# Patient Record
Sex: FEMALE | Race: WHITE | NOT HISPANIC OR LATINO | Employment: OTHER | RURAL
[De-identification: names, ages, dates, MRNs, and addresses within clinical notes are randomized per-mention and may not be internally consistent; named-entity substitution may affect disease eponyms.]

---

## 2020-11-29 ENCOUNTER — HISTORICAL (OUTPATIENT)
Dept: ADMINISTRATIVE | Facility: HOSPITAL | Age: 70
End: 2020-11-29

## 2020-11-29 LAB
ALBUMIN SERPL BCP-MCNC: 2.5 G/DL (ref 3.5–5)
ALBUMIN/GLOB SERPL: 0.7 {RATIO}
ALP SERPL-CCNC: 82 U/L (ref 55–142)
ALT SERPL W P-5'-P-CCNC: 15 U/L (ref 13–56)
ANION GAP SERPL CALCULATED.3IONS-SCNC: 16 MMOL/L
APTT PPP: 47.9 SECONDS (ref 25.2–37.3)
AST SERPL W P-5'-P-CCNC: 30 U/L (ref 15–37)
BACTERIA #/AREA URNS HPF: ABNORMAL /HPF
BASOPHILS # BLD AUTO: 0.08 X10E3/UL (ref 0–0.2)
BASOPHILS NFR BLD AUTO: 0.3 % (ref 0–1)
BILIRUB SERPL-MCNC: 0.6 MG/DL (ref 0–1.2)
BILIRUB UR QL STRIP: ABNORMAL MG/DL
BUN SERPL-MCNC: 42 MG/DL (ref 7–18)
BUN/CREAT SERPL: 22
CALCIUM SERPL-MCNC: 8 MG/DL (ref 8.5–10.1)
CHLORIDE SERPL-SCNC: 99 MMOL/L (ref 98–107)
CK MB SERPL-MCNC: 2.4 NG/ML (ref 1–3.6)
CK SERPL-CCNC: 470 U/L (ref 26–192)
CLARITY UR: ABNORMAL
CLARITY UR: ABNORMAL
CO2 SERPL-SCNC: 23 MMOL/L (ref 21–32)
COLOR UR: ABNORMAL
COLOR UR: ABNORMAL
CREAT SERPL-MCNC: 1.91 MG/DL (ref 0.55–1.02)
EOSINOPHIL # BLD AUTO: 0 X10E3/UL (ref 0–0.5)
EOSINOPHIL NFR BLD AUTO: 0 % (ref 1–4)
ERYTHROCYTE [DISTWIDTH] IN BLOOD BY AUTOMATED COUNT: 13.1 % (ref 11.5–14.5)
GLOBULIN SER-MCNC: 3.5 G/DL (ref 2–4)
GLUCOSE SERPL-MCNC: 131 MG/DL (ref 74–106)
GLUCOSE UR STRIP-MCNC: NEGATIVE MG/DL
HCT VFR BLD AUTO: 30.4 % (ref 38–47)
HGB BLD-MCNC: 9.8 G/DL (ref 12–16)
IMM GRANULOCYTES # BLD AUTO: 0.49 X10E3/UL (ref 0–0.04)
IMM GRANULOCYTES NFR BLD: 1.9 % (ref 0–0.4)
INR BLD: 2.38 (ref 0–3.3)
KETONES UR STRIP-SCNC: NEGATIVE MG/DL
LEUKOCYTE ESTERASE UR QL STRIP: ABNORMAL LEU/UL
LYMPHOCYTES # BLD AUTO: 0.36 X10E3/UL (ref 1–4.8)
LYMPHOCYTES NFR BLD AUTO: 1.4 % (ref 27–41)
LYMPHOCYTES NFR BLD MANUAL: 2 % (ref 27–41)
MAGNESIUM SERPL-MCNC: 1.4 MG/DL (ref 1.7–2.3)
MCH RBC QN AUTO: 31.1 PG (ref 27–31)
MCHC RBC AUTO-ENTMCNC: 32.2 G/DL (ref 32–36)
MCV RBC AUTO: 96.5 FL (ref 80–96)
MONOCYTES # BLD AUTO: 0.72 X10E3/UL (ref 0–0.8)
MONOCYTES NFR BLD AUTO: 2.8 % (ref 2–6)
MONOCYTES NFR BLD MANUAL: 2 % (ref 2–6)
MPC BLD CALC-MCNC: 11.2 FL (ref 9.4–12.4)
MYOGLOBIN SERPL-MCNC: 937 NG/ML (ref 13–71)
NEUTROPHILS # BLD AUTO: 24.19 X10E3/UL (ref 1.8–7.7)
NEUTROPHILS NFR BLD AUTO: 93.6 % (ref 53–65)
NEUTS BAND NFR BLD MANUAL: 24 % (ref 1–5)
NEUTS SEG NFR BLD MANUAL: 72 % (ref 50–62)
NITRITE UR QL STRIP: POSITIVE
NRBC # BLD AUTO: 0 X10E3/UL (ref 0–0)
NRBC, AUTO (.00): 0 /100 (ref 0–0)
PH UR STRIP: 5 PH UNITS (ref 5–8)
PLATELET # BLD AUTO: 234 X10E3/UL (ref 150–400)
PLATELET MORPHOLOGY: ABNORMAL
POTASSIUM SERPL-SCNC: 3.6 MMOL/L (ref 3.5–5.1)
PROT SERPL-MCNC: 6 G/DL (ref 6.4–8.2)
PROT UR QL STRIP: NEGATIVE MG/DL
PROTHROMBIN TIME: 24.6 SECONDS (ref 11.7–14.7)
RBC # BLD AUTO: 3.15 X10E6/UL (ref 4.2–5.4)
RBC # UR STRIP: NEGATIVE ERY/UL
RBC #/AREA URNS HPF: ABNORMAL /HPF (ref 0–3)
RBC MORPH BLD: NORMAL
SARS-COV-2 RNA AMPLIFICATION, QUAL: NEGATIVE
SODIUM SERPL-SCNC: 134 MMOL/L (ref 136–145)
SP GR UR STRIP: 1.02 (ref 1–1.03)
SQUAMOUS #/AREA URNS LPF: ABNORMAL /LPF
TRANS CELLS #/AREA URNS LPF: ABNORMAL /LPF
TROPONIN I SERPL-MCNC: <0.017 NG/ML (ref 0–0.06)
UROBILINOGEN UR STRIP-ACNC: 1 EU/DL
WBC # BLD AUTO: 25.84 X10E3/UL (ref 4.5–11)
WBC #/AREA URNS HPF: ABNORMAL /HPF (ref 0–5)

## 2020-11-30 ENCOUNTER — HISTORICAL (OUTPATIENT)
Dept: ADMINISTRATIVE | Facility: HOSPITAL | Age: 70
End: 2020-11-30

## 2020-11-30 LAB
ANION GAP SERPL CALCULATED.3IONS-SCNC: 15 MMOL/L
BASOPHILS # BLD AUTO: 0.04 X10E3/UL (ref 0–0.2)
BASOPHILS NFR BLD AUTO: 0.2 % (ref 0–1)
BUN SERPL-MCNC: 34 MG/DL (ref 7–18)
CALCIUM SERPL-MCNC: 7.9 MG/DL (ref 8.5–10.1)
CHLORIDE SERPL-SCNC: 102 MMOL/L (ref 101–111)
CO2 SERPL-SCNC: 21 MMOL/L (ref 21–32)
CREAT SERPL-MCNC: 1.7 MG/DL (ref 0.6–1.3)
EOSINOPHIL # BLD AUTO: 0.01 X10E3/UL (ref 0–0.5)
EOSINOPHIL NFR BLD AUTO: 0 % (ref 1–4)
ERYTHROCYTE [DISTWIDTH] IN BLOOD BY AUTOMATED COUNT: 13.1 % (ref 11.5–14.5)
GLUCOSE SERPL-MCNC: 114 MG/DL (ref 74–106)
HCT VFR BLD AUTO: 28.1 % (ref 38–47)
HGB BLD-MCNC: 9.1 G/DL (ref 12–16)
LYMPHOCYTES # BLD AUTO: 0.8 X10E3/UL (ref 1–4.8)
LYMPHOCYTES NFR BLD AUTO: 3.9 % (ref 27–41)
MAGNESIUM SERPL-MCNC: 1.6 MG/DL (ref 1.8–2.4)
MCH RBC QN AUTO: 30.4 PG (ref 27–31)
MCHC RBC AUTO-ENTMCNC: 32.4 G/DL (ref 32–36)
MCV RBC AUTO: 94 FL (ref 80–96)
MONOCYTES # BLD AUTO: 0.71 X10E3/UL (ref 0–0.8)
MONOCYTES NFR BLD AUTO: 3.4 % (ref 2–6)
MPC BLD CALC-MCNC: 10.9 FL (ref 9.4–12.4)
NEUTROPHILS # BLD AUTO: 19.18 X10E3/UL (ref 1.8–7.7)
NEUTROPHILS NFR BLD AUTO: 92.5 % (ref 53–65)
NT-PROBNP SERPL-MCNC: 8434 PG/ML (ref 5–125)
PLATELET # BLD AUTO: 225 X10E3/UL (ref 150–400)
POTASSIUM SERPL-SCNC: 3.4 MMOL/L (ref 3.6–5)
RBC # BLD AUTO: 2.99 X10E6/UL (ref 4.2–5.4)
SODIUM SERPL-SCNC: 135 MMOL/L (ref 135–145)
WBC # BLD AUTO: 20.74 X10E3/UL (ref 4.5–11)

## 2020-12-01 ENCOUNTER — HISTORICAL (OUTPATIENT)
Dept: ADMINISTRATIVE | Facility: HOSPITAL | Age: 70
End: 2020-12-01

## 2020-12-01 LAB
ANION GAP SERPL CALCULATED.3IONS-SCNC: 15 MMOL/L
BASOPHILS # BLD AUTO: 0.04 X10E3/UL (ref 0–0.2)
BASOPHILS NFR BLD AUTO: 0.2 % (ref 0–1)
BUN SERPL-MCNC: 27 MG/DL (ref 7–18)
CALCIUM SERPL-MCNC: 7.8 MG/DL (ref 8.5–10.1)
CHLORIDE SERPL-SCNC: 104 MMOL/L (ref 101–111)
CO2 SERPL-SCNC: 22 MMOL/L (ref 21–32)
CREAT SERPL-MCNC: 1.6 MG/DL (ref 0.6–1.3)
EOSINOPHIL # BLD AUTO: 0.4 X10E3/UL (ref 0–0.5)
EOSINOPHIL NFR BLD AUTO: 2.2 % (ref 1–4)
EOSINOPHIL NFR BLD MANUAL: 1 % (ref 1–4)
ERYTHROCYTE [DISTWIDTH] IN BLOOD BY AUTOMATED COUNT: 13.2 % (ref 11.5–14.5)
GLUCOSE SERPL-MCNC: 106 MG/DL (ref 74–106)
HCT VFR BLD AUTO: 27.4 % (ref 38–47)
HGB BLD-MCNC: 8.8 G/DL (ref 12–16)
LYMPHOCYTES # BLD AUTO: 1.07 X10E3/UL (ref 1–4.8)
LYMPHOCYTES NFR BLD AUTO: 5.9 % (ref 27–41)
LYMPHOCYTES NFR BLD MANUAL: 6 % (ref 27–41)
MAGNESIUM SERPL-MCNC: 2 MG/DL (ref 1.8–2.4)
MCH RBC QN AUTO: 30.7 PG (ref 27–31)
MCHC RBC AUTO-ENTMCNC: 32.1 G/DL (ref 32–36)
MCV RBC AUTO: 96 FL (ref 80–96)
MONOCYTES # BLD AUTO: 1.28 X10E3/UL (ref 0–0.8)
MONOCYTES NFR BLD AUTO: 7 % (ref 2–6)
MPC BLD CALC-MCNC: 11.6 FL (ref 9.4–12.4)
NEUTROPHILS # BLD AUTO: 15.44 X10E3/UL (ref 1.8–7.7)
NEUTROPHILS NFR BLD AUTO: 84.7 % (ref 53–65)
NEUTS BAND NFR BLD MANUAL: 7 % (ref 1–5)
NEUTS SEG NFR BLD MANUAL: 86 % (ref 50–62)
PLATELET # BLD AUTO: 223 X10E3/UL (ref 150–400)
POTASSIUM SERPL-SCNC: 4.2 MMOL/L (ref 3.6–5)
RBC # BLD AUTO: 2.87 X10E6/UL (ref 4.2–5.4)
REPORT: 38
REPORT: NORMAL
SODIUM SERPL-SCNC: 137 MMOL/L (ref 135–145)
WBC # BLD AUTO: 18.23 X10E3/UL (ref 4.5–11)

## 2020-12-02 ENCOUNTER — HISTORICAL (OUTPATIENT)
Dept: ADMINISTRATIVE | Facility: HOSPITAL | Age: 70
End: 2020-12-02

## 2020-12-02 LAB
ANION GAP SERPL CALCULATED.3IONS-SCNC: 17 MMOL/L
BASOPHILS # BLD AUTO: 0.05 X10E3/UL (ref 0–0.2)
BASOPHILS NFR BLD AUTO: 0.3 % (ref 0–1)
BILIRUB UR QL STRIP: NEGATIVE MG/DL
BUN SERPL-MCNC: 19 MG/DL (ref 7–18)
CALCIUM SERPL-MCNC: 8 MG/DL (ref 8.5–10.1)
CHLORIDE SERPL-SCNC: 100 MMOL/L (ref 101–111)
CLARITY UR: CLEAR
CO2 SERPL-SCNC: 22 MMOL/L (ref 21–32)
COLOR UR: YELLOW
CREAT SERPL-MCNC: 1.4 MG/DL (ref 0.6–1.3)
EOSINOPHIL # BLD AUTO: 0.43 X10E3/UL (ref 0–0.5)
EOSINOPHIL NFR BLD AUTO: 2.2 % (ref 1–4)
EOSINOPHIL NFR BLD MANUAL: 2 % (ref 1–4)
ERYTHROCYTE [DISTWIDTH] IN BLOOD BY AUTOMATED COUNT: 13.5 % (ref 11.5–14.5)
GLUCOSE SERPL-MCNC: 124 MG/DL (ref 74–106)
GLUCOSE UR STRIP-MCNC: NORMAL MG/DL
HCT VFR BLD AUTO: 30.6 % (ref 38–47)
HGB BLD-MCNC: 9.7 G/DL (ref 12–16)
KETONES UR STRIP-SCNC: NEGATIVE MG/DL
LEUKOCYTE ESTERASE UR QL STRIP: NEGATIVE LEU/UL
LYMPHOCYTES # BLD AUTO: 1.66 X10E3/UL (ref 1–4.8)
LYMPHOCYTES NFR BLD AUTO: 8.5 % (ref 27–41)
LYMPHOCYTES NFR BLD MANUAL: 8 % (ref 27–41)
MCH RBC QN AUTO: 30.5 PG (ref 27–31)
MCHC RBC AUTO-ENTMCNC: 31.7 G/DL (ref 32–36)
MCV RBC AUTO: 96 FL (ref 80–96)
MONOCYTES # BLD AUTO: 1.71 X10E3/UL (ref 0–0.8)
MONOCYTES NFR BLD AUTO: 8.8 % (ref 2–6)
MONOCYTES NFR BLD MANUAL: 10 % (ref 2–6)
MPC BLD CALC-MCNC: 11 FL (ref 9.4–12.4)
NEUTROPHILS # BLD AUTO: 15.66 X10E3/UL (ref 1.8–7.7)
NEUTROPHILS NFR BLD AUTO: 80.2 % (ref 53–65)
NEUTS BAND NFR BLD MANUAL: 6 % (ref 1–5)
NEUTS SEG NFR BLD MANUAL: 74 % (ref 50–62)
NITRITE UR QL STRIP: NEGATIVE
PH UR STRIP: 5.5 PH UNITS (ref 5–8)
PLATELET # BLD AUTO: 251 X10E3/UL (ref 150–400)
POTASSIUM SERPL-SCNC: 4 MMOL/L (ref 3.6–5)
PROT UR QL STRIP: NEGATIVE MG/DL
RBC # BLD AUTO: 3.18 X10E6/UL (ref 4.2–5.4)
RBC # UR STRIP: NEGATIVE ERY/UL
SODIUM SERPL-SCNC: 135 MMOL/L (ref 135–145)
SP GR UR STRIP: 1.02 (ref 1–1.03)
UROBILINOGEN UR STRIP-ACNC: 4 MG/DL
WBC # BLD AUTO: 19.51 X10E3/UL (ref 4.5–11)

## 2020-12-04 ENCOUNTER — HISTORICAL (OUTPATIENT)
Dept: ADMINISTRATIVE | Facility: HOSPITAL | Age: 70
End: 2020-12-04

## 2020-12-04 LAB — VANCOMYCIN TROUGH SERPL-MCNC: 8.1 UG/DL (ref 10–20)

## 2020-12-05 LAB
REPORT: NORMAL

## 2020-12-06 LAB
REPORT: NORMAL

## 2020-12-09 ENCOUNTER — HISTORICAL (OUTPATIENT)
Dept: ADMINISTRATIVE | Facility: HOSPITAL | Age: 70
End: 2020-12-09

## 2020-12-09 LAB
ANION GAP SERPL CALCULATED.3IONS-SCNC: 9 MMOL/L
BASOPHILS # BLD AUTO: 0.03 X10E3/UL (ref 0–0.2)
BASOPHILS NFR BLD AUTO: 0.3 % (ref 0–1)
BUN SERPL-MCNC: 21 MG/DL (ref 7–18)
CALCIUM SERPL-MCNC: 8.2 MG/DL (ref 8.5–10.1)
CHLORIDE SERPL-SCNC: 101 MMOL/L (ref 101–111)
CO2 SERPL-SCNC: 30 MMOL/L (ref 21–32)
CREAT SERPL-MCNC: 1.5 MG/DL (ref 0.6–1.3)
EOSINOPHIL # BLD AUTO: 0.11 X10E3/UL (ref 0–0.5)
EOSINOPHIL NFR BLD AUTO: 1.1 % (ref 1–4)
ERYTHROCYTE [DISTWIDTH] IN BLOOD BY AUTOMATED COUNT: 13.6 % (ref 11.5–14.5)
GLUCOSE SERPL-MCNC: 90 MG/DL (ref 74–106)
HCT VFR BLD AUTO: 27.5 % (ref 38–47)
HGB BLD-MCNC: 8.6 G/DL (ref 12–16)
LYMPHOCYTES # BLD AUTO: 1.45 X10E3/UL (ref 1–4.8)
LYMPHOCYTES NFR BLD AUTO: 14.5 % (ref 27–41)
MCH RBC QN AUTO: 30.4 PG (ref 27–31)
MCHC RBC AUTO-ENTMCNC: 31.3 G/DL (ref 32–36)
MCV RBC AUTO: 97 FL (ref 80–96)
MONOCYTES # BLD AUTO: 1.27 X10E3/UL (ref 0–0.8)
MONOCYTES NFR BLD AUTO: 12.7 % (ref 2–6)
MPC BLD CALC-MCNC: 9.5 FL (ref 9.4–12.4)
NEUTROPHILS # BLD AUTO: 7.14 X10E3/UL (ref 1.8–7.7)
NEUTROPHILS NFR BLD AUTO: 71.4 % (ref 53–65)
PLATELET # BLD AUTO: 378 X10E3/UL (ref 150–400)
POTASSIUM SERPL-SCNC: 4.2 MMOL/L (ref 3.6–5)
RBC # BLD AUTO: 2.83 X10E6/UL (ref 4.2–5.4)
SARS-COV+SARS-COV-2 AG RESP QL IA.RAPID: POSITIVE
SODIUM SERPL-SCNC: 136 MMOL/L (ref 135–145)
WBC # BLD AUTO: 10 X10E3/UL (ref 4.5–11)

## 2020-12-16 ENCOUNTER — HISTORICAL (OUTPATIENT)
Dept: ADMINISTRATIVE | Facility: HOSPITAL | Age: 70
End: 2020-12-16

## 2020-12-16 LAB
ANION GAP SERPL CALCULATED.3IONS-SCNC: 13 MMOL/L
BASOPHILS # BLD AUTO: 0.02 X10E3/UL (ref 0–0.2)
BASOPHILS NFR BLD AUTO: 0.3 % (ref 0–1)
BUN SERPL-MCNC: 27 MG/DL (ref 7–18)
CALCIUM SERPL-MCNC: 7.9 MG/DL (ref 8.5–10.1)
CHLORIDE SERPL-SCNC: 100 MMOL/L (ref 98–107)
CO2 SERPL-SCNC: 27 MMOL/L (ref 21–32)
CREAT SERPL-MCNC: 1.19 MG/DL (ref 0.55–1.02)
EOSINOPHIL # BLD AUTO: 0 X10E3/UL (ref 0–0.5)
EOSINOPHIL NFR BLD AUTO: 0 % (ref 1–4)
ERYTHROCYTE [DISTWIDTH] IN BLOOD BY AUTOMATED COUNT: 14.1 % (ref 11.5–14.5)
GLUCOSE SERPL-MCNC: 99 MG/DL (ref 74–106)
HCT VFR BLD AUTO: 28.4 % (ref 38–47)
HGB BLD-MCNC: 9 G/DL (ref 12–16)
LYMPHOCYTES # BLD AUTO: 0.91 X10E3/UL (ref 1–4.8)
LYMPHOCYTES NFR BLD AUTO: 13.7 % (ref 27–41)
MCH RBC QN AUTO: 31 PG (ref 27–31)
MCHC RBC AUTO-ENTMCNC: 31.7 G/DL (ref 32–36)
MCV RBC AUTO: 98 FL (ref 80–96)
MONOCYTES # BLD AUTO: 0.39 X10E3/UL (ref 0–0.8)
MONOCYTES NFR BLD AUTO: 5.9 % (ref 2–6)
MPC BLD CALC-MCNC: 9.9 FL (ref 9.4–12.4)
NEUTROPHILS # BLD AUTO: 5.3 X10E3/UL (ref 1.8–7.7)
NEUTROPHILS NFR BLD AUTO: 80.1 % (ref 53–65)
PLATELET # BLD AUTO: 259 X10E3/UL (ref 150–400)
POTASSIUM SERPL-SCNC: 3.8 MMOL/L (ref 3.5–5.1)
RBC # BLD AUTO: 2.9 X10E6/UL (ref 4.2–5.4)
SODIUM SERPL-SCNC: 136 MMOL/L (ref 136–145)
WBC # BLD AUTO: 6.62 X10E3/UL (ref 4.5–11)

## 2020-12-18 ENCOUNTER — HISTORICAL (OUTPATIENT)
Dept: ADMINISTRATIVE | Facility: HOSPITAL | Age: 70
End: 2020-12-18

## 2020-12-18 LAB
ANION GAP SERPL CALCULATED.3IONS-SCNC: 14 MMOL/L
BASOPHILS # BLD AUTO: 0.02 X10E3/UL (ref 0–0.2)
BASOPHILS NFR BLD AUTO: 0.2 % (ref 0–1)
BILIRUB UR QL STRIP: NEGATIVE MG/DL
BUN SERPL-MCNC: 22 MG/DL (ref 7–18)
CALCIUM SERPL-MCNC: 8.6 MG/DL (ref 8.5–10.1)
CHLORIDE SERPL-SCNC: 99 MMOL/L (ref 98–107)
CLARITY UR: CLEAR
CO2 SERPL-SCNC: 29 MMOL/L (ref 21–32)
COLOR UR: YELLOW
CREAT SERPL-MCNC: 1.21 MG/DL (ref 0.55–1.02)
EOSINOPHIL # BLD AUTO: 0 X10E3/UL (ref 0–0.5)
EOSINOPHIL NFR BLD AUTO: 0 % (ref 1–4)
ERYTHROCYTE [DISTWIDTH] IN BLOOD BY AUTOMATED COUNT: 14.2 % (ref 11.5–14.5)
GLUCOSE SERPL-MCNC: 98 MG/DL (ref 74–106)
GLUCOSE UR STRIP-MCNC: NEGATIVE MG/DL
HCT VFR BLD AUTO: 30.6 % (ref 38–47)
HGB BLD-MCNC: 9.5 G/DL (ref 12–16)
KETONES UR STRIP-SCNC: NEGATIVE MG/DL
LEUKOCYTE ESTERASE UR QL STRIP: NEGATIVE LEU/UL
LYMPHOCYTES # BLD AUTO: 0.87 X10E3/UL (ref 1–4.8)
LYMPHOCYTES NFR BLD AUTO: 10.6 % (ref 27–41)
MCH RBC QN AUTO: 30.4 PG (ref 27–31)
MCHC RBC AUTO-ENTMCNC: 31 G/DL (ref 32–36)
MCV RBC AUTO: 98 FL (ref 80–96)
MONOCYTES # BLD AUTO: 0.35 X10E3/UL (ref 0–0.8)
MONOCYTES NFR BLD AUTO: 4.3 % (ref 2–6)
MPC BLD CALC-MCNC: 10.4 FL (ref 9.4–12.4)
NEUTROPHILS # BLD AUTO: 6.96 X10E3/UL (ref 1.8–7.7)
NEUTROPHILS NFR BLD AUTO: 84.9 % (ref 53–65)
NITRITE UR QL STRIP: NEGATIVE
PH UR STRIP: 5 PH UNITS (ref 5–8)
PLATELET # BLD AUTO: 265 X10E3/UL (ref 150–400)
POTASSIUM SERPL-SCNC: 3.7 MMOL/L (ref 3.5–5.1)
PROT UR QL STRIP: NEGATIVE MG/DL
RBC # BLD AUTO: 3.12 X10E6/UL (ref 4.2–5.4)
RBC # UR STRIP: NEGATIVE ERY/UL
SODIUM SERPL-SCNC: 138 MMOL/L (ref 136–145)
SP GR UR STRIP: 1.02 (ref 1–1.03)
UROBILINOGEN UR STRIP-ACNC: 0.2 MG/DL
WBC # BLD AUTO: 8.2 X10E3/UL (ref 4.5–11)

## 2020-12-19 LAB — GLUCOSE SERPL-MCNC: 91 MG/DL (ref 70–105)

## 2020-12-20 ENCOUNTER — HISTORICAL (OUTPATIENT)
Dept: ADMINISTRATIVE | Facility: HOSPITAL | Age: 70
End: 2020-12-20

## 2020-12-20 LAB
ALBUMIN SERPL BCP-MCNC: 2.7 G/DL (ref 3.5–5)
ALBUMIN/GLOB SERPL: 0.6 {RATIO}
ALP SERPL-CCNC: 94 U/L (ref 55–142)
ALT SERPL W P-5'-P-CCNC: 23 U/L (ref 13–56)
ANION GAP SERPL CALCULATED.3IONS-SCNC: 14 MMOL/L
AST SERPL W P-5'-P-CCNC: 22 U/L (ref 15–37)
BASOPHILS # BLD AUTO: 0.02 X10E3/UL (ref 0–0.2)
BASOPHILS NFR BLD AUTO: 0.2 % (ref 0–1)
BILIRUB SERPL-MCNC: 0.4 MG/DL (ref 0–1.2)
BUN SERPL-MCNC: 20 MG/DL (ref 7–18)
BUN/CREAT SERPL: 16.1
CALCIUM SERPL-MCNC: 8.7 MG/DL (ref 8.5–10.1)
CHLORIDE SERPL-SCNC: 103 MMOL/L (ref 98–107)
CO2 SERPL-SCNC: 30 MMOL/L (ref 21–32)
CO2 SERPL-SCNC: 32 MMOL/L (ref 22–28)
CREAT SERPL-MCNC: 1.24 MG/DL (ref 0.55–1.02)
D DIMER PPP FEU-MCNC: 0.59 UG/ML (ref 0–0.47)
EOSINOPHIL # BLD AUTO: 0 X10E3/UL (ref 0–0.5)
EOSINOPHIL NFR BLD AUTO: 0 % (ref 1–4)
ERYTHROCYTE [DISTWIDTH] IN BLOOD BY AUTOMATED COUNT: 14 % (ref 11.5–14.5)
GLOBULIN SER-MCNC: 4.6 G/DL (ref 2–4)
GLUCOSE SERPL-MCNC: 104 MG/DL (ref 60–95)
GLUCOSE SERPL-MCNC: 136 MG/DL (ref 74–106)
HCO3 UR-SCNC: 30 MMOL/L (ref 21–28)
HCO3 UR-SCNC: 31 MMOL/L (ref 21–28)
HCT VFR BLD AUTO: 33 % (ref 35–51)
HCT VFR BLD AUTO: 33.1 % (ref 38–47)
HGB BLD-MCNC: 10.2 G/DL (ref 12–16)
IMM GRANULOCYTES # BLD AUTO: 0.22 X10E3/UL (ref 0–0.04)
IMM GRANULOCYTES NFR BLD: 2 % (ref 0–0.4)
LACTATE SERPL-SCNC: 2.4 MMOL/L (ref 0.4–2)
LACTATE SERPL-SCNC: 2.7 MMOL/L (ref 0.4–2)
LDH SERPL L TO P-CCNC: 1.8 MMOL/L (ref 0.3–1.2)
LYMPHOCYTES # BLD AUTO: 0.68 X10E3/UL (ref 1–4.8)
LYMPHOCYTES NFR BLD AUTO: 6.1 % (ref 27–41)
LYMPHOCYTES NFR BLD MANUAL: 7 % (ref 27–41)
MAGNESIUM SERPL-MCNC: 1.6 MG/DL (ref 1.7–2.3)
MCH RBC QN AUTO: 30.4 PG (ref 27–31)
MCHC RBC AUTO-ENTMCNC: 30.8 G/DL (ref 32–36)
MCV RBC AUTO: 98.5 FL (ref 80–96)
MONOCYTES # BLD AUTO: 0.99 X10E3/UL (ref 0–0.8)
MONOCYTES NFR BLD AUTO: 8.9 % (ref 2–6)
MONOCYTES NFR BLD MANUAL: 8 % (ref 2–6)
MPC BLD CALC-MCNC: 10.5 FL (ref 9.4–12.4)
MYELOCYTES NFR BLD MANUAL: 1 %
NEUTROPHILS # BLD AUTO: 9.17 X10E3/UL (ref 1.8–7.7)
NEUTROPHILS NFR BLD AUTO: 82.8 % (ref 53–65)
NEUTS BAND NFR BLD MANUAL: 1 % (ref 1–5)
NEUTS SEG NFR BLD MANUAL: 83 % (ref 50–62)
NRBC # BLD AUTO: 0.1 X10E3/UL (ref 0–0)
NRBC, AUTO (.00): 0.8 /100 (ref 0–0)
NT-PROBNP SERPL-MCNC: ABNORMAL PG/ML (ref 1–125)
PCO2 BLDA: 42 MM HG (ref 35–48)
PCO2 BLDA: 52 MM HG (ref 35–48)
PEEP: 10
PH SMN: 7.37 PH UNITS (ref 7.35–7.45)
PH SMN: 7.47 PH UNITS (ref 7.35–7.45)
PHOSPHATE SERPL-MCNC: 3.1 MG/DL (ref 2.5–4.5)
PLATELET # BLD AUTO: 277 X10E3/UL (ref 150–400)
PLATELET MORPHOLOGY: ABNORMAL
PO2 BLDA: 62 MM HG (ref 83–108)
PO2 BLDA: 74 MM HG (ref 83–108)
POC A-ADO2: 586
POC BASE EXCESS ARTERIAL: 3.7 MMOL/L (ref -2–3)
POC BASE EXCESS ARTERIAL: 6.3 MMOL/L (ref -2–3)
POC IONIZED CALCIUM: 1.12 MMOL/L (ref 1.15–1.35)
POC MECH RATE: 18
POC O2 STATUS: 100
POC SATURATED O2: 91 % (ref 95–98)
POC SATURATED O2: 96 % (ref 95–98)
POC TIDAL VOLUME: 400
POTASSIUM SERPL-SCNC: 3.7 MMOL/L (ref 3.5–5.1)
POTASSIUM SERPL-SCNC: 3.8 MMOL/L (ref 3.4–4.5)
PROT SERPL-MCNC: 7.3 G/DL (ref 6.4–8.2)
RBC # BLD AUTO: 3.36 X10E6/UL (ref 4.2–5.4)
RBC MORPH BLD: NORMAL
SODIUM SERPL-SCNC: 135 MMOL/L (ref 136–145)
SODIUM SERPL-SCNC: 143 MMOL/L (ref 136–145)
TROPONIN I SERPL-MCNC: 0.03 NG/ML (ref 0–0.06)
WBC # BLD AUTO: 11.08 X10E3/UL (ref 4.5–11)

## 2020-12-21 ENCOUNTER — HISTORICAL (OUTPATIENT)
Dept: ADMINISTRATIVE | Facility: HOSPITAL | Age: 70
End: 2020-12-21

## 2020-12-21 LAB
ALBUMIN SERPL BCP-MCNC: 2.4 G/DL (ref 3.5–5)
ALBUMIN/GLOB SERPL: 0.6 {RATIO}
ALP SERPL-CCNC: 90 U/L (ref 55–142)
ALT SERPL W P-5'-P-CCNC: 177 U/L (ref 13–56)
ANION GAP SERPL CALCULATED.3IONS-SCNC: 14 MMOL/L
AST SERPL W P-5'-P-CCNC: 336 U/L (ref 15–37)
BASOPHILS # BLD AUTO: 0.03 X10E3/UL (ref 0–0.2)
BASOPHILS NFR BLD AUTO: 0.2 % (ref 0–1)
BILIRUB SERPL-MCNC: 0.5 MG/DL (ref 0–1.2)
BUN SERPL-MCNC: 25 MG/DL (ref 7–18)
BUN/CREAT SERPL: 19.4
CALCIUM SERPL-MCNC: 8.2 MG/DL (ref 8.5–10.1)
CHLORIDE SERPL-SCNC: 103 MMOL/L (ref 98–107)
CO2 SERPL-SCNC: 29 MMOL/L (ref 21–32)
CREAT SERPL-MCNC: 1.29 MG/DL (ref 0.55–1.02)
EOSINOPHIL # BLD AUTO: 0 X10E3/UL (ref 0–0.5)
EOSINOPHIL NFR BLD AUTO: 0 % (ref 1–4)
ERYTHROCYTE [DISTWIDTH] IN BLOOD BY AUTOMATED COUNT: 14.3 % (ref 11.5–14.5)
GLOBULIN SER-MCNC: 4.3 G/DL (ref 2–4)
GLUCOSE SERPL-MCNC: 150 MG/DL (ref 74–106)
HCO3 UR-SCNC: 33 MMOL/L (ref 21–28)
HCT VFR BLD AUTO: 30 % (ref 38–47)
HGB BLD-MCNC: 9.5 G/DL (ref 12–16)
IMM GRANULOCYTES # BLD AUTO: 0.65 X10E3/UL (ref 0–0.04)
IMM GRANULOCYTES NFR BLD: 3.5 % (ref 0–0.4)
LYMPHOCYTES # BLD AUTO: 0.75 X10E3/UL (ref 1–4.8)
LYMPHOCYTES NFR BLD AUTO: 4 % (ref 27–41)
LYMPHOCYTES NFR BLD MANUAL: 4 % (ref 27–41)
MCH RBC QN AUTO: 30.1 PG (ref 27–31)
MCHC RBC AUTO-ENTMCNC: 31.7 G/DL (ref 32–36)
MCV RBC AUTO: 94.9 FL (ref 80–96)
METAMYELOCYTES NFR BLD MANUAL: 1 %
MONOCYTES # BLD AUTO: 1.53 X10E3/UL (ref 0–0.8)
MONOCYTES NFR BLD AUTO: 8.1 % (ref 2–6)
MONOCYTES NFR BLD MANUAL: 7 % (ref 2–6)
MPC BLD CALC-MCNC: 10.9 FL (ref 9.4–12.4)
MYELOCYTES NFR BLD MANUAL: 2 %
NEUTROPHILS # BLD AUTO: 15.86 X10E3/UL (ref 1.8–7.7)
NEUTROPHILS NFR BLD AUTO: 84.2 % (ref 53–65)
NEUTS BAND NFR BLD MANUAL: 2 % (ref 1–5)
NEUTS SEG NFR BLD MANUAL: 84 % (ref 50–62)
NRBC # BLD AUTO: 0.2 X10E3/UL (ref 0–0)
NRBC BLD MANUAL-RTO: 1 /100 (ref 0–0)
NRBC, AUTO (.00): 1 /100 (ref 0–0)
PCO2 BLDA: 54 MM HG (ref 35–48)
PH SMN: 7.4 PH UNITS (ref 7.35–7.45)
PLATELET # BLD AUTO: 245 X10E3/UL (ref 150–400)
PLATELET MORPHOLOGY: ABNORMAL
PO2 BLDA: 113 MM HG (ref 83–108)
POC A-ADO2: ABNORMAL
POC BASE EXCESS ARTERIAL: 7.1 MMOL/L (ref -2–3)
POC SATURATED O2: 98 % (ref 95–98)
POTASSIUM SERPL-SCNC: 4.1 MMOL/L (ref 3.5–5.1)
PROT SERPL-MCNC: 6.7 G/DL (ref 6.4–8.2)
RBC # BLD AUTO: 3.16 X10E6/UL (ref 4.2–5.4)
RBC MORPH BLD: NORMAL
SODIUM SERPL-SCNC: 142 MMOL/L (ref 136–145)
TRIGL SERPL-MCNC: 159 MG/DL
WBC # BLD AUTO: 18.82 X10E3/UL (ref 4.5–11)

## 2020-12-22 ENCOUNTER — HISTORICAL (OUTPATIENT)
Dept: ADMINISTRATIVE | Facility: HOSPITAL | Age: 70
End: 2020-12-22

## 2020-12-22 LAB
ALBUMIN SERPL BCP-MCNC: 2.2 G/DL (ref 3.5–5)
ALBUMIN/GLOB SERPL: 0.6 {RATIO}
ALP SERPL-CCNC: 86 U/L (ref 55–142)
ALT SERPL W P-5'-P-CCNC: 117 U/L (ref 13–56)
ANION GAP SERPL CALCULATED.3IONS-SCNC: 5 MMOL/L
ANISOCYTOSIS BLD QL SMEAR: ABNORMAL
AST SERPL W P-5'-P-CCNC: 161 U/L (ref 15–37)
BASOPHILS # BLD AUTO: 0.01 X10E3/UL (ref 0–0.2)
BASOPHILS NFR BLD AUTO: 0.1 % (ref 0–1)
BILIRUB SERPL-MCNC: 0.4 MG/DL (ref 0–1.2)
BUN SERPL-MCNC: 28 MG/DL (ref 7–18)
BUN/CREAT SERPL: 23
CALCIUM SERPL-MCNC: 8.3 MG/DL (ref 8.5–10.1)
CHLORIDE SERPL-SCNC: 103 MMOL/L (ref 98–107)
CO2 SERPL-SCNC: 37 MMOL/L (ref 21–32)
CREAT SERPL-MCNC: 1.22 MG/DL (ref 0.55–1.02)
D DIMER PPP FEU-MCNC: 0.67 UG/ML (ref 0–0.47)
EOSINOPHIL # BLD AUTO: 0 X10E3/UL (ref 0–0.5)
EOSINOPHIL NFR BLD AUTO: 0 % (ref 1–4)
ERYTHROCYTE [DISTWIDTH] IN BLOOD BY AUTOMATED COUNT: 14 % (ref 11.5–14.5)
GLOBULIN SER-MCNC: 3.6 G/DL (ref 2–4)
GLUCOSE SERPL-MCNC: 136 MG/DL (ref 74–106)
HCT VFR BLD AUTO: 27.5 % (ref 38–47)
HGB BLD-MCNC: 8.8 G/DL (ref 12–16)
HYPOCHROMIA BLD QL SMEAR: SLIGHT
IMM GRANULOCYTES # BLD AUTO: 0.79 X10E3/UL (ref 0–0.04)
IMM GRANULOCYTES NFR BLD: 5.4 % (ref 0–0.4)
LYMPHOCYTES # BLD AUTO: 0.95 X10E3/UL (ref 1–4.8)
LYMPHOCYTES NFR BLD AUTO: 6.5 % (ref 27–41)
LYMPHOCYTES NFR BLD MANUAL: 9 % (ref 27–41)
MAGNESIUM SERPL-MCNC: 1.7 MG/DL (ref 1.7–2.3)
MCH RBC QN AUTO: 30.6 PG (ref 27–31)
MCHC RBC AUTO-ENTMCNC: 32 G/DL (ref 32–36)
MCV RBC AUTO: 95.5 FL (ref 80–96)
MONOCYTES # BLD AUTO: 0.98 X10E3/UL (ref 0–0.8)
MONOCYTES NFR BLD AUTO: 6.7 % (ref 2–6)
MONOCYTES NFR BLD MANUAL: 4 % (ref 2–6)
MPC BLD CALC-MCNC: 11.4 FL (ref 9.4–12.4)
MYELOCYTES NFR BLD MANUAL: 1 %
NEUTROPHILS # BLD AUTO: 11.89 X10E3/UL (ref 1.8–7.7)
NEUTROPHILS NFR BLD AUTO: 81.3 % (ref 53–65)
NEUTS SEG NFR BLD MANUAL: 86 % (ref 50–62)
NRBC # BLD AUTO: 0.3 X10E3/UL (ref 0–0)
NRBC BLD MANUAL-RTO: 5 /100 (ref 0–0)
NRBC, AUTO (.00): 2.3 /100 (ref 0–0)
OVALOCYTES BLD QL SMEAR: ABNORMAL
PHOSPHATE SERPL-MCNC: 2.5 MG/DL (ref 2.5–4.5)
PLATELET # BLD AUTO: 244 X10E3/UL (ref 150–400)
PLATELET MORPHOLOGY: ABNORMAL
POLYCHROMASIA BLD QL SMEAR: ABNORMAL
POTASSIUM SERPL-SCNC: 3 MMOL/L (ref 3.5–5.1)
PROT SERPL-MCNC: 5.8 G/DL (ref 6.4–8.2)
RBC # BLD AUTO: 2.88 X10E6/UL (ref 4.2–5.4)
SODIUM SERPL-SCNC: 142 MMOL/L (ref 136–145)
TRIGL SERPL-MCNC: 118 MG/DL
WBC # BLD AUTO: 14.62 X10E3/UL (ref 4.5–11)

## 2020-12-23 ENCOUNTER — HISTORICAL (OUTPATIENT)
Dept: ADMINISTRATIVE | Facility: HOSPITAL | Age: 70
End: 2020-12-23

## 2020-12-23 LAB
ALBUMIN SERPL BCP-MCNC: 1.9 G/DL (ref 3.5–5)
ALBUMIN/GLOB SERPL: 0.6 {RATIO}
ALP SERPL-CCNC: 79 U/L (ref 55–142)
ALT SERPL W P-5'-P-CCNC: 68 U/L (ref 13–56)
ANION GAP SERPL CALCULATED.3IONS-SCNC: 9 MMOL/L
AST SERPL W P-5'-P-CCNC: 39 U/L (ref 15–37)
BILIRUB SERPL-MCNC: 0.4 MG/DL (ref 0–1.2)
BUN SERPL-MCNC: 38 MG/DL (ref 7–18)
BUN/CREAT SERPL: 38
CALCIUM SERPL-MCNC: 7.6 MG/DL (ref 8.5–10.1)
CHLORIDE SERPL-SCNC: 105 MMOL/L (ref 98–107)
CO2 SERPL-SCNC: 34 MMOL/L (ref 21–32)
CREAT SERPL-MCNC: 1 MG/DL (ref 0.55–1.02)
GLOBULIN SER-MCNC: 3.4 G/DL (ref 2–4)
GLUCOSE SERPL-MCNC: 149 MG/DL (ref 74–106)
POTASSIUM SERPL-SCNC: 3 MMOL/L (ref 3.5–5.1)
PROT SERPL-MCNC: 5.3 G/DL (ref 6.4–8.2)
SODIUM SERPL-SCNC: 145 MMOL/L (ref 136–145)
TRIGL SERPL-MCNC: 137 MG/DL

## 2020-12-25 ENCOUNTER — HISTORICAL (OUTPATIENT)
Dept: ADMINISTRATIVE | Facility: HOSPITAL | Age: 70
End: 2020-12-25

## 2020-12-25 LAB
ALBUMIN SERPL BCP-MCNC: 2 G/DL (ref 3.5–5)
ALBUMIN/GLOB SERPL: 0.5 {RATIO}
ALP SERPL-CCNC: 80 U/L (ref 55–142)
ALT SERPL W P-5'-P-CCNC: 47 U/L (ref 13–56)
ANION GAP SERPL CALCULATED.3IONS-SCNC: 11 MMOL/L
ANISOCYTOSIS BLD QL SMEAR: ABNORMAL
AST SERPL W P-5'-P-CCNC: 28 U/L (ref 15–37)
BASOPHILS # BLD AUTO: 0.05 X10E3/UL (ref 0–0.2)
BASOPHILS NFR BLD AUTO: 0.3 % (ref 0–1)
BILIRUB SERPL-MCNC: 0.4 MG/DL (ref 0–1.2)
BUN SERPL-MCNC: 39 MG/DL (ref 7–18)
BUN/CREAT SERPL: 45.9
CALCIUM SERPL-MCNC: 7.7 MG/DL (ref 8.5–10.1)
CHLORIDE SERPL-SCNC: 105 MMOL/L (ref 98–107)
CO2 SERPL-SCNC: 34 MMOL/L (ref 21–32)
CREAT SERPL-MCNC: 0.85 MG/DL (ref 0.55–1.02)
D DIMER PPP FEU-MCNC: 0.58 UG/ML (ref 0–0.47)
EOSINOPHIL # BLD AUTO: 0 X10E3/UL (ref 0–0.5)
EOSINOPHIL NFR BLD AUTO: 0 % (ref 1–4)
ERYTHROCYTE [DISTWIDTH] IN BLOOD BY AUTOMATED COUNT: 14.9 % (ref 11.5–14.5)
GLOBULIN SER-MCNC: 3.7 G/DL (ref 2–4)
GLUCOSE SERPL-MCNC: 108 MG/DL (ref 74–106)
HCT VFR BLD AUTO: 30.9 % (ref 38–47)
HGB BLD-MCNC: 9.6 G/DL (ref 12–16)
HYPOCHROMIA BLD QL SMEAR: SLIGHT
IMM GRANULOCYTES # BLD AUTO: 1 X10E3/UL (ref 0–0.04)
IMM GRANULOCYTES NFR BLD: 6.4 % (ref 0–0.4)
LYMPHOCYTES # BLD AUTO: 1.33 X10E3/UL (ref 1–4.8)
LYMPHOCYTES NFR BLD AUTO: 8.6 % (ref 27–41)
LYMPHOCYTES NFR BLD MANUAL: 6 % (ref 27–41)
MCH RBC QN AUTO: 29.4 PG (ref 27–31)
MCHC RBC AUTO-ENTMCNC: 31.1 G/DL (ref 32–36)
MCV RBC AUTO: 94.8 FL (ref 80–96)
METAMYELOCYTES NFR BLD MANUAL: 2 %
MONOCYTES # BLD AUTO: 1.36 X10E3/UL (ref 0–0.8)
MONOCYTES NFR BLD AUTO: 8.7 % (ref 2–6)
MONOCYTES NFR BLD MANUAL: 9 % (ref 2–6)
MPC BLD CALC-MCNC: 12.8 FL (ref 9.4–12.4)
NEUTROPHILS # BLD AUTO: 11.81 X10E3/UL (ref 1.8–7.7)
NEUTROPHILS NFR BLD AUTO: 76 % (ref 53–65)
NEUTS BAND NFR BLD MANUAL: 2 % (ref 1–5)
NEUTS SEG NFR BLD MANUAL: 81 % (ref 50–62)
NRBC # BLD AUTO: 0.1 X10E3/UL (ref 0–0)
NRBC, AUTO (.00): 0.6 /100 (ref 0–0)
OVALOCYTES BLD QL SMEAR: ABNORMAL
PLATELET # BLD AUTO: 318 X10E3/UL (ref 150–400)
PLATELET MORPHOLOGY: ABNORMAL
POLYCHROMASIA BLD QL SMEAR: ABNORMAL
POTASSIUM SERPL-SCNC: 3.6 MMOL/L (ref 3.5–5.1)
PROT SERPL-MCNC: 5.7 G/DL (ref 6.4–8.2)
RBC # BLD AUTO: 3.26 X10E6/UL (ref 4.2–5.4)
SODIUM SERPL-SCNC: 146 MMOL/L (ref 136–145)
TRIGL SERPL-MCNC: 146 MG/DL
WBC # BLD AUTO: 15.55 X10E3/UL (ref 4.5–11)

## 2020-12-26 ENCOUNTER — HISTORICAL (OUTPATIENT)
Dept: ADMINISTRATIVE | Facility: HOSPITAL | Age: 70
End: 2020-12-26

## 2020-12-26 LAB — TRIGL SERPL-MCNC: 148 MG/DL

## 2020-12-27 ENCOUNTER — HISTORICAL (OUTPATIENT)
Dept: ADMINISTRATIVE | Facility: HOSPITAL | Age: 70
End: 2020-12-27

## 2020-12-27 LAB
ANION GAP SERPL CALCULATED.3IONS-SCNC: 12 MMOL/L
ANISOCYTOSIS BLD QL SMEAR: ABNORMAL
BASOPHILS # BLD AUTO: 0.05 X10E3/UL (ref 0–0.2)
BASOPHILS NFR BLD AUTO: 0.3 % (ref 0–1)
BUN SERPL-MCNC: 34 MG/DL (ref 7–18)
CALCIUM SERPL-MCNC: 8 MG/DL (ref 8.5–10.1)
CHLORIDE SERPL-SCNC: 104 MMOL/L (ref 98–107)
CO2 SERPL-SCNC: 32 MMOL/L (ref 21–32)
CREAT SERPL-MCNC: 0.84 MG/DL (ref 0.55–1.02)
EOSINOPHIL # BLD AUTO: 0.13 X10E3/UL (ref 0–0.5)
EOSINOPHIL NFR BLD AUTO: 0.7 % (ref 1–4)
ERYTHROCYTE [DISTWIDTH] IN BLOOD BY AUTOMATED COUNT: 14.6 % (ref 11.5–14.5)
GLUCOSE SERPL-MCNC: 94 MG/DL (ref 74–106)
HCT VFR BLD AUTO: 30.2 % (ref 38–47)
HGB BLD-MCNC: 9.6 G/DL (ref 12–16)
IMM GRANULOCYTES # BLD AUTO: 1.28 X10E3/UL (ref 0–0.04)
IMM GRANULOCYTES NFR BLD: 6.6 % (ref 0–0.4)
LYMPHOCYTES # BLD AUTO: 2.41 X10E3/UL (ref 1–4.8)
LYMPHOCYTES NFR BLD AUTO: 12.4 % (ref 27–41)
LYMPHOCYTES NFR BLD MANUAL: 10 % (ref 27–41)
MCH RBC QN AUTO: 30.5 PG (ref 27–31)
MCHC RBC AUTO-ENTMCNC: 31.8 G/DL (ref 32–36)
MCV RBC AUTO: 95.9 FL (ref 80–96)
MONOCYTES # BLD AUTO: 1.7 X10E3/UL (ref 0–0.8)
MONOCYTES NFR BLD AUTO: 8.7 % (ref 2–6)
MONOCYTES NFR BLD MANUAL: 7 % (ref 2–6)
MPC BLD CALC-MCNC: 12.4 FL (ref 9.4–12.4)
MYELOCYTES NFR BLD MANUAL: 1 %
NEUTROPHILS # BLD AUTO: 13.86 X10E3/UL (ref 1.8–7.7)
NEUTROPHILS NFR BLD AUTO: 71.3 % (ref 53–65)
NEUTS SEG NFR BLD MANUAL: 82 % (ref 50–62)
NRBC # BLD AUTO: 0 X10E3/UL (ref 0–0)
NRBC, AUTO (.00): 0.2 /100 (ref 0–0)
OVALOCYTES BLD QL SMEAR: ABNORMAL
PLATELET # BLD AUTO: 343 X10E3/UL (ref 150–400)
PLATELET MORPHOLOGY: ABNORMAL
POLYCHROMASIA BLD QL SMEAR: ABNORMAL
POTASSIUM SERPL-SCNC: 3.5 MMOL/L (ref 3.5–5.1)
RBC # BLD AUTO: 3.15 X10E6/UL (ref 4.2–5.4)
SODIUM SERPL-SCNC: 144 MMOL/L (ref 136–145)
TARGETS BLD QL SMEAR: ABNORMAL
TRIGL SERPL-MCNC: 121 MG/DL
WBC # BLD AUTO: 19.43 X10E3/UL (ref 4.5–11)

## 2020-12-29 ENCOUNTER — HISTORICAL (OUTPATIENT)
Dept: ADMINISTRATIVE | Facility: HOSPITAL | Age: 70
End: 2020-12-29

## 2020-12-29 LAB
ANION GAP SERPL CALCULATED.3IONS-SCNC: 12 MMOL/L
BUN SERPL-MCNC: 25 MG/DL (ref 7–18)
CALCIUM SERPL-MCNC: 8.2 MG/DL (ref 8.5–10.1)
CHLORIDE SERPL-SCNC: 104 MMOL/L (ref 98–107)
CO2 SERPL-SCNC: 29 MMOL/L (ref 21–32)
CREAT SERPL-MCNC: 0.8 MG/DL (ref 0.55–1.02)
GLUCOSE SERPL-MCNC: 85 MG/DL (ref 74–106)
POTASSIUM SERPL-SCNC: 3.7 MMOL/L (ref 3.5–5.1)
SODIUM SERPL-SCNC: 141 MMOL/L (ref 136–145)

## 2020-12-30 ENCOUNTER — HISTORICAL (OUTPATIENT)
Dept: ADMINISTRATIVE | Facility: HOSPITAL | Age: 70
End: 2020-12-30

## 2020-12-30 LAB
ANION GAP SERPL CALCULATED.3IONS-SCNC: 14 MMOL/L
BASOPHILS # BLD AUTO: 0.08 X10E3/UL (ref 0–0.2)
BASOPHILS NFR BLD AUTO: 0.5 % (ref 0–1)
BUN SERPL-MCNC: 23 MG/DL (ref 7–18)
CALCIUM SERPL-MCNC: 8.4 MG/DL (ref 8.5–10.1)
CHLORIDE SERPL-SCNC: 102 MMOL/L (ref 98–107)
CHOLEST SERPL-MCNC: 145 MG/DL
CHOLEST/HDLC SERPL: 3.7 {RATIO}
CO2 SERPL-SCNC: 28 MMOL/L (ref 21–32)
CREAT SERPL-MCNC: 0.92 MG/DL (ref 0.55–1.02)
CRP SERPL-MCNC: 5.4 UG/ML (ref 0–0.8)
EOSINOPHIL # BLD AUTO: 0.11 X10E3/UL (ref 0–0.5)
EOSINOPHIL NFR BLD AUTO: 0.6 % (ref 1–4)
ERYTHROCYTE [DISTWIDTH] IN BLOOD BY AUTOMATED COUNT: 15.1 % (ref 11.5–14.5)
GLUCOSE SERPL-MCNC: 71 MG/DL (ref 74–106)
HCT VFR BLD AUTO: 34.6 % (ref 38–47)
HDLC SERPL-MCNC: 39 MG/DL
HGB BLD-MCNC: 10.7 G/DL (ref 12–16)
IMM GRANULOCYTES # BLD AUTO: 0.63 X10E3/UL (ref 0–0.04)
IMM GRANULOCYTES NFR BLD: 3.6 % (ref 0–0.4)
LDLC SERPL CALC-MCNC: 81 MG/DL
LYMPHOCYTES # BLD AUTO: 1.45 X10E3/UL (ref 1–4.8)
LYMPHOCYTES NFR BLD AUTO: 8.2 % (ref 27–41)
MCH RBC QN AUTO: 30 PG (ref 27–31)
MCHC RBC AUTO-ENTMCNC: 30.9 G/DL (ref 32–36)
MCV RBC AUTO: 96.9 FL (ref 80–96)
MONOCYTES # BLD AUTO: 1.93 X10E3/UL (ref 0–0.8)
MONOCYTES NFR BLD AUTO: 10.9 % (ref 2–6)
MPC BLD CALC-MCNC: 11.8 FL (ref 9.4–12.4)
NEUTROPHILS # BLD AUTO: 13.49 X10E3/UL (ref 1.8–7.7)
NEUTROPHILS NFR BLD AUTO: 76.2 % (ref 53–65)
NRBC # BLD AUTO: 0 X10E3/UL (ref 0–0)
NRBC, AUTO (.00): 0 /100 (ref 0–0)
PLATELET # BLD AUTO: 464 X10E3/UL (ref 150–400)
POTASSIUM SERPL-SCNC: 3.7 MMOL/L (ref 3.5–5.1)
RBC # BLD AUTO: 3.57 X10E6/UL (ref 4.2–5.4)
SODIUM SERPL-SCNC: 140 MMOL/L (ref 136–145)
T4 SERPL-MCNC: 12.9 UG/DL (ref 4.8–13.9)
TRIGL SERPL-MCNC: 124 MG/DL
TSH SERPL DL<=0.005 MIU/L-ACNC: 1.24 UIU/ML (ref 0.36–3.74)
WBC # BLD AUTO: 17.69 X10E3/UL (ref 4.5–11)

## 2020-12-31 ENCOUNTER — HISTORICAL (OUTPATIENT)
Dept: ADMINISTRATIVE | Facility: HOSPITAL | Age: 70
End: 2020-12-31

## 2020-12-31 LAB
ANION GAP SERPL CALCULATED.3IONS-SCNC: 9 MMOL/L
ANISOCYTOSIS BLD QL SMEAR: ABNORMAL
BASOPHILS # BLD AUTO: 0.06 X10E3/UL (ref 0–0.2)
BASOPHILS NFR BLD AUTO: 0.4 % (ref 0–1)
BUN SERPL-MCNC: 24 MG/DL (ref 7–17)
CALCIUM SERPL-MCNC: 8.9 MG/DL (ref 8.5–10.1)
CHLORIDE SERPL-SCNC: 105 MMOL/L (ref 98–107)
CO2 SERPL-SCNC: 28 MMOL/L (ref 21–32)
CREAT SERPL-MCNC: 0.9 MG/DL (ref 0.55–1.3)
EOSINOPHIL # BLD AUTO: 0.03 X10E3/UL (ref 0–0.5)
EOSINOPHIL NFR BLD AUTO: 0.2 % (ref 1–4)
ERYTHROCYTE [DISTWIDTH] IN BLOOD BY AUTOMATED COUNT: 15.4 % (ref 11.5–14.5)
GLUCOSE SERPL-MCNC: 97 MG/DL (ref 74–106)
HCT VFR BLD AUTO: 31.7 % (ref 38–47)
HGB BLD-MCNC: 9.7 G/DL (ref 12–16)
HYPOCHROMIA BLD QL SMEAR: SLIGHT
LYMPHOCYTES # BLD AUTO: 1.62 X10E3/UL (ref 1–4.8)
LYMPHOCYTES NFR BLD AUTO: 9.8 % (ref 27–41)
LYMPHOCYTES NFR BLD MANUAL: 11 % (ref 27–41)
MCH RBC QN AUTO: 29.8 PG (ref 27–31)
MCHC RBC AUTO-ENTMCNC: 30.6 G/DL (ref 32–36)
MCV RBC AUTO: 97 FL (ref 80–96)
MONOCYTES # BLD AUTO: 1.83 X10E3/UL (ref 0–0.8)
MONOCYTES NFR BLD AUTO: 11 % (ref 2–6)
MONOCYTES NFR BLD MANUAL: 8 % (ref 2–6)
MPC BLD CALC-MCNC: 10.9 FL (ref 9.4–12.4)
NEUTROPHILS # BLD AUTO: 13.04 X10E3/UL (ref 1.8–7.7)
NEUTROPHILS NFR BLD AUTO: 78.6 % (ref 53–65)
NEUTS BAND NFR BLD MANUAL: 2 % (ref 1–5)
NEUTS SEG NFR BLD MANUAL: 79 % (ref 50–62)
PLATELET # BLD AUTO: 392 X10E3/UL (ref 150–400)
PLATELET MORPHOLOGY: NORMAL
POTASSIUM SERPL-SCNC: 3.8 MMOL/L (ref 3.5–5.1)
RBC # BLD AUTO: 3.26 X10E6/UL (ref 4.2–5.4)
SODIUM SERPL-SCNC: 138 MMOL/L (ref 136–145)
WBC # BLD AUTO: 16.58 X10E3/UL (ref 4.5–11)

## 2021-01-01 ENCOUNTER — HISTORICAL (OUTPATIENT)
Dept: ADMINISTRATIVE | Facility: HOSPITAL | Age: 71
End: 2021-01-01

## 2021-01-01 LAB
ALBUMIN SERPL BCP-MCNC: 2.2 G/DL (ref 3.4–5)
ALBUMIN/GLOB SERPL: 0.6 {RATIO}
ALP SERPL-CCNC: 71 U/L (ref 46–116)
ALT SERPL W P-5'-P-CCNC: 29 U/L (ref 14–63)
ANION GAP SERPL CALCULATED.3IONS-SCNC: 9 MMOL/L
ANISOCYTOSIS BLD QL SMEAR: ABNORMAL
AST SERPL W P-5'-P-CCNC: 13 U/L (ref 15–37)
BASOPHILS # BLD AUTO: 0.05 X10E3/UL (ref 0–0.2)
BASOPHILS NFR BLD AUTO: 0.4 % (ref 0–1)
BASOPHILS NFR BLD MANUAL: 1 % (ref 0–1)
BILIRUB SERPL-MCNC: 0.5 MG/DL (ref 0.2–1)
BILIRUB UR QL STRIP: NEGATIVE MG/DL
BUN SERPL-MCNC: 22 MG/DL (ref 7–17)
BUN/CREAT SERPL: 22.9
CALCIUM SERPL-MCNC: 8.8 MG/DL (ref 8.5–10.1)
CHLORIDE SERPL-SCNC: 103 MMOL/L (ref 98–107)
CLARITY UR: ABNORMAL
CO2 SERPL-SCNC: 30 MMOL/L (ref 21–32)
COLOR UR: YELLOW
CREAT SERPL-MCNC: 0.96 MG/DL (ref 0.55–1.3)
EOSINOPHIL # BLD AUTO: 0.03 X10E3/UL (ref 0–0.5)
EOSINOPHIL NFR BLD AUTO: 0.2 % (ref 1–4)
ERYTHROCYTE [DISTWIDTH] IN BLOOD BY AUTOMATED COUNT: 15.5 % (ref 11.5–14.5)
GLOBULIN SER-MCNC: 3.7 G/DL
GLUCOSE SERPL-MCNC: 101 MG/DL (ref 74–106)
GLUCOSE UR STRIP-MCNC: NORMAL MG/DL
HCT VFR BLD AUTO: 30.9 % (ref 38–47)
HGB BLD-MCNC: 9.4 G/DL (ref 12–16)
HYPOCHROMIA BLD QL SMEAR: ABNORMAL
KETONES UR STRIP-SCNC: NEGATIVE MG/DL
LEUKOCYTE ESTERASE UR QL STRIP: NEGATIVE LEU/UL
LYMPHOCYTES # BLD AUTO: 1.29 X10E3/UL (ref 1–4.8)
LYMPHOCYTES NFR BLD AUTO: 9.2 % (ref 27–41)
LYMPHOCYTES NFR BLD MANUAL: 9 % (ref 27–41)
MCH RBC QN AUTO: 30.2 PG (ref 27–31)
MCHC RBC AUTO-ENTMCNC: 30.4 G/DL (ref 32–36)
MCV RBC AUTO: 99 FL (ref 80–96)
MONOCYTES # BLD AUTO: 1.66 X10E3/UL (ref 0–0.8)
MONOCYTES NFR BLD AUTO: 11.8 % (ref 2–6)
MONOCYTES NFR BLD MANUAL: 5 % (ref 2–6)
MPC BLD CALC-MCNC: 10.7 FL (ref 9.4–12.4)
NEUTROPHILS # BLD AUTO: 11.03 X10E3/UL (ref 1.8–7.7)
NEUTROPHILS NFR BLD AUTO: 78.4 % (ref 53–65)
NEUTS SEG NFR BLD MANUAL: 85 % (ref 50–62)
NITRITE UR QL STRIP: NEGATIVE MG/DL
PH UR STRIP: 5.5 PH UNITS (ref 5–8)
PLATELET # BLD AUTO: 352 X10E3/UL (ref 150–400)
PLATELET MORPHOLOGY: NORMAL
POTASSIUM SERPL-SCNC: 3.9 MMOL/L (ref 3.5–5.1)
PROT SERPL-MCNC: 5.9 G/DL (ref 6.4–8.2)
PROT UR QL STRIP: NEGATIVE MG/DL
RBC # BLD AUTO: 3.11 X10E6/UL (ref 4.2–5.4)
RBC # UR STRIP: NEGATIVE ERY/UL
SODIUM SERPL-SCNC: 138 MMOL/L (ref 136–145)
SP GR UR STRIP: 1.02 (ref 1–1.03)
UROBILINOGEN UR STRIP-ACNC: 0.2 MG/DL
WBC # BLD AUTO: 14.06 X10E3/UL (ref 4.5–11)

## 2021-01-02 ENCOUNTER — HISTORICAL (OUTPATIENT)
Dept: ADMINISTRATIVE | Facility: HOSPITAL | Age: 71
End: 2021-01-02

## 2021-01-04 LAB — C DIFF TOX A+B STL IA-ACNC: NEGATIVE

## 2021-01-05 ENCOUNTER — HISTORICAL (OUTPATIENT)
Dept: ADMINISTRATIVE | Facility: HOSPITAL | Age: 71
End: 2021-01-05

## 2021-01-05 LAB
ALBUMIN SERPL BCP-MCNC: 2.6 G/DL (ref 3.4–5)
ALBUMIN/GLOB SERPL: 0.6 {RATIO}
ALP SERPL-CCNC: 87 U/L (ref 46–116)
ALT SERPL W P-5'-P-CCNC: 37 U/L (ref 14–63)
ANION GAP SERPL CALCULATED.3IONS-SCNC: 11 MMOL/L
ANISOCYTOSIS BLD QL SMEAR: ABNORMAL
AST SERPL W P-5'-P-CCNC: 16 U/L (ref 15–37)
BACTERIA #/AREA URNS HPF: ABNORMAL /HPF
BASOPHILS # BLD AUTO: 0.08 X10E3/UL (ref 0–0.2)
BASOPHILS NFR BLD AUTO: 0.6 % (ref 0–1)
BILIRUB SERPL-MCNC: 0.4 MG/DL (ref 0.2–1)
BILIRUB UR QL STRIP: NEGATIVE MG/DL
BUN SERPL-MCNC: 21 MG/DL (ref 7–17)
BUN/CREAT SERPL: 22.8
CALCIUM SERPL-MCNC: 9.4 MG/DL (ref 8.5–10.1)
CHLORIDE SERPL-SCNC: 104 MMOL/L (ref 98–107)
CLARITY UR: ABNORMAL
CLARITY UR: ABNORMAL
CO2 SERPL-SCNC: 28 MMOL/L (ref 21–32)
COLOR UR: YELLOW
COLOR UR: YELLOW
CREAT SERPL-MCNC: 0.92 MG/DL (ref 0.55–1.3)
EOSINOPHIL # BLD AUTO: 0.06 X10E3/UL (ref 0–0.5)
EOSINOPHIL NFR BLD AUTO: 0.4 % (ref 1–4)
EOSINOPHIL NFR BLD MANUAL: 1 % (ref 1–4)
ERYTHROCYTE [DISTWIDTH] IN BLOOD BY AUTOMATED COUNT: 15.4 % (ref 11.5–14.5)
GLOBULIN SER-MCNC: 4.1 G/DL
GLUCOSE SERPL-MCNC: 98 MG/DL (ref 74–106)
GLUCOSE UR STRIP-MCNC: NORMAL MG/DL
HCT VFR BLD AUTO: 34 % (ref 38–47)
HGB BLD-MCNC: 10.4 G/DL (ref 12–16)
HYPOCHROMIA BLD QL SMEAR: SLIGHT
KETONES UR STRIP-SCNC: NEGATIVE MG/DL
LEUKOCYTE ESTERASE UR QL STRIP: NEGATIVE LEU/UL
LYMPHOCYTES # BLD AUTO: 1.88 X10E3/UL (ref 1–4.8)
LYMPHOCYTES NFR BLD AUTO: 13.9 % (ref 27–41)
LYMPHOCYTES NFR BLD MANUAL: 13 % (ref 27–41)
MCH RBC QN AUTO: 30.2 PG (ref 27–31)
MCHC RBC AUTO-ENTMCNC: 30.6 G/DL (ref 32–36)
MCV RBC AUTO: 99 FL (ref 80–96)
MONOCYTES # BLD AUTO: 0.98 X10E3/UL (ref 0–0.8)
MONOCYTES NFR BLD AUTO: 7.3 % (ref 2–6)
MONOCYTES NFR BLD MANUAL: 6 % (ref 2–6)
MPC BLD CALC-MCNC: 11.3 FL (ref 9.4–12.4)
NEUTROPHILS # BLD AUTO: 10.51 X10E3/UL (ref 1.8–7.7)
NEUTROPHILS NFR BLD AUTO: 77.8 % (ref 53–65)
NEUTS SEG NFR BLD MANUAL: 80 % (ref 50–62)
NITRITE UR QL STRIP: POSITIVE MG/DL
PH UR STRIP: 5.5 PH UNITS (ref 5–8)
PLATELET # BLD AUTO: 325 X10E3/UL (ref 150–400)
PLATELET MORPHOLOGY: NORMAL
POTASSIUM SERPL-SCNC: 3.1 MMOL/L (ref 3.5–5.1)
PROT SERPL-MCNC: 6.7 G/DL (ref 6.4–8.2)
PROT UR QL STRIP: NEGATIVE MG/DL
RBC # BLD AUTO: 3.44 X10E6/UL (ref 4.2–5.4)
RBC # UR STRIP: ABNORMAL ERY/UL
RBC #/AREA URNS HPF: ABNORMAL /HPF (ref 0–3)
SODIUM SERPL-SCNC: 140 MMOL/L (ref 136–145)
SP GR UR STRIP: 1.02 (ref 1–1.03)
SQUAMOUS #/AREA URNS LPF: ABNORMAL /LPF
TRANS CELLS #/AREA URNS LPF: ABNORMAL /LPF
UROBILINOGEN UR STRIP-ACNC: 0.2 MG/DL
WBC # BLD AUTO: 13.51 X10E3/UL (ref 4.5–11)
WBC #/AREA URNS HPF: ABNORMAL /HPF (ref 0–5)

## 2021-01-07 ENCOUNTER — HISTORICAL (OUTPATIENT)
Dept: ADMINISTRATIVE | Facility: HOSPITAL | Age: 71
End: 2021-01-07

## 2021-01-07 LAB
ANION GAP SERPL CALCULATED.3IONS-SCNC: 7 MMOL/L
BACTERIA #/AREA URNS HPF: ABNORMAL /HPF
BASOPHILS # BLD AUTO: 0.05 X10E3/UL (ref 0–0.2)
BASOPHILS NFR BLD AUTO: 0.3 % (ref 0–1)
BILIRUB UR QL STRIP: NEGATIVE MG/DL
BUN SERPL-MCNC: 29 MG/DL (ref 7–17)
CALCIUM SERPL-MCNC: 8.7 MG/DL (ref 8.5–10.1)
CHLORIDE SERPL-SCNC: 101 MMOL/L (ref 98–107)
CLARITY UR: CLEAR
CLARITY UR: CLEAR
CO2 SERPL-SCNC: 29 MMOL/L (ref 21–32)
COLOR UR: YELLOW
COLOR UR: YELLOW
CREAT SERPL-MCNC: 1.23 MG/DL (ref 0.55–1.3)
EOSINOPHIL # BLD AUTO: 0.04 X10E3/UL (ref 0–0.5)
EOSINOPHIL NFR BLD AUTO: 0.3 % (ref 1–4)
ERYTHROCYTE [DISTWIDTH] IN BLOOD BY AUTOMATED COUNT: 15.9 % (ref 11.5–14.5)
GLUCOSE SERPL-MCNC: 108 MG/DL (ref 74–106)
GLUCOSE UR STRIP-MCNC: NEGATIVE MG/DL
HCT VFR BLD AUTO: 29.2 % (ref 38–47)
HGB BLD-MCNC: 8.9 G/DL (ref 12–16)
KETONES UR STRIP-SCNC: NEGATIVE MG/DL
LEUKOCYTE ESTERASE UR QL STRIP: ABNORMAL LEU/UL
LYMPHOCYTES # BLD AUTO: 1.96 X10E3/UL (ref 1–4.8)
LYMPHOCYTES NFR BLD AUTO: 13.5 % (ref 27–41)
LYMPHOCYTES NFR BLD MANUAL: 10 % (ref 27–41)
MCH RBC QN AUTO: 30 PG (ref 27–31)
MCHC RBC AUTO-ENTMCNC: 30.5 G/DL (ref 32–36)
MCV RBC AUTO: 98 FL (ref 80–96)
MONOCYTES # BLD AUTO: 1.39 X10E3/UL (ref 0–0.8)
MONOCYTES NFR BLD AUTO: 9.5 % (ref 2–6)
MONOCYTES NFR BLD MANUAL: 10 % (ref 2–6)
MPC BLD CALC-MCNC: 10.6 FL (ref 9.4–12.4)
NEUTROPHILS # BLD AUTO: 11.13 X10E3/UL (ref 1.8–7.7)
NEUTROPHILS NFR BLD AUTO: 76.4 % (ref 53–65)
NEUTS SEG NFR BLD MANUAL: 80 % (ref 50–62)
NITRITE UR QL STRIP: NEGATIVE MG/DL
PH UR STRIP: 6 PH UNITS (ref 5–8)
PLATELET # BLD AUTO: 245 X10E3/UL (ref 150–400)
PLATELET MORPHOLOGY: NORMAL
POTASSIUM SERPL-SCNC: 3.5 MMOL/L (ref 3.5–5.1)
PROT UR QL STRIP: NEGATIVE MG/DL
RBC # BLD AUTO: 2.97 X10E6/UL (ref 4.2–5.4)
RBC # UR STRIP: ABNORMAL ERY/UL
RBC #/AREA URNS HPF: ABNORMAL /HPF (ref 0–3)
RBC MORPH BLD: NORMAL
SODIUM SERPL-SCNC: 133 MMOL/L (ref 136–145)
SP GR UR STRIP: 1.01 (ref 1–1.03)
SQUAMOUS #/AREA URNS LPF: ABNORMAL /LPF
UROBILINOGEN UR STRIP-ACNC: 0.2 MG/DL
WBC # BLD AUTO: 14.57 X10E3/UL (ref 4.5–11)
WBC #/AREA URNS HPF: ABNORMAL /HPF (ref 0–5)
YEAST #/AREA URNS HPF: ABNORMAL /HPF

## 2021-01-10 LAB
REPORT: NORMAL

## 2021-01-11 ENCOUNTER — HISTORICAL (OUTPATIENT)
Dept: ADMINISTRATIVE | Facility: HOSPITAL | Age: 71
End: 2021-01-11

## 2021-01-12 ENCOUNTER — HISTORICAL (OUTPATIENT)
Dept: ADMINISTRATIVE | Facility: HOSPITAL | Age: 71
End: 2021-01-12

## 2021-01-12 LAB
ALBUMIN SERPL BCP-MCNC: 2.3 G/DL (ref 3.4–5)
ALBUMIN/GLOB SERPL: 0.7 {RATIO}
ALP SERPL-CCNC: 93 U/L (ref 46–116)
ALT SERPL W P-5'-P-CCNC: 42 U/L (ref 14–63)
ANION GAP SERPL CALCULATED.3IONS-SCNC: 14 MMOL/L
AST SERPL W P-5'-P-CCNC: 14 U/L (ref 15–37)
BASOPHILS # BLD AUTO: 0.05 X10E3/UL (ref 0–0.2)
BASOPHILS NFR BLD AUTO: 0.4 % (ref 0–1)
BASOPHILS NFR BLD MANUAL: 1 % (ref 0–1)
BILIRUB SERPL-MCNC: 0.4 MG/DL (ref 0.2–1)
BILIRUB UR QL STRIP: NEGATIVE MG/DL
BUN SERPL-MCNC: 22 MG/DL (ref 7–17)
BUN/CREAT SERPL: 21.2
CALCIUM SERPL-MCNC: 8.7 MG/DL (ref 8.5–10.1)
CHLORIDE SERPL-SCNC: 107 MMOL/L (ref 98–107)
CLARITY UR: CLEAR
CO2 SERPL-SCNC: 25 MMOL/L (ref 21–32)
COLOR UR: YELLOW
CREAT SERPL-MCNC: 1.04 MG/DL (ref 0.55–1.3)
D DIMER PPP FEU-MCNC: 0.45 UG/ML (ref 0–0.47)
EOSINOPHIL # BLD AUTO: 0.29 X10E3/UL (ref 0–0.5)
EOSINOPHIL NFR BLD AUTO: 2.6 % (ref 1–4)
EOSINOPHIL NFR BLD MANUAL: 6 % (ref 1–4)
ERYTHROCYTE [DISTWIDTH] IN BLOOD BY AUTOMATED COUNT: 16.5 % (ref 11.5–14.5)
GLOBULIN SER-MCNC: 3.2 G/DL
GLUCOSE SERPL-MCNC: 99 MG/DL (ref 74–106)
GLUCOSE UR STRIP-MCNC: NORMAL MG/DL
HCT VFR BLD AUTO: 29.8 % (ref 38–47)
HGB BLD-MCNC: 9 G/DL (ref 12–16)
KETONES UR STRIP-SCNC: NEGATIVE MG/DL
LEUKOCYTE ESTERASE UR QL STRIP: NEGATIVE LEU/UL
LYMPHOCYTES # BLD AUTO: 2.13 X10E3/UL (ref 1–4.8)
LYMPHOCYTES NFR BLD AUTO: 18.7 % (ref 27–41)
LYMPHOCYTES NFR BLD MANUAL: 18 % (ref 27–41)
MCH RBC QN AUTO: 30 PG (ref 27–31)
MCHC RBC AUTO-ENTMCNC: 30.2 G/DL (ref 32–36)
MCV RBC AUTO: 99 FL (ref 80–96)
MONOCYTES # BLD AUTO: 1.29 X10E3/UL (ref 0–0.8)
MONOCYTES NFR BLD AUTO: 11.3 % (ref 2–6)
MONOCYTES NFR BLD MANUAL: 5 % (ref 2–6)
MPC BLD CALC-MCNC: 10.2 FL (ref 9.4–12.4)
NEUTROPHILS # BLD AUTO: 7.61 X10E3/UL (ref 1.8–7.7)
NEUTROPHILS NFR BLD AUTO: 67 % (ref 53–65)
NEUTS SEG NFR BLD MANUAL: 70 % (ref 50–62)
NITRITE UR QL STRIP: NEGATIVE MG/DL
PH UR STRIP: 5.5 PH UNITS (ref 5–8)
PLATELET # BLD AUTO: 212 X10E3/UL (ref 150–400)
PLATELET MORPHOLOGY: NORMAL
POTASSIUM SERPL-SCNC: 3.7 MMOL/L (ref 3.5–5.1)
PROT SERPL-MCNC: 5.5 G/DL (ref 6.4–8.2)
PROT UR QL STRIP: NEGATIVE MG/DL
RBC # BLD AUTO: 3 X10E6/UL (ref 4.2–5.4)
RBC # UR STRIP: NEGATIVE ERY/UL
RBC MORPH BLD: NORMAL
SODIUM SERPL-SCNC: 142 MMOL/L (ref 136–145)
SP GR UR STRIP: 1.02 (ref 1–1.03)
UROBILINOGEN UR STRIP-ACNC: 0.2 MG/DL
WBC # BLD AUTO: 11.37 X10E3/UL (ref 4.5–11)

## 2021-01-13 LAB
REPORT: 38
REPORT: NORMAL

## 2021-01-26 ENCOUNTER — HISTORICAL (OUTPATIENT)
Dept: ADMINISTRATIVE | Facility: HOSPITAL | Age: 71
End: 2021-01-26

## 2021-01-26 LAB
BACTERIA #/AREA URNS HPF: ABNORMAL /HPF
BILIRUB UR QL STRIP: NEGATIVE MG/DL
CLARITY UR: ABNORMAL
COLOR UR: YELLOW
GLUCOSE UR STRIP-MCNC: NEGATIVE MG/DL
KETONES UR STRIP-SCNC: NEGATIVE MG/DL
LEUKOCYTE ESTERASE UR QL STRIP: ABNORMAL LEU/UL
NITRITE UR QL STRIP: POSITIVE
PH UR STRIP: 5.5 PH UNITS (ref 5–8)
PROT UR QL STRIP: NEGATIVE MG/DL
RBC # UR STRIP: NEGATIVE ERY/UL
RBC #/AREA URNS HPF: ABNORMAL /HPF (ref 0–3)
SP GR UR STRIP: 1.02 (ref 1–1.03)
SQUAMOUS #/AREA URNS LPF: ABNORMAL /LPF
UROBILINOGEN UR STRIP-ACNC: 0.2 EU/DL
WBC #/AREA URNS HPF: ABNORMAL /HPF (ref 0–5)

## 2021-02-01 LAB
REPORT: 38
REPORT: NORMAL

## 2021-02-11 ENCOUNTER — HISTORICAL (OUTPATIENT)
Dept: ADMINISTRATIVE | Facility: HOSPITAL | Age: 71
End: 2021-02-11

## 2021-02-11 LAB
BACTERIA #/AREA URNS HPF: ABNORMAL /HPF
BILIRUB UR QL STRIP: NEGATIVE MG/DL
CLARITY UR: CLEAR
COLOR UR: YELLOW
GLUCOSE UR STRIP-MCNC: NEGATIVE MG/DL
KETONES UR STRIP-SCNC: NEGATIVE MG/DL
LEUKOCYTE ESTERASE UR QL STRIP: NEGATIVE LEU/UL
NITRITE UR QL STRIP: NEGATIVE
PH UR STRIP: 5.5 PH UNITS (ref 5–8)
PROT UR QL STRIP: NEGATIVE MG/DL
RBC # UR STRIP: NEGATIVE ERY/UL
RBC #/AREA URNS HPF: ABNORMAL /HPF (ref 0–3)
SP GR UR STRIP: 1.02 (ref 1–1.03)
SQUAMOUS #/AREA URNS LPF: ABNORMAL /LPF
UROBILINOGEN UR STRIP-ACNC: 0.2 EU/DL
WBC #/AREA URNS HPF: ABNORMAL /HPF (ref 0–5)

## 2021-02-13 LAB
REPORT: NORMAL

## 2021-07-23 ENCOUNTER — OFFICE VISIT (OUTPATIENT)
Dept: PAIN MEDICINE | Facility: CLINIC | Age: 71
End: 2021-07-23
Payer: COMMERCIAL

## 2021-07-23 VITALS
WEIGHT: 206 LBS | HEIGHT: 71 IN | DIASTOLIC BLOOD PRESSURE: 76 MMHG | HEART RATE: 88 BPM | SYSTOLIC BLOOD PRESSURE: 112 MMHG | BODY MASS INDEX: 28.84 KG/M2

## 2021-07-23 DIAGNOSIS — M54.9 DORSALGIA, UNSPECIFIED: Chronic | ICD-10-CM

## 2021-07-23 DIAGNOSIS — M47.816 LUMBAR SPONDYLOSIS: Chronic | ICD-10-CM

## 2021-07-23 DIAGNOSIS — M54.17 LUMBOSACRAL RADICULOPATHY: Chronic | ICD-10-CM

## 2021-07-23 DIAGNOSIS — G89.29 CHRONIC BILATERAL LOW BACK PAIN WITH BILATERAL SCIATICA: Primary | Chronic | ICD-10-CM

## 2021-07-23 DIAGNOSIS — M54.41 CHRONIC BILATERAL LOW BACK PAIN WITH BILATERAL SCIATICA: Primary | Chronic | ICD-10-CM

## 2021-07-23 DIAGNOSIS — M54.42 CHRONIC BILATERAL LOW BACK PAIN WITH BILATERAL SCIATICA: Primary | Chronic | ICD-10-CM

## 2021-07-23 PROCEDURE — 3288F PR FALLS RISK ASSESSMENT DOCUMENTED: ICD-10-PCS | Mod: CPTII,,, | Performed by: PAIN MEDICINE

## 2021-07-23 PROCEDURE — 1159F PR MEDICATION LIST DOCUMENTED IN MEDICAL RECORD: ICD-10-PCS | Mod: CPTII,,, | Performed by: PAIN MEDICINE

## 2021-07-23 PROCEDURE — 1100F PTFALLS ASSESS-DOCD GE2>/YR: CPT | Mod: CPTII,,, | Performed by: PAIN MEDICINE

## 2021-07-23 PROCEDURE — 1159F MED LIST DOCD IN RCRD: CPT | Mod: CPTII,,, | Performed by: PAIN MEDICINE

## 2021-07-23 PROCEDURE — 3008F BODY MASS INDEX DOCD: CPT | Mod: CPTII,,, | Performed by: PAIN MEDICINE

## 2021-07-23 PROCEDURE — 1126F AMNT PAIN NOTED NONE PRSNT: CPT | Mod: CPTII,,, | Performed by: PAIN MEDICINE

## 2021-07-23 PROCEDURE — 1126F PR PAIN SEVERITY QUANTIFIED, NO PAIN PRESENT: ICD-10-PCS | Mod: CPTII,,, | Performed by: PAIN MEDICINE

## 2021-07-23 PROCEDURE — 3008F PR BODY MASS INDEX (BMI) DOCUMENTED: ICD-10-PCS | Mod: CPTII,,, | Performed by: PAIN MEDICINE

## 2021-07-23 PROCEDURE — 99202 OFFICE O/P NEW SF 15 MIN: CPT | Mod: S$PBB,,, | Performed by: PAIN MEDICINE

## 2021-07-23 PROCEDURE — 1100F PR PT FALLS ASSESS DOC 2+ FALLS/FALL W/INJURY/YR: ICD-10-PCS | Mod: CPTII,,, | Performed by: PAIN MEDICINE

## 2021-07-23 PROCEDURE — 99202 PR OFFICE/OUTPT VISIT, NEW, LEVL II, 15-29 MIN: ICD-10-PCS | Mod: S$PBB,,, | Performed by: PAIN MEDICINE

## 2021-07-23 PROCEDURE — 3288F FALL RISK ASSESSMENT DOCD: CPT | Mod: CPTII,,, | Performed by: PAIN MEDICINE

## 2021-07-23 PROCEDURE — 99214 OFFICE O/P EST MOD 30 MIN: CPT | Mod: PBBFAC | Performed by: PAIN MEDICINE

## 2021-07-23 RX ORDER — APIXABAN 5 MG/1
5 TABLET, FILM COATED ORAL 2 TIMES DAILY
COMMUNITY
Start: 2021-07-06

## 2021-07-23 RX ORDER — FOLIC ACID 0.8 MG
800 TABLET ORAL DAILY
COMMUNITY

## 2021-07-23 RX ORDER — GABAPENTIN 600 MG/1
1 TABLET ORAL 2 TIMES DAILY
COMMUNITY
Start: 2021-04-01

## 2021-07-23 RX ORDER — SOLIFENACIN SUCCINATE 5 MG/1
1 TABLET, FILM COATED ORAL DAILY
COMMUNITY
Start: 2021-05-07

## 2021-07-23 RX ORDER — ESTRADIOL 2 MG/1
1 TABLET ORAL DAILY
COMMUNITY
Start: 2021-05-30

## 2021-07-23 RX ORDER — UBIDECARENONE 75 MG
500 CAPSULE ORAL DAILY
COMMUNITY

## 2021-07-23 RX ORDER — DULOXETIN HYDROCHLORIDE 60 MG/1
1 CAPSULE, DELAYED RELEASE ORAL DAILY
COMMUNITY
Start: 2021-04-01

## 2021-07-23 RX ORDER — METOPROLOL TARTRATE 25 MG/1
1 TABLET, FILM COATED ORAL DAILY
COMMUNITY
Start: 2021-05-30

## 2021-07-23 RX ORDER — LISINOPRIL AND HYDROCHLOROTHIAZIDE 12.5; 2 MG/1; MG/1
1 TABLET ORAL DAILY
COMMUNITY
Start: 2021-06-21

## 2021-08-10 ENCOUNTER — OFFICE VISIT (OUTPATIENT)
Dept: PAIN MEDICINE | Facility: CLINIC | Age: 71
End: 2021-08-10
Payer: COMMERCIAL

## 2021-08-10 VITALS
WEIGHT: 206 LBS | DIASTOLIC BLOOD PRESSURE: 44 MMHG | HEART RATE: 94 BPM | SYSTOLIC BLOOD PRESSURE: 100 MMHG | BODY MASS INDEX: 28.84 KG/M2 | HEIGHT: 71 IN

## 2021-08-10 DIAGNOSIS — M47.816 LUMBAR SPONDYLOSIS: Primary | Chronic | ICD-10-CM

## 2021-08-10 DIAGNOSIS — G89.29 CHRONIC BILATERAL LOW BACK PAIN WITH BILATERAL SCIATICA: Chronic | ICD-10-CM

## 2021-08-10 DIAGNOSIS — M54.42 CHRONIC BILATERAL LOW BACK PAIN WITH BILATERAL SCIATICA: Chronic | ICD-10-CM

## 2021-08-10 DIAGNOSIS — M54.41 CHRONIC BILATERAL LOW BACK PAIN WITH BILATERAL SCIATICA: Chronic | ICD-10-CM

## 2021-08-10 PROCEDURE — 3008F BODY MASS INDEX DOCD: CPT | Mod: CPTII,,, | Performed by: PAIN MEDICINE

## 2021-08-10 PROCEDURE — 1125F PR PAIN SEVERITY QUANTIFIED, PAIN PRESENT: ICD-10-PCS | Mod: CPTII,,, | Performed by: PAIN MEDICINE

## 2021-08-10 PROCEDURE — 3074F PR MOST RECENT SYSTOLIC BLOOD PRESSURE < 130 MM HG: ICD-10-PCS | Mod: CPTII,,, | Performed by: PAIN MEDICINE

## 2021-08-10 PROCEDURE — 3288F FALL RISK ASSESSMENT DOCD: CPT | Mod: CPTII,,, | Performed by: PAIN MEDICINE

## 2021-08-10 PROCEDURE — 1159F PR MEDICATION LIST DOCUMENTED IN MEDICAL RECORD: ICD-10-PCS | Mod: CPTII,,, | Performed by: PAIN MEDICINE

## 2021-08-10 PROCEDURE — 99215 OFFICE O/P EST HI 40 MIN: CPT | Mod: PBBFAC | Performed by: PAIN MEDICINE

## 2021-08-10 PROCEDURE — 99212 OFFICE O/P EST SF 10 MIN: CPT | Mod: S$PBB,,, | Performed by: PAIN MEDICINE

## 2021-08-10 PROCEDURE — 3074F SYST BP LT 130 MM HG: CPT | Mod: CPTII,,, | Performed by: PAIN MEDICINE

## 2021-08-10 PROCEDURE — 3288F PR FALLS RISK ASSESSMENT DOCUMENTED: ICD-10-PCS | Mod: CPTII,,, | Performed by: PAIN MEDICINE

## 2021-08-10 PROCEDURE — 1125F AMNT PAIN NOTED PAIN PRSNT: CPT | Mod: CPTII,,, | Performed by: PAIN MEDICINE

## 2021-08-10 PROCEDURE — 3078F DIAST BP <80 MM HG: CPT | Mod: CPTII,,, | Performed by: PAIN MEDICINE

## 2021-08-10 PROCEDURE — 1159F MED LIST DOCD IN RCRD: CPT | Mod: CPTII,,, | Performed by: PAIN MEDICINE

## 2021-08-10 PROCEDURE — 99212 PR OFFICE/OUTPT VISIT, EST, LEVL II, 10-19 MIN: ICD-10-PCS | Mod: S$PBB,,, | Performed by: PAIN MEDICINE

## 2021-08-10 PROCEDURE — 1101F PR PT FALLS ASSESS DOC 0-1 FALLS W/OUT INJ PAST YR: ICD-10-PCS | Mod: CPTII,,, | Performed by: PAIN MEDICINE

## 2021-08-10 PROCEDURE — 3008F PR BODY MASS INDEX (BMI) DOCUMENTED: ICD-10-PCS | Mod: CPTII,,, | Performed by: PAIN MEDICINE

## 2021-08-10 PROCEDURE — 1101F PT FALLS ASSESS-DOCD LE1/YR: CPT | Mod: CPTII,,, | Performed by: PAIN MEDICINE

## 2021-08-10 PROCEDURE — 3078F PR MOST RECENT DIASTOLIC BLOOD PRESSURE < 80 MM HG: ICD-10-PCS | Mod: CPTII,,, | Performed by: PAIN MEDICINE

## 2021-08-12 ENCOUNTER — DOCUMENTATION ONLY (OUTPATIENT)
Dept: PAIN MEDICINE | Facility: CLINIC | Age: 71
End: 2021-08-12

## 2021-08-13 ENCOUNTER — TELEPHONE (OUTPATIENT)
Dept: PAIN MEDICINE | Facility: CLINIC | Age: 71
End: 2021-08-13

## 2021-08-26 ENCOUNTER — HOSPITAL ENCOUNTER (OUTPATIENT)
Facility: HOSPITAL | Age: 71
Discharge: HOME | End: 2021-08-26
Attending: PAIN MEDICINE | Admitting: PAIN MEDICINE
Payer: COMMERCIAL

## 2021-08-26 ENCOUNTER — ANESTHESIA (OUTPATIENT)
Dept: PAIN MEDICINE | Facility: HOSPITAL | Age: 71
End: 2021-08-26
Payer: COMMERCIAL

## 2021-08-26 ENCOUNTER — ANESTHESIA EVENT (OUTPATIENT)
Dept: PAIN MEDICINE | Facility: HOSPITAL | Age: 71
End: 2021-08-26
Payer: COMMERCIAL

## 2021-08-26 VITALS
RESPIRATION RATE: 19 BRPM | SYSTOLIC BLOOD PRESSURE: 132 MMHG | OXYGEN SATURATION: 96 % | WEIGHT: 205 LBS | TEMPERATURE: 99 F | DIASTOLIC BLOOD PRESSURE: 76 MMHG | BODY MASS INDEX: 28.7 KG/M2 | HEIGHT: 71 IN | HEART RATE: 76 BPM

## 2021-08-26 DIAGNOSIS — M47.817 SPONDYLOSIS OF LUMBOSACRAL REGION WITHOUT MYELOPATHY OR RADICULOPATHY: Primary | ICD-10-CM

## 2021-08-26 PROCEDURE — 64494 INJ PARAVERT F JNT L/S 2 LEV: CPT | Mod: 50,,, | Performed by: PAIN MEDICINE

## 2021-08-26 PROCEDURE — 64494 INJ PARAVERT F JNT L/S 2 LEV: CPT | Mod: 50 | Performed by: PAIN MEDICINE

## 2021-08-26 PROCEDURE — 64493 PR INJ DX/THER AGNT PARAVERT FACET JOINT,IMG GUIDE,LUMBAR/SAC,1ST LVL: ICD-10-PCS | Mod: 50,,, | Performed by: PAIN MEDICINE

## 2021-08-26 PROCEDURE — 25000003 PHARM REV CODE 250: Performed by: PAIN MEDICINE

## 2021-08-26 PROCEDURE — 64495 INJ PARAVERT F JNT L/S 3 LEV: CPT | Mod: 50 | Performed by: PAIN MEDICINE

## 2021-08-26 PROCEDURE — 64495 INJ PARAVERT F JNT L/S 3 LEV: CPT | Mod: 50,,, | Performed by: PAIN MEDICINE

## 2021-08-26 PROCEDURE — 37000008 HC ANESTHESIA 1ST 15 MINUTES: Performed by: PAIN MEDICINE

## 2021-08-26 PROCEDURE — 64493 INJ PARAVERT F JNT L/S 1 LEV: CPT | Mod: 50 | Performed by: PAIN MEDICINE

## 2021-08-26 PROCEDURE — 27201423 OPTIME MED/SURG SUP & DEVICES STERILE SUPPLY: Performed by: PAIN MEDICINE

## 2021-08-26 PROCEDURE — 64494 PR INJ DX/THER AGNT PARAVERT FACET JOINT,IMG GUIDE,LUMBAR/SAC, 2ND LEVEL: ICD-10-PCS | Mod: 50,,, | Performed by: PAIN MEDICINE

## 2021-08-26 PROCEDURE — 64495 PR INJ DX/THER AGNT PARAVERT FACET JOINT,IMG GUIDE,LUMBAR/SAC, ADD LEVEL: ICD-10-PCS | Mod: 50,,, | Performed by: PAIN MEDICINE

## 2021-08-26 PROCEDURE — 63600175 PHARM REV CODE 636 W HCPCS: Performed by: PAIN MEDICINE

## 2021-08-26 PROCEDURE — 27000284 HC CANNULA NASAL: Performed by: NURSE ANESTHETIST, CERTIFIED REGISTERED

## 2021-08-26 PROCEDURE — 25000003 PHARM REV CODE 250: Performed by: NURSE ANESTHETIST, CERTIFIED REGISTERED

## 2021-08-26 PROCEDURE — D9220A PRA ANESTHESIA: ICD-10-PCS | Mod: ,,, | Performed by: NURSE ANESTHETIST, CERTIFIED REGISTERED

## 2021-08-26 PROCEDURE — 64493 INJ PARAVERT F JNT L/S 1 LEV: CPT | Mod: 50,,, | Performed by: PAIN MEDICINE

## 2021-08-26 PROCEDURE — 63600175 PHARM REV CODE 636 W HCPCS: Performed by: NURSE ANESTHETIST, CERTIFIED REGISTERED

## 2021-08-26 PROCEDURE — D9220A PRA ANESTHESIA: Mod: ,,, | Performed by: NURSE ANESTHETIST, CERTIFIED REGISTERED

## 2021-08-26 RX ORDER — LIDOCAINE HYDROCHLORIDE 20 MG/ML
INJECTION, SOLUTION EPIDURAL; INFILTRATION; INTRACAUDAL; PERINEURAL
Status: DISCONTINUED | OUTPATIENT
Start: 2021-08-26 | End: 2021-08-26

## 2021-08-26 RX ORDER — BUPIVACAINE HYDROCHLORIDE 2.5 MG/ML
INJECTION, SOLUTION INFILTRATION; PERINEURAL
Status: DISCONTINUED | OUTPATIENT
Start: 2021-08-26 | End: 2021-08-26 | Stop reason: HOSPADM

## 2021-08-26 RX ORDER — PROPOFOL 10 MG/ML
VIAL (ML) INTRAVENOUS
Status: DISCONTINUED | OUTPATIENT
Start: 2021-08-26 | End: 2021-08-26

## 2021-08-26 RX ORDER — TRIAMCINOLONE ACETONIDE 40 MG/ML
INJECTION, SUSPENSION INTRA-ARTICULAR; INTRAMUSCULAR
Status: DISCONTINUED | OUTPATIENT
Start: 2021-08-26 | End: 2021-08-26 | Stop reason: HOSPADM

## 2021-08-26 RX ORDER — SODIUM CHLORIDE 9 MG/ML
INJECTION, SOLUTION INTRAVENOUS CONTINUOUS
Status: DISCONTINUED | OUTPATIENT
Start: 2021-08-26 | End: 2021-08-26 | Stop reason: HOSPADM

## 2021-08-26 RX ADMIN — LIDOCAINE HYDROCHLORIDE 100 MG: 20 INJECTION, SOLUTION EPIDURAL; INFILTRATION; INTRACAUDAL; PERINEURAL at 11:08

## 2021-08-26 RX ADMIN — PROPOFOL 200 MG: 10 INJECTION, EMULSION INTRAVENOUS at 11:08

## 2021-09-16 ENCOUNTER — OFFICE VISIT (OUTPATIENT)
Dept: PAIN MEDICINE | Facility: CLINIC | Age: 71
End: 2021-09-16
Payer: COMMERCIAL

## 2021-09-16 VITALS
BODY MASS INDEX: 28.84 KG/M2 | WEIGHT: 206 LBS | HEIGHT: 71 IN | SYSTOLIC BLOOD PRESSURE: 99 MMHG | HEART RATE: 85 BPM | DIASTOLIC BLOOD PRESSURE: 64 MMHG

## 2021-09-16 DIAGNOSIS — M54.42 CHRONIC BILATERAL LOW BACK PAIN WITH BILATERAL SCIATICA: ICD-10-CM

## 2021-09-16 DIAGNOSIS — M54.41 CHRONIC BILATERAL LOW BACK PAIN WITH BILATERAL SCIATICA: ICD-10-CM

## 2021-09-16 DIAGNOSIS — G89.29 CHRONIC BILATERAL LOW BACK PAIN WITH BILATERAL SCIATICA: ICD-10-CM

## 2021-09-16 DIAGNOSIS — M47.817 SPONDYLOSIS OF LUMBOSACRAL REGION WITHOUT MYELOPATHY OR RADICULOPATHY: Primary | ICD-10-CM

## 2021-09-16 DIAGNOSIS — Z11.59 SCREENING FOR VIRAL DISEASE: ICD-10-CM

## 2021-09-16 PROCEDURE — 1125F PR PAIN SEVERITY QUANTIFIED, PAIN PRESENT: ICD-10-PCS | Mod: CPTII,,, | Performed by: PAIN MEDICINE

## 2021-09-16 PROCEDURE — 1159F PR MEDICATION LIST DOCUMENTED IN MEDICAL RECORD: ICD-10-PCS | Mod: CPTII,,, | Performed by: PAIN MEDICINE

## 2021-09-16 PROCEDURE — 1100F PTFALLS ASSESS-DOCD GE2>/YR: CPT | Mod: CPTII,,, | Performed by: PAIN MEDICINE

## 2021-09-16 PROCEDURE — 3288F PR FALLS RISK ASSESSMENT DOCUMENTED: ICD-10-PCS | Mod: CPTII,,, | Performed by: PAIN MEDICINE

## 2021-09-16 PROCEDURE — 99215 OFFICE O/P EST HI 40 MIN: CPT | Mod: PBBFAC | Performed by: PAIN MEDICINE

## 2021-09-16 PROCEDURE — 3074F PR MOST RECENT SYSTOLIC BLOOD PRESSURE < 130 MM HG: ICD-10-PCS | Mod: CPTII,,, | Performed by: PAIN MEDICINE

## 2021-09-16 PROCEDURE — 3008F BODY MASS INDEX DOCD: CPT | Mod: CPTII,,, | Performed by: PAIN MEDICINE

## 2021-09-16 PROCEDURE — 3074F SYST BP LT 130 MM HG: CPT | Mod: CPTII,,, | Performed by: PAIN MEDICINE

## 2021-09-16 PROCEDURE — 3008F PR BODY MASS INDEX (BMI) DOCUMENTED: ICD-10-PCS | Mod: CPTII,,, | Performed by: PAIN MEDICINE

## 2021-09-16 PROCEDURE — 1100F PR PT FALLS ASSESS DOC 2+ FALLS/FALL W/INJURY/YR: ICD-10-PCS | Mod: CPTII,,, | Performed by: PAIN MEDICINE

## 2021-09-16 PROCEDURE — 3078F PR MOST RECENT DIASTOLIC BLOOD PRESSURE < 80 MM HG: ICD-10-PCS | Mod: CPTII,,, | Performed by: PAIN MEDICINE

## 2021-09-16 PROCEDURE — 4010F ACE/ARB THERAPY RXD/TAKEN: CPT | Mod: CPTII,,, | Performed by: PAIN MEDICINE

## 2021-09-16 PROCEDURE — 4010F PR ACE/ARB THEARPY RXD/TAKEN: ICD-10-PCS | Mod: CPTII,,, | Performed by: PAIN MEDICINE

## 2021-09-16 PROCEDURE — 3288F FALL RISK ASSESSMENT DOCD: CPT | Mod: CPTII,,, | Performed by: PAIN MEDICINE

## 2021-09-16 PROCEDURE — 3078F DIAST BP <80 MM HG: CPT | Mod: CPTII,,, | Performed by: PAIN MEDICINE

## 2021-09-16 PROCEDURE — 1159F MED LIST DOCD IN RCRD: CPT | Mod: CPTII,,, | Performed by: PAIN MEDICINE

## 2021-09-16 PROCEDURE — 99212 OFFICE O/P EST SF 10 MIN: CPT | Mod: S$PBB,,, | Performed by: PAIN MEDICINE

## 2021-09-16 PROCEDURE — 1125F AMNT PAIN NOTED PAIN PRSNT: CPT | Mod: CPTII,,, | Performed by: PAIN MEDICINE

## 2021-09-16 PROCEDURE — 99212 PR OFFICE/OUTPT VISIT, EST, LEVL II, 10-19 MIN: ICD-10-PCS | Mod: S$PBB,,, | Performed by: PAIN MEDICINE

## 2021-10-05 ENCOUNTER — HOSPITAL ENCOUNTER (OUTPATIENT)
Facility: HOSPITAL | Age: 71
Discharge: HOME | End: 2021-10-05
Attending: PAIN MEDICINE | Admitting: PAIN MEDICINE
Payer: MEDICARE

## 2021-10-05 ENCOUNTER — ANESTHESIA (OUTPATIENT)
Dept: PAIN MEDICINE | Facility: HOSPITAL | Age: 71
End: 2021-10-05
Payer: MEDICARE

## 2021-10-05 ENCOUNTER — ANESTHESIA EVENT (OUTPATIENT)
Dept: PAIN MEDICINE | Facility: HOSPITAL | Age: 71
End: 2021-10-05
Payer: MEDICARE

## 2021-10-05 VITALS
SYSTOLIC BLOOD PRESSURE: 113 MMHG | HEART RATE: 86 BPM | OXYGEN SATURATION: 98 % | TEMPERATURE: 98 F | RESPIRATION RATE: 20 BRPM | BODY MASS INDEX: 28.84 KG/M2 | DIASTOLIC BLOOD PRESSURE: 60 MMHG | WEIGHT: 206 LBS | HEIGHT: 71 IN

## 2021-10-05 DIAGNOSIS — M47.817 SPONDYLOSIS OF LUMBOSACRAL REGION WITHOUT MYELOPATHY OR RADICULOPATHY: ICD-10-CM

## 2021-10-05 PROCEDURE — 63600175 PHARM REV CODE 636 W HCPCS: Performed by: NURSE ANESTHETIST, CERTIFIED REGISTERED

## 2021-10-05 PROCEDURE — 64493 INJ PARAVERT F JNT L/S 1 LEV: CPT | Mod: 50,,, | Performed by: PAIN MEDICINE

## 2021-10-05 PROCEDURE — 64493 INJ PARAVERT F JNT L/S 1 LEV: CPT | Mod: 50 | Performed by: PAIN MEDICINE

## 2021-10-05 PROCEDURE — 64494 PR INJ DX/THER AGNT PARAVERT FACET JOINT,IMG GUIDE,LUMBAR/SAC, 2ND LEVEL: ICD-10-PCS | Mod: 50,,, | Performed by: PAIN MEDICINE

## 2021-10-05 PROCEDURE — 27201423 OPTIME MED/SURG SUP & DEVICES STERILE SUPPLY: Performed by: PAIN MEDICINE

## 2021-10-05 PROCEDURE — D9220A PRA ANESTHESIA: ICD-10-PCS | Mod: ,,, | Performed by: NURSE ANESTHETIST, CERTIFIED REGISTERED

## 2021-10-05 PROCEDURE — 63600175 PHARM REV CODE 636 W HCPCS: Performed by: PAIN MEDICINE

## 2021-10-05 PROCEDURE — 64494 INJ PARAVERT F JNT L/S 2 LEV: CPT | Mod: 50,,, | Performed by: PAIN MEDICINE

## 2021-10-05 PROCEDURE — 25000003 PHARM REV CODE 250: Performed by: PAIN MEDICINE

## 2021-10-05 PROCEDURE — 64493 PR INJ DX/THER AGNT PARAVERT FACET JOINT,IMG GUIDE,LUMBAR/SAC,1ST LVL: ICD-10-PCS | Mod: 50,,, | Performed by: PAIN MEDICINE

## 2021-10-05 PROCEDURE — 64494 INJ PARAVERT F JNT L/S 2 LEV: CPT | Mod: 50 | Performed by: PAIN MEDICINE

## 2021-10-05 PROCEDURE — 37000008 HC ANESTHESIA 1ST 15 MINUTES: Performed by: PAIN MEDICINE

## 2021-10-05 PROCEDURE — D9220A PRA ANESTHESIA: Mod: ,,, | Performed by: NURSE ANESTHETIST, CERTIFIED REGISTERED

## 2021-10-05 PROCEDURE — 25000003 PHARM REV CODE 250: Performed by: NURSE ANESTHETIST, CERTIFIED REGISTERED

## 2021-10-05 PROCEDURE — 77003 FLUOROGUIDE FOR SPINE INJECT: CPT | Performed by: PAIN MEDICINE

## 2021-10-05 RX ORDER — TRIAMCINOLONE ACETONIDE 40 MG/ML
INJECTION, SUSPENSION INTRA-ARTICULAR; INTRAMUSCULAR
Status: DISCONTINUED | OUTPATIENT
Start: 2021-10-05 | End: 2021-10-05 | Stop reason: HOSPADM

## 2021-10-05 RX ORDER — PROPOFOL 10 MG/ML
VIAL (ML) INTRAVENOUS
Status: DISCONTINUED | OUTPATIENT
Start: 2021-10-05 | End: 2021-10-05

## 2021-10-05 RX ORDER — BUPIVACAINE HYDROCHLORIDE 2.5 MG/ML
INJECTION, SOLUTION INFILTRATION; PERINEURAL
Status: DISCONTINUED | OUTPATIENT
Start: 2021-10-05 | End: 2021-10-05 | Stop reason: HOSPADM

## 2021-10-05 RX ORDER — LIDOCAINE HYDROCHLORIDE 20 MG/ML
INJECTION, SOLUTION EPIDURAL; INFILTRATION; INTRACAUDAL; PERINEURAL
Status: DISCONTINUED | OUTPATIENT
Start: 2021-10-05 | End: 2021-10-05

## 2021-10-05 RX ORDER — SODIUM CHLORIDE 9 MG/ML
INJECTION, SOLUTION INTRAVENOUS CONTINUOUS
Status: DISCONTINUED | OUTPATIENT
Start: 2021-10-05 | End: 2021-10-05 | Stop reason: HOSPADM

## 2021-10-05 RX ADMIN — SODIUM CHLORIDE: 9 INJECTION, SOLUTION INTRAVENOUS at 12:10

## 2021-10-05 RX ADMIN — PROPOFOL 75 MG: 10 INJECTION, EMULSION INTRAVENOUS at 12:10

## 2021-10-05 RX ADMIN — LIDOCAINE HYDROCHLORIDE 100 MG: 20 INJECTION, SOLUTION INTRAVENOUS at 12:10

## 2021-10-19 ENCOUNTER — OFFICE VISIT (OUTPATIENT)
Dept: PAIN MEDICINE | Facility: CLINIC | Age: 71
End: 2021-10-19
Payer: MEDICARE

## 2021-10-19 VITALS
WEIGHT: 206 LBS | RESPIRATION RATE: 17 BRPM | BODY MASS INDEX: 28.84 KG/M2 | HEART RATE: 91 BPM | DIASTOLIC BLOOD PRESSURE: 64 MMHG | SYSTOLIC BLOOD PRESSURE: 118 MMHG | HEIGHT: 71 IN

## 2021-10-19 DIAGNOSIS — M47.817 SPONDYLOSIS OF LUMBOSACRAL REGION WITHOUT MYELOPATHY OR RADICULOPATHY: Primary | Chronic | ICD-10-CM

## 2021-10-19 DIAGNOSIS — Z11.59 SPECIAL SCREENING EXAMINATION FOR VIRAL DISEASE: ICD-10-CM

## 2021-10-19 DIAGNOSIS — G62.9 NEUROPATHY: Chronic | ICD-10-CM

## 2021-10-19 PROCEDURE — 1159F MED LIST DOCD IN RCRD: CPT | Mod: CPTII,,, | Performed by: PHYSICIAN ASSISTANT

## 2021-10-19 PROCEDURE — 1101F PT FALLS ASSESS-DOCD LE1/YR: CPT | Mod: CPTII,,, | Performed by: PHYSICIAN ASSISTANT

## 2021-10-19 PROCEDURE — 99215 OFFICE O/P EST HI 40 MIN: CPT | Mod: PBBFAC,CS | Performed by: PHYSICIAN ASSISTANT

## 2021-10-19 PROCEDURE — 1125F PR PAIN SEVERITY QUANTIFIED, PAIN PRESENT: ICD-10-PCS | Mod: CPTII,,, | Performed by: PHYSICIAN ASSISTANT

## 2021-10-19 PROCEDURE — 3008F BODY MASS INDEX DOCD: CPT | Mod: CPTII,,, | Performed by: PHYSICIAN ASSISTANT

## 2021-10-19 PROCEDURE — 1101F PR PT FALLS ASSESS DOC 0-1 FALLS W/OUT INJ PAST YR: ICD-10-PCS | Mod: CPTII,,, | Performed by: PHYSICIAN ASSISTANT

## 2021-10-19 PROCEDURE — 3288F PR FALLS RISK ASSESSMENT DOCUMENTED: ICD-10-PCS | Mod: CPTII,,, | Performed by: PHYSICIAN ASSISTANT

## 2021-10-19 PROCEDURE — 1125F AMNT PAIN NOTED PAIN PRSNT: CPT | Mod: CPTII,,, | Performed by: PHYSICIAN ASSISTANT

## 2021-10-19 PROCEDURE — 3078F DIAST BP <80 MM HG: CPT | Mod: CPTII,,, | Performed by: PHYSICIAN ASSISTANT

## 2021-10-19 PROCEDURE — 99214 PR OFFICE/OUTPT VISIT, EST, LEVL IV, 30-39 MIN: ICD-10-PCS | Mod: S$PBB,,, | Performed by: PHYSICIAN ASSISTANT

## 2021-10-19 PROCEDURE — 3008F PR BODY MASS INDEX (BMI) DOCUMENTED: ICD-10-PCS | Mod: CPTII,,, | Performed by: PHYSICIAN ASSISTANT

## 2021-10-19 PROCEDURE — 3074F PR MOST RECENT SYSTOLIC BLOOD PRESSURE < 130 MM HG: ICD-10-PCS | Mod: CPTII,,, | Performed by: PHYSICIAN ASSISTANT

## 2021-10-19 PROCEDURE — 4010F PR ACE/ARB THEARPY RXD/TAKEN: ICD-10-PCS | Mod: CPTII,,, | Performed by: PHYSICIAN ASSISTANT

## 2021-10-19 PROCEDURE — 3078F PR MOST RECENT DIASTOLIC BLOOD PRESSURE < 80 MM HG: ICD-10-PCS | Mod: CPTII,,, | Performed by: PHYSICIAN ASSISTANT

## 2021-10-19 PROCEDURE — 4010F ACE/ARB THERAPY RXD/TAKEN: CPT | Mod: CPTII,,, | Performed by: PHYSICIAN ASSISTANT

## 2021-10-19 PROCEDURE — 3288F FALL RISK ASSESSMENT DOCD: CPT | Mod: CPTII,,, | Performed by: PHYSICIAN ASSISTANT

## 2021-10-19 PROCEDURE — 99214 OFFICE O/P EST MOD 30 MIN: CPT | Mod: S$PBB,,, | Performed by: PHYSICIAN ASSISTANT

## 2021-10-19 PROCEDURE — 1159F PR MEDICATION LIST DOCUMENTED IN MEDICAL RECORD: ICD-10-PCS | Mod: CPTII,,, | Performed by: PHYSICIAN ASSISTANT

## 2021-10-19 PROCEDURE — 3074F SYST BP LT 130 MM HG: CPT | Mod: CPTII,,, | Performed by: PHYSICIAN ASSISTANT

## 2021-11-09 ENCOUNTER — ANESTHESIA EVENT (OUTPATIENT)
Dept: PAIN MEDICINE | Facility: HOSPITAL | Age: 71
End: 2021-11-09
Payer: MEDICARE

## 2021-11-09 ENCOUNTER — ANESTHESIA (OUTPATIENT)
Dept: PAIN MEDICINE | Facility: HOSPITAL | Age: 71
End: 2021-11-09
Payer: COMMERCIAL

## 2021-11-09 ENCOUNTER — HOSPITAL ENCOUNTER (OUTPATIENT)
Facility: HOSPITAL | Age: 71
Discharge: HOME | End: 2021-11-09
Attending: PAIN MEDICINE | Admitting: PAIN MEDICINE
Payer: MEDICARE

## 2021-11-09 VITALS
HEIGHT: 71 IN | HEART RATE: 89 BPM | OXYGEN SATURATION: 100 % | WEIGHT: 206 LBS | SYSTOLIC BLOOD PRESSURE: 107 MMHG | DIASTOLIC BLOOD PRESSURE: 62 MMHG | BODY MASS INDEX: 28.84 KG/M2 | TEMPERATURE: 99 F | RESPIRATION RATE: 15 BRPM

## 2021-11-09 DIAGNOSIS — M47.817 SPONDYLOSIS OF LUMBOSACRAL REGION WITHOUT MYELOPATHY OR RADICULOPATHY: ICD-10-CM

## 2021-11-09 PROCEDURE — 64636 DESTROY L/S FACET JNT ADDL: CPT | Mod: 50 | Performed by: PAIN MEDICINE

## 2021-11-09 PROCEDURE — 64635 PR DESTROY LUMB/SAC FACET JNT: ICD-10-PCS | Mod: 50,,, | Performed by: PAIN MEDICINE

## 2021-11-09 PROCEDURE — 25000003 PHARM REV CODE 250: Performed by: PAIN MEDICINE

## 2021-11-09 PROCEDURE — 25000003 PHARM REV CODE 250: Performed by: NURSE ANESTHETIST, CERTIFIED REGISTERED

## 2021-11-09 PROCEDURE — 27201423 OPTIME MED/SURG SUP & DEVICES STERILE SUPPLY: Performed by: PAIN MEDICINE

## 2021-11-09 PROCEDURE — 37000009 HC ANESTHESIA EA ADD 15 MINS: Performed by: PAIN MEDICINE

## 2021-11-09 PROCEDURE — 64635 DESTROY LUMB/SAC FACET JNT: CPT | Mod: 50,,, | Performed by: PAIN MEDICINE

## 2021-11-09 PROCEDURE — 63600175 PHARM REV CODE 636 W HCPCS: Performed by: PAIN MEDICINE

## 2021-11-09 PROCEDURE — 64636 PR DESTROY L/S FACET JNT ADDL: ICD-10-PCS | Mod: 50,,, | Performed by: PAIN MEDICINE

## 2021-11-09 PROCEDURE — 63600175 PHARM REV CODE 636 W HCPCS: Performed by: NURSE ANESTHETIST, CERTIFIED REGISTERED

## 2021-11-09 PROCEDURE — D9220A PRA ANESTHESIA: ICD-10-PCS | Mod: ,,, | Performed by: NURSE ANESTHETIST, CERTIFIED REGISTERED

## 2021-11-09 PROCEDURE — 64636 DESTROY L/S FACET JNT ADDL: CPT | Mod: 50,,, | Performed by: PAIN MEDICINE

## 2021-11-09 PROCEDURE — 27000284 HC CANNULA NASAL: Performed by: NURSE ANESTHETIST, CERTIFIED REGISTERED

## 2021-11-09 PROCEDURE — 37000008 HC ANESTHESIA 1ST 15 MINUTES: Performed by: PAIN MEDICINE

## 2021-11-09 PROCEDURE — 64635 DESTROY LUMB/SAC FACET JNT: CPT | Mod: 50 | Performed by: PAIN MEDICINE

## 2021-11-09 PROCEDURE — D9220A PRA ANESTHESIA: Mod: ,,, | Performed by: NURSE ANESTHETIST, CERTIFIED REGISTERED

## 2021-11-09 RX ORDER — PROPOFOL 10 MG/ML
VIAL (ML) INTRAVENOUS
Status: DISCONTINUED | OUTPATIENT
Start: 2021-11-09 | End: 2021-11-09

## 2021-11-09 RX ORDER — TRIAMCINOLONE ACETONIDE 40 MG/ML
INJECTION, SUSPENSION INTRA-ARTICULAR; INTRAMUSCULAR
Status: DISCONTINUED | OUTPATIENT
Start: 2021-11-09 | End: 2021-11-09 | Stop reason: HOSPADM

## 2021-11-09 RX ORDER — LIDOCAINE HYDROCHLORIDE 20 MG/ML
INJECTION, SOLUTION EPIDURAL; INFILTRATION; INTRACAUDAL; PERINEURAL
Status: DISCONTINUED | OUTPATIENT
Start: 2021-11-09 | End: 2021-11-09

## 2021-11-09 RX ORDER — BUPIVACAINE HYDROCHLORIDE 2.5 MG/ML
INJECTION, SOLUTION INFILTRATION; PERINEURAL
Status: DISCONTINUED | OUTPATIENT
Start: 2021-11-09 | End: 2021-11-09 | Stop reason: HOSPADM

## 2021-11-09 RX ORDER — ORPHENADRINE CITRATE 30 MG/ML
INJECTION INTRAMUSCULAR; INTRAVENOUS
Status: DISCONTINUED | OUTPATIENT
Start: 2021-11-09 | End: 2021-11-09

## 2021-11-09 RX ORDER — SODIUM CHLORIDE 9 MG/ML
INJECTION, SOLUTION INTRAVENOUS CONTINUOUS
Status: DISCONTINUED | OUTPATIENT
Start: 2021-11-09 | End: 2021-11-09 | Stop reason: HOSPADM

## 2021-11-09 RX ADMIN — LIDOCAINE HYDROCHLORIDE 100 MG: 20 INJECTION, SOLUTION EPIDURAL; INFILTRATION; INTRACAUDAL; PERINEURAL at 09:11

## 2021-11-09 RX ADMIN — PROPOFOL 200 MG: 10 INJECTION, EMULSION INTRAVENOUS at 09:11

## 2021-11-09 RX ADMIN — ORPHENADRINE CITRATE 60 MG: 30 INJECTION INTRAMUSCULAR; INTRAVENOUS at 09:11

## 2021-11-09 RX ADMIN — SODIUM CHLORIDE: 9 INJECTION, SOLUTION INTRAVENOUS at 09:11

## 2021-12-01 ENCOUNTER — OFFICE VISIT (OUTPATIENT)
Dept: PAIN MEDICINE | Facility: CLINIC | Age: 71
End: 2021-12-01
Payer: MEDICARE

## 2021-12-01 VITALS
DIASTOLIC BLOOD PRESSURE: 70 MMHG | WEIGHT: 213 LBS | HEIGHT: 71 IN | HEART RATE: 106 BPM | RESPIRATION RATE: 20 BRPM | BODY MASS INDEX: 29.82 KG/M2 | SYSTOLIC BLOOD PRESSURE: 103 MMHG

## 2021-12-01 DIAGNOSIS — G62.9 NEUROPATHY: Chronic | ICD-10-CM

## 2021-12-01 DIAGNOSIS — M47.817 SPONDYLOSIS OF LUMBOSACRAL REGION WITHOUT MYELOPATHY OR RADICULOPATHY: Primary | Chronic | ICD-10-CM

## 2021-12-01 PROCEDURE — 4010F ACE/ARB THERAPY RXD/TAKEN: CPT | Mod: CPTII,,, | Performed by: PHYSICIAN ASSISTANT

## 2021-12-01 PROCEDURE — 99214 OFFICE O/P EST MOD 30 MIN: CPT | Mod: PBBFAC | Performed by: PHYSICIAN ASSISTANT

## 2021-12-01 PROCEDURE — 99214 PR OFFICE/OUTPT VISIT, EST, LEVL IV, 30-39 MIN: ICD-10-PCS | Mod: S$PBB,,, | Performed by: PHYSICIAN ASSISTANT

## 2021-12-01 PROCEDURE — 99214 OFFICE O/P EST MOD 30 MIN: CPT | Mod: S$PBB,,, | Performed by: PHYSICIAN ASSISTANT

## 2021-12-01 PROCEDURE — 4010F PR ACE/ARB THEARPY RXD/TAKEN: ICD-10-PCS | Mod: CPTII,,, | Performed by: PHYSICIAN ASSISTANT

## 2021-12-01 RX ORDER — CYCLOBENZAPRINE HCL 10 MG
10 TABLET ORAL 3 TIMES DAILY PRN
Qty: 90 TABLET | Refills: 2 | Status: SHIPPED | OUTPATIENT
Start: 2021-12-01 | End: 2022-02-23 | Stop reason: SDUPTHER

## 2022-02-23 NOTE — PROGRESS NOTES
Disclaimer:  This note has been generated using voice recognition software.  There may be type of graft focal areas that have been missed during a proof reading      Subjective:      Patient ID: Bobbi Haywood is a 71 y.o. female.    Chief Complaint: Low-back Pain      Back Pain  This is a chronic problem. The current episode started more than 1 year ago. The problem occurs daily. The problem is unchanged. Associated symptoms include leg pain. Pertinent negatives include no bladder incontinence, chest pain, dysuria or fever.   Medication Refill  Associated symptoms include arthralgias, myalgias and neck pain. Pertinent negatives include no change in bowel habit, chest pain, chills, coughing, diaphoresis, fever, rash, sore throat, vertigo or vomiting.     Review of Systems   Constitutional: Negative for appetite change, chills, diaphoresis, fever and unexpected weight change.   HENT: Negative for drooling, ear discharge, ear pain, facial swelling, nosebleeds, sore throat, trouble swallowing, voice change and goiter.    Eyes: Negative for photophobia, pain, discharge, redness and visual disturbance.   Respiratory: Negative for apnea, cough, choking, chest tightness, shortness of breath, wheezing and stridor.    Cardiovascular: Negative for chest pain, palpitations and leg swelling.   Gastrointestinal: Negative for abdominal distention, change in bowel habit, diarrhea, rectal pain, vomiting, fecal incontinence and change in bowel habit.   Endocrine: Negative for cold intolerance, heat intolerance, polydipsia, polyphagia and polyuria.   Genitourinary: Negative for bladder incontinence, dysuria, flank pain, frequency and hot flashes.   Musculoskeletal: Positive for arthralgias, back pain, gait problem, leg pain, myalgias, neck pain, neck stiffness and joint deformity.   Integumentary:  Negative for color change, pallor and rash.   Allergic/Immunologic: Negative for immunocompromised state.   Neurological:  "Negative for dizziness, vertigo, seizures, syncope, facial asymmetry, speech difficulty, light-headedness, disturbances in coordination, memory loss and coordination difficulties.   Hematological: Negative for adenopathy. Does not bruise/bleed easily.   Psychiatric/Behavioral: Negative for agitation, behavioral problems, confusion, decreased concentration, dysphoric mood, hallucinations, self-injury and suicidal ideas. The patient is not nervous/anxious and is not hyperactive.             Objective:  Vitals:    02/28/22 1253 02/28/22 1254   BP: 126/77    Pulse: 98    Resp: 19    Weight: 96.6 kg (213 lb)    Height: 5' 11" (1.803 m)    PainSc:   8   8         Physical Exam  Vitals and nursing note reviewed. Exam conducted with a chaperone present.   Constitutional:       General: She is awake. She is not in acute distress.     Appearance: Normal appearance. She is not toxic-appearing or diaphoretic.   HENT:      Head: Normocephalic and atraumatic.      Nose: Nose normal.      Mouth/Throat:      Mouth: Mucous membranes are moist.      Pharynx: Oropharynx is clear.   Eyes:      Conjunctiva/sclera: Conjunctivae normal.      Pupils: Pupils are equal, round, and reactive to light.   Cardiovascular:      Rate and Rhythm: Normal rate.   Pulmonary:      Effort: Pulmonary effort is normal. No respiratory distress.   Abdominal:      Palpations: Abdomen is soft.      Tenderness: There is no guarding.   Musculoskeletal:         General: No signs of injury. Normal range of motion.      Cervical back: Normal range of motion and neck supple. Tenderness present. No rigidity.      Thoracic back: Tenderness present.      Lumbar back: Tenderness present.   Skin:     General: Skin is warm and dry.      Coloration: Skin is not jaundiced or pale.   Neurological:      General: No focal deficit present.      Mental Status: She is alert and oriented to person, place, and time. Mental status is at baseline.      Cranial Nerves: Cranial nerves " are intact. No cranial nerve deficit (II-XII).      Gait: Gait abnormal.   Psychiatric:         Mood and Affect: Mood normal.         Behavior: Behavior normal. Behavior is cooperative.         Thought Content: Thought content normal.           [facility]  See OP Notes for results.     IMPRESSION: See OP Notes for results.     This procedure was auto-finalized by: Virtual Radiologist       Lab Visit on 10/05/2021   Component Date Value Ref Range Status    COVID-19 Ag 10/05/2021 Negative  Negative, Invalid Final           Assessment:      1. Spondylosis of lumbosacral region without myelopathy or radiculopathy    2. Neuropathy    3. Spinal stenosis of lumbar region with neurogenic claudication          Orders Placed This Encounter   Procedures    Ambulatory referral/consult to Spine Surgery     Standing Status:   Future     Standing Expiration Date:   3/28/2023     Referral Priority:   Routine     Referral Type:   Consultation     Referral Reason:   Specialty Services Required     Referred to Provider:   Reed Crow MD     Requested Specialty:   Orthopedic Surgery     Number of Visits Requested:   1         Requested Prescriptions     Signed Prescriptions Disp Refills    cyclobenzaprine (FLEXERIL) 10 MG tablet 90 tablet 2     Sig: Take 1 tablet (10 mg total) by mouth 3 (three) times daily as needed for Muscle spasms.         Plan:    Wheelchair for mobility    Not using narcotics from our office    Requesting referral to spine surgery [facility]    Reviewed MRI lumbar spine [facility]    Patient has known lumbar stenosis multiple level neuroforaminal stenosis    Declines physical therapy at this time    Would like to continue with conservative management    Referral spine surgery please evaluate and treat lumbar stenosis    Follow-up 3 months sooner if needed    Dr. Nelson, November 2022

## 2022-02-28 ENCOUNTER — OFFICE VISIT (OUTPATIENT)
Dept: PAIN MEDICINE | Facility: CLINIC | Age: 72
End: 2022-02-28
Payer: COMMERCIAL

## 2022-02-28 VITALS
BODY MASS INDEX: 29.82 KG/M2 | DIASTOLIC BLOOD PRESSURE: 77 MMHG | HEIGHT: 71 IN | RESPIRATION RATE: 19 BRPM | SYSTOLIC BLOOD PRESSURE: 126 MMHG | HEART RATE: 98 BPM | WEIGHT: 213 LBS

## 2022-02-28 DIAGNOSIS — M48.062 SPINAL STENOSIS OF LUMBAR REGION WITH NEUROGENIC CLAUDICATION: Chronic | ICD-10-CM

## 2022-02-28 DIAGNOSIS — G62.9 NEUROPATHY: Chronic | ICD-10-CM

## 2022-02-28 DIAGNOSIS — M47.817 SPONDYLOSIS OF LUMBOSACRAL REGION WITHOUT MYELOPATHY OR RADICULOPATHY: Primary | Chronic | ICD-10-CM

## 2022-02-28 PROCEDURE — 3078F PR MOST RECENT DIASTOLIC BLOOD PRESSURE < 80 MM HG: ICD-10-PCS | Mod: CPTII,,, | Performed by: PHYSICIAN ASSISTANT

## 2022-02-28 PROCEDURE — 1159F PR MEDICATION LIST DOCUMENTED IN MEDICAL RECORD: ICD-10-PCS | Mod: CPTII,,, | Performed by: PHYSICIAN ASSISTANT

## 2022-02-28 PROCEDURE — 1125F PR PAIN SEVERITY QUANTIFIED, PAIN PRESENT: ICD-10-PCS | Mod: CPTII,,, | Performed by: PHYSICIAN ASSISTANT

## 2022-02-28 PROCEDURE — 99215 OFFICE O/P EST HI 40 MIN: CPT | Mod: PBBFAC | Performed by: PHYSICIAN ASSISTANT

## 2022-02-28 PROCEDURE — 99214 PR OFFICE/OUTPT VISIT, EST, LEVL IV, 30-39 MIN: ICD-10-PCS | Mod: S$PBB,,, | Performed by: PHYSICIAN ASSISTANT

## 2022-02-28 PROCEDURE — 3288F PR FALLS RISK ASSESSMENT DOCUMENTED: ICD-10-PCS | Mod: CPTII,,, | Performed by: PHYSICIAN ASSISTANT

## 2022-02-28 PROCEDURE — 3008F BODY MASS INDEX DOCD: CPT | Mod: CPTII,,, | Performed by: PHYSICIAN ASSISTANT

## 2022-02-28 PROCEDURE — 1125F AMNT PAIN NOTED PAIN PRSNT: CPT | Mod: CPTII,,, | Performed by: PHYSICIAN ASSISTANT

## 2022-02-28 PROCEDURE — 1159F MED LIST DOCD IN RCRD: CPT | Mod: CPTII,,, | Performed by: PHYSICIAN ASSISTANT

## 2022-02-28 PROCEDURE — 3074F SYST BP LT 130 MM HG: CPT | Mod: CPTII,,, | Performed by: PHYSICIAN ASSISTANT

## 2022-02-28 PROCEDURE — 99214 OFFICE O/P EST MOD 30 MIN: CPT | Mod: S$PBB,,, | Performed by: PHYSICIAN ASSISTANT

## 2022-02-28 PROCEDURE — 1100F PTFALLS ASSESS-DOCD GE2>/YR: CPT | Mod: CPTII,,, | Performed by: PHYSICIAN ASSISTANT

## 2022-02-28 PROCEDURE — 3078F DIAST BP <80 MM HG: CPT | Mod: CPTII,,, | Performed by: PHYSICIAN ASSISTANT

## 2022-02-28 PROCEDURE — 3288F FALL RISK ASSESSMENT DOCD: CPT | Mod: CPTII,,, | Performed by: PHYSICIAN ASSISTANT

## 2022-02-28 PROCEDURE — 3008F PR BODY MASS INDEX (BMI) DOCUMENTED: ICD-10-PCS | Mod: CPTII,,, | Performed by: PHYSICIAN ASSISTANT

## 2022-02-28 PROCEDURE — 1100F PR PT FALLS ASSESS DOC 2+ FALLS/FALL W/INJURY/YR: ICD-10-PCS | Mod: CPTII,,, | Performed by: PHYSICIAN ASSISTANT

## 2022-02-28 PROCEDURE — 3074F PR MOST RECENT SYSTOLIC BLOOD PRESSURE < 130 MM HG: ICD-10-PCS | Mod: CPTII,,, | Performed by: PHYSICIAN ASSISTANT

## 2022-02-28 RX ORDER — CYCLOBENZAPRINE HCL 10 MG
10 TABLET ORAL 3 TIMES DAILY PRN
Qty: 90 TABLET | Refills: 2 | Status: SHIPPED | OUTPATIENT
Start: 2022-02-28

## 2022-03-01 ENCOUNTER — IMMUNIZATION (OUTPATIENT)
Dept: FAMILY MEDICINE | Facility: CLINIC | Age: 72
End: 2022-03-01
Payer: COMMERCIAL

## 2022-03-01 DIAGNOSIS — Z23 NEED FOR VACCINATION: Primary | ICD-10-CM

## 2022-03-01 PROCEDURE — 0064A COVID-19, MRNA, LNP-S, PF, 100 MCG/0.25 ML DOSE VACCINE (MODERNA BOOSTER): CPT | Mod: ,,, | Performed by: NURSE PRACTITIONER

## 2022-03-01 PROCEDURE — 0064A COVID-19, MRNA, LNP-S, PF, 100 MCG/0.25 ML DOSE VACCINE (MODERNA BOOSTER): ICD-10-PCS | Mod: ,,, | Performed by: NURSE PRACTITIONER

## 2022-03-01 PROCEDURE — 91306 COVID-19, MRNA, LNP-S, PF, 100 MCG/0.25 ML DOSE VACCINE (MODERNA BOOSTER): CPT | Mod: ,,, | Performed by: NURSE PRACTITIONER

## 2022-03-01 PROCEDURE — 91306 COVID-19, MRNA, LNP-S, PF, 100 MCG/0.25 ML DOSE VACCINE (MODERNA BOOSTER): ICD-10-PCS | Mod: ,,, | Performed by: NURSE PRACTITIONER

## 2022-03-15 ENCOUNTER — OFFICE VISIT (OUTPATIENT)
Dept: SPINE | Facility: CLINIC | Age: 72
End: 2022-03-15
Payer: COMMERCIAL

## 2022-03-15 ENCOUNTER — HOSPITAL ENCOUNTER (OUTPATIENT)
Dept: RADIOLOGY | Facility: HOSPITAL | Age: 72
Discharge: HOME | End: 2022-03-15
Attending: NURSE PRACTITIONER
Payer: COMMERCIAL

## 2022-03-15 DIAGNOSIS — M47.817 SPONDYLOSIS OF LUMBOSACRAL REGION WITHOUT MYELOPATHY OR RADICULOPATHY: Chronic | ICD-10-CM

## 2022-03-15 DIAGNOSIS — M47.817 SPONDYLOSIS OF LUMBOSACRAL REGION WITHOUT MYELOPATHY OR RADICULOPATHY: ICD-10-CM

## 2022-03-15 DIAGNOSIS — M48.062 SPINAL STENOSIS OF LUMBAR REGION WITH NEUROGENIC CLAUDICATION: Chronic | ICD-10-CM

## 2022-03-15 PROCEDURE — 72114 XR LUMBAR SPINE 5 VIEW WITH FLEX AND EXT: ICD-10-PCS | Mod: 26,,,

## 2022-03-15 PROCEDURE — 1159F PR MEDICATION LIST DOCUMENTED IN MEDICAL RECORD: ICD-10-PCS | Mod: CPTII,,, | Performed by: NURSE PRACTITIONER

## 2022-03-15 PROCEDURE — 72114 X-RAY EXAM L-S SPINE BENDING: CPT | Mod: 26,,,

## 2022-03-15 PROCEDURE — 1159F MED LIST DOCD IN RCRD: CPT | Mod: CPTII,,, | Performed by: NURSE PRACTITIONER

## 2022-03-15 PROCEDURE — 99213 OFFICE O/P EST LOW 20 MIN: CPT | Mod: PBBFAC | Performed by: NURSE PRACTITIONER

## 2022-03-15 PROCEDURE — 99204 OFFICE O/P NEW MOD 45 MIN: CPT | Mod: S$PBB,,, | Performed by: NURSE PRACTITIONER

## 2022-03-15 PROCEDURE — 72114 X-RAY EXAM L-S SPINE BENDING: CPT | Mod: TC

## 2022-03-15 PROCEDURE — 99204 PR OFFICE/OUTPT VISIT, NEW, LEVL IV, 45-59 MIN: ICD-10-PCS | Mod: S$PBB,,, | Performed by: NURSE PRACTITIONER

## 2022-03-15 NOTE — PROGRESS NOTES
MDM/time:  Greater than 45 minutes spent on this encounter including 15 minutes reviewing imaging and notes, 20 minutes with the patient, 10 minutes documentation    ASSESSMENT:  71 y.o. female with lumbar spondylolisthesis L4-5 with radiculopathy and neurogenic claudication    PLAN:  Will discuss MRI with Dr. Crow to see if she would be a surgical candidate    HPI:  71 y.o. female here for evaluation of low back pain.  History of back issues for approximately 8 years was seen by Dr. Hess and referred to Dr. Lazcano for epidural injections.  She had a total of 3 injections at that time which did help relieve her pain.  Approximately 2 years ago started having increased pain in her back and some right leg and right knee pain was seen by Dr. Chow and had a total of 4 injections which did not give her pain relief.  She reports over the past 2 years pain has increased making it difficult to stand or walk for any length of time when she sits down her pain is relieved.  She recently has changed to [facility] seeing Dr. Nelson has had a total of 3 injections with little pain relief.  Currently takes Cymbalta 60 mg daily Neurontin 600 mg twice a day for pain.  She reports decreased walking tolerance and weakness in her low lower legs.  Reports difficulty with balance uses a cane or walker for ambulation.  Has had 3-4 falls over the past year.  Reports bladder incontinence but no bowel issues.  Unable to walk more than 50 ft due to back pain.  Recent physical therapy with home health after having COVID March of 2021. Her last MRI of her lumbar spine was 08/10/2021.  No prior spine surgery.  Patient is nonsmoker.  She currently takes Eliquis for Afib- Dr. Bradford/cardiologist at [facility].      IMAGING:  3/15/2022 lumbar spine xray reviewed showed:   There is grade 1 spondylolisthesis at L4-5 which does not significantly change in the minimal degrees of flexion and extension and she by the patient.  There are  osteophytes and lower lumbar facet arthropathy.  There is a a mild-to-moderate scoliosis present.   Clips present in the right abdomen.   Vascular calcifications present.    8/10/2021 MRI lumbar spine reviewed showed:   There is height loss of the T12, and L2 vertebral bodies minimally.  No marrow edema to suggest an acute fracture. There is a hemangioma of L1.  Disc desiccation throughout.  Minimal retrolisthesis L1 on L2, and anterolisthesis L4 on L5.  The conus terminates at the L1 level.   L1-L2: Disc osteophyte complex and facet degeneration.  Mild spinal canal and bilateral foraminal stenosis.   L2-3: Disc bulge and facet degeneration.  Mild spinal canal stenosis.  There is also small synovial cyst on the left.  Mild bilateral foraminal stenosis.   L3-4: Disc bulge, facet degeneration, and prominence of the posterior epidural fat efface the CSF within the thecal sac contributing to moderate to severe stenosis.  There is mild-to-moderate bilateral foraminal stenosis.   L4-5: Disc bulging and facet degeneration with prominent posterior epidural fat.  Severe spinal canal stenosis.  Mild bilateral foraminal stenosis.   L5-S1: Disc bulge and facet degeneration.  Mild spinal canal and bilateral foraminal stenosis.       Past Medical History:   Diagnosis Date    A-fib     Hypertension     Neuropathy      Past Surgical History:   Procedure Laterality Date    CATARACT EXTRACTION, BILATERAL      CHOLECYSTECTOMY      HYSTERECTOMY      INJECTION OF ANESTHETIC AGENT AROUND MEDIAL BRANCH NERVES INNERVATING LUMBAR FACET JOINT Bilateral 8/26/2021    Procedure: Bilateral L4-5,5-S1 MBB;  Surgeon: Sulma Nelson MD;  Location: [facility];  Service: Pain Management;  Laterality: Bilateral;  Per Dr Bradford ok to hold Eliquis for 4 days not 5, Per Dr Nelson this is fine with her. Pt will bring her verification card for COVID vaccination.    INJECTION OF ANESTHETIC AGENT AROUND MEDIAL BRANCH NERVES INNERVATING  LUMBAR FACET JOINT Bilateral 10/5/2021    Procedure: Bilateral L4-5,5-S1 MBB;  Surgeon: Sulma Nelson MD;  Location: [facility];  Service: Pain Management;  Laterality: Bilateral;  PT AWARE ON OV TO BE TESTED    KNEE CARTILAGE SURGERY Right     RADIOFREQUENCY ABLATION OF LUMBAR MEDIAL BRANCH NERVE AT SINGLE LEVEL Bilateral 11/9/2021    Procedure: Radiofrequency Ablation, Nerve, Spinal, Lumbar, Medial Branch, 1 Level L4-S1  BILATERAL;  Surgeon: Sulma Nelson MD;  Location: [facility];  Service: Pain Management;  Laterality: Bilateral;  per lessia pt is on eliquis but she will get permission for it to be held 5 days prior to procedure.   HAD VAC   CARD SCANNED IN    REDUCTION OF BOTH BREASTS      RETINAL DETACHMENT SURGERY  right    VAGINAL DELIVERY      X1     Social History     Tobacco Use    Smoking status: Never Smoker    Smokeless tobacco: Never Used   Substance Use Topics    Alcohol use: Not Currently      Current Outpatient Medications   Medication Instructions    cyanocobalamin 500 mcg, Oral, Daily    cyclobenzaprine (FLEXERIL) 10 mg, Oral, 3 times daily PRN    DULoxetine (CYMBALTA) 60 MG capsule 1 capsule, Oral, Daily    ELIQUIS 5 mg, Oral, 2 times daily    estradioL (ESTRACE) 2 MG tablet 1 tablet, Oral, Daily    folic acid (FOLVITE) 800 mcg, Oral, Daily    gabapentin (NEURONTIN) 600 MG tablet 1 tablet, Oral, 2 times daily    L. rhamnosus/C/zinc/elderberry (CULTURELLE IMMUNE DEFENSE ORAL) 1 tablet, Oral, Daily    L.acidoph,saliva/B.bif/S.therm (ACIDOPHILUS PROBIOTIC BLEND ORAL) 1 tablet, Oral, Daily    lisinopriL-hydrochlorothiazide (PRINZIDE,ZESTORETIC) 20-12.5 mg per tablet 1 tablet, Oral, Daily    metoprolol tartrate (LOPRESSOR) 25 MG tablet 1 tablet, Oral, Daily    solifenacin (VESICARE) 5 MG tablet 1 tablet, Oral, Daily        EXAM:  Physical Exam   Constitutional  General Appearance:  There is no height or weight on file to calculate BMI.,  NAD  Psychiatric   Orientation: Oriented to time, oriented to place, oriented to person  Mood and Affect: Active and alert, normal mood, normal affect  Gait and Station   Appearance:  Antalgic gait to the right walks with a cane, unable to tandem gait, unable to walk on toes, unable to walk on heels    LUMBAR  Musculoskeletal System   Hips: Normal appearance, no leg length discrepancy, normal motion; left, normal motion; right    Lumbar Spine                   Inspection:  Normal alignment, normal sagittal balance                  Range of motion:  Decreased flexion, extension, lateral bending, rotation. Pain with range of motion                  Bony Palpation of the Lumbar Spine:  No tenderness of the spinous process, no tenderness of the sacrum, no tenderness of the coccyx                  Bony Palpation of the Right Hip:  No tenderness of the iliac crest, no tenderness of the sciatic notch, no tenderness of the SI joint                  Bony Palpation of the Left Hip:  No tenderness of the iliac crest, no tenderness of the sciatic notch, no tenderness of the SI joint                  Soft Tissue Palpation on the Right:  No tenderness of the paraspinal region, no tenderness of the iliolumbar region                  Soft Tissue Palpation on the Left:  No tenderness of the paraspinal region, no tenderness of the iliolumbar region    Motor Strength   L1 Right:  Hip flexion iliopsoas 45    L1 Left:  Hip flexion iliopsoas 4/5              L2-L4 Right:  Knee extension quadriceps 4/5, tibialis anterior 5/5              L2-L4 Left:  Knee extension quadriceps 4/5, tibialis anterior 5/5   L5 Right:  Extensor hallucis llongus 5/5,    L5 Left:  Extensor hallucis longus 5/5,    S1 Right:  Plantar flexion gastrocnemius 5/5   S1 Left:  Plantar flexion gastrocnemius 5/5    Neurological System   Ankle Reflex Right:  normal   Ankle Reflex Left: normal   Knee Reflex Right:  normal   Knee Reflex Left:  normal   Sensation on the  Right:  L2 normal, L3 normal, L4 normal, L5 normal, S1 normal   Sensation on the Left:  L2 normal, L3 normal, L4 normal, L5 normal, S1 normal              Special Test on the Right:  Seated straight leg raising test negative, no clonus of the ankle              Special Test on the Left:  Seated straight leg raising test negative, no clonus of the ankle    Skin   Lumbosacral Spine:  Normal skin    Cardiovascular System   Arterial Pulses Right:  Posterior tibialis normal, dorsalis pedis normal   Arterial Pulses Left:  Posterior tibialis normal, dorsalis pedis normal   Edema Right: None   Edema Left:  None

## 2022-03-24 ENCOUNTER — DOCUMENTATION ONLY (OUTPATIENT)
Dept: SPINE | Facility: CLINIC | Age: 72
End: 2022-03-24
Payer: COMMERCIAL

## 2022-03-24 DIAGNOSIS — G95.9 LUMBAR MYELOPATHY: Primary | ICD-10-CM

## 2022-03-24 DIAGNOSIS — G95.9 CERVICAL MYELOPATHY: ICD-10-CM

## 2022-03-24 NOTE — PROGRESS NOTES
Reviewed office visit an MRI from August 2021 lumbar spine with Dr. Crow due to recent changes in patient's condition decreased walking tolerance and strength in her arms or legs we will orders MRI cervical and lumbar spine for evaluation of spinal stenosis cervical myeloradiculopathy and lumbar myeloradiculopathy with neurogenic claudication.

## 2022-04-06 ENCOUNTER — HOSPITAL ENCOUNTER (OUTPATIENT)
Dept: RADIOLOGY | Facility: HOSPITAL | Age: 72
Discharge: HOME | End: 2022-04-06
Attending: NURSE PRACTITIONER
Payer: COMMERCIAL

## 2022-04-06 ENCOUNTER — OFFICE VISIT (OUTPATIENT)
Dept: SPINE | Facility: CLINIC | Age: 72
End: 2022-04-06
Payer: COMMERCIAL

## 2022-04-06 DIAGNOSIS — M54.16 LUMBAR RADICULOPATHY: ICD-10-CM

## 2022-04-06 DIAGNOSIS — G95.9 CERVICAL MYELOPATHY: ICD-10-CM

## 2022-04-06 DIAGNOSIS — G95.9 LUMBAR MYELOPATHY: Primary | ICD-10-CM

## 2022-04-06 DIAGNOSIS — G95.9 LUMBAR MYELOPATHY: ICD-10-CM

## 2022-04-06 DIAGNOSIS — M47.817 SPONDYLOSIS OF LUMBOSACRAL REGION WITHOUT MYELOPATHY OR RADICULOPATHY: ICD-10-CM

## 2022-04-06 PROCEDURE — 1159F MED LIST DOCD IN RCRD: CPT | Mod: CPTII,,, | Performed by: ORTHOPAEDIC SURGERY

## 2022-04-06 PROCEDURE — 99214 OFFICE O/P EST MOD 30 MIN: CPT | Mod: S$PBB,,, | Performed by: ORTHOPAEDIC SURGERY

## 2022-04-06 PROCEDURE — 72148 MRI LUMBAR SPINE W/O DYE: CPT | Mod: 26,,, | Performed by: RADIOLOGY

## 2022-04-06 PROCEDURE — 72148 MRI LUMBAR SPINE WITHOUT CONTRAST: ICD-10-PCS | Mod: 26,,, | Performed by: RADIOLOGY

## 2022-04-06 PROCEDURE — 4010F PR ACE/ARB THEARPY RXD/TAKEN: ICD-10-PCS | Mod: CPTII,,, | Performed by: ORTHOPAEDIC SURGERY

## 2022-04-06 PROCEDURE — 4010F ACE/ARB THERAPY RXD/TAKEN: CPT | Mod: CPTII,,, | Performed by: ORTHOPAEDIC SURGERY

## 2022-04-06 PROCEDURE — 99213 OFFICE O/P EST LOW 20 MIN: CPT | Mod: PBBFAC,25 | Performed by: ORTHOPAEDIC SURGERY

## 2022-04-06 PROCEDURE — 99214 PR OFFICE/OUTPT VISIT, EST, LEVL IV, 30-39 MIN: ICD-10-PCS | Mod: S$PBB,,, | Performed by: ORTHOPAEDIC SURGERY

## 2022-04-06 PROCEDURE — 72141 MRI NECK SPINE W/O DYE: CPT | Mod: TC

## 2022-04-06 PROCEDURE — 72141 MRI NECK SPINE W/O DYE: CPT | Mod: 26,,, | Performed by: RADIOLOGY

## 2022-04-06 PROCEDURE — 72141 MRI CERVICAL SPINE WITHOUT CONTRAST: ICD-10-PCS | Mod: 26,,, | Performed by: RADIOLOGY

## 2022-04-06 PROCEDURE — 1159F PR MEDICATION LIST DOCUMENTED IN MEDICAL RECORD: ICD-10-PCS | Mod: CPTII,,, | Performed by: ORTHOPAEDIC SURGERY

## 2022-04-06 PROCEDURE — 72148 MRI LUMBAR SPINE W/O DYE: CPT | Mod: TC

## 2022-04-06 NOTE — PROGRESS NOTES
MDM/time:  Greater than 30 minutes spent on this encounter including 10 minutes reviewing imaging and notes, 15 minutes with the patient, 5 minutes documentation    ASSESSMENT:  71 y.o. female with lumbar spondylolisthesis L4-5 with radiculopathy and neurogenic claudication    PLAN:  Do not believe that she would be a good surgical candidate for extensive lumbar laminectomy and fusion.  She will follow-up with Dr. Nelson and consider spinal cord stimulator.  Follow-up as needed    HPI:  71 y.o. female here for repeat evaluation of low back pain.  History of back issues for approximately 8 years was seen by Dr. Hess and referred to Dr. Newman for epidural injections.  She had a total of 3 injections at that time which did help relieve her pain.  Approximately 2 years ago started having increased pain in her back and some right leg and right knee pain was seen by Dr. Morel and had a total of 4 injections which did not give her pain relief.  She reports over the past 2 years pain has increased making it difficult to stand or walk for any length of time when she sits down her pain is relieved.  She recently has changed to [facility] seeing Dr. Nelson has had a total of 3 injections with little pain relief.  Currently takes Cymbalta 60 mg daily Neurontin 600 mg twice a day for pain.  She reports decreased walking tolerance and weakness in her low lower legs.  Reports difficulty with balance uses a cane or walker for ambulation.  Has had 3-4 falls over the past year.  Reports bladder incontinence but no bowel issues.  Unable to walk more than 50 ft due to back pain.  Recent physical therapy with home health after having COVID March of 2021. Her last MRI of her lumbar spine was 08/10/2021.  No prior spine surgery.  Patient is nonsmoker.  She currently takes Eliquis for Afib- Dr. Bradford/cardiologist at [facility].      IMAGING:  3/15/2022 lumbar spine xray reviewed showed:   There is grade 1 spondylolisthesis at L4-5  which does not significantly change in the minimal degrees of flexion and extension and she by the patient.  There are osteophytes and lower lumbar facet arthropathy.  There is a a mild-to-moderate scoliosis present.   Clips present in the right abdomen.   Vascular calcifications present.    MRI 04/06/2022 reviewed shows:  At L1-2 there is moderate left lateral recess stenosis  At L2-3 there is moderate left greater than right lateral recess stenosis  At L3-4 there is severe central bilateral lateral recess stenosis  At L4-5 there is grade 1 spondylolisthesis with severe central and bilateral lateral recess stenosis and moderate bilateral foraminal stenosis  At L5-S1 no severe left lateral recess stenosis and severe foraminal stenosis       Past Medical History:   Diagnosis Date    A-fib     Hypertension     Neuropathy      Past Surgical History:   Procedure Laterality Date    CATARACT EXTRACTION, BILATERAL      CHOLECYSTECTOMY      HYSTERECTOMY      INJECTION OF ANESTHETIC AGENT AROUND MEDIAL BRANCH NERVES INNERVATING LUMBAR FACET JOINT Bilateral 8/26/2021    Procedure: Bilateral L4-5,5-S1 MBB;  Surgeon: Sulma Nelson MD;  Location: [facility];  Service: Pain Management;  Laterality: Bilateral;  Per Dr Bradford ok to hold Eliquis for 4 days not 5, Per Dr Nelson this is fine with her. Pt will bring her verification card for COVID vaccination.    INJECTION OF ANESTHETIC AGENT AROUND MEDIAL BRANCH NERVES INNERVATING LUMBAR FACET JOINT Bilateral 10/5/2021    Procedure: Bilateral L4-5,5-S1 MBB;  Surgeon: Sulma Nelson MD;  Location: [facility];  Service: Pain Management;  Laterality: Bilateral;  PT AWARE ON OV TO BE TESTED    KNEE CARTILAGE SURGERY Right     RADIOFREQUENCY ABLATION OF LUMBAR MEDIAL BRANCH NERVE AT SINGLE LEVEL Bilateral 11/9/2021    Procedure: Radiofrequency Ablation, Nerve, Spinal, Lumbar, Medial Branch, 1 Level L4-S1  BILATERAL;  Surgeon: Sulma Nelson MD;  Location:  [facility];  Service: Pain Management;  Laterality: Bilateral;  per lessia pt is on eliquis but she will get permission for it to be held 5 days prior to procedure.   HAD VAC   CARD SCANNED IN    REDUCTION OF BOTH BREASTS      RETINAL DETACHMENT SURGERY  right    VAGINAL DELIVERY      X1     Social History     Tobacco Use    Smoking status: Never Smoker    Smokeless tobacco: Never Used   Substance Use Topics    Alcohol use: Not Currently      Current Outpatient Medications   Medication Instructions    cyanocobalamin 500 mcg, Oral, Daily    cyclobenzaprine (FLEXERIL) 10 mg, Oral, 3 times daily PRN    DULoxetine (CYMBALTA) 60 MG capsule 1 capsule, Oral, Daily    ELIQUIS 5 mg, Oral, 2 times daily    estradioL (ESTRACE) 2 MG tablet 1 tablet, Oral, Daily    folic acid (FOLVITE) 800 mcg, Oral, Daily    gabapentin (NEURONTIN) 600 MG tablet 1 tablet, Oral, 2 times daily    L. rhamnosus/C/zinc/elderberry (CULTURELLE IMMUNE DEFENSE ORAL) 1 tablet, Oral, Daily    L.acidoph,saliva/B.bif/S.therm (ACIDOPHILUS PROBIOTIC BLEND ORAL) 1 tablet, Oral, Daily    lisinopriL-hydrochlorothiazide (PRINZIDE,ZESTORETIC) 20-12.5 mg per tablet 1 tablet, Oral, Daily    metoprolol tartrate (LOPRESSOR) 25 MG tablet 1 tablet, Oral, Daily    solifenacin (VESICARE) 5 MG tablet 1 tablet, Oral, Daily        EXAM:  Constitutional  General Appearance:  There is no height or weight on file to calculate BMI., NAD  Psychiatric   Orientation: Oriented to time, oriented to place, oriented to person  Mood and Affect: Active and alert, normal mood, normal affect  Gait and Station   Appearance:  Antalgic gait to the right walks with a cane, unable to tandem gait, unable to walk on toes, unable to walk on heels    LUMBAR  Musculoskeletal System   Hips: Normal appearance, no leg length discrepancy, normal motion; left, normal motion; right    Lumbar Spine                   Inspection:  Normal alignment, normal sagittal balance                   Range of motion:  Decreased flexion, extension, lateral bending, rotation. Pain with range of motion                  Bony Palpation of the Lumbar Spine:  No tenderness of the spinous process, no tenderness of the sacrum, no tenderness of the coccyx                  Bony Palpation of the Right Hip:  No tenderness of the iliac crest, no tenderness of the sciatic notch, no tenderness of the SI joint                  Bony Palpation of the Left Hip:  No tenderness of the iliac crest, no tenderness of the sciatic notch, no tenderness of the SI joint                  Soft Tissue Palpation on the Right:  No tenderness of the paraspinal region, no tenderness of the iliolumbar region                  Soft Tissue Palpation on the Left:  No tenderness of the paraspinal region, no tenderness of the iliolumbar region    Motor Strength   L1 Right:  Hip flexion iliopsoas 45    L1 Left:  Hip flexion iliopsoas 4/5              L2-L4 Right:  Knee extension quadriceps 4/5, tibialis anterior 5/5              L2-L4 Left:  Knee extension quadriceps 4/5, tibialis anterior 5/5   L5 Right:  Extensor hallucis llongus 5/5,    L5 Left:  Extensor hallucis longus 5/5,    S1 Right:  Plantar flexion gastrocnemius 5/5   S1 Left:  Plantar flexion gastrocnemius 5/5    Neurological System   Ankle Reflex Right:  normal   Ankle Reflex Left: normal   Knee Reflex Right:  normal   Knee Reflex Left:  normal   Sensation on the Right:  L2 normal, L3 normal, L4 normal, L5 normal, S1 normal   Sensation on the Left:  L2 normal, L3 normal, L4 normal, L5 normal, S1 normal              Special Test on the Right:  Seated straight leg raising test negative, no clonus of the ankle              Special Test on the Left:  Seated straight leg raising test negative, no clonus of the ankle    Skin   Lumbosacral Spine:  Normal skin    Cardiovascular System   Arterial Pulses Right:  Posterior tibialis normal, dorsalis pedis normal   Arterial Pulses Left:  Posterior  tibialis normal, dorsalis pedis normal   Edema Right: None   Edema Left:  None

## 2022-04-22 ENCOUNTER — OFFICE VISIT (OUTPATIENT)
Dept: PAIN MEDICINE | Facility: CLINIC | Age: 72
End: 2022-04-22
Payer: COMMERCIAL

## 2022-04-22 ENCOUNTER — TELEPHONE (OUTPATIENT)
Dept: PAIN MEDICINE | Facility: CLINIC | Age: 72
End: 2022-04-22
Payer: COMMERCIAL

## 2022-04-22 VITALS
WEIGHT: 213 LBS | DIASTOLIC BLOOD PRESSURE: 65 MMHG | BODY MASS INDEX: 29.82 KG/M2 | HEART RATE: 84 BPM | HEIGHT: 71 IN | SYSTOLIC BLOOD PRESSURE: 109 MMHG

## 2022-04-22 DIAGNOSIS — M54.16 LUMBAR RADICULOPATHY: Chronic | ICD-10-CM

## 2022-04-22 DIAGNOSIS — G62.9 NEUROPATHY: Primary | Chronic | ICD-10-CM

## 2022-04-22 DIAGNOSIS — M54.41 CHRONIC BILATERAL LOW BACK PAIN WITH BILATERAL SCIATICA: Chronic | ICD-10-CM

## 2022-04-22 DIAGNOSIS — G89.29 CHRONIC BILATERAL LOW BACK PAIN WITH BILATERAL SCIATICA: Chronic | ICD-10-CM

## 2022-04-22 DIAGNOSIS — M54.42 CHRONIC BILATERAL LOW BACK PAIN WITH BILATERAL SCIATICA: Chronic | ICD-10-CM

## 2022-04-22 DIAGNOSIS — M47.816 LUMBAR SPONDYLOSIS: Chronic | ICD-10-CM

## 2022-04-22 PROCEDURE — 3074F PR MOST RECENT SYSTOLIC BLOOD PRESSURE < 130 MM HG: ICD-10-PCS | Mod: CPTII,,, | Performed by: PAIN MEDICINE

## 2022-04-22 PROCEDURE — 99212 OFFICE O/P EST SF 10 MIN: CPT | Mod: S$PBB,,, | Performed by: PAIN MEDICINE

## 2022-04-22 PROCEDURE — 3288F FALL RISK ASSESSMENT DOCD: CPT | Mod: CPTII,,, | Performed by: PAIN MEDICINE

## 2022-04-22 PROCEDURE — 3008F BODY MASS INDEX DOCD: CPT | Mod: CPTII,,, | Performed by: PAIN MEDICINE

## 2022-04-22 PROCEDURE — 4010F ACE/ARB THERAPY RXD/TAKEN: CPT | Mod: CPTII,,, | Performed by: PAIN MEDICINE

## 2022-04-22 PROCEDURE — 1126F AMNT PAIN NOTED NONE PRSNT: CPT | Mod: CPTII,,, | Performed by: PAIN MEDICINE

## 2022-04-22 PROCEDURE — 3008F PR BODY MASS INDEX (BMI) DOCUMENTED: ICD-10-PCS | Mod: CPTII,,, | Performed by: PAIN MEDICINE

## 2022-04-22 PROCEDURE — 1100F PTFALLS ASSESS-DOCD GE2>/YR: CPT | Mod: CPTII,,, | Performed by: PAIN MEDICINE

## 2022-04-22 PROCEDURE — 3288F PR FALLS RISK ASSESSMENT DOCUMENTED: ICD-10-PCS | Mod: CPTII,,, | Performed by: PAIN MEDICINE

## 2022-04-22 PROCEDURE — 4010F PR ACE/ARB THEARPY RXD/TAKEN: ICD-10-PCS | Mod: CPTII,,, | Performed by: PAIN MEDICINE

## 2022-04-22 PROCEDURE — 1159F MED LIST DOCD IN RCRD: CPT | Mod: CPTII,,, | Performed by: PAIN MEDICINE

## 2022-04-22 PROCEDURE — 3078F PR MOST RECENT DIASTOLIC BLOOD PRESSURE < 80 MM HG: ICD-10-PCS | Mod: CPTII,,, | Performed by: PAIN MEDICINE

## 2022-04-22 PROCEDURE — 3078F DIAST BP <80 MM HG: CPT | Mod: CPTII,,, | Performed by: PAIN MEDICINE

## 2022-04-22 PROCEDURE — 1100F PR PT FALLS ASSESS DOC 2+ FALLS/FALL W/INJURY/YR: ICD-10-PCS | Mod: CPTII,,, | Performed by: PAIN MEDICINE

## 2022-04-22 PROCEDURE — 1159F PR MEDICATION LIST DOCUMENTED IN MEDICAL RECORD: ICD-10-PCS | Mod: CPTII,,, | Performed by: PAIN MEDICINE

## 2022-04-22 PROCEDURE — 99212 PR OFFICE/OUTPT VISIT, EST, LEVL II, 10-19 MIN: ICD-10-PCS | Mod: S$PBB,,, | Performed by: PAIN MEDICINE

## 2022-04-22 PROCEDURE — 3074F SYST BP LT 130 MM HG: CPT | Mod: CPTII,,, | Performed by: PAIN MEDICINE

## 2022-04-22 PROCEDURE — 1126F PR PAIN SEVERITY QUANTIFIED, NO PAIN PRESENT: ICD-10-PCS | Mod: CPTII,,, | Performed by: PAIN MEDICINE

## 2022-04-22 PROCEDURE — 99215 OFFICE O/P EST HI 40 MIN: CPT | Mod: PBBFAC | Performed by: PAIN MEDICINE

## 2022-04-22 NOTE — PATIENT INSTRUCTIONS
SCS TRIAL  ONLY IF DR GUTIERREZ CAN PERFORM PERM     IF DR GUTIERREZ CAN PERFORM PERM  PT NEEDS TO HAVE PSYCH EVAL FOR SCS TRIAL

## 2022-04-25 NOTE — TELEPHONE ENCOUNTER
Pt was notified Dr Crow is unable to perform the perm SCS at this time.  Pt also has not had psych eval ordered/done for SCS trial. Pt would need to be sent to another facility, in which would be [facility].  Pt made aware.  Pt states she will have to think about it and call the clinic back with her decision.

## 2022-04-25 NOTE — TELEPHONE ENCOUNTER
I spoke with Griselda at Dr Crow office re: if Dr Crow would perform the Perm SCS if Dr Nelson performs the SCS Trial whom spoke with Dr Crow and then states Dr Crow will not perform the SCS Perm, that she will have to be referred to another facility.

## 2022-04-26 NOTE — PROGRESS NOTES
She Disclaimer: This note has been generated using voice-recognition software. There may be typographical errors that have been missed during proof-reading        Patient ID: Bobbi Haywood is a 71 y.o. female.      Chief Complaint: Low-back Pain and Mid-back Pain      71-year-old female returns for re-evaluation of chronic lower back pain.  She has a long history of lumbar spondylosis and radiculopathy.  She received medial branch blocks in the past with transient relief.  She was evaluated by Dr. Crow and referred back to our clinic for considerations of a spinal cord stimulator. Her pain is primarily lower back with some occasional radicular symptoms to the bilateral lower extremities.  She has failed previous conservative treatments and is not a candidate for surgical intervention.  Spinal cord stimulator was discussed in detail with patient.  Patient and spouse were informed of preop psychological evaluation. Permanent implantation was discussed in detail.  Due to a higher incidence of lead migration and possible revision with percutaneous lead,  a paddle lead implantation was discussed with surgical laminectomy.               Pain Assessment  Pain Score: 0-No pain  Pain Location: Back  Pain Descriptors: Dull, Aching  Pain Frequency: Intermittent  Clinical Progression: Not changed  Aggravating Factors: Standing, Walking, Other (Comment) (lying)  Pain Intervention(s): Other (Comment) (sitting)      A's of Opioid Risk Assessment  Activity:Patient can not perform ADL.   Analgesia:Patients pain is not controlled by current medication.   Adverse Effects: Patient denies constipation or sedation.  Aberrant Behavior:  reviewed with no aberrant drug seeking/taking behavior.      Patient denies any suicidal or homicidal ideations    Physical Therapy/Home Exercise: yes      MRI Lumbar Spine Without Contrast  Narrative: EXAMINATION:  MRI LUMBAR SPINE WITHOUT CONTRAST    CLINICAL HISTORY:  Myelopathy, chronic,  lumbar spine;.  Disease of spinal cord, unspecified    COMPARISON:  August 10, 2021 lumbar MRI available    TECHNIQUE:  The lumbar spine was imaged in the sagittal and axial planes on a 1.5 Erin magnet without IV contrast.  Sequences include T1, T2, and STIR images.    FINDINGS:  There is no acute fracture.  There is chronic compression centrally of the superior endplate of T12.  There is an L1 vertebral body hemangioma as before.    There is grade 1 retrolisthesis of L1 similar to the previous study.  There is grade 1 anterolisthesis of L4 on L5 similar to the previous study.    There is severe degenerative disc narrowing with type 2 Modic endplate change and moderate disc desiccation at L1-L2.  There is severe degenerative disc narrowing with type 2 Modic endplate change and moderate anterior spondylosis at L5-S1.  There is mild degenerative disc narrowing otherwise.  There is scattered mild-to-moderate spondylosis.    Conus medullaris terminates at the T12-L1 level and is without gross mass lesion.    T12-L1: No significant spinal or neural foraminal stenosis.  Mild ligamentum flavum and facet hypertrophy.    L1-L2: Mild spinal stenosis secondary to mild bulging disc and osteophyte complex as well as mild ligamentum flavum and facet arthropathy.  There is facet joint effusion bilaterally at this level related to facet arthropathy.    L2-L3: Mild to moderate narrowing of spinal canal secondary to mild posterior bulging disc and osteophyte as well as mild ligamentum flavum and facet hypertrophy.  There is facet joint effusion related to arthropathy.  There is mild-to-moderate narrowing of the inferior recess of either neural foramen secondary to posterolateral bulging disc and facet arthropathy, left more than right.    L3-L4: There is moderate to prominent spinal stenosis related to mild posterior bulging disc and osteophyte complex as well as moderate ligamentum flavum and facet arthropathy similar to the  previous study.  There is ligamentum flavum and facet hypertrophy as before.  There is moderate narrowing of the inferior recess of either neural foramen secondary to posterolateral bulging disc and facet arthropathy as before.    L4-L5: There is severe spinal stenosis related to posterior bulging disc and osteophyte complex as well as ligamentum flavum and facet arthropathy.  This is unchanged.  There is moderate narrowing of the inferior recess of either neural foramen secondary to posterolateral bulging disc and facet arthropathy, left more than right.    L5-S1: There is mild narrowing of spinal canal secondary to mild posterior bulging disc and osteophyte complex as well as moderate ligamentum flavum and facet hypertrophy.  There is moderate narrowing of the inferior recess of either neural foramen secondary to posterolateral bulging disc and facet arthropathy.  Impression: The exam is overall similar to the previous study.  Multilevel spinal stenosis secondary to posterior bulging disc and osteophyte complex as well as hypertrophic changes of the posterior elements.  This includes severe spinal stenosis at L4-L5 as before    Degenerative disc disease    Spondylolisthesis L1-L2 and L4-L5 as before    Electronically signed by: Darron Franks  Date:    04/06/2022  Time:    12:32  MRI Cervical Spine Without Contrast  Narrative: EXAMINATION:  MRI CERVICAL SPINE WITHOUT CONTRAST    CLINICAL HISTORY:  Myelopathy, chronic, cervical spine;.  Disease of spinal cord, unspecified    TECHNIQUE:  Multiplanar multisequence MRI cervical spine performed without intravenous contrast.    COMPARISON:  Cervical spine CT 01/08/2021    FINDINGS:  The vertebral body heights and alignment are maintained.  There are degenerative endplate changes throughout.  There is a hemangioma seen of the T3 vertebral body.  Disc desiccation throughout.  No abnormal cord signal.    C2-3: No spinal canal or foraminal stenosis.    C3-4: Disc  osteophyte complex eccentric to the right.  Mild spinal canal stenosis.  Moderate right and mild left foraminal stenosis from uncovertebral and facet osteophytes.    C4-5: Disc osteophyte complex.  Mild spinal canal stenosis.  Mild-to-moderate left greater than right foraminal stenosis from osteophytes.    C5-6: Disc osteophyte complex.  Moderate spinal canal stenosis.  Moderate left and mild right foraminal stenosis from uncovertebral and facet osteophytes.    C6-7: Disc bulging.  Mild spinal canal stenosis.  Moderate left and mild right foraminal stenosis from uncovertebral and facet osteophytes.    C7-T1: No spinal canal or foraminal stenosis.  Impression: Multilevel degenerative changes of the cervical spine as detailed.    Electronically signed by: Dae Parsons  Date:    04/06/2022  Time:    12:23      Review of Systems   Constitutional: Negative.    HENT: Negative.    Eyes: Negative.    Respiratory: Negative.    Cardiovascular: Negative.    Gastrointestinal: Negative.    Endocrine: Negative.    Genitourinary: Negative.    Musculoskeletal: Positive for arthralgias, back pain and gait problem.   Integumentary:  Negative.   Neurological: Positive for numbness (BLE).   Hematological: Negative.    Psychiatric/Behavioral: Negative.              Past Medical History:   Diagnosis Date    A-fib     Hypertension     Neuropathy      Past Surgical History:   Procedure Laterality Date    CATARACT EXTRACTION, BILATERAL      CHOLECYSTECTOMY      HYSTERECTOMY      INJECTION OF ANESTHETIC AGENT AROUND MEDIAL BRANCH NERVES INNERVATING LUMBAR FACET JOINT Bilateral 8/26/2021    Procedure: Bilateral L4-5,5-S1 MBB;  Surgeon: Sulma Nelson MD;  Location: [facility];  Service: Pain Management;  Laterality: Bilateral;  Per Dr Bradford ok to hold Eliquis for 4 days not 5, Per Dr Nelson this is fine with her. Pt will bring her verification card for COVID vaccination.    INJECTION OF ANESTHETIC AGENT AROUND MEDIAL BRANCH  NERVES INNERVATING LUMBAR FACET JOINT Bilateral 10/5/2021    Procedure: Bilateral L4-5,5-S1 MBB;  Surgeon: Sulma Nelson MD;  Location: [facility];  Service: Pain Management;  Laterality: Bilateral;  PT AWARE ON OV TO BE TESTED    KNEE CARTILAGE SURGERY Right     RADIOFREQUENCY ABLATION OF LUMBAR MEDIAL BRANCH NERVE AT SINGLE LEVEL Bilateral 11/9/2021    Procedure: Radiofrequency Ablation, Nerve, Spinal, Lumbar, Medial Branch, 1 Level L4-S1  BILATERAL;  Surgeon: Sulma Nelson MD;  Location: [facility];  Service: Pain Management;  Laterality: Bilateral;  per lessia pt is on eliquis but she will get permission for it to be held 5 days prior to procedure.   HAD VAC   CARD SCANNED IN    REDUCTION OF BOTH BREASTS      RETINAL DETACHMENT SURGERY  right    VAGINAL DELIVERY      X1     Social History     Socioeconomic History    Marital status:    Tobacco Use    Smoking status: Never Smoker    Smokeless tobacco: Never Used   Substance and Sexual Activity    Alcohol use: Not Currently     Family History   Problem Relation Age of Onset    Hypertension Mother     Atrial fibrillation Mother     Stroke Father     Diabetes Paternal Grandfather      Review of patient's allergies indicates:   Allergen Reactions    Sulfa (sulfonamide antibiotics) Hives and Other (See Comments)     Sore Throat, Sore Mouth     has a current medication list which includes the following prescription(s): cyanocobalamin, cyclobenzaprine, duloxetine, eliquis, estradiol, folic acid, gabapentin, l. rhamnosus/c/zinc/elderberry, l.acidoph,saliva/b.bif/s.therm, lisinopril-hydrochlorothiazide, metoprolol tartrate, and solifenacin.      Objective:  Vitals:    04/22/22 1049   BP: 109/65   Pulse: 84        Physical Exam  Vitals and nursing note reviewed.   Constitutional:       General: She is not in acute distress.     Appearance: Normal appearance. She is not ill-appearing, toxic-appearing or diaphoretic.   HENT:       Head: Normocephalic and atraumatic.      Nose: Nose normal.      Mouth/Throat:      Mouth: Mucous membranes are moist.   Eyes:      Extraocular Movements: Extraocular movements intact.      Pupils: Pupils are equal, round, and reactive to light.   Cardiovascular:      Rate and Rhythm: Normal rate and regular rhythm.      Heart sounds: Normal heart sounds.   Pulmonary:      Effort: Pulmonary effort is normal. No respiratory distress.      Breath sounds: Normal breath sounds. No stridor. No wheezing or rhonchi.   Abdominal:      General: Bowel sounds are normal.      Palpations: Abdomen is soft.   Musculoskeletal:         General: No swelling or deformity.      Cervical back: Normal and normal range of motion. No spasms or tenderness. No pain with movement. Normal range of motion.      Thoracic back: Normal.      Lumbar back: Tenderness and bony tenderness present. No spasms. Decreased range of motion. Negative right straight leg raise test and negative left straight leg raise test. No scoliosis.      Right lower leg: No edema.      Left lower leg: No edema.   Skin:     General: Skin is warm.   Neurological:      General: No focal deficit present.      Mental Status: She is alert and oriented to person, place, and time. Mental status is at baseline.      Cranial Nerves: Cranial nerves are intact. No cranial nerve deficit.      Sensory: Sensation is intact. No sensory deficit.      Motor: No weakness.      Coordination: Coordination normal.      Gait: Gait abnormal.      Deep Tendon Reflexes: Reflexes are normal and symmetric.   Psychiatric:         Mood and Affect: Mood normal.         Behavior: Behavior normal.           Assessment:      1. Neuropathy    2. Lumbar radiculopathy    3. Chronic bilateral low back pain with bilateral sciatica    4. Lumbar spondylosis          Plan:  1. reviewed  2.Addiction, Dependency, Tolerance, Opioid abuse-misuse, Death, Diversion Discussed. Overdose reversal drug Naloxone  discussed.  3.Continue medications for pain control and function  4. Patient given information for spinal cord stimulator trial and permanent implantation.  We will obtain a psychological evaluation for trial spinal cord stimulator.  5. Patient left without scheduling a follow-up appointment        report:  Reviewed and consistent with medication use as prescribed.      The total time spent for evaluation and management on 04/26/2022 including reviewing separately obtained history, performing a medically appropriate exam and evaluation, documenting clinical information in the health record, independently interpreting results and communicating them to the patient/family/caregiver, and ordering medications/tests/procedures was between 15-29 minutes.    The above plan and management options were discussed at length with patient. Patient is in agreement with the above and verbalized understanding. It will be communicated with the referring physician via electronic record, fax, or mail.

## 2022-05-24 RX ORDER — CYCLOBENZAPRINE HCL 10 MG
10 TABLET ORAL 3 TIMES DAILY PRN
Qty: 90 TABLET | Refills: 2 | Status: CANCELLED | OUTPATIENT
Start: 2022-05-24

## 2022-05-24 NOTE — PROGRESS NOTES
Subjective:      Patient ID: Bobbi Haywood is a 71 y.o. female.    Chief Complaint: Low-back Pain      Back Pain  This is a chronic problem. The current episode started more than 1 year ago. The problem occurs daily. The problem has been waxing and waning since onset. Associated symptoms include leg pain. Pertinent negatives include no bladder incontinence, chest pain, dysuria or fever.   Medication Refill  Associated symptoms include arthralgias, myalgias and neck pain. Pertinent negatives include no change in bowel habit, chest pain, chills, coughing, diaphoresis, fever, rash, sore throat, vertigo or vomiting.     Review of Systems   Constitutional: Negative for activity change, appetite change, chills, diaphoresis and fever.   HENT: Negative for drooling, ear discharge, ear pain, facial swelling, nosebleeds, sore throat, trouble swallowing, voice change and goiter.    Eyes: Negative for photophobia, pain, discharge, redness and visual disturbance.   Respiratory: Negative for apnea, cough, choking, chest tightness, shortness of breath, wheezing and stridor.    Cardiovascular: Negative for chest pain, palpitations and leg swelling.   Gastrointestinal: Negative for abdominal distention, change in bowel habit, diarrhea, rectal pain, vomiting, fecal incontinence and change in bowel habit.   Endocrine: Negative for cold intolerance, heat intolerance, polydipsia, polyphagia and polyuria.   Genitourinary: Negative for bladder incontinence, dysuria, flank pain, frequency and hot flashes.   Musculoskeletal: Positive for arthralgias, back pain, gait problem, leg pain, myalgias, neck pain, neck stiffness and joint deformity.   Integumentary:  Negative for color change, pallor and rash.   Allergic/Immunologic: Negative for immunocompromised state.   Neurological: Negative for dizziness, vertigo, seizures, syncope, facial asymmetry, speech difficulty, light-headedness, disturbances in coordination, memory loss and  "coordination difficulties.   Hematological: Negative for adenopathy. Does not bruise/bleed easily.   Psychiatric/Behavioral: Negative for agitation, behavioral problems, confusion, decreased concentration, dysphoric mood, hallucinations, self-injury and suicidal ideas. The patient is not nervous/anxious and is not hyperactive.             Objective:  Vitals:    05/25/22 1253 05/25/22 1300   BP:  117/67   Pulse:  98   Resp:  18   Weight:  98 kg (216 lb)   Height:  5' 11" (1.803 m)   PainSc:   6 0-No pain         Physical Exam  Vitals and nursing note reviewed. Exam conducted with a chaperone present.   Constitutional:       General: She is awake. She is not in acute distress.     Appearance: Normal appearance. She is not diaphoretic.   HENT:      Head: Normocephalic and atraumatic.      Nose: Nose normal.      Mouth/Throat:      Mouth: Mucous membranes are moist.      Pharynx: Oropharynx is clear.   Eyes:      Conjunctiva/sclera: Conjunctivae normal.      Pupils: Pupils are equal, round, and reactive to light.   Cardiovascular:      Rate and Rhythm: Normal rate.   Pulmonary:      Effort: Pulmonary effort is normal. No respiratory distress.   Abdominal:      Palpations: Abdomen is soft.      Tenderness: There is no guarding.   Musculoskeletal:         General: No signs of injury. Normal range of motion.      Cervical back: Normal range of motion and neck supple. Tenderness present. No rigidity.      Thoracic back: Tenderness present.      Lumbar back: Tenderness present.   Skin:     General: Skin is warm and dry.      Coloration: Skin is not jaundiced or pale.   Neurological:      General: No focal deficit present.      Mental Status: She is alert and oriented to person, place, and time. Mental status is at baseline.      Cranial Nerves: Cranial nerves are intact. No cranial nerve deficit (II-XII).      Gait: Gait abnormal.   Psychiatric:         Mood and Affect: Mood normal.         Behavior: Behavior normal. " Behavior is cooperative.         Thought Content: Thought content normal.           MRI Lumbar Spine Without Contrast  Narrative: EXAMINATION:  MRI LUMBAR SPINE WITHOUT CONTRAST    CLINICAL HISTORY:  Myelopathy, chronic, lumbar spine;.  Disease of spinal cord, unspecified    COMPARISON:  August 10, 2021 lumbar MRI available    TECHNIQUE:  The lumbar spine was imaged in the sagittal and axial planes on a 1.5 Erin magnet without IV contrast.  Sequences include T1, T2, and STIR images.    FINDINGS:  There is no acute fracture.  There is chronic compression centrally of the superior endplate of T12.  There is an L1 vertebral body hemangioma as before.    There is grade 1 retrolisthesis of L1 similar to the previous study.  There is grade 1 anterolisthesis of L4 on L5 similar to the previous study.    There is severe degenerative disc narrowing with type 2 Modic endplate change and moderate disc desiccation at L1-L2.  There is severe degenerative disc narrowing with type 2 Modic endplate change and moderate anterior spondylosis at L5-S1.  There is mild degenerative disc narrowing otherwise.  There is scattered mild-to-moderate spondylosis.    Conus medullaris terminates at the T12-L1 level and is without gross mass lesion.    T12-L1: No significant spinal or neural foraminal stenosis.  Mild ligamentum flavum and facet hypertrophy.    L1-L2: Mild spinal stenosis secondary to mild bulging disc and osteophyte complex as well as mild ligamentum flavum and facet arthropathy.  There is facet joint effusion bilaterally at this level related to facet arthropathy.    L2-L3: Mild to moderate narrowing of spinal canal secondary to mild posterior bulging disc and osteophyte as well as mild ligamentum flavum and facet hypertrophy.  There is facet joint effusion related to arthropathy.  There is mild-to-moderate narrowing of the inferior recess of either neural foramen secondary to posterolateral bulging disc and facet arthropathy,  left more than right.    L3-L4: There is moderate to prominent spinal stenosis related to mild posterior bulging disc and osteophyte complex as well as moderate ligamentum flavum and facet arthropathy similar to the previous study.  There is ligamentum flavum and facet hypertrophy as before.  There is moderate narrowing of the inferior recess of either neural foramen secondary to posterolateral bulging disc and facet arthropathy as before.    L4-L5: There is severe spinal stenosis related to posterior bulging disc and osteophyte complex as well as ligamentum flavum and facet arthropathy.  This is unchanged.  There is moderate narrowing of the inferior recess of either neural foramen secondary to posterolateral bulging disc and facet arthropathy, left more than right.    L5-S1: There is mild narrowing of spinal canal secondary to mild posterior bulging disc and osteophyte complex as well as moderate ligamentum flavum and facet hypertrophy.  There is moderate narrowing of the inferior recess of either neural foramen secondary to posterolateral bulging disc and facet arthropathy.  Impression: The exam is overall similar to the previous study.  Multilevel spinal stenosis secondary to posterior bulging disc and osteophyte complex as well as hypertrophic changes of the posterior elements.  This includes severe spinal stenosis at L4-L5 as before    Degenerative disc disease    Spondylolisthesis L1-L2 and L4-L5 as before    Electronically signed by: Darron Franks  Date:    04/06/2022  Time:    12:32  MRI Cervical Spine Without Contrast  Narrative: EXAMINATION:  MRI CERVICAL SPINE WITHOUT CONTRAST    CLINICAL HISTORY:  Myelopathy, chronic, cervical spine;.  Disease of spinal cord, unspecified    TECHNIQUE:  Multiplanar multisequence MRI cervical spine performed without intravenous contrast.    COMPARISON:  Cervical spine CT 01/08/2021    FINDINGS:  The vertebral body heights and alignment are maintained.  There are  "degenerative endplate changes throughout.  There is a hemangioma seen of the T3 vertebral body.  Disc desiccation throughout.  No abnormal cord signal.    C2-3: No spinal canal or foraminal stenosis.    C3-4: Disc osteophyte complex eccentric to the right.  Mild spinal canal stenosis.  Moderate right and mild left foraminal stenosis from uncovertebral and facet osteophytes.    C4-5: Disc osteophyte complex.  Mild spinal canal stenosis.  Mild-to-moderate left greater than right foraminal stenosis from osteophytes.    C5-6: Disc osteophyte complex.  Moderate spinal canal stenosis.  Moderate left and mild right foraminal stenosis from uncovertebral and facet osteophytes.    C6-7: Disc bulging.  Mild spinal canal stenosis.  Moderate left and mild right foraminal stenosis from uncovertebral and facet osteophytes.    C7-T1: No spinal canal or foraminal stenosis.  Impression: Multilevel degenerative changes of the cervical spine as detailed.    Electronically signed by: Dae Parsons  Date:    04/06/2022  Time:    12:23       No visits with results within 6 Month(s) from this visit.   Latest known visit with results is:   Lab Visit on 10/05/2021   Component Date Value Ref Range Status    COVID-19 Ag 10/05/2021 Negative  Negative, Invalid Final           Assessment:      1. Neuropathy    2. Spondylosis of lumbosacral region without myelopathy or radiculopathy    3. Spinal stenosis of lumbar region with neurogenic claudication          Orders Placed This Encounter   Procedures    Back/Cervical Brace For Home Use     Order Specific Question:   Height:     Answer:   5' 11" (1.803 m)     Order Specific Question:   Weight:     Answer:   98 kg (216 lb)     Order Specific Question:   Length of need (1-99 months):     Answer:   99     Order Specific Question:   Product(s) ordered:     Answer:   LSO Brace-Low profile         Requested Prescriptions      No prescriptions requested or ordered in this encounter         Plan:    Not using " narcotics from our office    Considering spinal cord stimulator trial    She verbalized understanding need to have MRI thoracic spine and psychological evaluation prior to having spine stimulator trial    She is requesting lumbar support orthotic brace    Prescription for LSO given to the patient    Patient has known lumbar stenosis multiple level neuroforaminal stenosis    Declines physical therapy at this time    Would like to continue with conservative management    Follow-up p.r.n., patient request    Dr. Nelson, April 2023

## 2022-05-25 ENCOUNTER — OFFICE VISIT (OUTPATIENT)
Dept: PAIN MEDICINE | Facility: CLINIC | Age: 72
End: 2022-05-25
Payer: COMMERCIAL

## 2022-05-25 VITALS
WEIGHT: 216 LBS | HEIGHT: 71 IN | SYSTOLIC BLOOD PRESSURE: 117 MMHG | HEART RATE: 98 BPM | RESPIRATION RATE: 18 BRPM | BODY MASS INDEX: 30.24 KG/M2 | DIASTOLIC BLOOD PRESSURE: 67 MMHG

## 2022-05-25 DIAGNOSIS — M48.062 SPINAL STENOSIS OF LUMBAR REGION WITH NEUROGENIC CLAUDICATION: Chronic | ICD-10-CM

## 2022-05-25 DIAGNOSIS — M47.817 SPONDYLOSIS OF LUMBOSACRAL REGION WITHOUT MYELOPATHY OR RADICULOPATHY: Chronic | ICD-10-CM

## 2022-05-25 DIAGNOSIS — G62.9 NEUROPATHY: Primary | Chronic | ICD-10-CM

## 2022-05-25 PROCEDURE — 3008F BODY MASS INDEX DOCD: CPT | Mod: CPTII,,, | Performed by: PHYSICIAN ASSISTANT

## 2022-05-25 PROCEDURE — 99213 OFFICE O/P EST LOW 20 MIN: CPT | Mod: S$PBB,,, | Performed by: PHYSICIAN ASSISTANT

## 2022-05-25 PROCEDURE — 3288F FALL RISK ASSESSMENT DOCD: CPT | Mod: CPTII,,, | Performed by: PHYSICIAN ASSISTANT

## 2022-05-25 PROCEDURE — 3078F PR MOST RECENT DIASTOLIC BLOOD PRESSURE < 80 MM HG: ICD-10-PCS | Mod: CPTII,,, | Performed by: PHYSICIAN ASSISTANT

## 2022-05-25 PROCEDURE — 99213 PR OFFICE/OUTPT VISIT, EST, LEVL III, 20-29 MIN: ICD-10-PCS | Mod: S$PBB,,, | Performed by: PHYSICIAN ASSISTANT

## 2022-05-25 PROCEDURE — 1100F PTFALLS ASSESS-DOCD GE2>/YR: CPT | Mod: CPTII,,, | Performed by: PHYSICIAN ASSISTANT

## 2022-05-25 PROCEDURE — 3288F PR FALLS RISK ASSESSMENT DOCUMENTED: ICD-10-PCS | Mod: CPTII,,, | Performed by: PHYSICIAN ASSISTANT

## 2022-05-25 PROCEDURE — 1159F MED LIST DOCD IN RCRD: CPT | Mod: CPTII,,, | Performed by: PHYSICIAN ASSISTANT

## 2022-05-25 PROCEDURE — 99214 OFFICE O/P EST MOD 30 MIN: CPT | Mod: PBBFAC | Performed by: PHYSICIAN ASSISTANT

## 2022-05-25 PROCEDURE — 1126F AMNT PAIN NOTED NONE PRSNT: CPT | Mod: CPTII,,, | Performed by: PHYSICIAN ASSISTANT

## 2022-05-25 PROCEDURE — 1126F PR PAIN SEVERITY QUANTIFIED, NO PAIN PRESENT: ICD-10-PCS | Mod: CPTII,,, | Performed by: PHYSICIAN ASSISTANT

## 2022-05-25 PROCEDURE — 4010F PR ACE/ARB THEARPY RXD/TAKEN: ICD-10-PCS | Mod: CPTII,,, | Performed by: PHYSICIAN ASSISTANT

## 2022-05-25 PROCEDURE — 3074F SYST BP LT 130 MM HG: CPT | Mod: CPTII,,, | Performed by: PHYSICIAN ASSISTANT

## 2022-05-25 PROCEDURE — 3074F PR MOST RECENT SYSTOLIC BLOOD PRESSURE < 130 MM HG: ICD-10-PCS | Mod: CPTII,,, | Performed by: PHYSICIAN ASSISTANT

## 2022-05-25 PROCEDURE — 4010F ACE/ARB THERAPY RXD/TAKEN: CPT | Mod: CPTII,,, | Performed by: PHYSICIAN ASSISTANT

## 2022-05-25 PROCEDURE — 1159F PR MEDICATION LIST DOCUMENTED IN MEDICAL RECORD: ICD-10-PCS | Mod: CPTII,,, | Performed by: PHYSICIAN ASSISTANT

## 2022-05-25 PROCEDURE — 3008F PR BODY MASS INDEX (BMI) DOCUMENTED: ICD-10-PCS | Mod: CPTII,,, | Performed by: PHYSICIAN ASSISTANT

## 2022-05-25 PROCEDURE — 3078F DIAST BP <80 MM HG: CPT | Mod: CPTII,,, | Performed by: PHYSICIAN ASSISTANT

## 2022-05-25 PROCEDURE — 1100F PR PT FALLS ASSESS DOC 2+ FALLS/FALL W/INJURY/YR: ICD-10-PCS | Mod: CPTII,,, | Performed by: PHYSICIAN ASSISTANT

## 2024-09-10 ENCOUNTER — HOSPITAL ENCOUNTER (INPATIENT)
Facility: HOSPITAL | Age: 74
LOS: 6 days | Discharge: NURSING, REHAB OR LONG-TERM CARE | DRG: 871 | End: 2024-09-17
Attending: EMERGENCY MEDICINE | Admitting: INTERNAL MEDICINE
Payer: MEDICARE

## 2024-09-10 DIAGNOSIS — R00.0 TACHYCARDIA: ICD-10-CM

## 2024-09-10 DIAGNOSIS — L03.115 CELLULITIS AND ABSCESS OF RIGHT LEG: ICD-10-CM

## 2024-09-10 DIAGNOSIS — L02.415 CELLULITIS AND ABSCESS OF RIGHT LEG: ICD-10-CM

## 2024-09-10 DIAGNOSIS — R53.81 DEBILITY: ICD-10-CM

## 2024-09-10 DIAGNOSIS — A41.9 SEPSIS, DUE TO UNSPECIFIED ORGANISM, UNSPECIFIED WHETHER ACUTE ORGAN DYSFUNCTION PRESENT: Primary | ICD-10-CM

## 2024-09-10 DIAGNOSIS — R65.20 SEVERE SEPSIS: ICD-10-CM

## 2024-09-10 DIAGNOSIS — R07.9 CHEST PAIN: ICD-10-CM

## 2024-09-10 DIAGNOSIS — N30.01 ACUTE CYSTITIS WITH HEMATURIA: ICD-10-CM

## 2024-09-10 DIAGNOSIS — N17.9 AKI (ACUTE KIDNEY INJURY): ICD-10-CM

## 2024-09-10 DIAGNOSIS — A41.9 SEVERE SEPSIS: ICD-10-CM

## 2024-09-10 DIAGNOSIS — R79.89 ELEVATED BRAIN NATRIURETIC PEPTIDE (BNP) LEVEL: ICD-10-CM

## 2024-09-10 DIAGNOSIS — L03.115 CELLULITIS OF RIGHT LOWER EXTREMITY: ICD-10-CM

## 2024-09-10 LAB
ALBUMIN SERPL BCP-MCNC: 2.5 G/DL (ref 3.5–5)
ALBUMIN/GLOB SERPL: 0.5 {RATIO}
ALP SERPL-CCNC: 80 U/L (ref 55–142)
ALT SERPL W P-5'-P-CCNC: 10 U/L (ref 13–56)
ANION GAP SERPL CALCULATED.3IONS-SCNC: 13 MMOL/L (ref 7–16)
APTT PPP: 41.6 SECONDS (ref 25.2–37.3)
AST SERPL W P-5'-P-CCNC: 8 U/L (ref 15–37)
BACTERIA #/AREA URNS HPF: ABNORMAL /HPF
BASOPHILS # BLD AUTO: 0.09 K/UL (ref 0–0.2)
BASOPHILS NFR BLD AUTO: 0.4 % (ref 0–1)
BASOPHILS NFR BLD MANUAL: 1 % (ref 0–1)
BILIRUB SERPL-MCNC: 0.6 MG/DL (ref ?–1.2)
BILIRUB UR QL STRIP: NEGATIVE
BUN SERPL-MCNC: 22 MG/DL (ref 7–18)
BUN/CREAT SERPL: 13 (ref 6–20)
CALCIUM SERPL-MCNC: 8.7 MG/DL (ref 8.5–10.1)
CHLORIDE SERPL-SCNC: 103 MMOL/L (ref 98–107)
CLARITY UR: ABNORMAL
CO2 SERPL-SCNC: 23 MMOL/L (ref 21–32)
COLOR UR: YELLOW
CREAT SERPL-MCNC: 1.71 MG/DL (ref 0.55–1.02)
CRENATED CELLS: ABNORMAL
DIFFERENTIAL METHOD BLD: ABNORMAL
EGFR (NO RACE VARIABLE) (RUSH/TITUS): 31 ML/MIN/1.73M2
EOSINOPHIL # BLD AUTO: 0 K/UL (ref 0–0.5)
EOSINOPHIL NFR BLD AUTO: 0 % (ref 1–4)
ERYTHROCYTE [DISTWIDTH] IN BLOOD BY AUTOMATED COUNT: 14 % (ref 11.5–14.5)
GLOBULIN SER-MCNC: 4.6 G/DL (ref 2–4)
GLUCOSE SERPL-MCNC: 129 MG/DL (ref 74–106)
GLUCOSE UR STRIP-MCNC: NORMAL MG/DL
HCT VFR BLD AUTO: 35.1 % (ref 38–47)
HGB BLD-MCNC: 11.1 G/DL (ref 12–16)
HYPOCHROMIA BLD QL SMEAR: ABNORMAL
IMM GRANULOCYTES # BLD AUTO: 0.23 K/UL (ref 0–0.04)
IMM GRANULOCYTES NFR BLD: 0.9 % (ref 0–0.4)
INR BLD: 1.96
KETONES UR STRIP-SCNC: ABNORMAL MG/DL
LACTATE SERPL-SCNC: 2.6 MMOL/L (ref 0.4–2)
LEUKOCYTE ESTERASE UR QL STRIP: ABNORMAL
LYMPHOCYTES # BLD AUTO: 1.34 K/UL (ref 1–4.8)
LYMPHOCYTES NFR BLD AUTO: 5.4 % (ref 27–41)
LYMPHOCYTES NFR BLD MANUAL: 3 % (ref 27–41)
MAGNESIUM SERPL-MCNC: 1.3 MG/DL (ref 1.7–2.3)
MCH RBC QN AUTO: 29.6 PG (ref 27–31)
MCHC RBC AUTO-ENTMCNC: 31.6 G/DL (ref 32–36)
MCV RBC AUTO: 93.6 FL (ref 80–96)
MONOCYTES # BLD AUTO: 2.3 K/UL (ref 0–0.8)
MONOCYTES NFR BLD AUTO: 9.2 % (ref 2–6)
MONOCYTES NFR BLD MANUAL: 7 % (ref 2–6)
MPC BLD CALC-MCNC: 10.9 FL (ref 9.4–12.4)
MUCOUS, UA: ABNORMAL /LPF
NEUTROPHILS # BLD AUTO: 21.08 K/UL (ref 1.8–7.7)
NEUTROPHILS NFR BLD AUTO: 84.1 % (ref 53–65)
NEUTS BAND NFR BLD MANUAL: 3 % (ref 1–5)
NEUTS SEG NFR BLD MANUAL: 86 % (ref 50–62)
NITRITE UR QL STRIP: NEGATIVE
NRBC # BLD AUTO: 0 X10E3/UL
NRBC, AUTO (.00): 0 %
NT-PROBNP SERPL-MCNC: 2204 PG/ML (ref 1–450)
PH UR STRIP: 5.5 PH UNITS
PLATELET # BLD AUTO: 393 K/UL (ref 150–400)
PLATELET MORPHOLOGY: NORMAL
POTASSIUM SERPL-SCNC: 3.4 MMOL/L (ref 3.5–5.1)
PROT SERPL-MCNC: 7.1 G/DL (ref 6.4–8.2)
PROT UR QL STRIP: 70
PROTHROMBIN TIME: 22.1 SECONDS (ref 11.7–14.7)
RBC # BLD AUTO: 3.75 M/UL (ref 4.2–5.4)
RBC # UR STRIP: NEGATIVE /UL
RBC #/AREA URNS HPF: 4 /HPF
SODIUM SERPL-SCNC: 136 MMOL/L (ref 136–145)
SP GR UR STRIP: 1.03
SQUAMOUS #/AREA URNS LPF: ABNORMAL /HPF
TROPONIN I SERPL DL<=0.01 NG/ML-MCNC: 14 PG/ML
UROBILINOGEN UR STRIP-ACNC: 4 MG/DL
WBC # BLD AUTO: 25.04 K/UL (ref 4.5–11)
WBC #/AREA URNS HPF: 75 /HPF

## 2024-09-10 PROCEDURE — 85610 PROTHROMBIN TIME: CPT | Performed by: EMERGENCY MEDICINE

## 2024-09-10 PROCEDURE — 83735 ASSAY OF MAGNESIUM: CPT | Performed by: EMERGENCY MEDICINE

## 2024-09-10 PROCEDURE — 93005 ELECTROCARDIOGRAM TRACING: CPT

## 2024-09-10 PROCEDURE — 63600175 PHARM REV CODE 636 W HCPCS: Performed by: EMERGENCY MEDICINE

## 2024-09-10 PROCEDURE — 87086 URINE CULTURE/COLONY COUNT: CPT | Performed by: EMERGENCY MEDICINE

## 2024-09-10 PROCEDURE — 80053 COMPREHEN METABOLIC PANEL: CPT | Performed by: EMERGENCY MEDICINE

## 2024-09-10 PROCEDURE — 83880 ASSAY OF NATRIURETIC PEPTIDE: CPT | Performed by: EMERGENCY MEDICINE

## 2024-09-10 PROCEDURE — 87040 BLOOD CULTURE FOR BACTERIA: CPT | Performed by: EMERGENCY MEDICINE

## 2024-09-10 PROCEDURE — 84484 ASSAY OF TROPONIN QUANT: CPT | Performed by: EMERGENCY MEDICINE

## 2024-09-10 PROCEDURE — 83605 ASSAY OF LACTIC ACID: CPT | Performed by: EMERGENCY MEDICINE

## 2024-09-10 PROCEDURE — 87186 SC STD MICRODIL/AGAR DIL: CPT | Performed by: EMERGENCY MEDICINE

## 2024-09-10 PROCEDURE — 85025 COMPLETE CBC W/AUTO DIFF WBC: CPT | Performed by: EMERGENCY MEDICINE

## 2024-09-10 PROCEDURE — 99285 EMERGENCY DEPT VISIT HI MDM: CPT | Mod: 25

## 2024-09-10 PROCEDURE — 83690 ASSAY OF LIPASE: CPT | Performed by: EMERGENCY MEDICINE

## 2024-09-10 PROCEDURE — 85730 THROMBOPLASTIN TIME PARTIAL: CPT | Performed by: EMERGENCY MEDICINE

## 2024-09-10 PROCEDURE — 93010 ELECTROCARDIOGRAM REPORT: CPT | Mod: ,,, | Performed by: INTERNAL MEDICINE

## 2024-09-10 PROCEDURE — 81003 URINALYSIS AUTO W/O SCOPE: CPT | Performed by: EMERGENCY MEDICINE

## 2024-09-10 RX ORDER — MAGNESIUM SULFATE HEPTAHYDRATE 40 MG/ML
2 INJECTION, SOLUTION INTRAVENOUS
Status: COMPLETED | OUTPATIENT
Start: 2024-09-11 | End: 2024-09-11

## 2024-09-10 RX ORDER — CLINDAMYCIN PHOSPHATE 900 MG/50ML
900 INJECTION, SOLUTION INTRAVENOUS
Status: COMPLETED | OUTPATIENT
Start: 2024-09-10 | End: 2024-09-11

## 2024-09-10 RX ADMIN — SODIUM CHLORIDE, POTASSIUM CHLORIDE, SODIUM LACTATE AND CALCIUM CHLORIDE 1000 ML: 600; 310; 30; 20 INJECTION, SOLUTION INTRAVENOUS at 11:09

## 2024-09-11 PROBLEM — N30.01 ACUTE CYSTITIS WITH HEMATURIA: Status: ACTIVE | Noted: 2024-09-11

## 2024-09-11 PROBLEM — E83.42 HYPOMAGNESEMIA: Status: RESOLVED | Noted: 2024-09-11 | Resolved: 2024-09-11

## 2024-09-11 PROBLEM — R65.20 SEVERE SEPSIS: Status: ACTIVE | Noted: 2024-09-11

## 2024-09-11 PROBLEM — A41.9 SEPSIS: Status: ACTIVE | Noted: 2024-09-11

## 2024-09-11 PROBLEM — N17.9 AKI (ACUTE KIDNEY INJURY): Status: ACTIVE | Noted: 2024-09-11

## 2024-09-11 PROBLEM — N30.00 ACUTE CYSTITIS WITHOUT HEMATURIA: Status: ACTIVE | Noted: 2024-09-11

## 2024-09-11 PROBLEM — L03.115 CELLULITIS OF RIGHT LOWER EXTREMITY: Status: ACTIVE | Noted: 2024-09-11

## 2024-09-11 PROBLEM — I48.11 LONGSTANDING PERSISTENT ATRIAL FIBRILLATION: Status: ACTIVE | Noted: 2024-09-11

## 2024-09-11 PROBLEM — R53.81 DEBILITY: Status: ACTIVE | Noted: 2024-09-11

## 2024-09-11 PROBLEM — E83.42 HYPOMAGNESEMIA: Status: ACTIVE | Noted: 2024-09-11

## 2024-09-11 PROBLEM — D64.9 NORMOCYTIC ANEMIA: Status: ACTIVE | Noted: 2024-09-11

## 2024-09-11 LAB
ANION GAP SERPL CALCULATED.3IONS-SCNC: 11 MMOL/L (ref 7–16)
AORTIC ROOT ANNULUS: 3.22 CM
AORTIC VALVE CUSP SEPERATION: 2.29 CM
APICAL FOUR CHAMBER EJECTION FRACTION: 65 %
AV INDEX (PROSTH): 0.7
AV MEAN GRADIENT: 7 MMHG
AV PEAK GRADIENT: 16 MMHG
AV REGURGITATION PRESSURE HALF TIME: 572.32 MS
AV VALVE AREA BY VELOCITY RATIO: 1.86 CM²
AV VALVE AREA: 2.29 CM²
AV VELOCITY RATIO: 0.57
BASOPHILS # BLD AUTO: 0.09 K/UL (ref 0–0.2)
BASOPHILS NFR BLD AUTO: 0.4 % (ref 0–1)
BSA FOR ECHO PROCEDURE: 2.16 M2
BUN SERPL-MCNC: 22 MG/DL (ref 7–18)
BUN/CREAT SERPL: 15 (ref 6–20)
CALCIUM SERPL-MCNC: 8.6 MG/DL (ref 8.5–10.1)
CHLORIDE SERPL-SCNC: 103 MMOL/L (ref 98–107)
CO2 SERPL-SCNC: 25 MMOL/L (ref 21–32)
CREAT SERPL-MCNC: 1.48 MG/DL (ref 0.55–1.02)
CV ECHO LV RWT: 0.48 CM
DIFFERENTIAL METHOD BLD: ABNORMAL
DOP CALC AO PEAK VEL: 2.02 M/S
DOP CALC AO VTI: 26.4 CM
DOP CALC LVOT AREA: 3.3 CM2
DOP CALC LVOT DIAMETER: 2.04 CM
DOP CALC LVOT PEAK VEL: 1.15 M/S
DOP CALC LVOT STROKE VOLUME: 60.44 CM3
DOP CALCLVOT PEAK VEL VTI: 18.5 CM
E WAVE DECELERATION TIME: 76.13 MSEC
E/A RATIO: 1.15
E/E' RATIO: 6.7 M/S
ECHO LV POSTERIOR WALL: 1.2 CM (ref 0.6–1.1)
EGFR (NO RACE VARIABLE) (RUSH/TITUS): 37 ML/MIN/1.73M2
EOSINOPHIL # BLD AUTO: 0.01 K/UL (ref 0–0.5)
EOSINOPHIL NFR BLD AUTO: 0 % (ref 1–4)
ERYTHROCYTE [DISTWIDTH] IN BLOOD BY AUTOMATED COUNT: 14 % (ref 11.5–14.5)
FOLATE SERPL-MCNC: >20 NG/ML (ref 3.1–17.5)
FRACTIONAL SHORTENING: 31 % (ref 28–44)
GLUCOSE SERPL-MCNC: 141 MG/DL (ref 74–106)
HCT VFR BLD AUTO: 29.7 % (ref 38–47)
HGB BLD-MCNC: 9.3 G/DL (ref 12–16)
IMM GRANULOCYTES # BLD AUTO: 0.23 K/UL (ref 0–0.04)
IMM GRANULOCYTES NFR BLD: 1 % (ref 0–0.4)
INTERVENTRICULAR SEPTUM: 1.3 CM (ref 0.6–1.1)
IRON SATN MFR SERPL: 4 % (ref 14–50)
IRON SERPL-MCNC: 13 ΜG/DL (ref 50–170)
IVC DIAMETER: 2.05 CM
LACTATE SERPL-SCNC: 1.8 MMOL/L (ref 0.4–2)
LEFT ATRIUM AREA SYSTOLIC (APICAL 4 CHAMBER): 11.43 CM2
LEFT ATRIUM SIZE: 3.93 CM
LEFT INTERNAL DIMENSION IN SYSTOLE: 3.46 CM (ref 2.1–4)
LEFT VENTRICLE DIASTOLIC VOLUME INDEX: 57.67 ML/M2
LEFT VENTRICLE DIASTOLIC VOLUME: 123.42 ML
LEFT VENTRICLE END DIASTOLIC VOLUME APICAL 4 CHAMBER: 65.64 ML
LEFT VENTRICLE END SYSTOLIC VOLUME APICAL 4 CHAMBER: 24.68 ML
LEFT VENTRICLE MASS INDEX: 116 G/M2
LEFT VENTRICLE SYSTOLIC VOLUME INDEX: 23.1 ML/M2
LEFT VENTRICLE SYSTOLIC VOLUME: 49.35 ML
LEFT VENTRICULAR INTERNAL DIMENSION IN DIASTOLE: 5 CM (ref 3.5–6)
LEFT VENTRICULAR MASS: 247.6 G
LIPASE SERPL-CCNC: 10 U/L (ref 16–77)
LV LATERAL E/E' RATIO: 5.5 M/S
LV SEPTAL E/E' RATIO: 8.56 M/S
LVED V (TEICH): 123.42 ML
LVES V (TEICH): 49.35 ML
LVOT MG: 2.5 MMHG
LVOT MV: 0.74 CM/S
LYMPHOCYTES # BLD AUTO: 1.67 K/UL (ref 1–4.8)
LYMPHOCYTES NFR BLD AUTO: 7.6 % (ref 27–41)
MAGNESIUM SERPL-MCNC: 2 MG/DL (ref 1.7–2.3)
MCH RBC QN AUTO: 29.2 PG (ref 27–31)
MCHC RBC AUTO-ENTMCNC: 31.3 G/DL (ref 32–36)
MCV RBC AUTO: 93.4 FL (ref 80–96)
MONOCYTES # BLD AUTO: 2.48 K/UL (ref 0–0.8)
MONOCYTES NFR BLD AUTO: 11.2 % (ref 2–6)
MPC BLD CALC-MCNC: 10.9 FL (ref 9.4–12.4)
MV PEAK A VEL: 0.67 M/S
MV PEAK E VEL: 0.77 M/S
MV STENOSIS PRESSURE HALF TIME: 22.08 MS
MV VALVE AREA P 1/2 METHOD: 9.96 CM2
NEUTROPHILS # BLD AUTO: 17.57 K/UL (ref 1.8–7.7)
NEUTROPHILS NFR BLD AUTO: 79.8 % (ref 53–65)
NRBC # BLD AUTO: 0 X10E3/UL
NRBC, AUTO (.00): 0 %
OHS LV EJECTION FRACTION SIMPSONS BIPLANE MOD: 57 %
PISA AR MAX VEL: 1.94 M/S
PISA TR MAX VEL: 2.63 M/S
PLATELET # BLD AUTO: 328 K/UL (ref 150–400)
POTASSIUM SERPL-SCNC: 3.6 MMOL/L (ref 3.5–5.1)
PV PEAK GRADIENT: 4 MMHG
PV PEAK VELOCITY: 1 M/S
RA MAJOR: 3.91 CM
RA PRESSURE ESTIMATED: 8 MMHG
RBC # BLD AUTO: 3.18 M/UL (ref 4.2–5.4)
RIGHT VENTRICLE DIASTOLIC BASEL DIMENSION: 5.2 CM
RIGHT VENTRICLE DIASTOLIC LENGTH: 4.3 CM
RIGHT VENTRICLE DIASTOLIC MID DIMENSION: 4.5 CM
RIGHT VENTRICULAR LENGTH IN DIASTOLE (APICAL 4-CHAMBER VIEW): 4.34 CM
RV MID DIAMA: 4.49 CM
RV TB RVSP: 11 MMHG
SODIUM SERPL-SCNC: 135 MMOL/L (ref 136–145)
TDI LATERAL: 0.14 M/S
TDI SEPTAL: 0.09 M/S
TDI: 0.12 M/S
TIBC SERPL-MCNC: 312 ΜG/DL (ref 250–450)
TR MAX PG: 28 MMHG
TRICUSPID ANNULAR PLANE SYSTOLIC EXCURSION: 1.5 CM
TROPONIN I SERPL DL<=0.01 NG/ML-MCNC: 15.5 PG/ML
TV REST PULMONARY ARTERY PRESSURE: 36 MMHG
VIT B12 SERPL-MCNC: 411 PG/ML (ref 193–986)
WBC # BLD AUTO: 22.05 K/UL (ref 4.5–11)
Z-SCORE OF LEFT VENTRICULAR DIMENSION IN END DIASTOLE: -3.19
Z-SCORE OF LEFT VENTRICULAR DIMENSION IN END SYSTOLE: -1.52

## 2024-09-11 PROCEDURE — 11000001 HC ACUTE MED/SURG PRIVATE ROOM

## 2024-09-11 PROCEDURE — 36415 COLL VENOUS BLD VENIPUNCTURE: CPT

## 2024-09-11 PROCEDURE — 87070 CULTURE OTHR SPECIMN AEROBIC: CPT | Performed by: INTERNAL MEDICINE

## 2024-09-11 PROCEDURE — 25000003 PHARM REV CODE 250: Performed by: EMERGENCY MEDICINE

## 2024-09-11 PROCEDURE — 83735 ASSAY OF MAGNESIUM: CPT

## 2024-09-11 PROCEDURE — 84484 ASSAY OF TROPONIN QUANT: CPT

## 2024-09-11 PROCEDURE — 80048 BASIC METABOLIC PNL TOTAL CA: CPT

## 2024-09-11 PROCEDURE — 99499 UNLISTED E&M SERVICE: CPT | Mod: ,,, | Performed by: INTERNAL MEDICINE

## 2024-09-11 PROCEDURE — 25000003 PHARM REV CODE 250

## 2024-09-11 PROCEDURE — 99223 1ST HOSP IP/OBS HIGH 75: CPT | Mod: AI,,, | Performed by: INTERNAL MEDICINE

## 2024-09-11 PROCEDURE — 63600175 PHARM REV CODE 636 W HCPCS

## 2024-09-11 PROCEDURE — 96368 THER/DIAG CONCURRENT INF: CPT

## 2024-09-11 PROCEDURE — 96375 TX/PRO/DX INJ NEW DRUG ADDON: CPT

## 2024-09-11 PROCEDURE — 87075 CULTR BACTERIA EXCEPT BLOOD: CPT | Performed by: INTERNAL MEDICINE

## 2024-09-11 PROCEDURE — 97162 PT EVAL MOD COMPLEX 30 MIN: CPT

## 2024-09-11 PROCEDURE — 87186 SC STD MICRODIL/AGAR DIL: CPT | Performed by: INTERNAL MEDICINE

## 2024-09-11 PROCEDURE — 97165 OT EVAL LOW COMPLEX 30 MIN: CPT

## 2024-09-11 PROCEDURE — 63600175 PHARM REV CODE 636 W HCPCS: Mod: JZ,JG | Performed by: EMERGENCY MEDICINE

## 2024-09-11 PROCEDURE — 87077 CULTURE AEROBIC IDENTIFY: CPT | Performed by: INTERNAL MEDICINE

## 2024-09-11 PROCEDURE — 82746 ASSAY OF FOLIC ACID SERUM: CPT | Performed by: INTERNAL MEDICINE

## 2024-09-11 PROCEDURE — 83605 ASSAY OF LACTIC ACID: CPT | Performed by: EMERGENCY MEDICINE

## 2024-09-11 PROCEDURE — 85025 COMPLETE CBC W/AUTO DIFF WBC: CPT

## 2024-09-11 PROCEDURE — 87076 CULTURE ANAEROBE IDENT EACH: CPT | Performed by: INTERNAL MEDICINE

## 2024-09-11 PROCEDURE — 96365 THER/PROPH/DIAG IV INF INIT: CPT

## 2024-09-11 PROCEDURE — 83540 ASSAY OF IRON: CPT | Performed by: INTERNAL MEDICINE

## 2024-09-11 PROCEDURE — 36415 COLL VENOUS BLD VENIPUNCTURE: CPT | Performed by: EMERGENCY MEDICINE

## 2024-09-11 PROCEDURE — 25000003 PHARM REV CODE 250: Performed by: INTERNAL MEDICINE

## 2024-09-11 RX ORDER — ACETAMINOPHEN 325 MG/1
650 TABLET ORAL EVERY 4 HOURS PRN
Status: DISCONTINUED | OUTPATIENT
Start: 2024-09-11 | End: 2024-09-17 | Stop reason: HOSPADM

## 2024-09-11 RX ORDER — IBUPROFEN 200 MG
16 TABLET ORAL
Status: DISCONTINUED | OUTPATIENT
Start: 2024-09-11 | End: 2024-09-17 | Stop reason: HOSPADM

## 2024-09-11 RX ORDER — OXYBUTYNIN CHLORIDE 5 MG/1
5 TABLET, EXTENDED RELEASE ORAL DAILY
Status: DISCONTINUED | OUTPATIENT
Start: 2024-09-11 | End: 2024-09-17 | Stop reason: HOSPADM

## 2024-09-11 RX ORDER — SODIUM CHLORIDE 0.9 % (FLUSH) 0.9 %
10 SYRINGE (ML) INJECTION EVERY 12 HOURS PRN
Status: DISCONTINUED | OUTPATIENT
Start: 2024-09-11 | End: 2024-09-17 | Stop reason: HOSPADM

## 2024-09-11 RX ORDER — NALOXONE HCL 0.4 MG/ML
0.02 VIAL (ML) INJECTION
Status: DISCONTINUED | OUTPATIENT
Start: 2024-09-11 | End: 2024-09-17 | Stop reason: HOSPADM

## 2024-09-11 RX ORDER — METOPROLOL TARTRATE 25 MG/1
25 TABLET, FILM COATED ORAL DAILY
Status: DISCONTINUED | OUTPATIENT
Start: 2024-09-11 | End: 2024-09-17 | Stop reason: HOSPADM

## 2024-09-11 RX ORDER — DULOXETIN HYDROCHLORIDE 30 MG/1
60 CAPSULE, DELAYED RELEASE ORAL DAILY
Status: DISCONTINUED | OUTPATIENT
Start: 2024-09-11 | End: 2024-09-17 | Stop reason: HOSPADM

## 2024-09-11 RX ORDER — GLUCAGON 1 MG
1 KIT INJECTION
Status: DISCONTINUED | OUTPATIENT
Start: 2024-09-11 | End: 2024-09-17 | Stop reason: HOSPADM

## 2024-09-11 RX ORDER — IBUPROFEN 200 MG
24 TABLET ORAL
Status: DISCONTINUED | OUTPATIENT
Start: 2024-09-11 | End: 2024-09-17 | Stop reason: HOSPADM

## 2024-09-11 RX ORDER — GABAPENTIN 300 MG/1
300 CAPSULE ORAL 3 TIMES DAILY
Status: DISCONTINUED | OUTPATIENT
Start: 2024-09-11 | End: 2024-09-17 | Stop reason: HOSPADM

## 2024-09-11 RX ORDER — MUPIROCIN 20 MG/G
OINTMENT TOPICAL 2 TIMES DAILY
Status: DISPENSED | OUTPATIENT
Start: 2024-09-11 | End: 2024-09-16

## 2024-09-11 RX ORDER — ROSUVASTATIN CALCIUM 20 MG/1
20 TABLET, COATED ORAL NIGHTLY
Status: ON HOLD | COMMUNITY
Start: 2024-08-23

## 2024-09-11 RX ORDER — FESOTERODINE FUMARATE 4 MG/1
4 TABLET, FILM COATED, EXTENDED RELEASE ORAL DAILY
Status: ON HOLD | COMMUNITY
Start: 2024-07-03

## 2024-09-11 RX ORDER — ONDANSETRON HYDROCHLORIDE 2 MG/ML
4 INJECTION, SOLUTION INTRAVENOUS EVERY 8 HOURS PRN
Status: DISCONTINUED | OUTPATIENT
Start: 2024-09-11 | End: 2024-09-17 | Stop reason: HOSPADM

## 2024-09-11 RX ADMIN — PIPERACILLIN SODIUM AND TAZOBACTAM SODIUM 4.5 G: 4; .5 INJECTION, POWDER, LYOPHILIZED, FOR SOLUTION INTRAVENOUS at 08:09

## 2024-09-11 RX ADMIN — APIXABAN 5 MG: 5 TABLET, FILM COATED ORAL at 08:09

## 2024-09-11 RX ADMIN — DULOXETINE HYDROCHLORIDE 60 MG: 30 CAPSULE, DELAYED RELEASE ORAL at 08:09

## 2024-09-11 RX ADMIN — GABAPENTIN 300 MG: 300 CAPSULE ORAL at 03:09

## 2024-09-11 RX ADMIN — OXYBUTYNIN CHLORIDE 5 MG: 5 TABLET, EXTENDED RELEASE ORAL at 08:09

## 2024-09-11 RX ADMIN — CLINDAMYCIN PHOSPHATE 900 MG: 900 INJECTION, SOLUTION INTRAVENOUS at 12:09

## 2024-09-11 RX ADMIN — MUPIROCIN: 20 OINTMENT TOPICAL at 08:09

## 2024-09-11 RX ADMIN — GABAPENTIN 300 MG: 300 CAPSULE ORAL at 08:09

## 2024-09-11 RX ADMIN — CEFTRIAXONE SODIUM 2 G: 2 INJECTION, POWDER, FOR SOLUTION INTRAMUSCULAR; INTRAVENOUS at 12:09

## 2024-09-11 RX ADMIN — MAGNESIUM SULFATE HEPTAHYDRATE 2 G: 40 INJECTION, SOLUTION INTRAVENOUS at 01:09

## 2024-09-11 RX ADMIN — PIPERACILLIN SODIUM AND TAZOBACTAM SODIUM 4.5 G: 4; .5 INJECTION, POWDER, LYOPHILIZED, FOR SOLUTION INTRAVENOUS at 05:09

## 2024-09-11 RX ADMIN — VANCOMYCIN HYDROCHLORIDE 1750 MG: 1 INJECTION, POWDER, LYOPHILIZED, FOR SOLUTION INTRAVENOUS at 03:09

## 2024-09-11 RX ADMIN — ACETAMINOPHEN 650 MG: 325 TABLET ORAL at 02:09

## 2024-09-11 RX ADMIN — METOPROLOL TARTRATE 25 MG: 25 TABLET, FILM COATED ORAL at 08:09

## 2024-09-11 RX ADMIN — SODIUM CHLORIDE, POTASSIUM CHLORIDE, SODIUM LACTATE AND CALCIUM CHLORIDE 1000 ML: 600; 310; 30; 20 INJECTION, SOLUTION INTRAVENOUS at 02:09

## 2024-09-11 RX ADMIN — PIPERACILLIN SODIUM AND TAZOBACTAM SODIUM 4.5 G: 4; .5 INJECTION, POWDER, LYOPHILIZED, FOR SOLUTION INTRAVENOUS at 01:09

## 2024-09-11 NOTE — ASSESSMENT & PLAN NOTE
2 week history after fall from home  Concern for deep infection or hematoma, checking CT right leg.   Inpatient wound care nurse consulted, appreciate assistance

## 2024-09-11 NOTE — ED PROVIDER NOTES
Encounter Date: 9/10/2024    SCRIBE #1 NOTE: I, Nelly Omalley, am scribing for, and in the presence of,  Kendell Kovacs MD. I have scribed the entire note.       History     Chief Complaint   Patient presents with    Fall     This is a 72 y/o female,who presents to the ED with complaints of a fall which happened PTA. She states she was coming down 3 stairs when her foot turned the wrong way and she fell. She denies hitting her head and there was no LOC. Pt also complains of a hematoma to the right posterior calf with a foul smelling drainage. She notes 2 weeks ago, her leg became stuck in the door to a truck and has been bleeding. She is on blood thinners at this time. She has a known hx of HTN and Afib. There is no smoking hx on file. There are no other complaints/pain in the ED at this time.     The history is provided by the patient and the EMS personnel. No  was used.     Review of patient's allergies indicates:   Allergen Reactions    Sulfa (sulfonamide antibiotics) Hives and Other (See Comments)     Sore Throat, Sore Mouth     Past Medical History:   Diagnosis Date    A-fib     Hypertension     Neuropathy      Past Surgical History:   Procedure Laterality Date    CATARACT EXTRACTION, BILATERAL      CHOLECYSTECTOMY      HYSTERECTOMY      INJECTION OF ANESTHETIC AGENT AROUND MEDIAL BRANCH NERVES INNERVATING LUMBAR FACET JOINT Bilateral 8/26/2021    Procedure: Bilateral L4-5,5-S1 MBB;  Surgeon: Sulma Nelson MD;  Location: [facility];  Service: Pain Management;  Laterality: Bilateral;  Per Dr Bradford ok to hold Eliquis for 4 days not 5, Per Dr Nelson this is fine with her. Pt will bring her verification card for COVID vaccination.    INJECTION OF ANESTHETIC AGENT AROUND MEDIAL BRANCH NERVES INNERVATING LUMBAR FACET JOINT Bilateral 10/5/2021    Procedure: Bilateral L4-5,5-S1 MBB;  Surgeon: Sulma Nelson MD;  Location: [facility];  Service: Pain Management;   Laterality: Bilateral;  PT AWARE ON OV TO BE TESTED    KNEE CARTILAGE SURGERY Right     RADIOFREQUENCY ABLATION OF LUMBAR MEDIAL BRANCH NERVE AT SINGLE LEVEL Bilateral 11/9/2021    Procedure: Radiofrequency Ablation, Nerve, Spinal, Lumbar, Medial Branch, 1 Level L4-S1  BILATERAL;  Surgeon: Sulma Nelson MD;  Location: [facility];  Service: Pain Management;  Laterality: Bilateral;  per alicia pt is on eliquis but she will get permission for it to be held 5 days prior to procedure.   HAD VAC   CARD SCANNED IN    REDUCTION OF BOTH BREASTS      RETINAL DETACHMENT SURGERY  right    VAGINAL DELIVERY      X1     Family History   Problem Relation Name Age of Onset    Hypertension Mother      Atrial fibrillation Mother      Stroke Father      Diabetes Paternal Grandfather       Social History     Tobacco Use    Smoking status: Never    Smokeless tobacco: Never   Substance Use Topics    Alcohol use: Not Currently     Review of Systems   Constitutional:  Negative for fever.   Neurological:  Negative for syncope.   All other systems reviewed and are negative.      Physical Exam     Initial Vitals [09/10/24 2115]   BP Pulse Resp Temp SpO2   110/67 (!) 117 (!) 26 -- 95 %      MAP       --         Physical Exam    Nursing note and vitals reviewed.  Constitutional: She appears well-developed and well-nourished.   HENT:   Head: Normocephalic and atraumatic.   Eyes: Conjunctivae and EOM are normal. Pupils are equal, round, and reactive to light.   Neck: Neck supple.   Normal range of motion.  Cardiovascular:  Regular rhythm, normal heart sounds and intact distal pulses.           Tachycardia.      Pulmonary/Chest: Breath sounds normal.   Abdominal: Abdomen is soft. Bowel sounds are normal.   Musculoskeletal:         General: Normal range of motion.      Cervical back: Normal range of motion and neck supple.     Neurological: She is alert and oriented to person, place, and time. She has normal strength.   Skin: Skin is warm  "and dry. Capillary refill takes less than 2 seconds.   There is a large hematoma to the right posterior calf with a foul smelling drainage.      Psychiatric: She has a normal mood and affect. Thought content normal.         ED Course   Procedures  Labs Reviewed - No data to display       Imaging Results    None          Medications - No data to display  Medical Decision Making  This is a 74 y/o female,who presents to the ED with complaints of a fall which happened PTA. She states she was coming down 3 stairs when her foot turned the wrong way and she fell. She denies hitting her head and there was no LOC. Pt also complains of a hematoma to the right posterior calf with a foul smelling drainage. She notes 2 weeks ago, her leg became stuck in the door to a truck and has been bleeding. She is on blood thinners at this time. She has a known hx of HTN and Afib. There is no smoking hx on file. There are no other complaints/pain in the ED at this time.     Amount and/or Complexity of Data Reviewed  Independent Historian: EMS     Details: We spoke with the patient and EMS.                 Attending Attestation:           Physician Attestation for Scribe:  Physician Attestation Statement for Scribe #1: I, Kendell Kovacs MD, reviewed documentation, as scribed by Nelly Omalley in my presence, and it is both accurate and complete.                                    Clinical Impression:   ***Please document a Clinical Impression and click the "Refresh" button to refresh your note and automatically pull in before signing.***           "

## 2024-09-11 NOTE — ASSESSMENT & PLAN NOTE
This patient does have evidence of infective focus  My overall impression is sepsis.  Source: Urinary Tract and Skin and Soft Tissue (location right lower extremity)  Antibiotics given-   Antibiotics (72h ago, onward)      Start     Stop Route Frequency Ordered    09/11/24 0500  piperacillin-tazobactam (ZOSYN) 4.5 g in D5W 100 mL IVPB (MB+)         -- IV Every 8 hours (non-standard times) 09/11/24 0141    09/11/24 0300  vancomycin (VANCOCIN) 1,750 mg in D5W 500 mL IVPB         -- IV Every 36 hours 09/11/24 0143    09/11/24 0242  vancomycin - pharmacy to dose         -- IV pharmacy to manage frequency 09/11/24 0143    09/11/24 0200  piperacillin-tazobactam (ZOSYN) 4.5 g in D5W 100 mL IVPB (MB+)         09/11/24 1359 IV ED 1 Time 09/11/24 0141          Latest lactate reviewed-  Recent Labs   Lab 09/11/24  0032   LACTATE 1.8     Organ dysfunction indicated by Acute kidney injury    Fluid challenge Ideal Body Weight- The patient's ideal body weight is Ideal body weight: 70.8 kg (156 lb 1.4 oz) which will be used to calculate fluid bolus of 30 ml/kg for treatment of septic shock.      Post- resuscitation assessment Yes Perfusion exam was performed within 6 hours of septic shock presentation after bolus shows Adequate tissue perfusion assessed by non-invasive monitoring       Will Not start Pressors- Levophed for MAP of 65  Source control achieved by: cultures and antibiotics

## 2024-09-11 NOTE — PROGRESS NOTES
Pharmacy consulted to assist with the management of vancomycin in this patient to treat: sepsis, possible UTI source    Patient weight: 93.4 kg  Patient CrCl: ~32 ml/min    Doses given prior to consult: none    Will begin vancomycin: 1750mg every 36 hours    Vancomycin level ordered before 3rd dose.     Pharmacy will continue to monitor and make adjustments as needed.      Thank you for the consult,    Araceli Austin, PharmD  [phone]

## 2024-09-11 NOTE — HPI
"Patient is a 73-year-old female who presents to [facility] after a fall from home where she reportedly was too weak to get up.  Patient has a past medical history of AFib on Eliquis, neuropathy and depression. Patient reports having inverted ankles that affect her walking when she turned her foot wrong way she fell down.  She denies hitting her head or losing consciousness.  Patient also reports falling a couple of weeks ago as well where she got her right posterior calf stuck in a truck door.  She reports developing a wound which bled and it now "appears infected".  Over the past few weeks patient endorses weakness and fatigue more than usual.  The patient reportedly lives with her  who is also present and contributes to the history. The patient denies headache, chest pain, shortness of breath, abdominal pain, fevers or chills.    Significant findings in the ED, lactic acid 2.6, Mag 1.3, UA positive for leukocytes, WBC 25.    The patient will be admitted to [facility] for continued care and medical management.  "

## 2024-09-11 NOTE — PT/OT/SLP EVAL
Occupational Therapy   Evaluation    Name: Bobbi Haywood  MRN: [id number]  Admitting Diagnosis: Severe sepsis  Recent Surgery: * No surgery found *      Recommendations:     Discharge Recommendations: Moderate Intensity Therapy  Discharge Equipment Recommendations:  none  Barriers to discharge:  None    Assessment:     Bobbi Haywood is a 73 y.o. female with a medical diagnosis of Severe sepsis.  She presents with weakness and decline in ADLs. Performance deficits affecting function: weakness, impaired endurance, impaired self care skills, impaired functional mobility, gait instability, impaired balance, pain.  Pt limited with mobility d/t pain to RLE. Pt with increased assist needed with ADL tasks and functional mobility at this time. Pt would benefit from swing bed at discharge in order to regain maximum independence with ADL tasks and functional mobility     Rehab Prognosis: Good; patient would benefit from acute skilled OT services to address these deficits and reach maximum level of function.       Plan:     Patient to be seen 5 x/week to address the above listed problems via self-care/home management, therapeutic activities, therapeutic exercises  Plan of Care Expires: 10/09/24  Plan of Care Reviewed with: patient    Subjective     Chief Complaint: severe sepsis  Patient/Family Comments/goals: pt agreeable to OT eval    Occupational Profile:  Living Environment: pt lives with  in one story home; pt has three steps into bathroom with handrails  Previous level of function: independent with ADL tasks, utilized rollator walker for functional mobility outside of home; furniture/wall cruising inside home  Roles and Routines: perform self care  Equipment Used at Home: walker, rolling, rollator, grab bar, shower chair  Assistance upon Discharge: swing bed    Pain/Comfort:  Pain Rating 1: 4/10  Location - Side 1: Right  Location 1: leg    Patients cultural, spiritual, Scientology conflicts  given the current situation: no    Objective:     Communicated with: SUKHDEV Sorenson prior to session.  Patient found HOB elevated with PureWick, peripheral IV upon OT entry to room.    General Precautions: Standard, fall  Orthopedic Precautions: N/A  Braces: N/A  Respiratory Status: Room air    Occupational Performance:    Bed Mobility:    Patient completed Supine to Sit with moderate assistance    Functional Mobility/Transfers:  Patient completed Sit <> Stand Transfer with minimum assistance and of 2 persons  with  rolling walker   Patient completed Bed <> Chair Transfer using Step Transfer technique with minimum assistance with rolling walker  Functional Mobility: pt performed steps to bedside chair with Esvin with RW    Activities of Daily Living:  Lower Body Dressing: maximal assistance to christy socks    Cognitive/Visual Perceptual:  Cognitive/Psychosocial Skills:     -       Oriented to: Person, Place, Time, and Situation   -       Follows Commands/attention:Follows multistep  commands  -       Mood/Affect/Coping skills/emotional control: Cooperative    Physical Exam:  Balance:    -       sitting balance CGA; standing balance CGA with RW  Upper Extremity Range of Motion:     -       Right Upper Extremity: WFL  -       Left Upper Extremity: WFL  Upper Extremity Strength:    -       Right Upper Extremity: 4/5  -       Left Upper Extremity: 4/5  Gross motor coordination:   WFL    AMPAC 6 Click ADL:  AMPAC Total Score: 17    Treatment & Education:  Pt educated on OT role/POC.   Importance of OOB activity with staff assistance.  Importance of sitting up in the chair throughout the day as tolerated, especially for meals   Safety during functional t/f and mobility with use of RW  Importance of assisting with self-care activities   All questions/concerns answered within OT scope of practice      Patient left up in chair with all lines intact, call button in reach, and SUKHDEV Sorenson notified    GOALS:   Multidisciplinary  Problems       Occupational Therapy Goals          Problem: Occupational Therapy    Goal Priority Disciplines Outcome Interventions   Occupational Therapy Goal     OT, PT/OT Progressing    Description: STG:  Pt will perform grooming with setup  Pt will bathe with Esvin with setup at EOB  Pt will perform UE dressing with Charlene  Pt will perform LE dressing with Esvin  Pt will sit EOB x 10 min with SBA   Pt will transfer bed/chair/bsc with CGA with RW  Pt will perform standing task x 2 min with CGA with RW   Pt will tolerate 15 minutes of tx without fatigue      LT.Restore to max I with self care and mobility.                           History:     Past Medical History:   Diagnosis Date    A-fib     Hypertension     Neuropathy          Past Surgical History:   Procedure Laterality Date    CATARACT EXTRACTION, BILATERAL      CHOLECYSTECTOMY      HYSTERECTOMY      INJECTION OF ANESTHETIC AGENT AROUND MEDIAL BRANCH NERVES INNERVATING LUMBAR FACET JOINT Bilateral 2021    Procedure: Bilateral L4-5,5-S1 MBB;  Surgeon: Sulma Nelson MD;  Location: [facility];  Service: Pain Management;  Laterality: Bilateral;  Per Dr Bradford ok to hold Eliquis for 4 days not 5, Per Dr Nelson this is fine with her. Pt will bring her verification card for COVID vaccination.    INJECTION OF ANESTHETIC AGENT AROUND MEDIAL BRANCH NERVES INNERVATING LUMBAR FACET JOINT Bilateral 10/5/2021    Procedure: Bilateral L4-5,5-S1 MBB;  Surgeon: Sulma Nelson MD;  Location: [facility];  Service: Pain Management;  Laterality: Bilateral;  PT AWARE ON OV TO BE TESTED    KNEE CARTILAGE SURGERY Right     RADIOFREQUENCY ABLATION OF LUMBAR MEDIAL BRANCH NERVE AT SINGLE LEVEL Bilateral 2021    Procedure: Radiofrequency Ablation, Nerve, Spinal, Lumbar, Medial Branch, 1 Level L4-S1  BILATERAL;  Surgeon: Sulma Nelson MD;  Location: [facility];  Service: Pain Management;  Laterality: Bilateral;  per alicia pt is on eliquis  but she will get permission for it to be held 5 days prior to procedure.   HAD VAC   CARD SCANNED IN    REDUCTION OF BOTH BREASTS      RETINAL DETACHMENT SURGERY  right    VAGINAL DELIVERY      X1       Time Tracking:     OT Date of Treatment: 09/11/24  OT Start Time: 0943  OT Stop Time: 1010  OT Total Time (min): 27 min    Billable Minutes:Evaluation OT min complexity eval    9/11/2024

## 2024-09-11 NOTE — ASSESSMENT & PLAN NOTE
Patient has Abnormal Magnesium: hypomagnesemia. Will continue to monitor electrolytes closely. Will replace the affected electrolytes and repeat labs to be done after interventions completed. The patient's magnesium results have been reviewed and are listed below.  Recent Labs   Lab 09/10/24  2222   MG 1.3*

## 2024-09-11 NOTE — PROGRESS NOTES
"[facility] - [address] [place] [address] Medicine  Progress Note    Patient Name: Bobbi Haywood  MRN: [id number]  Patient Class: IP- Inpatient   Admission Date: 9/10/2024  Length of Stay: 0 days  Attending Physician: Abrahan Farmer MD  Primary Care Provider: [facility]        Subjective:     Principal Problem:Severe sepsis        HPI:  Patient is a 73-year-old female who presents to [facility] after a fall from home where she reportedly was too weak to get up.  Patient has a past medical history of AFib on Eliquis, neuropathy and depression. Patient reports having inverted ankles that affect her walking when she turned her foot wrong way she fell down.  She denies hitting her head or losing consciousness.  Patient also reports falling a couple of weeks ago as well where she got her right posterior calf stuck in a truck door.  She reports developing a wound which bled and it now "appears infected".  Over the past few weeks patient endorses weakness and fatigue more than usual.  The patient reportedly lives with her  who is also present and contributes to the history. The patient denies headache, chest pain, shortness of breath, abdominal pain, fevers or chills.    Significant findings in the ED, lactic acid 2.6, Mag 1.3, UA positive for leukocytes, WBC 25.    The patient will be admitted to [facility] for continued care and medical management.    Overview/Hospital Course:  9/11- Checking CT leg as concern for deep infection. Urine culture with GNB. Continue current antibiotics. PT/OT consulted.     Interval History: Patient seen and examined at the bedside, reports feeling some better. Pain in the leg is less.     Review of Systems   All other systems reviewed and are negative.    Objective:     Vital Signs (Most Recent):  Temp: 97.9 °F (36.6 °C) (09/11/24 0726)  Pulse: 104 (09/11/24 0726)  Resp: 18 (09/11/24 0726)  BP: 100/63 " (09/11/24 0726)  SpO2: (!) 94 % (09/11/24 0726) Vital Signs (24h Range):  Temp:  [97.9 °F (36.6 °C)-98.4 °F (36.9 °C)] 97.9 °F (36.6 °C)  Pulse:  [] 104  Resp:  [14-26] 18  SpO2:  [94 %-100 %] 94 %  BP: (100-117)/(63-74) 100/63     Weight: 93.4 kg (205 lb 14.6 oz)  Body mass index is 28.72 kg/m².    Intake/Output Summary (Last 24 hours) at 9/11/2024 1025  Last data filed at 9/11/2024 0100  Gross per 24 hour   Intake 1150 ml   Output --   Net 1150 ml         Physical Exam  Vitals and nursing note reviewed.   Constitutional:       Appearance: Normal appearance.   HENT:      Head: Normocephalic and atraumatic.      Mouth/Throat:      Mouth: Mucous membranes are moist.   Eyes:      Extraocular Movements: Extraocular movements intact.      Pupils: Pupils are equal, round, and reactive to light.   Cardiovascular:      Rate and Rhythm: Normal rate and regular rhythm.   Pulmonary:      Effort: Pulmonary effort is normal.      Breath sounds: Normal breath sounds.   Abdominal:      General: Bowel sounds are normal.      Palpations: Abdomen is soft.   Musculoskeletal:         General: Deformity present.      Cervical back: Normal range of motion and neck supple.      Right lower leg: Edema present.   Skin:     Findings: Erythema present.   Neurological:      General: No focal deficit present.      Mental Status: She is alert and oriented to person, place, and time. Mental status is at baseline.   Psychiatric:         Mood and Affect: Mood normal.         Behavior: Behavior normal.             Significant Labs: All pertinent labs within the past 24 hours have been reviewed.    Significant Imaging: I have reviewed all pertinent imaging results/findings within the past 24 hours.    Assessment/Plan:      * Severe sepsis  This patient does have evidence of infective focus  My overall impression is sepsis.  Source: Urinary Tract and Skin and Soft Tissue (location right lower extremity)  Antibiotics given-   Antibiotics (72h  ago, onward)      Start     Stop Route Frequency Ordered    09/11/24 0900  mupirocin 2 % ointment         09/16/24 0859 Top 2 times daily 09/11/24 0643    09/11/24 0500  piperacillin-tazobactam (ZOSYN) 4.5 g in D5W 100 mL IVPB (MB+)         -- IV Every 8 hours (non-standard times) 09/11/24 0141    09/11/24 0300  vancomycin (VANCOCIN) 1,750 mg in D5W 500 mL IVPB         -- IV Every 36 hours 09/11/24 0143    09/11/24 0242  vancomycin - pharmacy to dose         -- IV pharmacy to manage frequency 09/11/24 0143    09/11/24 0200  piperacillin-tazobactam (ZOSYN) 4.5 g in D5W 100 mL IVPB (MB+)         09/11/24 1359 IV ED 1 Time 09/11/24 0141          Latest lactate reviewed-  Recent Labs   Lab 09/11/24  0032   LACTATE 1.8       Source control achieved by: antibiotics.  Follow blood and urine culture.     Cellulitis of right lower extremity  2 week history after fall from home  Concern for deep infection or hematoma, checking CT right leg.   Inpatient wound care nurse consulted, appreciate assistance          INOCENTE (acute kidney injury)  INOCENTE is likely due to acute tubular necrosis caused by infection . Baseline creatinine is  ~ 1 . Most recent creatinine and eGFR are listed below.  Recent Labs     09/10/24  2222 09/11/24  0250   CREATININE 1.71* 1.48*   EGFRNORACEVR 31* 37*        Plan  - INOCENTE is improving.  - s/p IV fluids.  - Avoid nephrotoxins and renally dose meds for GFR listed above  - Monitor urine output, serial BMP, and adjust therapy as needed      Acute cystitis with hematuria  UA positive for leukocytes.   Urine culture with GNB.   Continue antibiotics as above.    Normocytic anemia  Anemia is likely due to  unclear . Most recent hemoglobin and hematocrit are listed below.  Recent Labs     09/10/24  2222 09/11/24  0250   HGB 11.1* 9.3*   HCT 35.1* 29.7*     Plan  - Monitor serial CBC: Daily  - Transfuse PRBC if patient becomes hemodynamically unstable, symptomatic or H/H drops below 7/21.  - Patient has not received  any PRBC transfusions to date  - Patient's anemia is currently stable  - Check B12, folate, iron panel.     Debility  Patient with Acute on chronic debility due to chronic unspecified fatigue and age-related physical debility. Latest AMPAC and GEMS scores have not been reviewed. Evaluation for etiology is complete. Plan includes progressive mobility protocol initated and PT/OT consulted.    Longstanding persistent atrial fibrillation  Patient with Long standing persistent (>12 months) atrial fibrillation which is uncontrolled currently with Beta Blocker. Patient is currently in atrial fibrillation.[id number] Score: 3. HASBLED Score: 1. Anticoagulation indicated. Anticoagulation done with Eliquis .  Ok to resume Eliquis for now unless hematoma or abscess requiring surgery consult noted    Neuropathy  Moderately well-controlled  Continue home Cymbalta and gabapentin        VTE Risk Mitigation (From admission, onward)           Ordered     apixaban tablet 5 mg  2 times daily         09/11/24 0119     Reason for No Pharmacological VTE Prophylaxis  Once        Question:  Reasons:  Answer:  Already adequately anticoagulated on oral Anticoagulants    09/11/24 0119     IP VTE HIGH RISK PATIENT  Once         09/11/24 0119     Place sequential compression device  Until discontinued         09/11/24 0119                    Discharge Planning   SHASHI: 9/13/2024     Code Status: Full Code   Is the patient medically ready for discharge?:     Reason for patient still in hospital (select all that apply): Patient trending condition, Imaging, and PT / OT recommendations  Discharge Plan A: Home with family, Home Health                SAVANAH SÁNCHEZ MD  Department of Hospital Medicine   [facility] - [facility] Medical Telemetry

## 2024-09-11 NOTE — ASSESSMENT & PLAN NOTE
2 week history after fall from home  Patient meeting sepsis criteria, empiric antibiotics started  Inpatient wound care nurse consulted, appreciate assistance

## 2024-09-11 NOTE — PT/OT/SLP EVAL
Physical Therapy Evaluation    Patient Name:  Bobbi Haywood   MRN:  [id number]    Recommendations:     Discharge Recommendations: Moderate Intensity Therapy   Discharge Equipment Recommendations: none   Barriers to discharge: None    Assessment:     Bobbi Haywood is a 73 y.o. female admitted with a medical diagnosis of Severe sepsis.  She presents with the following impairments/functional limitations: weakness, impaired self care skills, impaired functional mobility, gait instability, impaired balance, pain Patient with very limited mobility due to generalized weakness and right leg pain. Patient unable to ambulate due to pain in right le. Will need extensive rehab prior to dc home.    Rehab Prognosis: Good; patient would benefit from acute skilled PT services to address these deficits and reach maximum level of function.    Recent Surgery: * No surgery found *      Plan:     During this hospitalization, patient to be seen 5 x/week to address the identified rehab impairments via gait training, therapeutic activities, therapeutic exercises and progress toward the following goals:    Plan of Care Expires:  10/11/24    Subjective     Chief Complaint: right leg pain  Patient/Family Comments/goals: Patient states calf injury from a car door.   Pain/Comfort:  Pain Rating 1: 6/10  Location - Side 1: Right  Location 1: leg  Pain Addressed 1: Cessation of Activity, Pre-medicate for activity    Patients cultural, spiritual, Jain conflicts given the current situation: no    Living Environment:  Lives with , 3 steps going to bathroom  Prior to admission, patients level of function was household with walker.  Equipment used at home: walker, rolling.  DME owned (not currently used): rolling walker.  Upon discharge, patient will have assistance from .    Objective:     Communicated with nurse prior to session.  Patient found supine with PureWick, peripheral IV  upon PT entry to  room.    General Precautions: Standard, fall  Orthopedic Precautions:    Braces:    Respiratory Status: Room air    Exams:  Wound with dressing to right calf  RLE ROM: WFL  RLE Strength: 3/5  LLE ROM: WFL  LLE Strength: 3/5    Functional Mobility:  Bed Mobility:     Supine to Sit: moderate assistance  Sit to Supine: moderate assistance  Transfers:     Sit to Stand:  maximal assistance with rolling walker  Gait: unable to right le pain      AM-PAC 6 CLICK MOBILITY  Total Score:11       Treatment & Education:  Tx plan    Patient left HOB elevated with all lines intact.    GOALS:   Multidisciplinary Problems       Physical Therapy Goals          Problem: Physical Therapy    Goal Priority Disciplines Outcome Goal Variances Interventions   Physical Therapy Goal     PT, PT/OT Progressing     Description: Short Term Goals  Ambulate SBA - 100 feet with rolling walker assistive device.   Supine to sit minimum  Sit to stand minimum  SPT minimum  5. Independent with HEP    Long Term Goals  Ambulate SBA - 150 feet with rolling walker assistive device.   Supine to sit stand-by  Sit to stand stand-by  SPT stand-by  Needed equipment for home.                              History:     Past Medical History:   Diagnosis Date    A-fib     Hypertension     Neuropathy        Past Surgical History:   Procedure Laterality Date    CATARACT EXTRACTION, BILATERAL      CHOLECYSTECTOMY      HYSTERECTOMY      INJECTION OF ANESTHETIC AGENT AROUND MEDIAL BRANCH NERVES INNERVATING LUMBAR FACET JOINT Bilateral 8/26/2021    Procedure: Bilateral L4-5,5-S1 MBB;  Surgeon: Sulma Nelson MD;  Location: [facility];  Service: Pain Management;  Laterality: Bilateral;  Per Dr Bradford ok to hold Eliquis for 4 days not 5, Per Dr Nelson this is fine with her. Pt will bring her verification card for COVID vaccination.    INJECTION OF ANESTHETIC AGENT AROUND MEDIAL BRANCH NERVES INNERVATING LUMBAR FACET JOINT Bilateral 10/5/2021    Procedure: Bilateral  L4-5,5-S1 MBB;  Surgeon: Sulma Nelson MD;  Location: [facility];  Service: Pain Management;  Laterality: Bilateral;  PT AWARE ON OV TO BE TESTED    KNEE CARTILAGE SURGERY Right     RADIOFREQUENCY ABLATION OF LUMBAR MEDIAL BRANCH NERVE AT SINGLE LEVEL Bilateral 11/9/2021    Procedure: Radiofrequency Ablation, Nerve, Spinal, Lumbar, Medial Branch, 1 Level L4-S1  BILATERAL;  Surgeon: Sulma Nelson MD;  Location: [facility];  Service: Pain Management;  Laterality: Bilateral;  per alicia pt is on eliquis but she will get permission for it to be held 5 days prior to procedure.   HAD VAC   CARD SCANNED IN    REDUCTION OF BOTH BREASTS      RETINAL DETACHMENT SURGERY  right    VAGINAL DELIVERY      X1       Time Tracking:     PT Received On: 09/11/24  PT Start Time: 1800     PT Stop Time: 1825  PT Total Time (min): 25 min     Billable Minutes: Evaluation 25 09/11/2024

## 2024-09-11 NOTE — ASSESSMENT & PLAN NOTE
Patient with Acute on chronic debility due to chronic unspecified fatigue and age-related physical debility. Latest AMPAC and GEMS scores have not been reviewed. Evaluation for etiology is complete. Plan includes progressive mobility protocol initated and PT/OT consulted.

## 2024-09-11 NOTE — ASSESSMENT & PLAN NOTE
Anemia is likely due to  unclear . Most recent hemoglobin and hematocrit are listed below.  Recent Labs     09/10/24  2222 09/11/24  0250   HGB 11.1* 9.3*   HCT 35.1* 29.7*     Plan  - Monitor serial CBC: Daily  - Transfuse PRBC if patient becomes hemodynamically unstable, symptomatic or H/H drops below 7/21.  - Patient has not received any PRBC transfusions to date  - Patient's anemia is currently stable  - Check B12, folate, iron panel.

## 2024-09-11 NOTE — SUBJECTIVE & OBJECTIVE
Past Medical History:   Diagnosis Date    A-fib     Hypertension     Neuropathy        Past Surgical History:   Procedure Laterality Date    CATARACT EXTRACTION, BILATERAL      CHOLECYSTECTOMY      HYSTERECTOMY      INJECTION OF ANESTHETIC AGENT AROUND MEDIAL BRANCH NERVES INNERVATING LUMBAR FACET JOINT Bilateral 8/26/2021    Procedure: Bilateral L4-5,5-S1 MBB;  Surgeon: Sulma Nelson MD;  [facility];  Service: Pain Management;  Laterality: Bilateral;  Per Dr Bradford ok to hold Eliquis for 4 days not 5, Per Dr Nelson this is fine with her. Pt will bring her verification card for COVID vaccination.    INJECTION OF ANESTHETIC AGENT AROUND MEDIAL BRANCH NERVES INNERVATING LUMBAR FACET JOINT Bilateral 10/5/2021    Procedure: Bilateral L4-5,5-S1 MBB;  Surgeon: Sulma Nelson MD;  [facility];  Service: Pain Management;  Laterality: Bilateral;  PT AWARE ON OV TO BE TESTED    KNEE CARTILAGE SURGERY Right     RADIOFREQUENCY ABLATION OF LUMBAR MEDIAL BRANCH NERVE AT SINGLE LEVEL Bilateral 11/9/2021    Procedure: Radiofrequency Ablation, Nerve, Spinal, Lumbar, Medial Branch, 1 Level L4-S1  BILATERAL;  Surgeon: Sulma Nelson MD;  [facility];  Service: Pain Management;  Laterality: Bilateral;  per alicia pt is on eliquis but she will get permission for it to be held 5 days prior to procedure.   HAD VAC   CARD SCANNED IN    REDUCTION OF BOTH BREASTS      RETINAL DETACHMENT SURGERY  right    VAGINAL DELIVERY      X1       Review of patient's allergies indicates:   Allergen Reactions    Sulfa (sulfonamide antibiotics) Hives and Other (See Comments)     Sore Throat, Sore Mouth       No current facility-administered medications on file prior to encounter.     Current Outpatient Medications on File Prior to Encounter   Medication Sig    cyanocobalamin 500 MCG tablet Take 500 mcg by mouth once daily.    DULoxetine (CYMBALTA) 60 MG capsule Take 1 capsule by mouth once daily.     ELIQUIS 5 mg Tab Take 5 mg by mouth 2 (two) times daily.    estradioL (ESTRACE) 2 MG tablet Take 1 tablet by mouth once daily.    folic acid (FOLVITE) 800 MCG Tab Take 800 mcg by mouth once daily.    gabapentin (NEURONTIN) 600 MG tablet Take 1 tablet by mouth 2 (two) times a day.    L. rhamnosus/C/zinc/elderberry (CULTURELLE IMMUNE DEFENSE ORAL) Take 1 tablet by mouth once daily.    L.acidoph,saliva/B.bif/S.therm (ACIDOPHILUS PROBIOTIC BLEND ORAL) Take 1 tablet by mouth once daily.    metoprolol tartrate (LOPRESSOR) 25 MG tablet Take 1 tablet by mouth once daily.    solifenacin (VESICARE) 5 MG tablet Take 1 tablet by mouth once daily.    [DISCONTINUED] lisinopriL-hydrochlorothiazide (PRINZIDE,ZESTORETIC) 20-12.5 mg per tablet Take 1 tablet by mouth once daily.    cyclobenzaprine (FLEXERIL) 10 MG tablet Take 1 tablet (10 mg total) by mouth 3 (three) times daily as needed for Muscle spasms.     Family History       Problem Relation (Age of Onset)    Atrial fibrillation Mother    Diabetes Paternal Grandfather    Hypertension Mother    Stroke Father          Tobacco Use    Smoking status: Never    Smokeless tobacco: Never   Substance and Sexual Activity    Alcohol use: Not Currently    Drug use: Not on file    Sexual activity: Not on file     Review of Systems   Constitutional:  Positive for activity change and fatigue. Negative for appetite change, chills and fever.   HENT:  Negative for drooling, ear discharge, facial swelling, mouth sores, rhinorrhea, sneezing and voice change.    Eyes:  Negative for discharge and itching.   Respiratory:  Negative for cough, choking, shortness of breath, wheezing and stridor.    Cardiovascular:  Positive for leg swelling. Negative for chest pain and palpitations.   Gastrointestinal:  Negative for abdominal distention, abdominal pain, constipation, diarrhea and vomiting.   Endocrine: Negative for cold intolerance and heat intolerance.   Genitourinary:  Negative for difficulty  urinating, dysuria, enuresis, flank pain, frequency and hematuria.   Musculoskeletal:  Positive for gait problem. Negative for arthralgias and back pain.   Skin:  Positive for color change and wound.   Allergic/Immunologic: Negative for environmental allergies and food allergies.   Neurological:  Positive for weakness and numbness. Negative for dizziness, tremors, seizures, syncope, speech difficulty and headaches.   Psychiatric/Behavioral:  Negative for behavioral problems, decreased concentration, hallucinations, self-injury and sleep disturbance.    All other systems reviewed and are negative.    Objective:     Vital Signs (Most Recent):  Temp: 97.9 °F (36.6 °C) (09/11/24 0202)  Pulse: 102 (09/11/24 0204)  Resp: 19 (09/11/24 0138)  BP: 117/74 (09/11/24 0204)  SpO2: 95 % (09/11/24 0202) Vital Signs (24h Range):  Temp:  [97.9 °F (36.6 °C)-98.4 °F (36.9 °C)] 97.9 °F (36.6 °C)  Pulse:  [] 102  Resp:  [14-26] 19  SpO2:  [95 %-100 %] 95 %  BP: (110-117)/(63-74) 117/74     Weight: 93.4 kg (206 lb)  Body mass index is 28.73 kg/m².     Physical Exam  Vitals and nursing note reviewed.   Constitutional:       General: She is awake. She is not in acute distress.     Appearance: Normal appearance. She is well-developed, well-groomed and overweight. She is ill-appearing. She is not toxic-appearing.   HENT:      Head: Normocephalic and atraumatic.      Mouth/Throat:      Mouth: Mucous membranes are dry.      Pharynx: Oropharynx is clear.   Eyes:      Conjunctiva/sclera: Conjunctivae normal.   Cardiovascular:      Rate and Rhythm: Tachycardia present. Rhythm irregular.      Heart sounds: Murmur heard.   Pulmonary:      Effort: Pulmonary effort is normal.      Breath sounds: Normal breath sounds.   Abdominal:      General: Abdomen is flat. There is no distension.      Palpations: Abdomen is soft.   Musculoskeletal:      Right lower leg: Edema present.      Left lower leg: Edema present.   Skin:     General: Skin is warm and  dry.      Findings: Abrasion, erythema, signs of injury, lesion and wound present.          Neurological:      Mental Status: She is alert and oriented to person, place, and time.   Psychiatric:         Mood and Affect: Mood normal.         Behavior: Behavior normal. Behavior is cooperative.         Thought Content: Thought content normal.                Significant Labs: All pertinent labs within the past 24 hours have been reviewed.    Significant Imaging: I have reviewed all pertinent imaging results/findings within the past 24 hours.

## 2024-09-11 NOTE — PROGRESS NOTES
09/11/24 1149        Wound 09/11/24 1149 Pressure Injury Sacral spine   Date First Assessed/Time First Assessed: 09/11/24 1149   Present on Original Admission: Yes  Primary Wound Type: Pressure Injury  Location: Sacral spine  Wound Approximate Age at First Assessment (Weeks): 2 weeks   Wound Image Images linked   Pressure Injury Stage 2   Dressing Appearance Open to air   Drainage Amount None   Appearance Intact;Pink;Dry   Black (%), Wound Tissue Color 0 %   Red (%), Wound Tissue Color 0 %   Yellow (%), Wound Tissue Color 0 %   Periwound Area Blistered   Wound Edges Defined   Wound Length (cm) 2.6 cm   Wound Width (cm) 2.4 cm   Wound Depth (cm) 0 cm   Wound Volume (cm^3) 0 cm^3   Wound Surface Area (cm^2) 6.24 cm^2   Care Cleansed with:;Antimicrobial agent   Dressing Applied;Foam;Island/border   Periwound Care Moisture barrier applied   Dressing Change Due 09/16/24

## 2024-09-11 NOTE — PLAN OF CARE
Problem: Physical Therapy  Goal: Physical Therapy Goal  Description: Short Term Goals  Ambulate SBA - 100 feet with rolling walker assistive device.   Supine to sit minimum  Sit to stand minimum  SPT minimum  5. Independent with HEP    Long Term Goals  Ambulate SBA - 150 feet with rolling walker assistive device.   Supine to sit stand-by  Sit to stand stand-by  SPT stand-by  Needed equipment for home.         Outcome: Progressing

## 2024-09-11 NOTE — ASSESSMENT & PLAN NOTE
Patient has Abnormal Magnesium: hypomagnesemia. Will continue to monitor electrolytes closely. Will replace the affected electrolytes and repeat labs to be done after interventions completed. The patient's magnesium results have been reviewed and are listed below.  Recent Labs   Lab 09/11/24  0250   MG 2.0

## 2024-09-11 NOTE — PLAN OF CARE
Problem: Occupational Therapy  Goal: Occupational Therapy Goal  Description: STG:  Pt will perform grooming with setup  Pt will bathe with Esvin with setup at EOB  Pt will perform UE dressing with Charlene  Pt will perform LE dressing with Esvin  Pt will sit EOB x 10 min with SBA   Pt will transfer bed/chair/bsc with CGA with RW  Pt will perform standing task x 2 min with CGA with RW   Pt will tolerate 15 minutes of tx without fatigue      LT.Restore to max I with self care and mobility.      Outcome: Progressing

## 2024-09-11 NOTE — ASSESSMENT & PLAN NOTE
UA positive for leukocytes, large  In the ED, patient given ceftriaxone 2 g  Patient additionally getting empiric antibiotics for sepsis  Patient asymptomatic

## 2024-09-11 NOTE — PROGRESS NOTES
Pharmacy consulted to dose Zosyn for this patient with sepsis, possible UTI source.    CrCL: ~32 ml/min calculated    Will give a one time dose of Zosyn 4.5gm in ER over 30 minutes, then start Zosyn 4.5 gm over 4 hours q8h.    Order entered and processed.

## 2024-09-11 NOTE — H&P
"  [facility] - [address] [place] [address] Medicine  History & Physical    Patient Name: Bobbi Haywood  MRN: [id number]  Patient Class: IP- Inpatient  Admission Date: 9/10/2024  Attending Physician: Ned Olvera MD   Primary Care Provider: [facility]         Patient information was obtained from patient, spouse/SO, past medical records, and ER records.     Subjective:     Principal Problem:Sepsis    Chief Complaint:   Chief Complaint   Patient presents with    Fall        HPI: Patient is a 73-year-old female who presents to [facility] after a fall from home where she reportedly was too weak to get up.  Patient has a past medical history of AFib on Eliquis, neuropathy and depression. Patient reports having inverted ankles that affect her walking when she turned her foot wrong way she fell down.  She denies hitting her head or losing consciousness.  Patient also reports falling a couple of weeks ago as well where she got her right posterior calf stuck in a truck door.  She reports developing a wound which bled and it now "appears infected".  Over the past few weeks patient endorses weakness and fatigue more than usual.  The patient reportedly lives with her  who is also present and contributes to the history. The patient denies headache, chest pain, shortness of breath, abdominal pain, fevers or chills.    Significant findings in the ED, lactic acid 2.6, Mag 1.3, UA positive for leukocytes, WBC 25.    The patient will be admitted to [facility] for continued care and medical management.    Past Medical History:   Diagnosis Date    A-fib     Hypertension     Neuropathy        Past Surgical History:   Procedure Laterality Date    CATARACT EXTRACTION, BILATERAL      CHOLECYSTECTOMY      HYSTERECTOMY      INJECTION OF ANESTHETIC AGENT AROUND MEDIAL BRANCH NERVES INNERVATING LUMBAR FACET JOINT Bilateral 8/26/2021    Procedure: Bilateral " L4-5,5-S1 MBB;  Surgeon: Sulma Nelson MD;  Location: [facility];  Service: Pain Management;  Laterality: Bilateral;  Per Dr Bradford ok to hold Eliquis for 4 days not 5, Per Dr Nelson this is fine with her. Pt will bring her verification card for COVID vaccination.    INJECTION OF ANESTHETIC AGENT AROUND MEDIAL BRANCH NERVES INNERVATING LUMBAR FACET JOINT Bilateral 10/5/2021    Procedure: Bilateral L4-5,5-S1 MBB;  Surgeon: Sulma Nelson MD;  Location: [facility];  Service: Pain Management;  Laterality: Bilateral;  PT AWARE ON OV TO BE TESTED    KNEE CARTILAGE SURGERY Right     RADIOFREQUENCY ABLATION OF LUMBAR MEDIAL BRANCH NERVE AT SINGLE LEVEL Bilateral 11/9/2021    Procedure: Radiofrequency Ablation, Nerve, Spinal, Lumbar, Medial Branch, 1 Level L4-S1  BILATERAL;  Surgeon: Sulma Nelson MD;  Location: [facility];  Service: Pain Management;  Laterality: Bilateral;  per alicia pt is on eliquis but she will get permission for it to be held 5 days prior to procedure.   HAD VAC   CARD SCANNED IN    REDUCTION OF BOTH BREASTS      RETINAL DETACHMENT SURGERY  right    VAGINAL DELIVERY      X1       Review of patient's allergies indicates:   Allergen Reactions    Sulfa (sulfonamide antibiotics) Hives and Other (See Comments)     Sore Throat, Sore Mouth       No current facility-administered medications on file prior to encounter.     Current Outpatient Medications on File Prior to Encounter   Medication Sig    cyanocobalamin 500 MCG tablet Take 500 mcg by mouth once daily.    DULoxetine (CYMBALTA) 60 MG capsule Take 1 capsule by mouth once daily.    ELIQUIS 5 mg Tab Take 5 mg by mouth 2 (two) times daily.    estradioL (ESTRACE) 2 MG tablet Take 1 tablet by mouth once daily.    folic acid (FOLVITE) 800 MCG Tab Take 800 mcg by mouth once daily.    gabapentin (NEURONTIN) 600 MG tablet Take 1 tablet by mouth 2 (two) times a day.    L. rhamnosus/C/zinc/elderberry (CULTURELLE IMMUNE DEFENSE ORAL)  Take 1 tablet by mouth once daily.    L.acidoph,saliva/B.bif/S.therm (ACIDOPHILUS PROBIOTIC BLEND ORAL) Take 1 tablet by mouth once daily.    metoprolol tartrate (LOPRESSOR) 25 MG tablet Take 1 tablet by mouth once daily.    solifenacin (VESICARE) 5 MG tablet Take 1 tablet by mouth once daily.    [DISCONTINUED] lisinopriL-hydrochlorothiazide (PRINZIDE,ZESTORETIC) 20-12.5 mg per tablet Take 1 tablet by mouth once daily.    cyclobenzaprine (FLEXERIL) 10 MG tablet Take 1 tablet (10 mg total) by mouth 3 (three) times daily as needed for Muscle spasms.     Family History       Problem Relation (Age of Onset)    Atrial fibrillation Mother    Diabetes Paternal Grandfather    Hypertension Mother    Stroke Father          Tobacco Use    Smoking status: Never    Smokeless tobacco: Never   Substance and Sexual Activity    Alcohol use: Not Currently    Drug use: Not on file    Sexual activity: Not on file     Review of Systems   Constitutional:  Positive for activity change and fatigue. Negative for appetite change, chills and fever.   HENT:  Negative for drooling, ear discharge, facial swelling, mouth sores, rhinorrhea, sneezing and voice change.    Eyes:  Negative for discharge and itching.   Respiratory:  Negative for cough, choking, shortness of breath, wheezing and stridor.    Cardiovascular:  Positive for leg swelling. Negative for chest pain and palpitations.   Gastrointestinal:  Negative for abdominal distention, abdominal pain, constipation, diarrhea and vomiting.   Endocrine: Negative for cold intolerance and heat intolerance.   Genitourinary:  Negative for difficulty urinating, dysuria, enuresis, flank pain, frequency and hematuria.   Musculoskeletal:  Positive for gait problem. Negative for arthralgias and back pain.   Skin:  Positive for color change and wound.   Allergic/Immunologic: Negative for environmental allergies and food allergies.   Neurological:  Positive for weakness and numbness. Negative for  dizziness, tremors, seizures, syncope, speech difficulty and headaches.   Psychiatric/Behavioral:  Negative for behavioral problems, decreased concentration, hallucinations, self-injury and sleep disturbance.    All other systems reviewed and are negative.    Objective:     Vital Signs (Most Recent):  Temp: 97.9 °F (36.6 °C) (09/11/24 0202)  Pulse: 102 (09/11/24 0204)  Resp: 19 (09/11/24 0138)  BP: 117/74 (09/11/24 0204)  SpO2: 95 % (09/11/24 0202) Vital Signs (24h Range):  Temp:  [97.9 °F (36.6 °C)-98.4 °F (36.9 °C)] 97.9 °F (36.6 °C)  Pulse:  [] 102  Resp:  [14-26] 19  SpO2:  [95 %-100 %] 95 %  BP: (110-117)/(63-74) 117/74     Weight: 93.4 kg (206 lb)  Body mass index is 28.73 kg/m².     Physical Exam  Vitals and nursing note reviewed.   Constitutional:       General: She is awake. She is not in acute distress.     Appearance: Normal appearance. She is well-developed, well-groomed and overweight. She is ill-appearing. She is not toxic-appearing.   HENT:      Head: Normocephalic and atraumatic.      Mouth/Throat:      Mouth: Mucous membranes are dry.      Pharynx: Oropharynx is clear.   Eyes:      Conjunctiva/sclera: Conjunctivae normal.   Cardiovascular:      Rate and Rhythm: Tachycardia present. Rhythm irregular.      Heart sounds: Murmur heard.   Pulmonary:      Effort: Pulmonary effort is normal.      Breath sounds: Normal breath sounds.   Abdominal:      General: Abdomen is flat. There is no distension.      Palpations: Abdomen is soft.   Musculoskeletal:      Right lower leg: Edema present.      Left lower leg: Edema present.   Skin:     General: Skin is warm and dry.      Findings: Abrasion, erythema, signs of injury, lesion and wound present.          Neurological:      Mental Status: She is alert and oriented to person, place, and time.   Psychiatric:         Mood and Affect: Mood normal.         Behavior: Behavior normal. Behavior is cooperative.         Thought Content: Thought content normal.                 Significant Labs: All pertinent labs within the past 24 hours have been reviewed.    Significant Imaging: I have reviewed all pertinent imaging results/findings within the past 24 hours.  Assessment/Plan:     * Sepsis  This patient does have evidence of infective focus  My overall impression is sepsis.  Source: Urinary Tract and Skin and Soft Tissue (location right lower extremity)  Antibiotics given-   Antibiotics (72h ago, onward)      Start     Stop Route Frequency Ordered    09/11/24 0500  piperacillin-tazobactam (ZOSYN) 4.5 g in D5W 100 mL IVPB (MB+)         -- IV Every 8 hours (non-standard times) 09/11/24 0141    09/11/24 0300  vancomycin (VANCOCIN) 1,750 mg in D5W 500 mL IVPB         -- IV Every 36 hours 09/11/24 0143    09/11/24 0242  vancomycin - pharmacy to dose         -- IV pharmacy to manage frequency 09/11/24 0143    09/11/24 0200  piperacillin-tazobactam (ZOSYN) 4.5 g in D5W 100 mL IVPB (MB+)         09/11/24 1359 IV ED 1 Time 09/11/24 0141          Latest lactate reviewed-  Recent Labs   Lab 09/11/24  0032   LACTATE 1.8     Organ dysfunction indicated by Acute kidney injury    Fluid challenge Ideal Body Weight- The patient's ideal body weight is Ideal body weight: 70.8 kg (156 lb 1.4 oz) which will be used to calculate fluid bolus of 30 ml/kg for treatment of septic shock.      Post- resuscitation assessment Yes Perfusion exam was performed within 6 hours of septic shock presentation after bolus shows Adequate tissue perfusion assessed by non-invasive monitoring       Will Not start Pressors- Levophed for MAP of 65  Source control achieved by: cultures and antibiotics    INOCENTE (acute kidney injury)  INOCENTE is likely due to acute tubular necrosis caused by infection . Baseline creatinine is  0.95 . Most recent creatinine and eGFR are listed below.  Recent Labs     09/10/24  2222   CREATININE 1.71*   EGFRNORACEVR 31*      Plan  - INOCENTE is stable  - Avoid nephrotoxins and renally dose meds for  GFR listed above  - Monitor urine output, serial BMP, and adjust therapy as needed  - IVFs    Cellulitis of right lower extremity  2 week history after fall from home  Patient meeting sepsis criteria, empiric antibiotics started  Inpatient wound care nurse consulted, appreciate assistance          Longstanding persistent atrial fibrillation  Patient with Long standing persistent (>12 months) atrial fibrillation which is uncontrolled currently with Beta Blocker. Patient is currently in atrial fibrillation.[id number] Score: 3. HASBLED Score: 1. Anticoagulation indicated. Anticoagulation done with Eliquis .    Acute cystitis with hematuria  UA positive for leukocytes, large  In the ED, patient given ceftriaxone 2 g  Patient additionally getting empiric antibiotics for sepsis  Patient asymptomatic    Hypomagnesemia  Patient has Abnormal Magnesium: hypomagnesemia. Will continue to monitor electrolytes closely. Will replace the affected electrolytes and repeat labs to be done after interventions completed. The patient's magnesium results have been reviewed and are listed below.  Recent Labs   Lab 09/10/24  2222   MG 1.3*        Neuropathy  Moderately well-controlled  Continue home Cymbalta and gabapentin        VTE Risk Mitigation (From admission, onward)           Ordered     apixaban tablet 5 mg  2 times daily         09/11/24 0119     Reason for No Pharmacological VTE Prophylaxis  Once        Question:  Reasons:  Answer:  Already adequately anticoagulated on oral Anticoagulants    09/11/24 0119     IP VTE HIGH RISK PATIENT  Once         09/11/24 0119     Place sequential compression device  Until discontinued         09/11/24 0119                               Pharmacy consulted to dose Zosyn for this patient with sepsis, possible UTI source.    CrCL: ~32 ml/min calculated    Will give a one time dose of Zosyn 4.5gm in ER over 30 minutes, then start Zosyn 4.5 gm over 4 hours q8h.    Order entered and processed.        Dank Gaviria MD  Department of Hospital Medicine  [facility] - [address] [place] [address]

## 2024-09-11 NOTE — ASSESSMENT & PLAN NOTE
INOCENTE is likely due to acute tubular necrosis caused by infection . Baseline creatinine is  ~ 1 . Most recent creatinine and eGFR are listed below.  Recent Labs     09/10/24  2222 09/11/24  0250   CREATININE 1.71* 1.48*   EGFRNORACEVR 31* 37*        Plan  - INOCENTE is improving.  - s/p IV fluids.  - Avoid nephrotoxins and renally dose meds for GFR listed above  - Monitor urine output, serial BMP, and adjust therapy as needed

## 2024-09-11 NOTE — PROGRESS NOTES
[facility] Tele[facility]  Wound Care    Patient Name:  Bobbi Haywood   MRN:  [id number]  Date: 9/11/2024  Diagnosis: Severe sepsis    History:     Past Medical History:   Diagnosis Date    A-fib     Hypertension     Neuropathy        Social History     Socioeconomic History    Marital status:    Tobacco Use    Smoking status: Never    Smokeless tobacco: Never   Substance and Sexual Activity    Alcohol use: Not Currently     Social Determinants of Health     Financial Resource Strain: Low Risk  (9/11/2024)    Overall Financial Resource Strain (CARDIA)     Difficulty of Paying Living Expenses: Not very hard   Food Insecurity: No Food Insecurity (9/11/2024)    Hunger Vital Sign     Worried About Running Out of Food in the Last Year: Never true     Ran Out of Food in the Last Year: Never true   Transportation Needs: No Transportation Needs (9/11/2024)    TRANSPORTATION NEEDS     Transportation : No   Physical Activity: Inactive (9/11/2024)    Exercise Vital Sign     Days of Exercise per Week: 0 days     Minutes of Exercise per Session: 0 min   Stress: No Stress Concern Present (9/11/2024)    [employer] - Occupational Stress Questionnaire     Feeling of Stress : Only a little   Housing Stability: Low Risk  (9/11/2024)    Housing Stability Vital Sign     Unable to Pay for Housing in the Last Year: No     Homeless in the Last Year: No       Precautions:     Allergies as of 09/10/2024 - Reviewed 09/10/2024   Allergen Reaction Noted    Sulfa (sulfonamide antibiotics) Hives and Other (See Comments) 09/02/2016       [facility] Assessment Details/Treatment     Narrative: Seen patient for initiation of preventative skin care measures    Patient up in chair. Alert. Has wound care consult for Right lower leg Erwin Steward R.N assess leg and buttock.  Bilateral heels with out pressure injury. Applied Mepliex Foam border to heels. Has pink discoloration to buttock and  dry scab formation to Right buttock. Applied Mepliex Foam border.   Lazaro score 16    Consult skin care for any skin issues   09/11/2024

## 2024-09-11 NOTE — ASSESSMENT & PLAN NOTE
This patient does have evidence of infective focus  My overall impression is sepsis.  Source: Urinary Tract and Skin and Soft Tissue (location right lower extremity)  Antibiotics given-   Antibiotics (72h ago, onward)      Start     Stop Route Frequency Ordered    09/11/24 0900  mupirocin 2 % ointment         09/16/24 0859 Top 2 times daily 09/11/24 0643    09/11/24 0500  piperacillin-tazobactam (ZOSYN) 4.5 g in D5W 100 mL IVPB (MB+)         -- IV Every 8 hours (non-standard times) 09/11/24 0141    09/11/24 0300  vancomycin (VANCOCIN) 1,750 mg in D5W 500 mL IVPB         -- IV Every 36 hours 09/11/24 0143    09/11/24 0242  vancomycin - pharmacy to dose         -- IV pharmacy to manage frequency 09/11/24 0143    09/11/24 0200  piperacillin-tazobactam (ZOSYN) 4.5 g in D5W 100 mL IVPB (MB+)         09/11/24 1359 IV ED 1 Time 09/11/24 0141          Latest lactate reviewed-  Recent Labs   Lab 09/11/24  0032   LACTATE 1.8       Source control achieved by: antibiotics.  Follow blood and urine culture.

## 2024-09-11 NOTE — HOSPITAL COURSE
9/11- Checking CT leg as concern for deep infection. Urine culture with GNB. Continue current antibiotics. PT/OT consulted.   9/12- CT showed abscess, general surgery consulted and plan for I&D in OR today. Continue broad spectrum antibiotics. Swing bed placement in progress.   9/13- Wound culture with Group G strep and GNB, MN'ed vancomycin, continue Zosyn. Swing bed placement next week.   9/16- Awaiting insurance approval for swing bed placement. Transitioned to Augmentin to complete 7 day course post drainage of abscess. Home Eliquis resumed.   9/17- Discharge to swing bed today.

## 2024-09-11 NOTE — ASSESSMENT & PLAN NOTE
Patient with Long standing persistent (>12 months) atrial fibrillation which is uncontrolled currently with Beta Blocker. Patient is currently in atrial fibrillation.[id number] Score: 3. HASBLED Score: 1. Anticoagulation indicated. Anticoagulation done with Eliquis .

## 2024-09-11 NOTE — PLAN OF CARE
Problem: Skin Injury Risk Increased  Goal: Skin Health and Integrity  Outcome: Progressing     Problem: Adult Inpatient Plan of Care  Goal: Plan of Care Review  Outcome: Progressing  Goal: Patient-Specific Goal (Individualized)  Outcome: Progressing  Goal: Absence of Hospital-Acquired Illness or Injury  Outcome: Progressing  Goal: Optimal Comfort and Wellbeing  Outcome: Progressing  Goal: Readiness for Transition of Care  Outcome: Progressing     Problem: Sepsis/Septic Shock  Goal: Optimal Coping  Outcome: Progressing  Goal: Absence of Bleeding  Outcome: Progressing  Goal: Blood Glucose Level Within Targeted Range  Outcome: Progressing  Goal: Absence of Infection Signs and Symptoms  Outcome: Progressing  Goal: Optimal Nutrition Intake  Outcome: Progressing     Problem: Acute Kidney Injury/Impairment  Goal: Fluid and Electrolyte Balance  Outcome: Progressing  Goal: Improved Oral Intake  Outcome: Progressing  Goal: Effective Renal Function  Outcome: Progressing

## 2024-09-11 NOTE — ASSESSMENT & PLAN NOTE
Patient with Long standing persistent (>12 months) atrial fibrillation which is uncontrolled currently with Beta Blocker. Patient is currently in atrial fibrillation.[id number] Score: 3. HASBLED Score: 1. Anticoagulation indicated. Anticoagulation done with Eliquis .  Ok to resume Eliquis for now unless hematoma or abscess requiring surgery consult noted

## 2024-09-11 NOTE — PLAN OF CARE
[facility]  Initial Discharge Assessment       Primary Care Provider: [facility]    Admission Diagnosis: Tachycardia [R00.0]  Chest pain [R07.9]    Admission Date: 9/10/2024  Expected Discharge Date: 9/13/2024    Transition of Care Barriers: None    Payor: HUMANA MANAGED MEDICARE / Plan: HUMANA MEDICARE PPO / Product Type: Medicare Advantage /     Extended Emergency Contact Information  Primary Emergency Contact: Ned Haywood  Address: [address] [place] [address] [place] [address] [facility]  Home Phone: [phone]  Relation: Spouse  Preferred language: English   needed? No    Discharge Plan A: Home with family, Home Health  Discharge Plan B: Skilled Nursing Facility      [facility] - [facility] [address] [facility]  [address] [facility] [address]  Phone: [phone] Fax: [phone]    [id number] [address] [facility] [address]  [address]  [place] [address]  Phone: [phone] Fax: [phone]      Initial Assessment (most recent)       Adult Discharge Assessment - 09/11/24 1029          Discharge Assessment    Assessment Type Discharge Planning Assessment     Source of Information patient     Communicated SHASHI with patient/caregiver Date not available/Unable to determine     People in Home spouse     Do you expect to return to your current living situation? Yes     Do you have help at home or someone to help you manage your care at home? Yes     Who are your caregiver(s) and their phone number(s)?  ned [phone]     Prior to hospitilization cognitive status: Alert/Oriented     Current cognitive status: Alert/Oriented     Walking or Climbing Stairs Difficulty yes     Walking or Climbing Stairs ambulation difficulty, requires equipment;stair climbing difficulty, requires equipment     Mobility Management rw or rollator      Dressing/Bathing Difficulty no     Equipment Currently Used at Home walker, rolling;rollator     Patient currently being followed by outpatient case management? No     Do you currently have service(s) that help you manage your care at home? No     Do you take prescription medications? Yes     Do you have prescription coverage? Yes     Coverage humana     Do you have any problems affording any of your prescribed medications? No     Is the patient taking medications as prescribed? yes     Who is going to help you get home at discharge?      How do you get to doctors appointments? car, drives self;family or friend will provide     Are you on dialysis? No     Do you take coumadin? No     Discharge Plan A Home with family;Home Health     Discharge Plan B Skilled Nursing Facility     DME Needed Upon Discharge  none     Discharge Plan discussed with: Patient     Transition of Care Barriers None        Physical Activity    On average, how many days per week do you engage in moderate to strenuous exercise (like a brisk walk)? 0 days     On average, how many minutes do you engage in exercise at this level? 0 min        Financial Resource Strain    How hard is it for you to pay for the very basics like food, housing, medical care, and heating? Not very hard        Housing Stability    In the last 12 months, was there a time when you were not able to pay the mortgage or rent on time? No     At any time in the past 12 months, were you homeless or living in a shelter (including now)? No        Transportation Needs    Has the lack of transportation kept you from medical appointments, meetings, work or from getting things needed for daily living? No        Food Insecurity    Within the past 12 months, you worried that your food would run out before you got the money to buy more. Never true     Within the past 12 months, the food you bought just didn't last and you didn't have money to get more. Never true        Stress    Do  you feel stress - tense, restless, nervous, or anxious, or unable to sleep at night because your mind is troubled all the time - these days? Only a little        Social Isolation    How often do you feel lonely or isolated from those around you?  Never        Alcohol Use    Q1: How often do you have a drink containing alcohol? Never     Q2: How many drinks containing alcohol do you have on a typical day when you are drinking? Patient does not drink     Q3: How often do you have six or more drinks on one occasion? Never        Utilities    In the past 12 months has the electric, gas, oil, or water company threatened to shut off services in your home? No        Health Literacy    How often do you need to have someone help you when you read instructions, pamphlets, or other written material from your doctor or pharmacy? Never                   Consult for probable lack of resources to care for self at home. Spoke with patient in her room. She lives with her  roseline. Her son and daughter in law live in [place]. She states that they work. No home services. States that she uses a rollator or rolling walker. When asked about her needs. She states that she needs someone to come clean her house. Explained that unfortunately there was not a resource for that as she does not have medicaid. Provided patient with list of places that offer assistance with meals. Discussed dc planning. She is agreeable to swb at [facility] or home health based on what is recommended. IM explained and signed. Cm will follow.

## 2024-09-11 NOTE — ASSESSMENT & PLAN NOTE
INOCENTE is likely due to acute tubular necrosis caused by infection . Baseline creatinine is  0.95 . Most recent creatinine and eGFR are listed below.  Recent Labs     09/10/24  2222   CREATININE 1.71*   EGFRNORACEVR 31*      Plan  - INOCENTE is stable  - Avoid nephrotoxins and renally dose meds for GFR listed above  - Monitor urine output, serial BMP, and adjust therapy as needed  - IVFs

## 2024-09-11 NOTE — SUBJECTIVE & OBJECTIVE
Interval History: Patient seen and examined at the bedside, reports feeling some better. Pain in the leg is less.     Review of Systems   All other systems reviewed and are negative.    Objective:     Vital Signs (Most Recent):  Temp: 97.9 °F (36.6 °C) (09/11/24 0726)  Pulse: 104 (09/11/24 0726)  Resp: 18 (09/11/24 0726)  BP: 100/63 (09/11/24 0726)  SpO2: (!) 94 % (09/11/24 0726) Vital Signs (24h Range):  Temp:  [97.9 °F (36.6 °C)-98.4 °F (36.9 °C)] 97.9 °F (36.6 °C)  Pulse:  [] 104  Resp:  [14-26] 18  SpO2:  [94 %-100 %] 94 %  BP: (100-117)/(63-74) 100/63     Weight: 93.4 kg (205 lb 14.6 oz)  Body mass index is 28.72 kg/m².    Intake/Output Summary (Last 24 hours) at 9/11/2024 1025  Last data filed at 9/11/2024 0100  Gross per 24 hour   Intake 1150 ml   Output --   Net 1150 ml         Physical Exam  Vitals and nursing note reviewed.   Constitutional:       Appearance: Normal appearance.   HENT:      Head: Normocephalic and atraumatic.      Mouth/Throat:      Mouth: Mucous membranes are moist.   Eyes:      Extraocular Movements: Extraocular movements intact.      Pupils: Pupils are equal, round, and reactive to light.   Cardiovascular:      Rate and Rhythm: Normal rate and regular rhythm.   Pulmonary:      Effort: Pulmonary effort is normal.      Breath sounds: Normal breath sounds.   Abdominal:      General: Bowel sounds are normal.      Palpations: Abdomen is soft.   Musculoskeletal:         General: Deformity present.      Cervical back: Normal range of motion and neck supple.      Right lower leg: Edema present.   Skin:     Findings: Erythema present.   Neurological:      General: No focal deficit present.      Mental Status: She is alert and oriented to person, place, and time. Mental status is at baseline.   Psychiatric:         Mood and Affect: Mood normal.         Behavior: Behavior normal.             Significant Labs: All pertinent labs within the past 24 hours have been reviewed.    Significant  Imaging: I have reviewed all pertinent imaging results/findings within the past 24 hours.

## 2024-09-11 NOTE — ED NOTES
Pt and family given recliners, warm blankets, new pillows with covers, and lights dimmed down. Pt states no other needs at this time

## 2024-09-12 ENCOUNTER — ANESTHESIA EVENT (OUTPATIENT)
Dept: SURGERY | Facility: HOSPITAL | Age: 74
End: 2024-09-12
Payer: MEDICARE

## 2024-09-12 ENCOUNTER — ANESTHESIA (OUTPATIENT)
Dept: SURGERY | Facility: HOSPITAL | Age: 74
End: 2024-09-12
Payer: MEDICARE

## 2024-09-12 PROBLEM — L02.415 CELLULITIS AND ABSCESS OF RIGHT LEG: Status: ACTIVE | Noted: 2024-09-11

## 2024-09-12 LAB
ANION GAP SERPL CALCULATED.3IONS-SCNC: 9 MMOL/L (ref 7–16)
BASOPHILS # BLD AUTO: 0.08 K/UL (ref 0–0.2)
BASOPHILS NFR BLD AUTO: 0.4 % (ref 0–1)
BUN SERPL-MCNC: 19 MG/DL (ref 7–18)
BUN/CREAT SERPL: 13 (ref 6–20)
CALCIUM SERPL-MCNC: 8.5 MG/DL (ref 8.5–10.1)
CHLORIDE SERPL-SCNC: 102 MMOL/L (ref 98–107)
CO2 SERPL-SCNC: 29 MMOL/L (ref 21–32)
CREAT SERPL-MCNC: 1.49 MG/DL (ref 0.55–1.02)
DIFFERENTIAL METHOD BLD: ABNORMAL
EGFR (NO RACE VARIABLE) (RUSH/TITUS): 36 ML/MIN/1.73M2
EOSINOPHIL # BLD AUTO: 0.07 K/UL (ref 0–0.5)
EOSINOPHIL NFR BLD AUTO: 0.3 % (ref 1–4)
ERYTHROCYTE [DISTWIDTH] IN BLOOD BY AUTOMATED COUNT: 14.3 % (ref 11.5–14.5)
GLUCOSE SERPL-MCNC: 104 MG/DL (ref 74–106)
HCT VFR BLD AUTO: 29.4 % (ref 38–47)
HGB BLD-MCNC: 9.2 G/DL (ref 12–16)
IMM GRANULOCYTES # BLD AUTO: 0.17 K/UL (ref 0–0.04)
IMM GRANULOCYTES NFR BLD: 0.8 % (ref 0–0.4)
LYMPHOCYTES # BLD AUTO: 1.73 K/UL (ref 1–4.8)
LYMPHOCYTES NFR BLD AUTO: 7.9 % (ref 27–41)
MCH RBC QN AUTO: 29.4 PG (ref 27–31)
MCHC RBC AUTO-ENTMCNC: 31.3 G/DL (ref 32–36)
MCV RBC AUTO: 93.9 FL (ref 80–96)
MONOCYTES # BLD AUTO: 2.18 K/UL (ref 0–0.8)
MONOCYTES NFR BLD AUTO: 10 % (ref 2–6)
MPC BLD CALC-MCNC: 11.3 FL (ref 9.4–12.4)
NEUTROPHILS # BLD AUTO: 17.62 K/UL (ref 1.8–7.7)
NEUTROPHILS NFR BLD AUTO: 80.6 % (ref 53–65)
NRBC # BLD AUTO: 0 X10E3/UL
NRBC, AUTO (.00): 0 %
PLATELET # BLD AUTO: 354 K/UL (ref 150–400)
POTASSIUM SERPL-SCNC: 3.6 MMOL/L (ref 3.5–5.1)
RBC # BLD AUTO: 3.13 M/UL (ref 4.2–5.4)
SODIUM SERPL-SCNC: 136 MMOL/L (ref 136–145)
UA COMPLETE W REFLEX CULTURE PNL UR: ABNORMAL
WBC # BLD AUTO: 21.85 K/UL (ref 4.5–11)

## 2024-09-12 PROCEDURE — 25000003 PHARM REV CODE 250

## 2024-09-12 PROCEDURE — 36000704 HC OR TIME LEV I 1ST 15 MIN: Performed by: SURGERY

## 2024-09-12 PROCEDURE — 85025 COMPLETE CBC W/AUTO DIFF WBC: CPT

## 2024-09-12 PROCEDURE — 63600175 PHARM REV CODE 636 W HCPCS

## 2024-09-12 PROCEDURE — 27000177 HC AIRWAY, LARYNGEAL MASK: Performed by: ANESTHESIOLOGY

## 2024-09-12 PROCEDURE — 27000716 HC OXISENSOR PROBE, ANY SIZE: Performed by: ANESTHESIOLOGY

## 2024-09-12 PROCEDURE — 37000008 HC ANESTHESIA 1ST 15 MINUTES: Performed by: SURGERY

## 2024-09-12 PROCEDURE — 87186 SC STD MICRODIL/AGAR DIL: CPT | Performed by: SURGERY

## 2024-09-12 PROCEDURE — 0H9KXZZ DRAINAGE OF RIGHT LOWER LEG SKIN, EXTERNAL APPROACH: ICD-10-PCS | Performed by: SURGERY

## 2024-09-12 PROCEDURE — 37000009 HC ANESTHESIA EA ADD 15 MINS: Performed by: SURGERY

## 2024-09-12 PROCEDURE — 27000655: Performed by: ANESTHESIOLOGY

## 2024-09-12 PROCEDURE — 97110 THERAPEUTIC EXERCISES: CPT

## 2024-09-12 PROCEDURE — 11000001 HC ACUTE MED/SURG PRIVATE ROOM

## 2024-09-12 PROCEDURE — 99233 SBSQ HOSP IP/OBS HIGH 50: CPT | Mod: ,,, | Performed by: INTERNAL MEDICINE

## 2024-09-12 PROCEDURE — 88304 TISSUE EXAM BY PATHOLOGIST: CPT | Mod: 26,,, | Performed by: PATHOLOGY

## 2024-09-12 PROCEDURE — 25000003 PHARM REV CODE 250: Performed by: NURSE ANESTHETIST, CERTIFIED REGISTERED

## 2024-09-12 PROCEDURE — 36415 COLL VENOUS BLD VENIPUNCTURE: CPT

## 2024-09-12 PROCEDURE — 27000510 HC BLANKET BAIR HUGGER ANY SIZE: Performed by: ANESTHESIOLOGY

## 2024-09-12 PROCEDURE — 88304 TISSUE EXAM BY PATHOLOGIST: CPT | Mod: TC,SUR | Performed by: SURGERY

## 2024-09-12 PROCEDURE — 87077 CULTURE AEROBIC IDENTIFY: CPT | Performed by: SURGERY

## 2024-09-12 PROCEDURE — 80048 BASIC METABOLIC PNL TOTAL CA: CPT

## 2024-09-12 PROCEDURE — 87075 CULTR BACTERIA EXCEPT BLOOD: CPT | Performed by: SURGERY

## 2024-09-12 PROCEDURE — 10061 I&D ABSCESS COMP/MULTIPLE: CPT | Mod: ,,, | Performed by: SURGERY

## 2024-09-12 PROCEDURE — 63600175 PHARM REV CODE 636 W HCPCS: Performed by: NURSE ANESTHETIST, CERTIFIED REGISTERED

## 2024-09-12 PROCEDURE — 36000705 HC OR TIME LEV I EA ADD 15 MIN: Performed by: SURGERY

## 2024-09-12 PROCEDURE — 71000033 HC RECOVERY, INTIAL HOUR: Performed by: SURGERY

## 2024-09-12 PROCEDURE — 0J9M0ZZ DRAINAGE OF LEFT UPPER LEG SUBCUTANEOUS TISSUE AND FASCIA, OPEN APPROACH: ICD-10-PCS | Performed by: SURGERY

## 2024-09-12 PROCEDURE — 25000003 PHARM REV CODE 250: Performed by: INTERNAL MEDICINE

## 2024-09-12 RX ORDER — DIPHENHYDRAMINE HYDROCHLORIDE 50 MG/ML
25 INJECTION INTRAMUSCULAR; INTRAVENOUS EVERY 6 HOURS PRN
Status: DISCONTINUED | OUTPATIENT
Start: 2024-09-12 | End: 2024-09-12 | Stop reason: HOSPADM

## 2024-09-12 RX ORDER — LIDOCAINE HYDROCHLORIDE 20 MG/ML
INJECTION, SOLUTION EPIDURAL; INFILTRATION; INTRACAUDAL; PERINEURAL
Status: DISCONTINUED | OUTPATIENT
Start: 2024-09-12 | End: 2024-09-12

## 2024-09-12 RX ORDER — SODIUM CHLORIDE, SODIUM LACTATE, POTASSIUM CHLORIDE, CALCIUM CHLORIDE 600; 310; 30; 20 MG/100ML; MG/100ML; MG/100ML; MG/100ML
125 INJECTION, SOLUTION INTRAVENOUS CONTINUOUS
Status: DISCONTINUED | OUTPATIENT
Start: 2024-09-12 | End: 2024-09-13

## 2024-09-12 RX ORDER — IPRATROPIUM BROMIDE AND ALBUTEROL SULFATE 2.5; .5 MG/3ML; MG/3ML
3 SOLUTION RESPIRATORY (INHALATION)
Status: DISCONTINUED | OUTPATIENT
Start: 2024-09-12 | End: 2024-09-12 | Stop reason: HOSPADM

## 2024-09-12 RX ORDER — PROPOFOL 10 MG/ML
VIAL (ML) INTRAVENOUS
Status: DISCONTINUED | OUTPATIENT
Start: 2024-09-12 | End: 2024-09-12

## 2024-09-12 RX ORDER — HYDROMORPHONE HYDROCHLORIDE 2 MG/ML
0.5 INJECTION, SOLUTION INTRAMUSCULAR; INTRAVENOUS; SUBCUTANEOUS EVERY 5 MIN PRN
Status: DISCONTINUED | OUTPATIENT
Start: 2024-09-12 | End: 2024-09-12 | Stop reason: HOSPADM

## 2024-09-12 RX ORDER — ONDANSETRON HYDROCHLORIDE 2 MG/ML
INJECTION, SOLUTION INTRAVENOUS
Status: DISCONTINUED | OUTPATIENT
Start: 2024-09-12 | End: 2024-09-12

## 2024-09-12 RX ORDER — FENTANYL CITRATE 50 UG/ML
INJECTION, SOLUTION INTRAMUSCULAR; INTRAVENOUS
Status: DISCONTINUED | OUTPATIENT
Start: 2024-09-12 | End: 2024-09-12

## 2024-09-12 RX ORDER — ONDANSETRON HYDROCHLORIDE 2 MG/ML
4 INJECTION, SOLUTION INTRAVENOUS DAILY PRN
Status: DISCONTINUED | OUTPATIENT
Start: 2024-09-12 | End: 2024-09-12 | Stop reason: HOSPADM

## 2024-09-12 RX ORDER — OXYCODONE HYDROCHLORIDE 5 MG/1
5 TABLET ORAL
Status: DISCONTINUED | OUTPATIENT
Start: 2024-09-12 | End: 2024-09-12 | Stop reason: HOSPADM

## 2024-09-12 RX ORDER — MEPERIDINE HYDROCHLORIDE 25 MG/ML
25 INJECTION INTRAMUSCULAR; INTRAVENOUS; SUBCUTANEOUS EVERY 10 MIN PRN
Status: DISCONTINUED | OUTPATIENT
Start: 2024-09-12 | End: 2024-09-12 | Stop reason: HOSPADM

## 2024-09-12 RX ORDER — LANOLIN ALCOHOL/MO/W.PET/CERES
1 CREAM (GRAM) TOPICAL DAILY
Status: DISCONTINUED | OUTPATIENT
Start: 2024-09-12 | End: 2024-09-17 | Stop reason: HOSPADM

## 2024-09-12 RX ORDER — MORPHINE SULFATE 10 MG/ML
4 INJECTION INTRAMUSCULAR; INTRAVENOUS; SUBCUTANEOUS EVERY 5 MIN PRN
Status: DISCONTINUED | OUTPATIENT
Start: 2024-09-12 | End: 2024-09-12 | Stop reason: HOSPADM

## 2024-09-12 RX ORDER — GLUCAGON 1 MG
1 KIT INJECTION
Status: DISCONTINUED | OUTPATIENT
Start: 2024-09-12 | End: 2024-09-12 | Stop reason: HOSPADM

## 2024-09-12 RX ORDER — DEXAMETHASONE SODIUM PHOSPHATE 4 MG/ML
INJECTION, SOLUTION INTRA-ARTICULAR; INTRALESIONAL; INTRAMUSCULAR; INTRAVENOUS; SOFT TISSUE
Status: DISCONTINUED | OUTPATIENT
Start: 2024-09-12 | End: 2024-09-12

## 2024-09-12 RX ADMIN — OXYBUTYNIN CHLORIDE 5 MG: 5 TABLET, EXTENDED RELEASE ORAL at 08:09

## 2024-09-12 RX ADMIN — GABAPENTIN 300 MG: 300 CAPSULE ORAL at 03:09

## 2024-09-12 RX ADMIN — DEXAMETHASONE SODIUM PHOSPHATE 8 MG: 4 INJECTION, SOLUTION INTRA-ARTICULAR; INTRALESIONAL; INTRAMUSCULAR; INTRAVENOUS; SOFT TISSUE at 01:09

## 2024-09-12 RX ADMIN — FERROUS SULFATE TAB 325 MG (65 MG ELEMENTAL FE) 1 EACH: 325 (65 FE) TAB at 03:09

## 2024-09-12 RX ADMIN — METOPROLOL TARTRATE 25 MG: 25 TABLET, FILM COATED ORAL at 08:09

## 2024-09-12 RX ADMIN — FENTANYL CITRATE 100 MCG: 50 INJECTION, SOLUTION INTRAMUSCULAR; INTRAVENOUS at 01:09

## 2024-09-12 RX ADMIN — PROPOFOL 100 MG: 10 INJECTION, EMULSION INTRAVENOUS at 01:09

## 2024-09-12 RX ADMIN — PIPERACILLIN SODIUM AND TAZOBACTAM SODIUM 4.5 G: 4; .5 INJECTION, POWDER, LYOPHILIZED, FOR SOLUTION INTRAVENOUS at 09:09

## 2024-09-12 RX ADMIN — LIDOCAINE HYDROCHLORIDE 50 MG: 20 INJECTION, SOLUTION INTRAVENOUS at 01:09

## 2024-09-12 RX ADMIN — GABAPENTIN 300 MG: 300 CAPSULE ORAL at 08:09

## 2024-09-12 RX ADMIN — PIPERACILLIN SODIUM AND TAZOBACTAM SODIUM 4.5 G: 4; .5 INJECTION, POWDER, LYOPHILIZED, FOR SOLUTION INTRAVENOUS at 04:09

## 2024-09-12 RX ADMIN — VANCOMYCIN HYDROCHLORIDE 1750 MG: 1 INJECTION, POWDER, LYOPHILIZED, FOR SOLUTION INTRAVENOUS at 03:09

## 2024-09-12 RX ADMIN — GABAPENTIN 300 MG: 300 CAPSULE ORAL at 09:09

## 2024-09-12 RX ADMIN — ACETAMINOPHEN 650 MG: 325 TABLET ORAL at 04:09

## 2024-09-12 RX ADMIN — MUPIROCIN: 20 OINTMENT TOPICAL at 08:09

## 2024-09-12 RX ADMIN — ONDANSETRON 4 MG: 2 INJECTION INTRAMUSCULAR; INTRAVENOUS at 01:09

## 2024-09-12 RX ADMIN — MUPIROCIN: 20 OINTMENT TOPICAL at 09:09

## 2024-09-12 RX ADMIN — SODIUM CHLORIDE: 9 INJECTION, SOLUTION INTRAVENOUS at 01:09

## 2024-09-12 RX ADMIN — APIXABAN 5 MG: 5 TABLET, FILM COATED ORAL at 08:09

## 2024-09-12 RX ADMIN — PIPERACILLIN SODIUM AND TAZOBACTAM SODIUM 4.5 G: 4; .5 INJECTION, POWDER, LYOPHILIZED, FOR SOLUTION INTRAVENOUS at 12:09

## 2024-09-12 RX ADMIN — DULOXETINE HYDROCHLORIDE 60 MG: 30 CAPSULE, DELAYED RELEASE ORAL at 08:09

## 2024-09-12 NOTE — PLAN OF CARE
[facility] - [facility] Telemetry  Discharge Reassessment    Primary Care Provider: [facility]    Expected Discharge Date: 9/13/2024    Reassessment (most recent)       Discharge Reassessment - 09/12/24 1216          Discharge Reassessment    Assessment Type Discharge Planning Reassessment     Did the patient's condition or plan change since previous assessment? Yes     Discharge Plan discussed with: Patient     Communicated SHASHI with patient/caregiver Date not available/Unable to determine     Discharge Plan A Skilled Nursing Facility     Discharge Plan B Skilled Nursing Facility     DME Needed Upon Discharge  none     Transition of Care Barriers None     Why the patient remains in the hospital Requires continued medical care        Post-Acute Status    Post-Acute Authorization Placement     Post-Acute Placement Status Referrals Sent     Patient choice form signed by patient/caregiver List with quality metrics by geographic area provided;List from CMS Compare;List from System Post-Acute Care     Discharge Delays None known at this time                     Per dr mai needing swb at d/c. Choice obtained for [facility]. Referral given to kody with [facility]. Packet started. Ss following.

## 2024-09-12 NOTE — ASSESSMENT & PLAN NOTE
This patient does have evidence of infective focus  My overall impression is sepsis.  Source: Urinary Tract and Skin and Soft Tissue (location right lower extremity)  Antibiotics given-   Antibiotics (72h ago, onward)      Start     Stop Route Frequency Ordered    09/11/24 0900  mupirocin 2 % ointment         09/16/24 0859 Top 2 times daily 09/11/24 0643    09/11/24 0500  piperacillin-tazobactam (ZOSYN) 4.5 g in D5W 100 mL IVPB (MB+)         -- IV Every 8 hours (non-standard times) 09/11/24 0141    09/11/24 0300  vancomycin (VANCOCIN) 1,750 mg in D5W 500 mL IVPB         -- IV Every 36 hours 09/11/24 0143    09/11/24 0242  vancomycin - pharmacy to dose         -- IV pharmacy to manage frequency 09/11/24 0143    09/11/24 0200  piperacillin-tazobactam (ZOSYN) 4.5 g in D5W 100 mL IVPB (MB+)         09/11/24 1359 IV ED 1 Time 09/11/24 0141          Latest lactate reviewed-  Recent Labs   Lab 09/11/24  0032   LACTATE 1.8       Blood culture NGTD.   Source control achieved by: antibiotics.

## 2024-09-12 NOTE — PT/OT/SLP PROGRESS
Physical Therapy      Patient Name:  Bobbi Haywood   MRN:  [id number]    Patient not seen today secondary to Off the floor for procedure/surgery. Will follow-up 09/13/24.

## 2024-09-12 NOTE — OP NOTE
[facility] Per[facility] Services  Surgery Department  Operative Note    SUMMARY     Date of Procedure: 9/12/2024     Procedure: Procedure(s) (LRB):  INCISION AND DRAINAGE, ABSCESS (Right)     Surgeons and Role:     * Joe Alberto MD - Primary    Assisting Surgeon: None    Pre-Operative Diagnosis: Cellulitis of right lower extremity [L03.115]    Post-Operative Diagnosis: Post-Op Diagnosis Codes:     * Cellulitis of right lower extremity [L03.115]    Anesthesia: General    Procedures Performed:  Incision and drainage of infected right calf hematoma and incision and drainage of left thigh abscess    Significant Findings of the Procedure:  Infected right calf hematoma in the lateral aspect cavity proximally 6 x 5 x 1 and 0.5 cm.  Right medial thigh abscess cavity proximally 7 x 4 x 3 cm in size    Procedure in Detail:  After informed consent patient brought to the OR prepped and draped in the usual sterile fashion.  Started off on the medial aspect of the thigh on the distal aspect of the medial thigh we made a longitudinal incision over this pocket had purulence come out.  Broke up the loculations cavity was about 7 x 4 x 3 cm.  We packed this area out fair amount of oozing from the cavity initially.  Then when I looked at the calf which had an infected hematoma pus bloody pus coming out and we unroofed it surgically excise and cut out the overlying skin in his cavity was necrotic proximally 3 x 3 cm area.  Underlying cavity was about 6 x 5 x 1 cm in size and we broke up the loculations irrigated out packed with a Kerlix and pressure dressing on both areas.  We then wrapped the leg.  Patient tolerated the procedure well    Complications: No    Estimated Blood Loss (EBL):100 cc    Implants: * No implants in log *    Specimens:   Specimen (24h ago, onward)       Start     Ordered    09/12/24 1420  Surgical Pathology  RELEASE UPON ORDERING         09/12/24 1420                            Condition:  Good    Disposition: PACU - hemodynamically stable.    Attestation: I was present and scrubbed for the entire procedure.

## 2024-09-12 NOTE — ANESTHESIA PROCEDURE NOTES
Intubation    Date/Time: 9/12/2024 1:36 PM    Performed by: Ziggy Mendez CRNA  Authorized by: Paul Allen MD    Intubation:     Induction:  Intravenous    Intubated:  Postinduction    Mask Ventilation:  Easy mask    Attempts:  1    Attempted By:  CRNA    Method of Intubation:  Direct    Difficult Airway Encountered?: No      Complications:  None    Airway Device:  Supraglottic airway/LMA    Airway Device Size:  4.0    Style/Cuff Inflation:  Cuffed (inflated to minimal occlusive pressure)    Placement Verified By:  Capnometry    Complicating Factors:  None    Findings Post-Intubation:  BS equal bilateral and atraumatic/condition of teeth unchanged

## 2024-09-12 NOTE — ASSESSMENT & PLAN NOTE
Patient with Long standing persistent (>12 months) atrial fibrillation which is uncontrolled currently with Beta Blocker. Patient is currently in atrial fibrillation.[id number] Score: 3. HASBLED Score: 1. Anticoagulation indicated. Anticoagulation done with Eliquis .

## 2024-09-12 NOTE — PROGRESS NOTES
"[facility] Hospital Medicine  Progress Note    Patient Name: Bobbi Haywood  MRN: [id number]  Patient Class: IP- Inpatient   Admission Date: 9/10/2024  Length of Stay: 1 days  Attending Physician: Abrahan Farmer MD  Primary Care Provider: [facility]        Subjective:     Principal Problem:Severe sepsis        HPI:  Patient is a 73-year-old female who presents to [facility] after a fall from home where she reportedly was too weak to get up.  Patient has a past medical history of AFib on Eliquis, neuropathy and depression. Patient reports having inverted ankles that affect her walking when she turned her foot wrong way she fell down.  She denies hitting her head or losing consciousness.  Patient also reports falling a couple of weeks ago as well where she got her right posterior calf stuck in a truck door.  She reports developing a wound which bled and it now "appears infected".  Over the past few weeks patient endorses weakness and fatigue more than usual.  The patient reportedly lives with her  who is also present and contributes to the history. The patient denies headache, chest pain, shortness of breath, abdominal pain, fevers or chills.    Significant findings in the ED, lactic acid 2.6, Mag 1.3, UA positive for leukocytes, WBC 25.    The patient will be admitted to [facility] for continued care and medical management.    Overview/Hospital Course:  9/11- Checking CT leg as concern for deep infection. Urine culture with GNB. Continue current antibiotics. PT/OT consulted.   9/12- CT showed abscess, general surgery consulted and plan for I&D in OR today. Continue broad spectrum antibiotics. Swing bed placement in progress.     Interval History: Patient seen and examined at the bedside, reports no change in symptoms.     Review of Systems   All other systems reviewed and are negative.    Objective:     Vital Signs " (Most Recent):  Temp: 98.1 °F (36.7 °C) (09/12/24 1134)  Pulse: 94 (09/12/24 1134)  Resp: 18 (09/12/24 1134)  BP: 106/67 (09/12/24 1134)  SpO2: 95 % (09/12/24 1134) Vital Signs (24h Range):  Temp:  [97.7 °F (36.5 °C)-99.2 °F (37.3 °C)] 98.1 °F (36.7 °C)  Pulse:  [] 94  Resp:  [18] 18  SpO2:  [93 %-98 %] 95 %  BP: (100-119)/(63-78) 106/67     Weight: 93.4 kg (205 lb 14.6 oz)  Body mass index is 28.72 kg/m².    Intake/Output Summary (Last 24 hours) at 9/12/2024 1400  Last data filed at 9/12/2024 0923  Gross per 24 hour   Intake --   Output 1700 ml   Net -1700 ml         Physical Exam  Vitals and nursing note reviewed.   Constitutional:       Appearance: Normal appearance.   HENT:      Head: Normocephalic and atraumatic.      Mouth/Throat:      Mouth: Mucous membranes are moist.   Eyes:      Extraocular Movements: Extraocular movements intact.      Pupils: Pupils are equal, round, and reactive to light.   Cardiovascular:      Rate and Rhythm: Normal rate and regular rhythm.   Pulmonary:      Effort: Pulmonary effort is normal.      Breath sounds: Normal breath sounds.   Abdominal:      General: Bowel sounds are normal.      Palpations: Abdomen is soft.   Musculoskeletal:         General: Deformity present.      Cervical back: Normal range of motion and neck supple.      Right lower leg: Edema present.   Skin:     Findings: Erythema present.   Neurological:      General: No focal deficit present.      Mental Status: She is alert and oriented to person, place, and time. Mental status is at baseline.   Psychiatric:         Mood and Affect: Mood normal.         Behavior: Behavior normal.             Significant Labs: All pertinent labs within the past 24 hours have been reviewed.    Significant Imaging: I have reviewed all pertinent imaging results/findings within the past 24 hours.    Assessment/Plan:      * Severe sepsis  This patient does have evidence of infective focus  My overall impression is sepsis.  Source:  Urinary Tract and Skin and Soft Tissue (location right lower extremity)  Antibiotics given-   Antibiotics (72h ago, onward)      Start     Stop Route Frequency Ordered    09/11/24 0900  mupirocin 2 % ointment         09/16/24 0859 Top 2 times daily 09/11/24 0643    09/11/24 0500  piperacillin-tazobactam (ZOSYN) 4.5 g in D5W 100 mL IVPB (MB+)         -- IV Every 8 hours (non-standard times) 09/11/24 0141    09/11/24 0300  vancomycin (VANCOCIN) 1,750 mg in D5W 500 mL IVPB         -- IV Every 36 hours 09/11/24 0143    09/11/24 0242  vancomycin - pharmacy to dose         -- IV pharmacy to manage frequency 09/11/24 0143    09/11/24 0200  piperacillin-tazobactam (ZOSYN) 4.5 g in D5W 100 mL IVPB (MB+)         09/11/24 1359 IV ED 1 Time 09/11/24 0141          Latest lactate reviewed-  Recent Labs   Lab 09/11/24  0032   LACTATE 1.8       Blood culture NGTD.   Source control achieved by: antibiotics.    Cellulitis and abscess of right leg  2 week history after fall from home  CT leg showed probable right calf cellulitis with superimposed 23 x 68 x 92 mm thick walled air containing superficial abscess along the posterolateral soft tissues of the proximal right calf.   General surgery consulted; plan for I&D in OR today.  Follow wound cultures and adjust antibiotics (continue vancomycin and Zosyn for now).           INOCENTE (acute kidney injury)  INOCENTE is likely due to acute tubular necrosis caused by infection . Baseline creatinine is  ~ 1 . Most recent creatinine and eGFR are listed below.  Recent Labs     09/10/24  2222 09/11/24  0250 09/12/24  0929   CREATININE 1.71* 1.48* 1.49*   EGFRNORACEVR 31* 37* 36*        Plan  - INOCENTE is improving.  - s/p IV fluids.  - Avoid nephrotoxins and renally dose meds for GFR listed above  - Monitor urine output, serial BMP, and adjust therapy as needed      Acute cystitis with hematuria  UA positive for leukocytes.   Urine culture with E.coli.  Continue antibiotics as above.    Normocytic  anemia  Anemia is likely due to  unclear . Most recent hemoglobin and hematocrit are listed below.  Recent Labs     09/10/24  2222 09/11/24  0250 09/12/24  0632   HGB 11.1* 9.3* 9.2*   HCT 35.1* 29.7* 29.4*       Plan  - Monitor serial CBC: Daily  - Transfuse PRBC if patient becomes hemodynamically unstable, symptomatic or H/H drops below 7/21.  - Patient has not received any PRBC transfusions to date  - Patient's anemia is currently stable  - B12, folate WNL, iron panel with iron deficiency.  - Started on iron replacement.      Debility  Patient with Acute on chronic debility due to chronic unspecified fatigue and age-related physical debility. Latest AMPAC and GEMS scores have not been reviewed. Evaluation for etiology is complete. Plan includes progressive mobility protocol initated and PT/OT consulted.  Swing bed placement in progress.     Longstanding persistent atrial fibrillation  Patient with Long standing persistent (>12 months) atrial fibrillation which is uncontrolled currently with Beta Blocker. Patient is currently in atrial fibrillation.[id number] Score: 3. HASBLED Score: 1. Anticoagulation indicated. Anticoagulation done with Eliquis .      Neuropathy  Moderately well-controlled  Continue home Cymbalta and gabapentin        VTE Risk Mitigation (From admission, onward)           Ordered     apixaban tablet 5 mg  2 times daily         09/11/24 0119     Reason for No Pharmacological VTE Prophylaxis  Once        Question:  Reasons:  Answer:  Already adequately anticoagulated on oral Anticoagulants    09/11/24 0119     IP VTE HIGH RISK PATIENT  Once         09/11/24 0119     Place sequential compression device  Until discontinued         09/11/24 0119                    Discharge Planning   SHASHI: 9/14/2024     Code Status: Full Code   Is the patient medically ready for discharge?:     Reason for patient still in hospital (select all that apply): Patient trending condition, Laboratory test, and Consult  recommendations  Discharge Plan A: Skilled Nursing Facility   Discharge Delays: None known at this time          SAVANAH SÁNCHEZ MD  Department of Hospital Medicine   [facility]

## 2024-09-12 NOTE — TRANSFER OF CARE
"Anesthesia Transfer of Care Note    Patient: Bobbi Haywood    Procedure(s) Performed: Procedure(s) (LRB):  INCISION AND DRAINAGE, ABSCESS (Right)    Patient location: PACU    Anesthesia Type: general    Transport from OR: Transported from OR on 2-3 L/min O2 by NC with adequate spontaneous ventilation    Post pain: adequate analgesia    Post assessment: no apparent anesthetic complications and tolerated procedure well    Post vital signs: stable    Level of consciousness: responds to stimulation    Nausea/Vomiting: no nausea/vomiting    Complications: none    Transfer of care protocol was followedComments: Report Given to PACU rn VSS      Last vitals: Visit Vitals  /69 (Patient Position: Lying)   Pulse 109   Temp 36.6 °C (97.8 °F) (Oral)   Resp (!) 21   Ht 5' 11" (1.803 m)   Wt 93.4 kg (205 lb 14.6 oz)   SpO2 99%   BMI 28.72 kg/m²     "

## 2024-09-12 NOTE — PROGRESS NOTES
09/11/24 1127        Wound 09/11/24 1126 Traumatic Right posterior Calf   Date First Assessed/Time First Assessed: 09/11/24 1126   Present on Original Admission: (c) Yes  Primary Wound Type: Traumatic  Side: Right  Orientation: posterior  Location: Calf   Wound Image Images linked   Dressing Appearance Dry;Intact;Clean   Drainage Amount Moderate   Drainage Characteristics/Odor Malodorous;Purulent;Clots;Brown   Appearance Pink;Red;Maroon;Moist;Granulating;Adipose;Black;Eschar   Tissue loss description Full thickness   Black (%), Wound Tissue Color 15 %   Red (%), Wound Tissue Color 60 %   Yellow (%), Wound Tissue Color 25 %   Periwound Area Blistered;Moist;Redness;Swelling   Wound Edges Irregular;Undefined   Wound Length (cm) 1.6 cm   Wound Width (cm) 1.1 cm   Wound Depth (cm) 0.6 cm   Wound Volume (cm^3) 1.056 cm^3   Wound Surface Area (cm^2) 1.76 cm^2   Undermining (depth (cm)/location) 12 to 12; 4.1 cm   Care Cleansed with:;Antimicrobial agent   Dressing Applied;Absorptive Pad;Rolled gauze   Packing packed with;strip gauze  (Moisten with vashe)   Periwound Care Moisture barrier applied      Large hematoma noted  Cultures obtained and sent to lab  Instructed patient to elevate site on pillow avoiding pressure to site, she stated she understood and would follow instructions  Surgery planned for 9/13/24

## 2024-09-12 NOTE — PLAN OF CARE
Problem: Skin Injury Risk Increased  Goal: Skin Health and Integrity  Outcome: Progressing     Problem: Adult Inpatient Plan of Care  Goal: Plan of Care Review  Outcome: Progressing  Goal: Patient-Specific Goal (Individualized)  Outcome: Progressing  Goal: Absence of Hospital-Acquired Illness or Injury  Outcome: Progressing  Goal: Optimal Comfort and Wellbeing  Outcome: Progressing  Goal: Readiness for Transition of Care  Outcome: Progressing     Problem: Sepsis/Septic Shock  Goal: Optimal Coping  Outcome: Progressing  Goal: Absence of Bleeding  Outcome: Progressing  Goal: Blood Glucose Level Within Targeted Range  Outcome: Progressing  Goal: Absence of Infection Signs and Symptoms  Outcome: Progressing  Goal: Optimal Nutrition Intake  Outcome: Progressing     Problem: Acute Kidney Injury/Impairment  Goal: Fluid and Electrolyte Balance  Outcome: Progressing  Goal: Improved Oral Intake  Outcome: Progressing  Goal: Effective Renal Function  Outcome: Progressing     Problem: Wound  Goal: Optimal Coping  Outcome: Progressing  Goal: Optimal Functional Ability  Outcome: Progressing  Goal: Absence of Infection Signs and Symptoms  Outcome: Progressing  Goal: Improved Oral Intake  Outcome: Progressing  Goal: Optimal Pain Control and Function  Outcome: Progressing  Goal: Skin Health and Integrity  Outcome: Progressing  Goal: Optimal Wound Healing  Outcome: Progressing

## 2024-09-12 NOTE — OR NURSING
1428 Rec'd pt to PACU asleep with no distress noted, respirations even and unlabored. VSS. RLE dressing C/D/I. No needs. Will continue to monitor.     1502 Out of PACU. VSS. No signs of bleeding/distress noted.     1520 Pt to room 567 drowsy but arousable to verbal stimuli. No signs of distress noted, respirations even and unlabored. No visitors at bedside. Moved pt to regular bed with safety precautions in place. Bedside report given to Genoveva SANTIZO. RLE dressing C/D/I. Denies pain/needs. /79, P 109, R 16, O2 98% 2L NC, T 98.0 oral.

## 2024-09-12 NOTE — ASSESSMENT & PLAN NOTE
Patient with Acute on chronic debility due to chronic unspecified fatigue and age-related physical debility. Latest AMPAC and GEMS scores have not been reviewed. Evaluation for etiology is complete. Plan includes progressive mobility protocol initated and PT/OT consulted.  Swing bed placement in progress.

## 2024-09-12 NOTE — ASSESSMENT & PLAN NOTE
2 week history after fall from home  CT leg showed probable right calf cellulitis with superimposed 23 x 68 x 92 mm thick walled air containing superficial abscess along the posterolateral soft tissues of the proximal right calf.   General surgery consulted; plan for I&D in OR today.  Follow wound cultures and adjust antibiotics (continue vancomycin and Zosyn for now).

## 2024-09-12 NOTE — ANESTHESIA POSTPROCEDURE EVALUATION
Anesthesia Post Evaluation    Patient: Bobbi Haywood    Procedure(s) Performed: Procedure(s) (LRB):  INCISION AND DRAINAGE, ABSCESS (Right)    Final Anesthesia Type: general      Patient location during evaluation: PACU  Patient participation: Yes- Able to Participate  Level of consciousness: awake and alert  Post-procedure vital signs: reviewed and stable  Pain management: adequate  Airway patency: patent  NESS mitigation strategies: Multimodal analgesia  PONV status at discharge: No PONV  Anesthetic complications: no      Cardiovascular status: blood pressure returned to baseline  Respiratory status: unassisted  Hydration status: euvolemic  Follow-up not needed.              Vitals Value Taken Time   /79 09/12/24 1547   Temp 36.7 °C (98 °F) 09/12/24 1522   Pulse 112 09/12/24 1547   Resp 20 09/12/24 1500   SpO2 97 % 09/12/24 1547   Vitals shown include unfiled device data.      Event Time   Out of Recovery 09/12/2024 15:02:23         Pain/Deven Score: Pain Rating Prior to Med Admin: 4 (9/11/2024  2:04 PM)  Pain Rating Post Med Admin: 2 (9/11/2024  2:41 PM)  Deven Score: 8 (9/12/2024  3:00 PM)

## 2024-09-12 NOTE — ANESTHESIA PREPROCEDURE EVALUATION
09/12/2024  Bobbi Haywood is a 73 y.o., female.      Pre-op Assessment    I have reviewed the Patient Summary Reports.     I have reviewed the Nursing Notes. I have reviewed the NPO Status.   I have reviewed the Medications.     Review of Systems  Anesthesia Hx:  No problems with previous Anesthesia                Social:  Non-Smoker, No Alcohol Use       Hematology/Oncology:    Oncology Normal    -- Anemia:                                  EENT/Dental:  EENT/Dental Normal           Cardiovascular:     Hypertension    Dysrhythmias atrial fibrillation                                   Pulmonary:  Pulmonary Normal                       Renal/:  Chronic Renal Disease, CKD                Hepatic/GI:  Hepatic/GI Normal                 Musculoskeletal:  Arthritis               Neurological:        Peripheral Neuropathy                          Endocrine:  Endocrine Normal            Dermatological:  Skin Normal    Psych:  Psychiatric Normal                    Physical Exam  General: Well nourished    Airway:  Mallampati: II / II  Mouth Opening: Normal  TM Distance: > 6 cm  Tongue: Normal  Neck ROM: Normal ROM    Chest/Lungs:  Clear to auscultation, Normal Respiratory Rate    Heart:  Rate: Normal  Rhythm: Regular Rhythm        Chemistry        Component Value Date/Time     09/12/2024 0929    K 3.6 09/12/2024 0929     09/12/2024 0929    CO2 29 09/12/2024 0929    BUN 19 (H) 09/12/2024 0929    CREATININE 1.49 (H) 09/12/2024 0929     09/12/2024 0929        Component Value Date/Time    CALCIUM 8.5 09/12/2024 0929    ALKPHOS 80 09/10/2024 2222    AST 8 (L) 09/10/2024 2222    ALT 10 (L) 09/10/2024 2222    BILITOT 0.6 09/10/2024 2222    ESTGFRAFRICA 67 01/12/2021 0246    EGFRNONAA 55 01/12/2021 0246        Lab Results   Component Value Date    WBC 21.85 (H) 09/12/2024    HGB 9.2 (L)  09/12/2024    HCT 29.4 (L) 09/12/2024     09/12/2024     No results found for this or any previous visit.      Anesthesia Plan  Type of Anesthesia, risks & benefits discussed:    Anesthesia Type: Gen Supraglottic Airway  Intra-op Monitoring Plan: Standard ASA Monitors  Post Op Pain Control Plan: multimodal analgesia  Induction:  IV  Informed Consent: Informed consent signed with the Patient and all parties understand the risks and agree with anesthesia plan.  All questions answered. Patient consented to blood products? Yes  ASA Score: 3  Day of Surgery Review of History & Physical: H&P Update referred to the surgeon/provider.I have interviewed and examined the patient. I have reviewed the patient's H&P dated:     Ready For Surgery From Anesthesia Perspective.     .

## 2024-09-12 NOTE — PROGRESS NOTES
09/12/24 1207   Wound Care Follow Up   Wound Care Follow-up? Yes   Wound Care- Next Visit Date 09/16/24   Follow Up Plan Howard POC

## 2024-09-12 NOTE — CONSULTS
[facility] - [facility] Medical Telemetry  General Surgery  Consult Note    Patient Name: Bobbi Haywood  MRN: [id number]  Code Status: Full Code  Admission Date: 9/10/2024  Hospital Length of Stay: 1 days  Attending Physician: Abrahan Farmer MD  Primary Care Provider: [facility]    Patient information was obtained from patient and past medical records.     Inpatient consult to General Surgery  Consult performed by: Colton Mathur FNP  Consult ordered by: Abrahan Farmer MD        Subjective:     Principal Problem: Severe sepsis    History of Present Illness: Patient initially admitted for weakness.  Diagnosed with sepsis with a [place] blood [place] [id number].  Labs and vital signs otherwise stable.  Has urinary tract infection and cellulitis of the right lower extremity.    Surgery consult for abscess of the right lower extremity.  Patient reports hitting a door with her leg couple of weeks ago and having a hematoma of the right lateral calf.  This is the same area of concern for abscess.  CT shows an approximately 2 x 6 x 9 cm superficial fluid collection with air lateral right calf.  This area is edematous and erythematous with fluctuation without drainage.  Plan for OR today for incision and drainage by Dr. Alberto.    No current facility-administered medications on file prior to encounter.     Current Outpatient Medications on File Prior to Encounter   Medication Sig    cyanocobalamin 500 MCG tablet Take 500 mcg by mouth once daily.    DULoxetine (CYMBALTA) 60 MG capsule Take 1 capsule by mouth once daily.    ELIQUIS 5 mg Tab Take 5 mg by mouth 2 (two) times daily.    estradioL (ESTRACE) 2 MG tablet Take 1 tablet by mouth once daily.    fesoterodine (TOVIAZ) 4 mg Tb24 Take 4 mg by mouth once daily.    folic acid (FOLVITE) 800 MCG Tab Take 800 mcg by mouth once daily.    gabapentin (NEURONTIN) 600 MG tablet Take 1 tablet by mouth 2 (two) times a day.    L.  rhamnosus/C/zinc/elderberry (CULTURELLE IMMUNE DEFENSE ORAL) Take 1 tablet by mouth once daily.    L.acidoph,saliva/B.bif/S.therm (ACIDOPHILUS PROBIOTIC BLEND ORAL) Take 1 tablet by mouth once daily.    metoprolol tartrate (LOPRESSOR) 25 MG tablet Take 1 tablet by mouth once daily.    rosuvastatin (CRESTOR) 20 MG tablet Take 20 mg by mouth every evening.       Review of patient's allergies indicates:   Allergen Reactions    Sulfa (sulfonamide antibiotics) Hives and Other (See Comments)     Sore Throat, Sore Mouth       Past Medical History:   Diagnosis Date    A-fib     Hypertension     Neuropathy      Past Surgical History:   Procedure Laterality Date    CATARACT EXTRACTION, BILATERAL      CHOLECYSTECTOMY      HYSTERECTOMY      INJECTION OF ANESTHETIC AGENT AROUND MEDIAL BRANCH NERVES INNERVATING LUMBAR FACET JOINT Bilateral 8/26/2021    Procedure: Bilateral L4-5,5-S1 MBB;  Surgeon: Sulma Nelson MD;  Location: [facility];  Service: Pain Management;  Laterality: Bilateral;  Per Dr Bradford ok to hold Eliquis for 4 days not 5, Per Dr Nelson this is fine with her. Pt will bring her verification card for COVID vaccination.    INJECTION OF ANESTHETIC AGENT AROUND MEDIAL BRANCH NERVES INNERVATING LUMBAR FACET JOINT Bilateral 10/5/2021    Procedure: Bilateral L4-5,5-S1 MBB;  Surgeon: Sulma Nelson MD;  Location: [facility];  Service: Pain Management;  Laterality: Bilateral;  PT AWARE ON OV TO BE TESTED    KNEE CARTILAGE SURGERY Right     RADIOFREQUENCY ABLATION OF LUMBAR MEDIAL BRANCH NERVE AT SINGLE LEVEL Bilateral 11/9/2021    Procedure: Radiofrequency Ablation, Nerve, Spinal, Lumbar, Medial Branch, 1 Level L4-S1  BILATERAL;  Surgeon: Sulma Nelson MD;  Location: [facility];  Service: Pain Management;  Laterality: Bilateral;  per alicia pt is on eliquis but she will get permission for it to be held 5 days prior to procedure.   HAD VAC   CARD SCANNED IN    REDUCTION OF BOTH BREASTS       RETINAL DETACHMENT SURGERY  right    VAGINAL DELIVERY      X1     Family History       Problem Relation (Age of Onset)    Atrial fibrillation Mother    Diabetes Paternal Grandfather    Hypertension Mother    Stroke Father          Tobacco Use    Smoking status: Never    Smokeless tobacco: Never   Substance and Sexual Activity    Alcohol use: Not Currently    Drug use: Not on file    Sexual activity: Not on file     Review of Systems   Constitutional:  Negative for fever.   Respiratory: Negative.     Cardiovascular: Negative.    Skin:         Abscess right lower leg   Neurological:  Positive for weakness.     Objective:     Vital Signs (Most Recent):  Temp: 98 °F (36.7 °C) (09/12/24 0732)  Pulse: 102 (09/12/24 0732)  Resp: 18 (09/12/24 0732)  BP: 100/63 (09/12/24 0732)  SpO2: 95 % (09/12/24 0732) Vital Signs (24h Range):  Temp:  [97.7 °F (36.5 °C)-99.2 °F (37.3 °C)] 98 °F (36.7 °C)  Pulse:  [] 102  Resp:  [18] 18  SpO2:  [93 %-98 %] 95 %  BP: ()/(50-78) 100/63     Weight: 93.4 kg (205 lb 14.6 oz)  Body mass index is 28.72 kg/m².     Physical Exam  Vitals reviewed.   Constitutional:       General: She is not in acute distress.  Cardiovascular:      Rate and Rhythm: Normal rate.   Pulmonary:      Effort: Pulmonary effort is normal. No respiratory distress.   Skin:     General: Skin is warm and dry.      Comments: Abscess right lateral calf, see media   Neurological:      Mental Status: She is alert. Mental status is at baseline.            I have reviewed all pertinent lab results within the past 24 hours.    Significant Diagnostics:  I have reviewed all pertinent imaging results/findings within the past 24 hours.    Assessment/Plan:     No notes have been filed under this hospital service.  Service: General Surgery    VTE Risk Mitigation (From admission, onward)           Ordered     apixaban tablet 5 mg  2 times daily         09/11/24 0119     Reason for No Pharmacological VTE Prophylaxis  Once         Question:  Reasons:  Answer:  Already adequately anticoagulated on oral Anticoagulants    09/11/24 0119     IP VTE HIGH RISK PATIENT  Once         09/11/24 0119     Place sequential compression device  Until discontinued         09/11/24 0119                    Thank you for your consult. I will follow-up with patient. Please contact us if you have any additional questions.    Colton Mathur, CINDYP  General Surgery  [facility] - [address] [place] [address]

## 2024-09-12 NOTE — ASSESSMENT & PLAN NOTE
INOCENTE is likely due to acute tubular necrosis caused by infection . Baseline creatinine is  ~ 1 . Most recent creatinine and eGFR are listed below.  Recent Labs     09/10/24  2222 09/11/24  0250 09/12/24  0929   CREATININE 1.71* 1.48* 1.49*   EGFRNORACEVR 31* 37* 36*        Plan  - INOCENTE is improving.  - s/p IV fluids.  - Avoid nephrotoxins and renally dose meds for GFR listed above  - Monitor urine output, serial BMP, and adjust therapy as needed

## 2024-09-12 NOTE — PT/OT/SLP PROGRESS
Occupational Therapy   Treatment    Name: Bobbi Haywood  MRN: [id number]  Admitting Diagnosis:  Severe sepsis  Day of Surgery    Recommendations:     Discharge Recommendations: Moderate Intensity Therapy  Discharge Equipment Recommendations:  none  Barriers to discharge:  None    Assessment:     Bobbi Haywood is a 73 y.o. female with a medical diagnosis of Severe sepsis.  She presents with weakness and decline in ADLs. Performance deficits affecting function are weakness, impaired endurance, impaired self care skills, impaired functional mobility, gait instability, impaired balance, pain.     Rehab Prognosis:  Good; patient would benefit from acute skilled OT services to address these deficits and reach maximum level of function.       Plan:     Patient to be seen 5 x/week to address the above listed problems via self-care/home management, therapeutic activities, therapeutic exercises  Plan of Care Expires: 10/09/24  Plan of Care Reviewed with: patient    Subjective     Chief Complaint: severe sepsis  Patient/Family Comments/goals: pt agreeable to OT tx  Pain/Comfort:  Pain Rating 1: 7/10  Location - Side 1: Right  Location 1: leg  Pain Addressed 1: Pre-medicate for activity    Objective:     Communicated with: SUKHDEV Tomas prior to session.  Patient found HOB elevated with peripheral IV, telemetry, PureWick upon OT entry to room.    General Precautions: Standard, fall    Orthopedic Precautions:N/A  Braces: N/A  Respiratory Status: Room air     Occupational Performance:     Bed Mobility:    Not performed     Functional Mobility/Transfers:  Not performed    Activities of Daily Living:  Not performed      [facility] 6 Click ADL:      Treatment & Education:  Pt performed 2 sets x 15 reps each bicep curls 2#db, chest press 2#db, IR/ER 2#db, and rows red tband in order to improve BUE strength and endurance to perform ADL and ADL t/f with less assist.     Patient left HOB elevated with all lines intact, call  button in reach, and SUKHDEV Tomas notified    GOALS:   Multidisciplinary Problems       Occupational Therapy Goals          Problem: Occupational Therapy    Goal Priority Disciplines Outcome Interventions   Occupational Therapy Goal     OT, PT/OT Progressing    Description: STG:  Pt will perform grooming with setup  Pt will bathe with Esvin with setup at EOB  Pt will perform UE dressing with Charlene  Pt will perform LE dressing with Esvin  Pt will sit EOB x 10 min with SBA   Pt will transfer bed/chair/bsc with CGA with RW  Pt will perform standing task x 2 min with CGA with RW   Pt will tolerate 15 minutes of tx without fatigue      LT.Restore to max I with self care and mobility.                           Time Tracking:     OT Date of Treatment: 24  OT Start Time: 1048  OT Stop Time: 1121  OT Total Time (min): 33 min    Billable Minutes:Therapeutic Exercise 30 minutes    OT/TRISHA: OT          2024

## 2024-09-12 NOTE — ASSESSMENT & PLAN NOTE
Anemia is likely due to  unclear . Most recent hemoglobin and hematocrit are listed below.  Recent Labs     09/10/24  2222 09/11/24  0250 09/12/24  0632   HGB 11.1* 9.3* 9.2*   HCT 35.1* 29.7* 29.4*       Plan  - Monitor serial CBC: Daily  - Transfuse PRBC if patient becomes hemodynamically unstable, symptomatic or H/H drops below 7/21.  - Patient has not received any PRBC transfusions to date  - Patient's anemia is currently stable  - B12, folate WNL, iron panel with iron deficiency.  - Started on iron replacement.

## 2024-09-12 NOTE — HPI
Patient initially admitted for weakness.  Diagnosed with sepsis with a [place] blood [place] count of 68722.  Labs and vital signs otherwise stable.  Has urinary tract infection and cellulitis of the right lower extremity.    Surgery consult for abscess of the right lower extremity.  Patient reports hitting a door with her leg couple of weeks ago and having a hematoma of the right lateral calf.  This is the same area of concern for abscess.  CT shows an approximately 2 x 6 x 9 cm superficial fluid collection with air lateral right calf.  This area is edematous and erythematous with fluctuation without drainage.  Plan for OR today for incision and drainage by Dr. Alberto.

## 2024-09-12 NOTE — H&P (VIEW-ONLY)
[facility] - [facility] Medical Telemetry  General Surgery  Consult Note    Patient Name: Bobbi Haywood  MRN: [id number]  Code Status: Full Code  Admission Date: 9/10/2024  Hospital Length of Stay: 1 days  Attending Physician: Abrahan Farmer MD  Primary Care Provider: [facility]    Patient information was obtained from patient and past medical records.     Inpatient consult to General Surgery  Consult performed by: Colton Mathur FNP  Consult ordered by: Abrahan Farmer MD        Subjective:     Principal Problem: Severe sepsis    History of Present Illness: Patient initially admitted for weakness.  Diagnosed with sepsis with a [place] blood [place] of [phone].  Labs and vital signs otherwise stable.  Has urinary tract infection and cellulitis of the right lower extremity.    Surgery consult for abscess of the right lower extremity.  Patient reports hitting a door with her leg couple of weeks ago and having a hematoma of the right lateral calf.  This is the same area of concern for abscess.  CT shows an approximately 2 x 6 x 9 cm superficial fluid collection with air lateral right calf.  This area is edematous and erythematous with fluctuation without drainage.  Plan for OR today for incision and drainage by Dr. Alberto.    No current facility-administered medications on file prior to encounter.     Current Outpatient Medications on File Prior to Encounter   Medication Sig    cyanocobalamin 500 MCG tablet Take 500 mcg by mouth once daily.    DULoxetine (CYMBALTA) 60 MG capsule Take 1 capsule by mouth once daily.    ELIQUIS 5 mg Tab Take 5 mg by mouth 2 (two) times daily.    estradioL (ESTRACE) 2 MG tablet Take 1 tablet by mouth once daily.    fesoterodine (TOVIAZ) 4 mg Tb24 Take 4 mg by mouth once daily.    folic acid (FOLVITE) 800 MCG Tab Take 800 mcg by mouth once daily.    gabapentin (NEURONTIN) 600 MG tablet Take 1 tablet by mouth 2 (two) times a day.    L.  rhamnosus/C/zinc/elderberry (CULTURELLE IMMUNE DEFENSE ORAL) Take 1 tablet by mouth once daily.    L.acidoph,saliva/B.bif/S.therm (ACIDOPHILUS PROBIOTIC BLEND ORAL) Take 1 tablet by mouth once daily.    metoprolol tartrate (LOPRESSOR) 25 MG tablet Take 1 tablet by mouth once daily.    rosuvastatin (CRESTOR) 20 MG tablet Take 20 mg by mouth every evening.       Review of patient's allergies indicates:   Allergen Reactions    Sulfa (sulfonamide antibiotics) Hives and Other (See Comments)     Sore Throat, Sore Mouth       Past Medical History:   Diagnosis Date    A-fib     Hypertension     Neuropathy      Past Surgical History:   Procedure Laterality Date    CATARACT EXTRACTION, BILATERAL      CHOLECYSTECTOMY      HYSTERECTOMY      INJECTION OF ANESTHETIC AGENT AROUND MEDIAL BRANCH NERVES INNERVATING LUMBAR FACET JOINT Bilateral 8/26/2021    Procedure: Bilateral L4-5,5-S1 MBB;  Surgeon: Sulma Nelson MD;  Location: [facility];  Service: Pain Management;  Laterality: Bilateral;  Per Dr Bradford ok to hold Eliquis for 4 days not 5, Per Dr Nelson this is fine with her. Pt will bring her verification card for COVID vaccination.    INJECTION OF ANESTHETIC AGENT AROUND MEDIAL BRANCH NERVES INNERVATING LUMBAR FACET JOINT Bilateral 10/5/2021    Procedure: Bilateral L4-5,5-S1 MBB;  Surgeon: Sulma Nelson MD;  Location: [facility];  Service: Pain Management;  Laterality: Bilateral;  PT AWARE ON OV TO BE TESTED    KNEE CARTILAGE SURGERY Right     RADIOFREQUENCY ABLATION OF LUMBAR MEDIAL BRANCH NERVE AT SINGLE LEVEL Bilateral 11/9/2021    Procedure: Radiofrequency Ablation, Nerve, Spinal, Lumbar, Medial Branch, 1 Level L4-S1  BILATERAL;  Surgeon: Sulma Nelson MD;  Location: [facility];  Service: Pain Management;  Laterality: Bilateral;  per alicia pt is on eliquis but she will get permission for it to be held 5 days prior to procedure.   HAD VAC   CARD SCANNED IN    REDUCTION OF BOTH BREASTS       RETINAL DETACHMENT SURGERY  right    VAGINAL DELIVERY      X1     Family History       Problem Relation (Age of Onset)    Atrial fibrillation Mother    Diabetes Paternal Grandfather    Hypertension Mother    Stroke Father          Tobacco Use    Smoking status: Never    Smokeless tobacco: Never   Substance and Sexual Activity    Alcohol use: Not Currently    Drug use: Not on file    Sexual activity: Not on file     Review of Systems   Constitutional:  Negative for fever.   Respiratory: Negative.     Cardiovascular: Negative.    Skin:         Abscess right lower leg   Neurological:  Positive for weakness.     Objective:     Vital Signs (Most Recent):  Temp: 98 °F (36.7 °C) (09/12/24 0732)  Pulse: 102 (09/12/24 0732)  Resp: 18 (09/12/24 0732)  BP: 100/63 (09/12/24 0732)  SpO2: 95 % (09/12/24 0732) Vital Signs (24h Range):  Temp:  [97.7 °F (36.5 °C)-99.2 °F (37.3 °C)] 98 °F (36.7 °C)  Pulse:  [] 102  Resp:  [18] 18  SpO2:  [93 %-98 %] 95 %  BP: ()/(50-78) 100/63     Weight: 93.4 kg (205 lb 14.6 oz)  Body mass index is 28.72 kg/m².     Physical Exam  Vitals reviewed.   Constitutional:       General: She is not in acute distress.  Cardiovascular:      Rate and Rhythm: Normal rate.   Pulmonary:      Effort: Pulmonary effort is normal. No respiratory distress.   Skin:     General: Skin is warm and dry.      Comments: Abscess right lateral calf, see media   Neurological:      Mental Status: She is alert. Mental status is at baseline.            I have reviewed all pertinent lab results within the past 24 hours.    Significant Diagnostics:  I have reviewed all pertinent imaging results/findings within the past 24 hours.    Assessment/Plan:     No notes have been filed under this hospital service.  Service: General Surgery    VTE Risk Mitigation (From admission, onward)           Ordered     apixaban tablet 5 mg  2 times daily         09/11/24 0119     Reason for No Pharmacological VTE Prophylaxis  Once         Question:  Reasons:  Answer:  Already adequately anticoagulated on oral Anticoagulants    09/11/24 0119     IP VTE HIGH RISK PATIENT  Once         09/11/24 0119     Place sequential compression device  Until discontinued         09/11/24 0119                    Thank you for your consult. I will follow-up with patient. Please contact us if you have any additional questions.    Colton Mathur, CINDYP  General Surgery  [facility] - [address] [place] [address]

## 2024-09-12 NOTE — SUBJECTIVE & OBJECTIVE
No current facility-administered medications on file prior to encounter.     Current Outpatient Medications on File Prior to Encounter   Medication Sig    cyanocobalamin 500 MCG tablet Take 500 mcg by mouth once daily.    DULoxetine (CYMBALTA) 60 MG capsule Take 1 capsule by mouth once daily.    ELIQUIS 5 mg Tab Take 5 mg by mouth 2 (two) times daily.    estradioL (ESTRACE) 2 MG tablet Take 1 tablet by mouth once daily.    fesoterodine (TOVIAZ) 4 mg Tb24 Take 4 mg by mouth once daily.    folic acid (FOLVITE) 800 MCG Tab Take 800 mcg by mouth once daily.    gabapentin (NEURONTIN) 600 MG tablet Take 1 tablet by mouth 2 (two) times a day.    L. rhamnosus/C/zinc/elderberry (CULTURELLE IMMUNE DEFENSE ORAL) Take 1 tablet by mouth once daily.    L.acidoph,saliva/B.bif/S.therm (ACIDOPHILUS PROBIOTIC BLEND ORAL) Take 1 tablet by mouth once daily.    metoprolol tartrate (LOPRESSOR) 25 MG tablet Take 1 tablet by mouth once daily.    rosuvastatin (CRESTOR) 20 MG tablet Take 20 mg by mouth every evening.       Review of patient's allergies indicates:   Allergen Reactions    Sulfa (sulfonamide antibiotics) Hives and Other (See Comments)     Sore Throat, Sore Mouth       Past Medical History:   Diagnosis Date    A-fib     Hypertension     Neuropathy      Past Surgical History:   Procedure Laterality Date    CATARACT EXTRACTION, BILATERAL      CHOLECYSTECTOMY      HYSTERECTOMY      INJECTION OF ANESTHETIC AGENT AROUND MEDIAL BRANCH NERVES INNERVATING LUMBAR FACET JOINT Bilateral 8/26/2021    Procedure: Bilateral L4-5,5-S1 MBB;  Surgeon: Sulma Nelson MD;  Location: [facility];  Service: Pain Management;  Laterality: Bilateral;  Per Dr Bradford ok to hold Eliquis for 4 days not 5, Per Dr Nelson this is fine with her. Pt will bring her verification card for COVID vaccination.    INJECTION OF ANESTHETIC AGENT AROUND MEDIAL BRANCH NERVES INNERVATING LUMBAR FACET JOINT Bilateral 10/5/2021    Procedure: Bilateral L4-5,5-S1 MBB;   Surgeon: Sulma Nelson MD;  Location: [facility];  Service: Pain Management;  Laterality: Bilateral;  PT AWARE ON OV TO BE TESTED    KNEE CARTILAGE SURGERY Right     RADIOFREQUENCY ABLATION OF LUMBAR MEDIAL BRANCH NERVE AT SINGLE LEVEL Bilateral 11/9/2021    Procedure: Radiofrequency Ablation, Nerve, Spinal, Lumbar, Medial Branch, 1 Level L4-S1  BILATERAL;  Surgeon: Sulma Nelson MD;  Location: [facility];  Service: Pain Management;  Laterality: Bilateral;  per alicia pt is on eliquis but she will get permission for it to be held 5 days prior to procedure.   HAD VAC   CARD SCANNED IN    REDUCTION OF BOTH BREASTS      RETINAL DETACHMENT SURGERY  right    VAGINAL DELIVERY      X1     Family History       Problem Relation (Age of Onset)    Atrial fibrillation Mother    Diabetes Paternal Grandfather    Hypertension Mother    Stroke Father          Tobacco Use    Smoking status: Never    Smokeless tobacco: Never   Substance and Sexual Activity    Alcohol use: Not Currently    Drug use: Not on file    Sexual activity: Not on file     Review of Systems   Constitutional:  Negative for fever.   Respiratory: Negative.     Cardiovascular: Negative.    Skin:         Abscess right lower leg   Neurological:  Positive for weakness.     Objective:     Vital Signs (Most Recent):  Temp: 98 °F (36.7 °C) (09/12/24 0732)  Pulse: 102 (09/12/24 0732)  Resp: 18 (09/12/24 0732)  BP: 100/63 (09/12/24 0732)  SpO2: 95 % (09/12/24 0732) Vital Signs (24h Range):  Temp:  [97.7 °F (36.5 °C)-99.2 °F (37.3 °C)] 98 °F (36.7 °C)  Pulse:  [] 102  Resp:  [18] 18  SpO2:  [93 %-98 %] 95 %  BP: ()/(50-78) 100/63     Weight: 93.4 kg (205 lb 14.6 oz)  Body mass index is 28.72 kg/m².     Physical Exam  Vitals reviewed.   Constitutional:       General: She is not in acute distress.  Cardiovascular:      Rate and Rhythm: Normal rate.   Pulmonary:      Effort: Pulmonary effort is normal. No respiratory distress.   Skin:      General: Skin is warm and dry.      Comments: Abscess right lateral calf, see media   Neurological:      Mental Status: She is alert. Mental status is at baseline.            I have reviewed all pertinent lab results within the past 24 hours.    Significant Diagnostics:  I have reviewed all pertinent imaging results/findings within the past 24 hours.

## 2024-09-12 NOTE — SUBJECTIVE & OBJECTIVE
Interval History: Patient seen and examined at the bedside, reports no change in symptoms.     Review of Systems   All other systems reviewed and are negative.    Objective:     Vital Signs (Most Recent):  Temp: 98.1 °F (36.7 °C) (09/12/24 1134)  Pulse: 94 (09/12/24 1134)  Resp: 18 (09/12/24 1134)  BP: 106/67 (09/12/24 1134)  SpO2: 95 % (09/12/24 1134) Vital Signs (24h Range):  Temp:  [97.7 °F (36.5 °C)-99.2 °F (37.3 °C)] 98.1 °F (36.7 °C)  Pulse:  [] 94  Resp:  [18] 18  SpO2:  [93 %-98 %] 95 %  BP: (100-119)/(63-78) 106/67     Weight: 93.4 kg (205 lb 14.6 oz)  Body mass index is 28.72 kg/m².    Intake/Output Summary (Last 24 hours) at 9/12/2024 1400  Last data filed at 9/12/2024 0923  Gross per 24 hour   Intake --   Output 1700 ml   Net -1700 ml         Physical Exam  Vitals and nursing note reviewed.   Constitutional:       Appearance: Normal appearance.   HENT:      Head: Normocephalic and atraumatic.      Mouth/Throat:      Mouth: Mucous membranes are moist.   Eyes:      Extraocular Movements: Extraocular movements intact.      Pupils: Pupils are equal, round, and reactive to light.   Cardiovascular:      Rate and Rhythm: Normal rate and regular rhythm.   Pulmonary:      Effort: Pulmonary effort is normal.      Breath sounds: Normal breath sounds.   Abdominal:      General: Bowel sounds are normal.      Palpations: Abdomen is soft.   Musculoskeletal:         General: Deformity present.      Cervical back: Normal range of motion and neck supple.      Right lower leg: Edema present.   Skin:     Findings: Erythema present.   Neurological:      General: No focal deficit present.      Mental Status: She is alert and oriented to person, place, and time. Mental status is at baseline.   Psychiatric:         Mood and Affect: Mood normal.         Behavior: Behavior normal.             Significant Labs: All pertinent labs within the past 24 hours have been reviewed.    Significant Imaging: I have reviewed all  pertinent imaging results/findings within the past 24 hours.

## 2024-09-12 NOTE — INTERVAL H&P NOTE
The patient has been examined and the H&P has been reviewed:    I concur with the findings and no changes have occurred since H&P was written.    Surgery risks, benefits and alternative options discussed and understood by patient/family.          Active Hospital Problems    Diagnosis  POA    *Severe sepsis [A41.9, R65.20]  Yes    INOCENTE (acute kidney injury) [N17.9]  Yes    Longstanding persistent atrial fibrillation [I48.11]  Yes    Acute cystitis with hematuria [N30.01]  Yes    Cellulitis of right lower extremity [L03.115]  Yes    Debility [R53.81]  Yes    Normocytic anemia [D64.9]  Yes    Neuropathy [G62.9]  Yes     Chronic      Resolved Hospital Problems    Diagnosis Date Resolved POA    Hypomagnesemia [E83.42] 09/11/2024 Yes

## 2024-09-12 NOTE — OR NURSING
Patient has a ring that was worn down from room. Gold in color. Clear stones. Security notified to come secure ring while patient in surgery.     ASHISH Blanc RN

## 2024-09-13 LAB
ANION GAP SERPL CALCULATED.3IONS-SCNC: 9 MMOL/L (ref 7–16)
BASOPHILS # BLD AUTO: 0.03 K/UL (ref 0–0.2)
BASOPHILS NFR BLD AUTO: 0.1 % (ref 0–1)
BUN SERPL-MCNC: 17 MG/DL (ref 7–18)
BUN/CREAT SERPL: 12 (ref 6–20)
CALCIUM SERPL-MCNC: 8.5 MG/DL (ref 8.5–10.1)
CHLORIDE SERPL-SCNC: 107 MMOL/L (ref 98–107)
CO2 SERPL-SCNC: 27 MMOL/L (ref 21–32)
CREAT SERPL-MCNC: 1.38 MG/DL (ref 0.55–1.02)
DIFFERENTIAL METHOD BLD: ABNORMAL
EGFR (NO RACE VARIABLE) (RUSH/TITUS): 40 ML/MIN/1.73M2
EOSINOPHIL # BLD AUTO: 0 K/UL (ref 0–0.5)
EOSINOPHIL NFR BLD AUTO: 0 % (ref 1–4)
ERYTHROCYTE [DISTWIDTH] IN BLOOD BY AUTOMATED COUNT: 13.9 % (ref 11.5–14.5)
ESTROGEN SERPL-MCNC: NORMAL PG/ML
GLUCOSE SERPL-MCNC: 162 MG/DL (ref 74–106)
HCT VFR BLD AUTO: 31.1 % (ref 38–47)
HGB BLD-MCNC: 9.8 G/DL (ref 12–16)
IMM GRANULOCYTES # BLD AUTO: 0.26 K/UL (ref 0–0.04)
IMM GRANULOCYTES NFR BLD: 1.2 % (ref 0–0.4)
INSULIN SERPL-ACNC: NORMAL U[IU]/ML
LAB AP GROSS DESCRIPTION: NORMAL
LAB AP LABORATORY NOTES: NORMAL
LYMPHOCYTES # BLD AUTO: 1.07 K/UL (ref 1–4.8)
LYMPHOCYTES NFR BLD AUTO: 5 % (ref 27–41)
MCH RBC QN AUTO: 29.3 PG (ref 27–31)
MCHC RBC AUTO-ENTMCNC: 31.5 G/DL (ref 32–36)
MCV RBC AUTO: 93.1 FL (ref 80–96)
MONOCYTES # BLD AUTO: 1.28 K/UL (ref 0–0.8)
MONOCYTES NFR BLD AUTO: 5.9 % (ref 2–6)
MPC BLD CALC-MCNC: 10.5 FL (ref 9.4–12.4)
NEUTROPHILS # BLD AUTO: 18.91 K/UL (ref 1.8–7.7)
NEUTROPHILS NFR BLD AUTO: 87.8 % (ref 53–65)
NRBC # BLD AUTO: 0 X10E3/UL
NRBC, AUTO (.00): 0 %
PLATELET # BLD AUTO: 376 K/UL (ref 150–400)
POTASSIUM SERPL-SCNC: 3.6 MMOL/L (ref 3.5–5.1)
RBC # BLD AUTO: 3.34 M/UL (ref 4.2–5.4)
SODIUM SERPL-SCNC: 139 MMOL/L (ref 136–145)
T3RU NFR SERPL: NORMAL %
WBC # BLD AUTO: 21.55 K/UL (ref 4.5–11)

## 2024-09-13 PROCEDURE — 25000003 PHARM REV CODE 250

## 2024-09-13 PROCEDURE — 36415 COLL VENOUS BLD VENIPUNCTURE: CPT

## 2024-09-13 PROCEDURE — 97530 THERAPEUTIC ACTIVITIES: CPT

## 2024-09-13 PROCEDURE — 85025 COMPLETE CBC W/AUTO DIFF WBC: CPT

## 2024-09-13 PROCEDURE — 94761 N-INVAS EAR/PLS OXIMETRY MLT: CPT

## 2024-09-13 PROCEDURE — 97110 THERAPEUTIC EXERCISES: CPT | Mod: CQ

## 2024-09-13 PROCEDURE — 99900035 HC TECH TIME PER 15 MIN (STAT)

## 2024-09-13 PROCEDURE — 97530 THERAPEUTIC ACTIVITIES: CPT | Mod: CQ

## 2024-09-13 PROCEDURE — 80048 BASIC METABOLIC PNL TOTAL CA: CPT

## 2024-09-13 PROCEDURE — 27000221 HC OXYGEN, UP TO 24 HOURS

## 2024-09-13 PROCEDURE — 99232 SBSQ HOSP IP/OBS MODERATE 35: CPT | Mod: ,,, | Performed by: INTERNAL MEDICINE

## 2024-09-13 PROCEDURE — 25000003 PHARM REV CODE 250: Performed by: INTERNAL MEDICINE

## 2024-09-13 PROCEDURE — 63600175 PHARM REV CODE 636 W HCPCS

## 2024-09-13 PROCEDURE — 11000001 HC ACUTE MED/SURG PRIVATE ROOM

## 2024-09-13 RX ADMIN — PIPERACILLIN SODIUM AND TAZOBACTAM SODIUM 4.5 G: 4; .5 INJECTION, POWDER, LYOPHILIZED, FOR SOLUTION INTRAVENOUS at 05:09

## 2024-09-13 RX ADMIN — METOPROLOL TARTRATE 25 MG: 25 TABLET, FILM COATED ORAL at 08:09

## 2024-09-13 RX ADMIN — GABAPENTIN 300 MG: 300 CAPSULE ORAL at 08:09

## 2024-09-13 RX ADMIN — MUPIROCIN: 20 OINTMENT TOPICAL at 08:09

## 2024-09-13 RX ADMIN — PIPERACILLIN SODIUM AND TAZOBACTAM SODIUM 4.5 G: 4; .5 INJECTION, POWDER, LYOPHILIZED, FOR SOLUTION INTRAVENOUS at 09:09

## 2024-09-13 RX ADMIN — GABAPENTIN 300 MG: 300 CAPSULE ORAL at 02:09

## 2024-09-13 RX ADMIN — DULOXETINE HYDROCHLORIDE 60 MG: 30 CAPSULE, DELAYED RELEASE ORAL at 08:09

## 2024-09-13 RX ADMIN — OXYBUTYNIN CHLORIDE 5 MG: 5 TABLET, EXTENDED RELEASE ORAL at 08:09

## 2024-09-13 RX ADMIN — FERROUS SULFATE TAB 325 MG (65 MG ELEMENTAL FE) 1 EACH: 325 (65 FE) TAB at 08:09

## 2024-09-13 RX ADMIN — PIPERACILLIN SODIUM AND TAZOBACTAM SODIUM 4.5 G: 4; .5 INJECTION, POWDER, LYOPHILIZED, FOR SOLUTION INTRAVENOUS at 02:09

## 2024-09-13 RX ADMIN — GABAPENTIN 300 MG: 300 CAPSULE ORAL at 09:09

## 2024-09-13 NOTE — SUBJECTIVE & OBJECTIVE
Interval History: Patient seen and examined at the bedside, reports no change in symptoms.     Review of Systems   All other systems reviewed and are negative.    Objective:     Vital Signs (Most Recent):  Temp: 97.6 °F (36.4 °C) (09/13/24 1127)  Pulse: 71 (09/13/24 1127)  Resp: 18 (09/13/24 0709)  BP: 94/60 (09/13/24 1127)  SpO2: 98 % (09/13/24 1451) Vital Signs (24h Range):  Temp:  [97.2 °F (36.2 °C)-98 °F (36.7 °C)] 97.6 °F (36.4 °C)  Pulse:  [] 71  Resp:  [16-19] 18  SpO2:  [77 %-100 %] 98 %  BP: ()/(60-84) 94/60     Weight: 93.4 kg (205 lb 14.6 oz)  Body mass index is 28.72 kg/m².    Intake/Output Summary (Last 24 hours) at 9/13/2024 1457  Last data filed at 9/13/2024 0118  Gross per 24 hour   Intake 25 ml   Output 1100 ml   Net -1075 ml         Physical Exam  Vitals and nursing note reviewed.   Constitutional:       Appearance: Normal appearance.   HENT:      Head: Normocephalic and atraumatic.      Mouth/Throat:      Mouth: Mucous membranes are moist.   Eyes:      Extraocular Movements: Extraocular movements intact.      Pupils: Pupils are equal, round, and reactive to light.   Cardiovascular:      Rate and Rhythm: Normal rate and regular rhythm.   Pulmonary:      Effort: Pulmonary effort is normal.      Breath sounds: Normal breath sounds.   Abdominal:      General: Bowel sounds are normal.      Palpations: Abdomen is soft.   Musculoskeletal:      Cervical back: Normal range of motion and neck supple.      Right lower leg: Edema present.      Comments: Right thigh and zuleyka dressing.    Skin:     Findings: Erythema present.   Neurological:      General: No focal deficit present.      Mental Status: She is alert and oriented to person, place, and time. Mental status is at baseline.   Psychiatric:         Mood and Affect: Mood normal.         Behavior: Behavior normal.             Significant Labs: All pertinent labs within the past 24 hours have been reviewed.    Significant Imaging: I have reviewed  all pertinent imaging results/findings within the past 24 hours.

## 2024-09-13 NOTE — PROGRESS NOTES
"[facility] Hospital Medicine  Progress Note    Patient Name: Bobbi Haywood  MRN: [id number]  Patient Class: IP- Inpatient   Admission Date: 9/10/2024  Length of Stay: 2 days  Attending Physician: Abrahan Farmer MD  Primary Care Provider: [facility]        Subjective:     Principal Problem:Severe sepsis        HPI:  Patient is a 73-year-old female who presents to [facility] after a fall from home where she reportedly was too weak to get up.  Patient has a past medical history of AFib on Eliquis, neuropathy and depression. Patient reports having inverted ankles that affect her walking when she turned her foot wrong way she fell down.  She denies hitting her head or losing consciousness.  Patient also reports falling a couple of weeks ago as well where she got her right posterior calf stuck in a truck door.  She reports developing a wound which bled and it now "appears infected".  Over the past few weeks patient endorses weakness and fatigue more than usual.  The patient reportedly lives with her  who is also present and contributes to the history. The patient denies headache, chest pain, shortness of breath, abdominal pain, fevers or chills.    Significant findings in the ED, lactic acid 2.6, Mag 1.3, UA positive for leukocytes, WBC 25.    The patient will be admitted to [facility] for continued care and medical management.    Overview/Hospital Course:  9/11- Checking CT leg as concern for deep infection. Urine culture with GNB. Continue current antibiotics. PT/OT consulted.   9/12- CT showed abscess, general surgery consulted and plan for I&D in OR today. Continue broad spectrum antibiotics. Swing bed placement in progress.   9/13- Wound culture with Group G strep and GNB, Dc'ed vancomycin, continue Zosyn. Swing bed placement next week.     Interval History: Patient seen and examined at the bedside, " reports no change in symptoms.     Review of Systems   All other systems reviewed and are negative.    Objective:     Vital Signs (Most Recent):  Temp: 97.6 °F (36.4 °C) (09/13/24 1127)  Pulse: 71 (09/13/24 1127)  Resp: 18 (09/13/24 0709)  BP: 94/60 (09/13/24 1127)  SpO2: 98 % (09/13/24 1451) Vital Signs (24h Range):  Temp:  [97.2 °F (36.2 °C)-98 °F (36.7 °C)] 97.6 °F (36.4 °C)  Pulse:  [] 71  Resp:  [16-19] 18  SpO2:  [77 %-100 %] 98 %  BP: ()/(60-84) 94/60     Weight: 93.4 kg (205 lb 14.6 oz)  Body mass index is 28.72 kg/m².    Intake/Output Summary (Last 24 hours) at 9/13/2024 1457  Last data filed at 9/13/2024 0118  Gross per 24 hour   Intake 25 ml   Output 1100 ml   Net -1075 ml         Physical Exam  Vitals and nursing note reviewed.   Constitutional:       Appearance: Normal appearance.   HENT:      Head: Normocephalic and atraumatic.      Mouth/Throat:      Mouth: Mucous membranes are moist.   Eyes:      Extraocular Movements: Extraocular movements intact.      Pupils: Pupils are equal, round, and reactive to light.   Cardiovascular:      Rate and Rhythm: Normal rate and regular rhythm.   Pulmonary:      Effort: Pulmonary effort is normal.      Breath sounds: Normal breath sounds.   Abdominal:      General: Bowel sounds are normal.      Palpations: Abdomen is soft.   Musculoskeletal:      Cervical back: Normal range of motion and neck supple.      Right lower leg: Edema present.      Comments: Right thigh and zuleyka dressing.    Skin:     Findings: Erythema present.   Neurological:      General: No focal deficit present.      Mental Status: She is alert and oriented to person, place, and time. Mental status is at baseline.   Psychiatric:         Mood and Affect: Mood normal.         Behavior: Behavior normal.             Significant Labs: All pertinent labs within the past 24 hours have been reviewed.    Significant Imaging: I have reviewed all pertinent imaging results/findings within the past 24  hours.    Assessment/Plan:      * Severe sepsis  This patient does have evidence of infective focus  My overall impression is sepsis.  Source: Urinary Tract and Skin and Soft Tissue (location right lower extremity)  Antibiotics given-   Antibiotics (72h ago, onward)      Start     Stop Route Frequency Ordered    09/11/24 0900  mupirocin 2 % ointment         09/16/24 0859 Top 2 times daily 09/11/24 0643    09/11/24 0500  piperacillin-tazobactam (ZOSYN) 4.5 g in D5W 100 mL IVPB (MB+)         -- IV Every 8 hours (non-standard times) 09/11/24 0141    09/11/24 0200  piperacillin-tazobactam (ZOSYN) 4.5 g in D5W 100 mL IVPB (MB+)         09/11/24 1359 IV ED 1 Time 09/11/24 0141          Latest lactate reviewed-  Recent Labs   Lab 09/11/24  0032   LACTATE 1.8       Blood culture NGTD.   Source control achieved by: antibiotics, abscess drainage- see below.     Cellulitis and abscess of right leg  2 week history after fall from home  CT leg showed probable right calf cellulitis with superimposed 23 x 68 x 92 mm thick walled air containing superficial abscess along the posterolateral soft tissues of the proximal right calf.   General surgery consulted; s/p I&D of abscess in calf and thigh on 9/12.  Per op report:  Infected right calf hematoma in the lateral aspect cavity proximally 6 x 5 x 1 and 0.5 cm.  Right medial thigh abscess cavity proximally 7 x 4 x 3 cm in size   Wound cultures with Group G strep and GNB- continue IV Zosyn.           INOCENTE (acute kidney injury)  INOCENTE is likely due to acute tubular necrosis caused by infection . Baseline creatinine is  ~ 1 . Most recent creatinine and eGFR are listed below.  Recent Labs     09/11/24  0250 09/12/24  0929 09/13/24  0514   CREATININE 1.48* 1.49* 1.38*   EGFRNORACEVR 37* 36* 40*        Plan  - INOCENTE is improving.  - s/p IV fluids.  - Avoid nephrotoxins and renally dose meds for GFR listed above  - Monitor urine output, serial BMP, and adjust therapy as needed      Acute cystitis  with hematuria  UA positive for leukocytes.   Urine culture with E.coli.  Continue antibiotics as above.    Normocytic anemia  Anemia is likely due to  unclear . Most recent hemoglobin and hematocrit are listed below.  Recent Labs     09/11/24  0250 09/12/24  0632 09/13/24  0514   HGB 9.3* 9.2* 9.8*   HCT 29.7* 29.4* 31.1*       Plan  - Monitor serial CBC: Daily  - Transfuse PRBC if patient becomes hemodynamically unstable, symptomatic or H/H drops below 7/21.  - Patient has not received any PRBC transfusions to date  - Patient's anemia is currently stable  - B12, folate WNL, iron panel with iron deficiency.  - Started on iron replacement.      Debility  Patient with Acute on chronic debility due to chronic unspecified fatigue and age-related physical debility. Latest AMPAC and GEMS scores have not been reviewed. Evaluation for etiology is complete. Plan includes progressive mobility protocol initated and PT/OT consulted.  Swing bed placement in progress.     Longstanding persistent atrial fibrillation  Patient with Long standing persistent (>12 months) atrial fibrillation which is uncontrolled currently with Beta Blocker. Patient is currently in atrial fibrillation.[id number] Score: 3. HASBLED Score: 1. Anticoagulation indicated. Anticoagulation done with Eliquis but on hold given surgery/hematoma. Resume AC when ok with surgery team .      Neuropathy  Moderately well-controlled  Continue home Cymbalta and gabapentin        VTE Risk Mitigation (From admission, onward)           Ordered     Reason for No Pharmacological VTE Prophylaxis  Once        Question:  Reasons:  Answer:  Already adequately anticoagulated on oral Anticoagulants    09/11/24 0119     IP VTE HIGH RISK PATIENT  Once         09/11/24 0119     Place sequential compression device  Until discontinued         09/11/24 0119                    Discharge Planning   SHASHI: 9/16/2024     Code Status: Full Code   Is the patient medically ready for discharge?:      Reason for patient still in hospital (select all that apply): Patient trending condition, Laboratory test, Consult recommendations, and Pending disposition  Discharge Plan A: Skilled Nursing Facility   Discharge Delays: None known at this time          SAVANAH SÁNCHEZ MD  Department of Hospital Medicine   [facility]

## 2024-09-13 NOTE — PT/OT/SLP PROGRESS
Physical Therapy Treatment    Patient Name:  Bobbi Haywood   MRN:  [id number]    Recommendations:     Discharge Recommendations: Moderate Intensity Therapy  Discharge Equipment Recommendations: none  Barriers to discharge: None    Assessment:     Bobbi Haywood is a 73 y.o. female admitted with a medical diagnosis of Severe sepsis.  She presents with the following impairments/functional limitations: weakness, impaired functional mobility, impaired endurance, pain .    Pt eager to improve, participates well    Rehab Prognosis: Good and Fair; patient would benefit from acute skilled PT services to address these deficits and reach maximum level of function.    Recent Surgery: Procedure(s) (LRB):  INCISION AND DRAINAGE, ABSCESS (Right) 1 Day Post-Op    Plan:     During this hospitalization, patient to be seen 5 x/week to address the identified rehab impairments via gait training, therapeutic activities, therapeutic exercises and progress toward the following goals:    Plan of Care Expires:  10/11/24    Received Plan of Care per Rojas Means PT     Subjective     Chief Complaint: pain as noted  Patient/Family Comments/goals: agrees to PT Tx   Pain/Comfort:  Pain Rating 1: 3/10  Location - Side 1: Right  Location 1: leg  Pain Addressed 1: Pre-medicate for activity, Reposition, Distraction, Cessation of Activity  Pain Rating Post-Intervention 1: 2/10      Objective:     Communicated with patient prior to session.  Patient found HOB elevated with peripheral IV, telemetry, PureWick, oxygen upon PT entry to room.     General Precautions: Standard, fall  Orthopedic Precautions:    Braces:    Respiratory Status: Nasal cannula, flow 2 L/min     Functional Mobility:  Bed Mobility:     Scooting: moderate assistance of 2; assist for RLE  Supine to Sit: moderate assistance and of 2 persons, assist for RLE  Transfers:     Sit to Stand:  minimum assistance, moderate assistance, and of 2 persons with rolling  walker  Bed to Chair: minimum assistance, moderate assistance, and of 2 persons with  rolling walker  using  Step Transfer  Balance: good sit edge of bed balance unsupported; fair with rolling walker in standing      AM-PAC 6 CLICK MOBILITY  Turning over in bed (including adjusting bedclothes, sheets and blankets)?: 3  Sitting down on and standing up from a chair with arms (e.g., wheelchair, bedside commode, etc.): 3  Moving from lying on back to sitting on the side of the bed?: 3  Moving to and from a bed to a chair (including a wheelchair)?: 3  Need to walk in hospital room?: 2  Climbing 3-5 steps with a railing?: 1  Basic Mobility Total Score: 15       Treatment & Education:  Bed mobility and transfers as noted above   Sitting and reclined BLE exercises x 20 repetitions each with assist prn for RLE    Patient left up in chair with all lines intact and call button in reach..    GOALS:   Multidisciplinary Problems       Physical Therapy Goals          Problem: Physical Therapy    Goal Priority Disciplines Outcome Goal Variances Interventions   Physical Therapy Goal     PT, PT/OT Progressing     Description: Short Term Goals  Ambulate SBA - 100 feet with rolling walker assistive device.   Supine to sit minimum  Sit to stand minimum  SPT minimum  5. Independent with HEP    Long Term Goals  Ambulate SBA - 150 feet with rolling walker assistive device.   Supine to sit stand-by  Sit to stand stand-by  SPT stand-by  Needed equipment for home.                              Time Tracking:     PT Received On: 09/13/24  PT Start Time: 1143     PT Stop Time: 1210  PT Total Time (min): 27 min     Billable Minutes: Therapeutic Activity 13 and Therapeutic Exercise 14    Treatment Type: Treatment  PT/PTA: PTA     Number of PTA visits since last PT visit: 1 09/13/2024

## 2024-09-13 NOTE — ASSESSMENT & PLAN NOTE
INOCENTE is likely due to acute tubular necrosis caused by infection . Baseline creatinine is  ~ 1 . Most recent creatinine and eGFR are listed below.  Recent Labs     09/11/24  0250 09/12/24  0929 09/13/24  0514   CREATININE 1.48* 1.49* 1.38*   EGFRNORACEVR 37* 36* 40*        Plan  - INOCENTE is improving.  - s/p IV fluids.  - Avoid nephrotoxins and renally dose meds for GFR listed above  - Monitor urine output, serial BMP, and adjust therapy as needed

## 2024-09-13 NOTE — ASSESSMENT & PLAN NOTE
Anemia is likely due to  unclear . Most recent hemoglobin and hematocrit are listed below.  Recent Labs     09/11/24  0250 09/12/24  0632 09/13/24  0514   HGB 9.3* 9.2* 9.8*   HCT 29.7* 29.4* 31.1*       Plan  - Monitor serial CBC: Daily  - Transfuse PRBC if patient becomes hemodynamically unstable, symptomatic or H/H drops below 7/21.  - Patient has not received any PRBC transfusions to date  - Patient's anemia is currently stable  - B12, folate WNL, iron panel with iron deficiency.  - Started on iron replacement.

## 2024-09-13 NOTE — ASSESSMENT & PLAN NOTE
Patient with Long standing persistent (>12 months) atrial fibrillation which is uncontrolled currently with Beta Blocker. Patient is currently in atrial fibrillation.[id number] Score: 3. HASBLED Score: 1. Anticoagulation indicated. Anticoagulation done with Eliquis but on hold given surgery/hematoma. Resume AC when ok with surgery team .

## 2024-09-13 NOTE — PT/OT/SLP PROGRESS
Occupational Therapy   Treatment    Name: Bobbi Haywood  MRN: [id number]  Admitting Diagnosis:  Severe sepsis  1 Day Post-Op    Recommendations:     Discharge Recommendations: Moderate Intensity Therapy  Discharge Equipment Recommendations:  none  Barriers to discharge:  None    Assessment:     Bobbi Haywood is a 73 y.o. female with a medical diagnosis of Severe sepsis.  She presents with weakness and decline in ADLs. Performance deficits affecting function are weakness, impaired endurance, impaired self care skills, impaired functional mobility, gait instability, impaired balance, pain. Pt s/p I&D of right leg on 9/12/24. Pt reports improvements with pain with bed mobility and sit to stand t/f.     Rehab Prognosis:  Good; patient would benefit from acute skilled OT services to address these deficits and reach maximum level of function.       Plan:     Patient to be seen 5 x/week to address the above listed problems via self-care/home management, therapeutic activities, therapeutic exercises  Plan of Care Expires: 10/09/24  Plan of Care Reviewed with: patient    Subjective     Chief Complaint: severe sepsis  Patient/Family Comments/goals: pt agreeable to OT tx  Pain/Comfort:  Pain Rating 1: 3/10  Location - Side 1: Right  Location 1: leg    Objective:     Communicated with: SUKHDEV Mayo prior to session.  Patient found HOB elevated with oxygen, peripheral IV, telemetry, PureWick upon OT entry to room.    General Precautions: Standard, fall    Orthopedic Precautions:N/A  Braces: N/A  Respiratory Status: Nasal cannula, flow 2 L/min     Occupational Performance:     Bed Mobility:    Patient completed Supine to Sit with moderate assistance  Patient completed Sit to Supine with moderate assistance     Functional Mobility/Transfers:  Patient completed Sit <> Stand Transfer with minimum assistance and of 2 persons  with  rolling walker   Functional Mobility: pt performed side step to HOB with Esvin with  RW    Activities of Daily Living:  Not performed      [facility] 6 Click ADL:      Treatment & Education:  Pt performed bed mobility and ADL t/f as lsited above. Pt performed sit to stand t/f x 3 attempts with Esvin x 2 with RW.     Patient left HOB elevated with all lines intact, call button in reach, Maria Esther RN notified, and  present    GOALS:   Multidisciplinary Problems       Occupational Therapy Goals          Problem: Occupational Therapy    Goal Priority Disciplines Outcome Interventions   Occupational Therapy Goal     OT, PT/OT Progressing    Description: STG:  Pt will perform grooming with setup  Pt will bathe with Esvin with setup at EOB  Pt will perform UE dressing with Charlene  Pt will perform LE dressing with Esvin  Pt will sit EOB x 10 min with SBA   Pt will transfer bed/chair/bsc with CGA with RW  Pt will perform standing task x 2 min with CGA with RW   Pt will tolerate 15 minutes of tx without fatigue      LT.Restore to max I with self care and mobility.                           Time Tracking:     OT Date of Treatment: 24  OT Start Time: 955  OT Stop Time: 1011  OT Total Time (min): 16 min    Billable Minutes:Therapeutic Activity 16 minutes    OT/TRISHA: OT          2024

## 2024-09-13 NOTE — PLAN OF CARE
09/13/24 1249   Rounds   Attendance Provider;;Charge nurse;Physical therapist;Pharmacist   Discharge Plan A Skilled Nursing Facility   Why the patient remains in the hospital Requires continued medical care   Transition of Care Barriers None     Chart reviewed. Per  pt possibly ready for d/c on Monday. Ghazal with [facility]onda informed.

## 2024-09-13 NOTE — ASSESSMENT & PLAN NOTE
2 week history after fall from home  CT leg showed probable right calf cellulitis with superimposed 23 x 68 x 92 mm thick walled air containing superficial abscess along the posterolateral soft tissues of the proximal right calf.   General surgery consulted; s/p I&D of abscess in calf and thigh on 9/12.  Per op report:  Infected right calf hematoma in the lateral aspect cavity proximally 6 x 5 x 1 and 0.5 cm.  Right medial thigh abscess cavity proximally 7 x 4 x 3 cm in size   Wound cultures with Group G strep and GNB- continue IV Zosyn.

## 2024-09-13 NOTE — PROGRESS NOTES
Status post incision and drainage of abscess of right lateral calf and right medial thigh.  Afebrile.  WBC 37294, down from 25,000.  Wounds look good.  Preliminary culture shows Gram-negative bacilli, on Zosyn.  Continue daily dressing change by nursing.  Awaiting swing bed determination.

## 2024-09-13 NOTE — ASSESSMENT & PLAN NOTE
This patient does have evidence of infective focus  My overall impression is sepsis.  Source: Urinary Tract and Skin and Soft Tissue (location right lower extremity)  Antibiotics given-   Antibiotics (72h ago, onward)      Start     Stop Route Frequency Ordered    09/11/24 0900  mupirocin 2 % ointment         09/16/24 0859 Top 2 times daily 09/11/24 0643    09/11/24 0500  piperacillin-tazobactam (ZOSYN) 4.5 g in D5W 100 mL IVPB (MB+)         -- IV Every 8 hours (non-standard times) 09/11/24 0141    09/11/24 0200  piperacillin-tazobactam (ZOSYN) 4.5 g in D5W 100 mL IVPB (MB+)         09/11/24 1359 IV ED 1 Time 09/11/24 0141          Latest lactate reviewed-  Recent Labs   Lab 09/11/24  0032   LACTATE 1.8       Blood culture NGTD.   Source control achieved by: antibiotics, abscess drainage- see below.

## 2024-09-14 LAB
ANION GAP SERPL CALCULATED.3IONS-SCNC: 7 MMOL/L (ref 7–16)
BASOPHILS # BLD AUTO: 0.05 K/UL (ref 0–0.2)
BASOPHILS NFR BLD AUTO: 0.2 % (ref 0–1)
BUN SERPL-MCNC: 23 MG/DL (ref 7–18)
BUN/CREAT SERPL: 14 (ref 6–20)
CALCIUM SERPL-MCNC: 8.1 MG/DL (ref 8.5–10.1)
CHLORIDE SERPL-SCNC: 107 MMOL/L (ref 98–107)
CO2 SERPL-SCNC: 29 MMOL/L (ref 21–32)
CREAT SERPL-MCNC: 1.66 MG/DL (ref 0.55–1.02)
DIFFERENTIAL METHOD BLD: ABNORMAL
EGFR (NO RACE VARIABLE) (RUSH/TITUS): 32 ML/MIN/1.73M2
EOSINOPHIL # BLD AUTO: 0.07 K/UL (ref 0–0.5)
EOSINOPHIL NFR BLD AUTO: 0.3 % (ref 1–4)
ERYTHROCYTE [DISTWIDTH] IN BLOOD BY AUTOMATED COUNT: 14.1 % (ref 11.5–14.5)
GLUCOSE SERPL-MCNC: 99 MG/DL (ref 74–106)
HCT VFR BLD AUTO: 26.9 % (ref 38–47)
HGB BLD-MCNC: 8.3 G/DL (ref 12–16)
IMM GRANULOCYTES # BLD AUTO: 0.39 K/UL (ref 0–0.04)
IMM GRANULOCYTES NFR BLD: 1.9 % (ref 0–0.4)
LYMPHOCYTES # BLD AUTO: 2.34 K/UL (ref 1–4.8)
LYMPHOCYTES NFR BLD AUTO: 11.3 % (ref 27–41)
MCH RBC QN AUTO: 29.2 PG (ref 27–31)
MCHC RBC AUTO-ENTMCNC: 30.9 G/DL (ref 32–36)
MCV RBC AUTO: 94.7 FL (ref 80–96)
MICROORGANISM SPEC CULT: ABNORMAL
MONOCYTES # BLD AUTO: 2.11 K/UL (ref 0–0.8)
MONOCYTES NFR BLD AUTO: 10.2 % (ref 2–6)
MPC BLD CALC-MCNC: 10.8 FL (ref 9.4–12.4)
NEUTROPHILS # BLD AUTO: 15.81 K/UL (ref 1.8–7.7)
NEUTROPHILS NFR BLD AUTO: 76.1 % (ref 53–65)
NRBC # BLD AUTO: 0 X10E3/UL
NRBC, AUTO (.00): 0 %
PLATELET # BLD AUTO: 374 K/UL (ref 150–400)
POTASSIUM SERPL-SCNC: 4 MMOL/L (ref 3.5–5.1)
RBC # BLD AUTO: 2.84 M/UL (ref 4.2–5.4)
SODIUM SERPL-SCNC: 139 MMOL/L (ref 136–145)
VANCOMYCIN TROUGH SERPL-MCNC: 11.4 ΜG/ML (ref 10–20)
WBC # BLD AUTO: 20.77 K/UL (ref 4.5–11)

## 2024-09-14 PROCEDURE — 25000003 PHARM REV CODE 250

## 2024-09-14 PROCEDURE — 11000001 HC ACUTE MED/SURG PRIVATE ROOM

## 2024-09-14 PROCEDURE — 85025 COMPLETE CBC W/AUTO DIFF WBC: CPT

## 2024-09-14 PROCEDURE — 36415 COLL VENOUS BLD VENIPUNCTURE: CPT

## 2024-09-14 PROCEDURE — 80202 ASSAY OF VANCOMYCIN: CPT

## 2024-09-14 PROCEDURE — 99232 SBSQ HOSP IP/OBS MODERATE 35: CPT | Mod: ,,, | Performed by: FAMILY MEDICINE

## 2024-09-14 PROCEDURE — 99900035 HC TECH TIME PER 15 MIN (STAT)

## 2024-09-14 PROCEDURE — 63600175 PHARM REV CODE 636 W HCPCS

## 2024-09-14 PROCEDURE — 80048 BASIC METABOLIC PNL TOTAL CA: CPT

## 2024-09-14 PROCEDURE — 25000003 PHARM REV CODE 250: Performed by: INTERNAL MEDICINE

## 2024-09-14 PROCEDURE — 94761 N-INVAS EAR/PLS OXIMETRY MLT: CPT

## 2024-09-14 RX ADMIN — GABAPENTIN 300 MG: 300 CAPSULE ORAL at 09:09

## 2024-09-14 RX ADMIN — MUPIROCIN: 20 OINTMENT TOPICAL at 08:09

## 2024-09-14 RX ADMIN — METOPROLOL TARTRATE 25 MG: 25 TABLET, FILM COATED ORAL at 08:09

## 2024-09-14 RX ADMIN — GABAPENTIN 300 MG: 300 CAPSULE ORAL at 03:09

## 2024-09-14 RX ADMIN — MUPIROCIN: 20 OINTMENT TOPICAL at 09:09

## 2024-09-14 RX ADMIN — FERROUS SULFATE TAB 325 MG (65 MG ELEMENTAL FE) 1 EACH: 325 (65 FE) TAB at 08:09

## 2024-09-14 RX ADMIN — PIPERACILLIN SODIUM AND TAZOBACTAM SODIUM 4.5 G: 4; .5 INJECTION, POWDER, LYOPHILIZED, FOR SOLUTION INTRAVENOUS at 09:09

## 2024-09-14 RX ADMIN — GABAPENTIN 300 MG: 300 CAPSULE ORAL at 08:09

## 2024-09-14 RX ADMIN — PIPERACILLIN SODIUM AND TAZOBACTAM SODIUM 4.5 G: 4; .5 INJECTION, POWDER, LYOPHILIZED, FOR SOLUTION INTRAVENOUS at 12:09

## 2024-09-14 RX ADMIN — OXYBUTYNIN CHLORIDE 5 MG: 5 TABLET, EXTENDED RELEASE ORAL at 08:09

## 2024-09-14 RX ADMIN — DULOXETINE HYDROCHLORIDE 60 MG: 30 CAPSULE, DELAYED RELEASE ORAL at 08:09

## 2024-09-14 RX ADMIN — PIPERACILLIN SODIUM AND TAZOBACTAM SODIUM 4.5 G: 4; .5 INJECTION, POWDER, LYOPHILIZED, FOR SOLUTION INTRAVENOUS at 05:09

## 2024-09-14 NOTE — PROGRESS NOTES
"[facility] - [address] [place] [address] Medicine  Progress Note    Patient Name: Bobbi Haywood  MRN: [id number]  Patient Class: IP- Inpatient   Admission Date: 9/10/2024  Length of Stay: 3 days  Attending Physician: Elizabeth Luis DO  Primary Care Provider: [facility]        Subjective:     Principal Problem:Severe sepsis        HPI:  Patient is a 73-year-old female who presents to [facility] after a fall from home where she reportedly was too weak to get up.  Patient has a past medical history of AFib on Eliquis, neuropathy and depression. Patient reports having inverted ankles that affect her walking when she turned her foot wrong way she fell down.  She denies hitting her head or losing consciousness.  Patient also reports falling a couple of weeks ago as well where she got her right posterior calf stuck in a truck door.  She reports developing a wound which bled and it now "appears infected".  Over the past few weeks patient endorses weakness and fatigue more than usual.  The patient reportedly lives with her  who is also present and contributes to the history. The patient denies headache, chest pain, shortness of breath, abdominal pain, fevers or chills.    Significant findings in the ED, lactic acid 2.6, Mag 1.3, UA positive for leukocytes, WBC 25.    The patient will be admitted to [facility] for continued care and medical management.    Overview/Hospital Course:  9/11- Checking CT leg as concern for deep infection. Urine culture with GNB. Continue current antibiotics. PT/OT consulted.   9/12- CT showed abscess, general surgery consulted and plan for I&D in OR today. Continue broad spectrum antibiotics. Swing bed placement in progress.   9/13- Wound culture with Group G strep and GNB, Dc'ed vancomycin, continue Zosyn. Swing bed placement next week.     Interval History:     No significant events overnight, no new " complaints or concerns. Continue current management.     Review of Systems   All other systems reviewed and are negative.    Objective:     Vital Signs (Most Recent):  Temp: 97.7 °F (36.5 °C) (09/14/24 0745)  Pulse: 73 (09/14/24 0843)  Resp: 19 (09/14/24 0745)  BP: 97/62 (09/14/24 0843)  SpO2: 95 % (09/14/24 0809) Vital Signs (24h Range):  Temp:  [97.6 °F (36.4 °C)-98 °F (36.7 °C)] 97.7 °F (36.5 °C)  Pulse:  [] 73  Resp:  [18-19] 19  SpO2:  [95 %-99 %] 95 %  BP: ()/(60-77) 97/62     Weight: 93.4 kg (205 lb 14.6 oz)  Body mass index is 28.72 kg/m².    Intake/Output Summary (Last 24 hours) at 9/14/2024 1445  Last data filed at 9/13/2024 1821  Gross per 24 hour   Intake 692.56 ml   Output --   Net 692.56 ml         Physical Exam  Vitals and nursing note reviewed.   Constitutional:       Appearance: Normal appearance.   HENT:      Head: Normocephalic and atraumatic.      Mouth/Throat:      Mouth: Mucous membranes are moist.   Eyes:      Extraocular Movements: Extraocular movements intact.      Pupils: Pupils are equal, round, and reactive to light.   Cardiovascular:      Rate and Rhythm: Normal rate and regular rhythm.   Pulmonary:      Effort: Pulmonary effort is normal.      Breath sounds: Normal breath sounds.   Abdominal:      General: Bowel sounds are normal.      Palpations: Abdomen is soft.   Musculoskeletal:      Cervical back: Normal range of motion and neck supple.      Right lower leg: Edema present.      Comments: Right thigh and calf dressing - intact, clean and dry   Skin:     Findings: Erythema present.   Neurological:      General: No focal deficit present.      Mental Status: She is alert and oriented to person, place, and time. Mental status is at baseline.   Psychiatric:         Mood and Affect: Mood normal.         Behavior: Behavior normal.             Significant Labs: All pertinent labs within the past 24 hours have been reviewed.    Significant Imaging: I have reviewed all pertinent  imaging results/findings within the past 24 hours.    Assessment/Plan:      * Severe sepsis  This patient does have evidence of infective focus  My overall impression is sepsis.  Source: Urinary Tract and Skin and Soft Tissue (location right lower extremity)  Antibiotics given-   Antibiotics (72h ago, onward)      Start     Stop Route Frequency Ordered    09/11/24 0900  mupirocin 2 % ointment         09/16/24 0859 Top 2 times daily 09/11/24 0643    09/11/24 0500  piperacillin-tazobactam (ZOSYN) 4.5 g in D5W 100 mL IVPB (MB+)         -- IV Every 8 hours (non-standard times) 09/11/24 0141    09/11/24 0200  piperacillin-tazobactam (ZOSYN) 4.5 g in D5W 100 mL IVPB (MB+)         09/11/24 1359 IV ED 1 Time 09/11/24 0141          Latest lactate reviewed-  Recent Labs   Lab 09/11/24  0032   LACTATE 1.8       Blood culture NGTD.   Source control achieved by: antibiotics, abscess drainage- see below.     Normocytic anemia  Anemia is likely due to  unclear . Most recent hemoglobin and hematocrit are listed below.  Recent Labs     09/11/24  0250 09/12/24  0632 09/13/24  0514   HGB 9.3* 9.2* 9.8*   HCT 29.7* 29.4* 31.1*       Plan  - Monitor serial CBC: Daily  - Transfuse PRBC if patient becomes hemodynamically unstable, symptomatic or H/H drops below 7/21.  - Patient has not received any PRBC transfusions to date  - Patient's anemia is currently stable  - B12, folate WNL, iron panel with iron deficiency.  - Started on iron replacement.      Debility  Patient with Acute on chronic debility due to chronic unspecified fatigue and age-related physical debility. Latest AMPAC and GEMS scores have not been reviewed. Evaluation for etiology is complete. Plan includes progressive mobility protocol initated and PT/OT consulted.  Swing bed placement in progress.     Cellulitis and abscess of right leg  Awaiting SWB for continued antibiotics, wound care, and therapy.   ---  2 week history after fall from home  CT leg showed probable right  calf cellulitis with superimposed 23 x 68 x 92 mm thick walled air containing superficial abscess along the posterolateral soft tissues of the proximal right calf.   General surgery consulted; s/p I&D of abscess in calf and thigh on 9/12.  Per op report:  Infected right calf hematoma in the lateral aspect cavity proximally 6 x 5 x 1 and 0.5 cm.  Right medial thigh abscess cavity proximally 7 x 4 x 3 cm in size   Wound cultures with Group G strep and GNB- continue IV Zosyn.           Acute cystitis with hematuria  UA positive for leukocytes.   Urine culture with E.coli.  Continue antibiotics as above.    Longstanding persistent atrial fibrillation  Patient with Long standing persistent (>12 months) atrial fibrillation which is uncontrolled currently with Beta Blocker. Patient is currently in atrial fibrillation.[id number] Score: 3. HASBLED Score: 1. Anticoagulation indicated. Anticoagulation done with Eliquis but on hold given surgery/hematoma. Resume AC when ok with surgery team .      INOCENTE (acute kidney injury)  INOCENTE is likely due to acute tubular necrosis caused by infection . Baseline creatinine is  ~ 1 . Most recent creatinine and eGFR are listed below.  Recent Labs     09/12/24  0929 09/13/24  0514 09/14/24  0544   CREATININE 1.49* 1.38* 1.66*   EGFRNORACEVR 36* 40* 32*        Plan  - INOCENTE is stable  - s/p IV fluids.  - Avoid nephrotoxins and renally dose meds for GFR listed above  - Monitor urine output, serial BMP, and adjust therapy as needed      Neuropathy  Moderately well-controlled  Continue home Cymbalta and gabapentin        VTE Risk Mitigation (From admission, onward)           Ordered     Reason for No Pharmacological VTE Prophylaxis  Once        Question:  Reasons:  Answer:  Already adequately anticoagulated on oral Anticoagulants    09/11/24 0119     IP VTE HIGH RISK PATIENT  Once         09/11/24 0119     Place sequential compression device  Until discontinued         09/11/24 0119                     Discharge Planning   SHASHI: 9/16/2024     Code Status: Full Code   Is the patient medically ready for discharge?:     Reason for patient still in hospital (select all that apply): Treatment  Discharge Plan A: Skilled Nursing Facility   Discharge Delays: None known at this time              Elizabeth Luis DO  Department of Hospital Medicine   [facility] - [facility] Telemetry

## 2024-09-14 NOTE — ASSESSMENT & PLAN NOTE
INOCENTE is likely due to acute tubular necrosis caused by infection . Baseline creatinine is  ~ 1 . Most recent creatinine and eGFR are listed below.  Recent Labs     09/12/24  0929 09/13/24  0514 09/14/24  0544   CREATININE 1.49* 1.38* 1.66*   EGFRNORACEVR 36* 40* 32*        Plan  - INOCENTE is stable  - s/p IV fluids.  - Avoid nephrotoxins and renally dose meds for GFR listed above  - Monitor urine output, serial BMP, and adjust therapy as needed

## 2024-09-14 NOTE — ASSESSMENT & PLAN NOTE
Awaiting SWB for continued antibiotics, wound care, and therapy.   ---  2 week history after fall from home  CT leg showed probable right calf cellulitis with superimposed 23 x 68 x 92 mm thick walled air containing superficial abscess along the posterolateral soft tissues of the proximal right calf.   General surgery consulted; s/p I&D of abscess in calf and thigh on 9/12.  Per op report:  Infected right calf hematoma in the lateral aspect cavity proximally 6 x 5 x 1 and 0.5 cm.  Right medial thigh abscess cavity proximally 7 x 4 x 3 cm in size   Wound cultures with Group G strep and GNB- continue IV Zosyn.

## 2024-09-14 NOTE — SUBJECTIVE & OBJECTIVE
Interval History:     No significant events overnight, no new complaints or concerns. Continue current management.     Review of Systems   All other systems reviewed and are negative.    Objective:     Vital Signs (Most Recent):  Temp: 97.7 °F (36.5 °C) (09/14/24 0745)  Pulse: 73 (09/14/24 0843)  Resp: 19 (09/14/24 0745)  BP: 97/62 (09/14/24 0843)  SpO2: 95 % (09/14/24 0809) Vital Signs (24h Range):  Temp:  [97.6 °F (36.4 °C)-98 °F (36.7 °C)] 97.7 °F (36.5 °C)  Pulse:  [] 73  Resp:  [18-19] 19  SpO2:  [95 %-99 %] 95 %  BP: ()/(60-77) 97/62     Weight: 93.4 kg (205 lb 14.6 oz)  Body mass index is 28.72 kg/m².    Intake/Output Summary (Last 24 hours) at 9/14/2024 1445  Last data filed at 9/13/2024 1821  Gross per 24 hour   Intake 692.56 ml   Output --   Net 692.56 ml         Physical Exam  Vitals and nursing note reviewed.   Constitutional:       Appearance: Normal appearance.   HENT:      Head: Normocephalic and atraumatic.      Mouth/Throat:      Mouth: Mucous membranes are moist.   Eyes:      Extraocular Movements: Extraocular movements intact.      Pupils: Pupils are equal, round, and reactive to light.   Cardiovascular:      Rate and Rhythm: Normal rate and regular rhythm.   Pulmonary:      Effort: Pulmonary effort is normal.      Breath sounds: Normal breath sounds.   Abdominal:      General: Bowel sounds are normal.      Palpations: Abdomen is soft.   Musculoskeletal:      Cervical back: Normal range of motion and neck supple.      Right lower leg: Edema present.      Comments: Right thigh and calf dressing - intact, clean and dry   Skin:     Findings: Erythema present.   Neurological:      General: No focal deficit present.      Mental Status: She is alert and oriented to person, place, and time. Mental status is at baseline.   Psychiatric:         Mood and Affect: Mood normal.         Behavior: Behavior normal.             Significant Labs: All pertinent labs within the past 24 hours have been  reviewed.    Significant Imaging: I have reviewed all pertinent imaging results/findings within the past 24 hours.

## 2024-09-15 LAB
ANION GAP SERPL CALCULATED.3IONS-SCNC: 9 MMOL/L (ref 7–16)
BACTERIA SPEC ANAEROBE CULT: ABNORMAL
BASOPHILS # BLD AUTO: 0.1 K/UL (ref 0–0.2)
BASOPHILS NFR BLD AUTO: 0.6 % (ref 0–1)
BUN SERPL-MCNC: 30 MG/DL (ref 7–18)
BUN/CREAT SERPL: 19 (ref 6–20)
CALCIUM SERPL-MCNC: 8.4 MG/DL (ref 8.5–10.1)
CHLORIDE SERPL-SCNC: 108 MMOL/L (ref 98–107)
CO2 SERPL-SCNC: 26 MMOL/L (ref 21–32)
CREAT SERPL-MCNC: 1.56 MG/DL (ref 0.55–1.02)
DIFFERENTIAL METHOD BLD: ABNORMAL
EGFR (NO RACE VARIABLE) (RUSH/TITUS): 35 ML/MIN/1.73M2
EOSINOPHIL # BLD AUTO: 0.47 K/UL (ref 0–0.5)
EOSINOPHIL NFR BLD AUTO: 3 % (ref 1–4)
ERYTHROCYTE [DISTWIDTH] IN BLOOD BY AUTOMATED COUNT: 14.3 % (ref 11.5–14.5)
GLUCOSE SERPL-MCNC: 83 MG/DL (ref 74–106)
HCT VFR BLD AUTO: 26.3 % (ref 38–47)
HGB BLD-MCNC: 8 G/DL (ref 12–16)
IMM GRANULOCYTES # BLD AUTO: 0.43 K/UL (ref 0–0.04)
IMM GRANULOCYTES NFR BLD: 2.7 % (ref 0–0.4)
LYMPHOCYTES # BLD AUTO: 2.86 K/UL (ref 1–4.8)
LYMPHOCYTES NFR BLD AUTO: 18 % (ref 27–41)
MCH RBC QN AUTO: 29 PG (ref 27–31)
MCHC RBC AUTO-ENTMCNC: 30.4 G/DL (ref 32–36)
MCV RBC AUTO: 95.3 FL (ref 80–96)
MONOCYTES # BLD AUTO: 1.77 K/UL (ref 0–0.8)
MONOCYTES NFR BLD AUTO: 11.1 % (ref 2–6)
MPC BLD CALC-MCNC: 10.5 FL (ref 9.4–12.4)
NEUTROPHILS # BLD AUTO: 10.3 K/UL (ref 1.8–7.7)
NEUTROPHILS NFR BLD AUTO: 64.6 % (ref 53–65)
NRBC # BLD AUTO: 0 X10E3/UL
NRBC, AUTO (.00): 0 %
PLATELET # BLD AUTO: 377 K/UL (ref 150–400)
POTASSIUM SERPL-SCNC: 4.3 MMOL/L (ref 3.5–5.1)
RBC # BLD AUTO: 2.76 M/UL (ref 4.2–5.4)
SODIUM SERPL-SCNC: 139 MMOL/L (ref 136–145)
WBC # BLD AUTO: 15.93 K/UL (ref 4.5–11)

## 2024-09-15 PROCEDURE — 85025 COMPLETE CBC W/AUTO DIFF WBC: CPT

## 2024-09-15 PROCEDURE — 99232 SBSQ HOSP IP/OBS MODERATE 35: CPT | Mod: ,,, | Performed by: FAMILY MEDICINE

## 2024-09-15 PROCEDURE — 94761 N-INVAS EAR/PLS OXIMETRY MLT: CPT

## 2024-09-15 PROCEDURE — 80048 BASIC METABOLIC PNL TOTAL CA: CPT

## 2024-09-15 PROCEDURE — 36415 COLL VENOUS BLD VENIPUNCTURE: CPT

## 2024-09-15 PROCEDURE — 25000003 PHARM REV CODE 250: Performed by: INTERNAL MEDICINE

## 2024-09-15 PROCEDURE — 63600175 PHARM REV CODE 636 W HCPCS

## 2024-09-15 PROCEDURE — 25000003 PHARM REV CODE 250

## 2024-09-15 PROCEDURE — 11000001 HC ACUTE MED/SURG PRIVATE ROOM

## 2024-09-15 PROCEDURE — 99900035 HC TECH TIME PER 15 MIN (STAT)

## 2024-09-15 RX ADMIN — PIPERACILLIN SODIUM AND TAZOBACTAM SODIUM 4.5 G: 4; .5 INJECTION, POWDER, LYOPHILIZED, FOR SOLUTION INTRAVENOUS at 02:09

## 2024-09-15 RX ADMIN — PIPERACILLIN SODIUM AND TAZOBACTAM SODIUM 4.5 G: 4; .5 INJECTION, POWDER, LYOPHILIZED, FOR SOLUTION INTRAVENOUS at 04:09

## 2024-09-15 RX ADMIN — PIPERACILLIN SODIUM AND TAZOBACTAM SODIUM 4.5 G: 4; .5 INJECTION, POWDER, LYOPHILIZED, FOR SOLUTION INTRAVENOUS at 08:09

## 2024-09-15 RX ADMIN — OXYBUTYNIN CHLORIDE 5 MG: 5 TABLET, EXTENDED RELEASE ORAL at 09:09

## 2024-09-15 RX ADMIN — MUPIROCIN: 20 OINTMENT TOPICAL at 08:09

## 2024-09-15 RX ADMIN — GABAPENTIN 300 MG: 300 CAPSULE ORAL at 08:09

## 2024-09-15 RX ADMIN — METOPROLOL TARTRATE 25 MG: 25 TABLET, FILM COATED ORAL at 09:09

## 2024-09-15 RX ADMIN — GABAPENTIN 300 MG: 300 CAPSULE ORAL at 02:09

## 2024-09-15 RX ADMIN — GABAPENTIN 300 MG: 300 CAPSULE ORAL at 09:09

## 2024-09-15 RX ADMIN — MUPIROCIN: 20 OINTMENT TOPICAL at 09:09

## 2024-09-15 RX ADMIN — FERROUS SULFATE TAB 325 MG (65 MG ELEMENTAL FE) 1 EACH: 325 (65 FE) TAB at 09:09

## 2024-09-15 RX ADMIN — DULOXETINE HYDROCHLORIDE 60 MG: 30 CAPSULE, DELAYED RELEASE ORAL at 09:09

## 2024-09-15 RX ADMIN — ACETAMINOPHEN 650 MG: 325 TABLET ORAL at 06:09

## 2024-09-15 NOTE — SUBJECTIVE & OBJECTIVE
Interval History:     No significant events overnight, no new complaints or concerns. Continue current management.     Review of Systems   All other systems reviewed and are negative.    Objective:     Vital Signs (Most Recent):  Temp: 97.8 °F (36.6 °C) (09/15/24 1133)  Pulse: 73 (09/15/24 1133)  Resp: 18 (09/15/24 1133)  BP: 113/72 (09/15/24 1133)  SpO2: 95 % (09/15/24 1133) Vital Signs (24h Range):  Temp:  [97.5 °F (36.4 °C)-98 °F (36.7 °C)] 97.8 °F (36.6 °C)  Pulse:  [66-85] 73  Resp:  [18] 18  SpO2:  [93 %-99 %] 95 %  BP: ()/(62-72) 113/72     Weight: 93.4 kg (205 lb 14.6 oz)  Body mass index is 28.72 kg/m².    Intake/Output Summary (Last 24 hours) at 9/15/2024 1610  Last data filed at 9/15/2024 1245  Gross per 24 hour   Intake 746.42 ml   Output 450 ml   Net 296.42 ml         Physical Exam  Vitals and nursing note reviewed.   Constitutional:       Appearance: Normal appearance.   HENT:      Head: Normocephalic and atraumatic.      Mouth/Throat:      Mouth: Mucous membranes are moist.   Eyes:      Extraocular Movements: Extraocular movements intact.      Pupils: Pupils are equal, round, and reactive to light.   Cardiovascular:      Rate and Rhythm: Normal rate and regular rhythm.   Pulmonary:      Effort: Pulmonary effort is normal.      Breath sounds: Normal breath sounds.   Abdominal:      General: Bowel sounds are normal.      Palpations: Abdomen is soft.   Musculoskeletal:      Cervical back: Normal range of motion and neck supple.      Right lower leg: Edema present.      Comments: Right thigh and calf dressing - intact, clean and dry   Skin:     Findings: Erythema present.   Neurological:      General: No focal deficit present.      Mental Status: She is alert and oriented to person, place, and time. Mental status is at baseline.   Psychiatric:         Mood and Affect: Mood normal.         Behavior: Behavior normal.             Significant Labs: All pertinent labs within the past 24 hours have been  reviewed.    Significant Imaging: I have reviewed all pertinent imaging results/findings within the past 24 hours.

## 2024-09-15 NOTE — PROGRESS NOTES
"[facility] - [address] [place] [address] Medicine  Progress Note    Patient Name: Bobbi Haywood  MRN: [id number]  Patient Class: IP- Inpatient   Admission Date: 9/10/2024  Length of Stay: 4 days  Attending Physician: Elizabeth Luis DO  Primary Care Provider: [facility]        Subjective:     Principal Problem:Severe sepsis        HPI:  Patient is a 73-year-old female who presents to [facility] after a fall from home where she reportedly was too weak to get up.  Patient has a past medical history of AFib on Eliquis, neuropathy and depression. Patient reports having inverted ankles that affect her walking when she turned her foot wrong way she fell down.  She denies hitting her head or losing consciousness.  Patient also reports falling a couple of weeks ago as well where she got her right posterior calf stuck in a truck door.  She reports developing a wound which bled and it now "appears infected".  Over the past few weeks patient endorses weakness and fatigue more than usual.  The patient reportedly lives with her  who is also present and contributes to the history. The patient denies headache, chest pain, shortness of breath, abdominal pain, fevers or chills.    Significant findings in the ED, lactic acid 2.6, Mag 1.3, UA positive for leukocytes, WBC 25.    The patient will be admitted to [facility] for continued care and medical management.    Overview/Hospital Course:  9/11- Checking CT leg as concern for deep infection. Urine culture with GNB. Continue current antibiotics. PT/OT consulted.   9/12- CT showed abscess, general surgery consulted and plan for I&D in OR today. Continue broad spectrum antibiotics. Swing bed placement in progress.   9/13- Wound culture with Group G strep and GNB, Dc'ed vancomycin, continue Zosyn. Swing bed placement next week.     Interval History:     No significant events overnight, no new " complaints or concerns. Continue current management.     Review of Systems   All other systems reviewed and are negative.    Objective:     Vital Signs (Most Recent):  Temp: 97.8 °F (36.6 °C) (09/15/24 1133)  Pulse: 73 (09/15/24 1133)  Resp: 18 (09/15/24 1133)  BP: 113/72 (09/15/24 1133)  SpO2: 95 % (09/15/24 1133) Vital Signs (24h Range):  Temp:  [97.5 °F (36.4 °C)-98 °F (36.7 °C)] 97.8 °F (36.6 °C)  Pulse:  [66-85] 73  Resp:  [18] 18  SpO2:  [93 %-99 %] 95 %  BP: ()/(62-72) 113/72     Weight: 93.4 kg (205 lb 14.6 oz)  Body mass index is 28.72 kg/m².    Intake/Output Summary (Last 24 hours) at 9/15/2024 1610  Last data filed at 9/15/2024 1245  Gross per 24 hour   Intake 746.42 ml   Output 450 ml   Net 296.42 ml         Physical Exam  Vitals and nursing note reviewed.   Constitutional:       Appearance: Normal appearance.   HENT:      Head: Normocephalic and atraumatic.      Mouth/Throat:      Mouth: Mucous membranes are moist.   Eyes:      Extraocular Movements: Extraocular movements intact.      Pupils: Pupils are equal, round, and reactive to light.   Cardiovascular:      Rate and Rhythm: Normal rate and regular rhythm.   Pulmonary:      Effort: Pulmonary effort is normal.      Breath sounds: Normal breath sounds.   Abdominal:      General: Bowel sounds are normal.      Palpations: Abdomen is soft.   Musculoskeletal:      Cervical back: Normal range of motion and neck supple.      Right lower leg: Edema present.      Comments: Right thigh and calf dressing - intact, clean and dry   Skin:     Findings: Erythema present.   Neurological:      General: No focal deficit present.      Mental Status: She is alert and oriented to person, place, and time. Mental status is at baseline.   Psychiatric:         Mood and Affect: Mood normal.         Behavior: Behavior normal.             Significant Labs: All pertinent labs within the past 24 hours have been reviewed.    Significant Imaging: I have reviewed all pertinent  imaging results/findings within the past 24 hours.    Assessment/Plan:      * Severe sepsis  This patient does have evidence of infective focus  My overall impression is sepsis.  Source: Urinary Tract and Skin and Soft Tissue (location right lower extremity)  Antibiotics given-   Antibiotics (72h ago, onward)      Start     Stop Route Frequency Ordered    09/11/24 0900  mupirocin 2 % ointment         09/16/24 0859 Top 2 times daily 09/11/24 0643    09/11/24 0500  piperacillin-tazobactam (ZOSYN) 4.5 g in D5W 100 mL IVPB (MB+)         -- IV Every 8 hours (non-standard times) 09/11/24 0141    09/11/24 0200  piperacillin-tazobactam (ZOSYN) 4.5 g in D5W 100 mL IVPB (MB+)         09/11/24 1359 IV ED 1 Time 09/11/24 0141          Latest lactate reviewed-  Recent Labs   Lab 09/11/24  0032   LACTATE 1.8       Blood culture NGTD.   Source control achieved by: antibiotics, abscess drainage- see below.     Normocytic anemia  Anemia is likely due to  acute surgical blood loss superimposed on chronic anemia . Most recent hemoglobin and hematocrit are listed below.  Recent Labs     09/13/24  0514 09/14/24  0544 09/15/24  0327   HGB 9.8* 8.3* 8.0*   HCT 31.1* 26.9* 26.3*       Plan  - Monitor serial CBC: Daily  - Transfuse PRBC if patient becomes hemodynamically unstable, symptomatic or H/H drops below 7/21.  - Patient has not received any PRBC transfusions to date  - Patient's anemia is currently stable  - B12, folate WNL, iron panel with iron deficiency.  - Started on iron replacement.      Debility  Patient with Acute on chronic debility due to chronic unspecified fatigue and age-related physical debility. Latest AMPAC and GEMS scores have not been reviewed. Evaluation for etiology is complete. Plan includes progressive mobility protocol initated and PT/OT consulted.  Swing bed placement in progress.     Cellulitis and abscess of right leg  Awaiting SWB for continued antibiotics, wound care, and therapy.   ---  2 week history  after fall from home  CT leg showed probable right calf cellulitis with superimposed 23 x 68 x 92 mm thick walled air containing superficial abscess along the posterolateral soft tissues of the proximal right calf.   General surgery consulted; s/p I&D of abscess in calf and thigh on 9/12.  Per op report:  Infected right calf hematoma in the lateral aspect cavity proximally 6 x 5 x 1 and 0.5 cm.  Right medial thigh abscess cavity proximally 7 x 4 x 3 cm in size   Wound cultures with Group G strep and GNB- continue IV Zosyn.           Acute cystitis with hematuria  UA positive for leukocytes.   Urine culture with E.coli.  Continue antibiotics as above.    Longstanding persistent atrial fibrillation  Patient with Long standing persistent (>12 months) atrial fibrillation which is uncontrolled currently with Beta Blocker. Patient is currently in atrial fibrillation.[id number] Score: 3. HASBLED Score: 1. Anticoagulation indicated. Anticoagulation done with Eliquis but on hold given surgery/hematoma. Resume AC when ok with surgery team .      INOCENTE (acute kidney injury)  INOCENTE is likely due to acute tubular necrosis caused by infection . Baseline creatinine is  ~ 1 . Most recent creatinine and eGFR are listed below.  Recent Labs     09/13/24  0514 09/14/24  0544 09/15/24  0327   CREATININE 1.38* 1.66* 1.56*   EGFRNORACEVR 40* 32* 35*        Plan  - INOCENTE is stable  - s/p IV fluids.  - Avoid nephrotoxins and renally dose meds for GFR listed above  - Monitor urine output, serial BMP, and adjust therapy as needed      Neuropathy  Moderately well-controlled  Continue home Cymbalta and gabapentin        VTE Risk Mitigation (From admission, onward)           Ordered     Reason for No Pharmacological VTE Prophylaxis  Once        Question:  Reasons:  Answer:  Already adequately anticoagulated on oral Anticoagulants    09/11/24 0119     IP VTE HIGH RISK PATIENT  Once         09/11/24 0119     Place sequential compression device  Until  discontinued         09/11/24 0119                    Discharge Planning   SHASHI: 9/16/2024     Code Status: Full Code   Is the patient medically ready for discharge?:     Reason for patient still in hospital (select all that apply): Treatment  Discharge Plan A: Skilled Nursing Facility   Discharge Delays: None known at this time              Elizabeth Luis DO  Department of Hospital Medicine   [facility] - [address] [place] [address]

## 2024-09-15 NOTE — ASSESSMENT & PLAN NOTE
INOCENTE is likely due to acute tubular necrosis caused by infection . Baseline creatinine is  ~ 1 . Most recent creatinine and eGFR are listed below.  Recent Labs     09/13/24  0514 09/14/24  0544 09/15/24  0327   CREATININE 1.38* 1.66* 1.56*   EGFRNORACEVR 40* 32* 35*        Plan  - INOCENTE is stable  - s/p IV fluids.  - Avoid nephrotoxins and renally dose meds for GFR listed above  - Monitor urine output, serial BMP, and adjust therapy as needed

## 2024-09-15 NOTE — ASSESSMENT & PLAN NOTE
Anemia is likely due to  acute surgical blood loss superimposed on chronic anemia . Most recent hemoglobin and hematocrit are listed below.  Recent Labs     09/13/24  0514 09/14/24  0544 09/15/24  0327   HGB 9.8* 8.3* 8.0*   HCT 31.1* 26.9* 26.3*       Plan  - Monitor serial CBC: Daily  - Transfuse PRBC if patient becomes hemodynamically unstable, symptomatic or H/H drops below 7/21.  - Patient has not received any PRBC transfusions to date  - Patient's anemia is currently stable  - B12, folate WNL, iron panel with iron deficiency.  - Started on iron replacement.

## 2024-09-16 LAB
ANION GAP SERPL CALCULATED.3IONS-SCNC: 8 MMOL/L (ref 7–16)
BACTERIA BLD CULT: NORMAL
BACTERIA BLD CULT: NORMAL
BACTERIA SPEC ANAEROBE CULT: NORMAL
BASOPHILS # BLD AUTO: 0.1 K/UL (ref 0–0.2)
BASOPHILS NFR BLD AUTO: 0.6 % (ref 0–1)
BUN SERPL-MCNC: 26 MG/DL (ref 7–18)
BUN/CREAT SERPL: 17 (ref 6–20)
CALCIUM SERPL-MCNC: 8.9 MG/DL (ref 8.5–10.1)
CHLORIDE SERPL-SCNC: 105 MMOL/L (ref 98–107)
CO2 SERPL-SCNC: 28 MMOL/L (ref 21–32)
CREAT SERPL-MCNC: 1.56 MG/DL (ref 0.55–1.02)
DIFFERENTIAL METHOD BLD: ABNORMAL
EGFR (NO RACE VARIABLE) (RUSH/TITUS): 35 ML/MIN/1.73M2
EOSINOPHIL # BLD AUTO: 0.45 K/UL (ref 0–0.5)
EOSINOPHIL NFR BLD AUTO: 2.8 % (ref 1–4)
EOSINOPHIL NFR BLD MANUAL: 2 % (ref 1–4)
ERYTHROCYTE [DISTWIDTH] IN BLOOD BY AUTOMATED COUNT: 14.1 % (ref 11.5–14.5)
GLUCOSE SERPL-MCNC: 90 MG/DL (ref 74–106)
HCT VFR BLD AUTO: 27.1 % (ref 38–47)
HGB BLD-MCNC: 8.3 G/DL (ref 12–16)
IMM GRANULOCYTES # BLD AUTO: 0.52 K/UL (ref 0–0.04)
IMM GRANULOCYTES NFR BLD: 3.3 % (ref 0–0.4)
LYMPHOCYTES # BLD AUTO: 2.89 K/UL (ref 1–4.8)
LYMPHOCYTES NFR BLD AUTO: 18.2 % (ref 27–41)
LYMPHOCYTES NFR BLD MANUAL: 16 % (ref 27–41)
MCH RBC QN AUTO: 28.6 PG (ref 27–31)
MCHC RBC AUTO-ENTMCNC: 30.6 G/DL (ref 32–36)
MCV RBC AUTO: 93.4 FL (ref 80–96)
MONOCYTES # BLD AUTO: 1.73 K/UL (ref 0–0.8)
MONOCYTES NFR BLD AUTO: 10.9 % (ref 2–6)
MONOCYTES NFR BLD MANUAL: 13 % (ref 2–6)
MPC BLD CALC-MCNC: 10.2 FL (ref 9.4–12.4)
NEUTROPHILS # BLD AUTO: 10.16 K/UL (ref 1.8–7.7)
NEUTROPHILS NFR BLD AUTO: 64.2 % (ref 53–65)
NEUTS BAND NFR BLD MANUAL: 5 % (ref 1–5)
NEUTS SEG NFR BLD MANUAL: 64 % (ref 50–62)
NRBC # BLD AUTO: 0.02 X10E3/UL
NRBC, AUTO (.00): 0.1 %
PLATELET # BLD AUTO: 366 K/UL (ref 150–400)
PLATELET MORPHOLOGY: NORMAL
POTASSIUM SERPL-SCNC: 4.2 MMOL/L (ref 3.5–5.1)
RBC # BLD AUTO: 2.9 M/UL (ref 4.2–5.4)
RBC MORPH BLD: NORMAL
SODIUM SERPL-SCNC: 137 MMOL/L (ref 136–145)
WBC # BLD AUTO: 15.85 K/UL (ref 4.5–11)

## 2024-09-16 PROCEDURE — 97110 THERAPEUTIC EXERCISES: CPT

## 2024-09-16 PROCEDURE — 36415 COLL VENOUS BLD VENIPUNCTURE: CPT

## 2024-09-16 PROCEDURE — 25000003 PHARM REV CODE 250

## 2024-09-16 PROCEDURE — 63600175 PHARM REV CODE 636 W HCPCS

## 2024-09-16 PROCEDURE — 80048 BASIC METABOLIC PNL TOTAL CA: CPT

## 2024-09-16 PROCEDURE — 11000001 HC ACUTE MED/SURG PRIVATE ROOM

## 2024-09-16 PROCEDURE — 97530 THERAPEUTIC ACTIVITIES: CPT

## 2024-09-16 PROCEDURE — 99232 SBSQ HOSP IP/OBS MODERATE 35: CPT | Mod: ,,, | Performed by: INTERNAL MEDICINE

## 2024-09-16 PROCEDURE — 25000003 PHARM REV CODE 250: Performed by: INTERNAL MEDICINE

## 2024-09-16 PROCEDURE — 85025 COMPLETE CBC W/AUTO DIFF WBC: CPT

## 2024-09-16 RX ORDER — AMOXICILLIN AND CLAVULANATE POTASSIUM 875; 125 MG/1; MG/1
1 TABLET, FILM COATED ORAL EVERY 12 HOURS
Status: DISCONTINUED | OUTPATIENT
Start: 2024-09-16 | End: 2024-09-17 | Stop reason: HOSPADM

## 2024-09-16 RX ADMIN — ACETAMINOPHEN 650 MG: 325 TABLET ORAL at 12:09

## 2024-09-16 RX ADMIN — GABAPENTIN 300 MG: 300 CAPSULE ORAL at 08:09

## 2024-09-16 RX ADMIN — GABAPENTIN 300 MG: 300 CAPSULE ORAL at 03:09

## 2024-09-16 RX ADMIN — PIPERACILLIN SODIUM AND TAZOBACTAM SODIUM 4.5 G: 4; .5 INJECTION, POWDER, LYOPHILIZED, FOR SOLUTION INTRAVENOUS at 04:09

## 2024-09-16 RX ADMIN — OXYBUTYNIN CHLORIDE 5 MG: 5 TABLET, EXTENDED RELEASE ORAL at 08:09

## 2024-09-16 RX ADMIN — AMOXICILLIN AND CLAVULANATE POTASSIUM 1 TABLET: 875; 125 TABLET, FILM COATED ORAL at 08:09

## 2024-09-16 RX ADMIN — ACETAMINOPHEN 650 MG: 325 TABLET ORAL at 06:09

## 2024-09-16 RX ADMIN — PIPERACILLIN SODIUM AND TAZOBACTAM SODIUM 4.5 G: 4; .5 INJECTION, POWDER, LYOPHILIZED, FOR SOLUTION INTRAVENOUS at 01:09

## 2024-09-16 RX ADMIN — ACETAMINOPHEN 650 MG: 325 TABLET ORAL at 07:09

## 2024-09-16 RX ADMIN — DULOXETINE HYDROCHLORIDE 60 MG: 30 CAPSULE, DELAYED RELEASE ORAL at 08:09

## 2024-09-16 RX ADMIN — APIXABAN 5 MG: 5 TABLET, FILM COATED ORAL at 08:09

## 2024-09-16 RX ADMIN — METOPROLOL TARTRATE 25 MG: 25 TABLET, FILM COATED ORAL at 08:09

## 2024-09-16 RX ADMIN — FERROUS SULFATE TAB 325 MG (65 MG ELEMENTAL FE) 1 EACH: 325 (65 FE) TAB at 08:09

## 2024-09-16 NOTE — PROGRESS NOTES
Patient alert and oriented. Attempt wound care visit. MAYELA Segura had come and redress dressing per patient. Observed dressings, new bandages.

## 2024-09-16 NOTE — SUBJECTIVE & OBJECTIVE
Interval History: Patient seen and examined at the bedside, reports doing Ok.     Review of Systems   All other systems reviewed and are negative.    Objective:     Vital Signs (Most Recent):  Temp: 97.6 °F (36.4 °C) (09/16/24 1200)  Pulse: 84 (09/16/24 1200)  Resp: 20 (09/16/24 1200)  BP: 102/66 (09/16/24 1200)  SpO2: 97 % (09/16/24 1200) Vital Signs (24h Range):  Temp:  [97.6 °F (36.4 °C)-98.4 °F (36.9 °C)] 97.6 °F (36.4 °C)  Pulse:  [65-84] 84  Resp:  [18-20] 20  SpO2:  [95 %-97 %] 97 %  BP: (102-123)/(65-79) 102/66     Weight: 93.4 kg (205 lb 14.6 oz)  Body mass index is 28.72 kg/m².    Intake/Output Summary (Last 24 hours) at 9/16/2024 1417  Last data filed at 9/16/2024 0436  Gross per 24 hour   Intake 394.03 ml   Output 400 ml   Net -5.97 ml         Physical Exam  Vitals and nursing note reviewed.   Constitutional:       Appearance: Normal appearance.   HENT:      Head: Normocephalic and atraumatic.      Mouth/Throat:      Mouth: Mucous membranes are moist.   Eyes:      Extraocular Movements: Extraocular movements intact.      Pupils: Pupils are equal, round, and reactive to light.   Cardiovascular:      Rate and Rhythm: Normal rate and regular rhythm.   Pulmonary:      Effort: Pulmonary effort is normal.      Breath sounds: Normal breath sounds.   Abdominal:      General: Bowel sounds are normal.      Palpations: Abdomen is soft.   Musculoskeletal:      Cervical back: Normal range of motion and neck supple.      Right lower leg: Edema present.      Comments: Right thigh and calf dressing.    Skin:     Findings: Erythema present.   Neurological:      General: No focal deficit present.      Mental Status: She is alert and oriented to person, place, and time. Mental status is at baseline.   Psychiatric:         Mood and Affect: Mood normal.         Behavior: Behavior normal.             Significant Labs: All pertinent labs within the past 24 hours have been reviewed.    Significant Imaging: I have reviewed all  pertinent imaging results/findings within the past 24 hours.

## 2024-09-16 NOTE — ASSESSMENT & PLAN NOTE
2 week history after fall from home  CT leg showed probable right calf cellulitis with superimposed 23 x 68 x 92 mm thick walled air containing superficial abscess along the posterolateral soft tissues of the proximal right calf.   General surgery consulted; s/p I&D of abscess in calf and thigh on 9/12.  Per op report:  Infected right calf hematoma in the lateral aspect cavity proximally 6 x 5 x 1 and 0.5 cm.  Right medial thigh abscess cavity proximally 7 x 4 x 3 cm in size   Wound cultures with Group G strep, proteus, anaerobes  Received IV Zosyn for several days, transitioned to Augmentin on 9/16 to complete total 10 day course.

## 2024-09-16 NOTE — PROGRESS NOTES
09/16/24 1051   Wound Care Follow Up   Wound Care Follow-up? Yes   Wound Care- Next Visit Date 09/19/24   Follow Up Plan [facility] POC

## 2024-09-16 NOTE — ASSESSMENT & PLAN NOTE
Anemia is likely due to  acute surgical blood loss superimposed on chronic anemia . Most recent hemoglobin and hematocrit are listed below.  Recent Labs     09/14/24  0544 09/15/24  0327 09/16/24  0541   HGB 8.3* 8.0* 8.3*   HCT 26.9* 26.3* 27.1*       Plan  - Monitor serial CBC: Daily  - Transfuse PRBC if patient becomes hemodynamically unstable, symptomatic or H/H drops below 7/21.  - Patient has not received any PRBC transfusions to date  - Patient's anemia is currently stable  - B12, folate WNL, iron panel with iron deficiency.  - Started on iron replacement.

## 2024-09-16 NOTE — ASSESSMENT & PLAN NOTE
Patient with Long standing persistent (>12 months) atrial fibrillation which is uncontrolled currently with Beta Blocker. Patient is currently in atrial fibrillation.[id number] Score: 3. HASBLED Score: 1. Anticoagulation indicated. Anticoagulation done with Eliquis .

## 2024-09-16 NOTE — ASSESSMENT & PLAN NOTE
"This patient does have evidence of infective focus  My overall impression is sepsis.  Source: Urinary Tract and Skin and Soft Tissue (location right lower extremity)  Antibiotics given-   Antibiotics (72h ago, onward)    Start     Stop Route Frequency Ordered    09/16/24 2100  amoxicillin-clavulanate 875-125mg per tablet 1 tablet         09/20/24 2059 Oral Every 12 hours 09/16/24 1416    09/11/24 0900  mupirocin 2 % ointment         09/16/24 0859 Top 2 times daily 09/11/24 0643        Latest lactate reviewed-  No results for input(s): "LACTATE", "POCLAC" in the last 72 hours.    Blood culture NGTD.   Source control achieved by: antibiotics, abscess drainage- see below.   "

## 2024-09-16 NOTE — PT/OT/SLP PROGRESS
Occupational Therapy   Treatment    Name: Bobbi Haywood  MRN: [id number]  Admitting Diagnosis:  Severe sepsis  4 Days Post-Op    Recommendations:     Discharge Recommendations: Moderate Intensity Therapy  Discharge Equipment Recommendations:  none  Barriers to discharge:       Assessment:     Bobbi Haywood is a 73 y.o. female with a medical diagnosis of Severe sepsis.  She presents with weakness. Performance deficits affecting function are weakness, impaired endurance, impaired self care skills, impaired functional mobility, gait instability, impaired balance, pain.     Rehab Prognosis:  Good; patient would benefit from acute skilled OT services to address these deficits and reach maximum level of function.       Plan:     Patient to be seen 5 x/week to address the above listed problems via self-care/home management, therapeutic activities, therapeutic exercises  Plan of Care Expires: 10/09/24  Plan of Care Reviewed with: patient    Subjective     Chief Complaint: Pt had no complaints  Patient/Family Comments/goals: return to PLOF  Pain/Comfort:       Objective:     Communicated with: SUKHDEV Patricia prior to session.  Patient found HOB elevated with   upon OT entry to room.    General Precautions: Standard, fall    Orthopedic Precautions:N/A  Braces: N/A  Respiratory Status: Room air     Occupational Performance:     Bed Mobility:         Functional Mobility/Transfers:    Functional Mobility:     Activities of Daily Living:        [facility] 6 Click ADL:      Treatment & Education:  Pt completed BUE elbow flex, chest rpess, and sh flex with 1# wt 2x12 ea ex to increase strength and endurance for improved performance in functional activities    Patient left up in chair with all lines intact and call button in reach    GOALS:   Multidisciplinary Problems       Occupational Therapy Goals          Problem: Occupational Therapy    Goal Priority Disciplines Outcome Interventions   Occupational Therapy Goal      OT, PT/OT Progressing    Description: STG:  Pt will perform grooming with setup  Pt will bathe with Esvin with setup at EOB  Pt will perform UE dressing with Charlene  Pt will perform LE dressing with Esvin  Pt will sit EOB x 10 min with SBA   Pt will transfer bed/chair/bsc with CGA with RW  Pt will perform standing task x 2 min with CGA with RW   Pt will tolerate 15 minutes of tx without fatigue      LT.Restore to max I with self care and mobility.                           Time Tracking:     OT Date of Treatment: 24  OT Start Time: 1130  OT Stop Time: 1145  OT Total Time (min): 15 min    Billable Minutes:Therapeutic Exercise 15               2024

## 2024-09-16 NOTE — PT/OT/SLP PROGRESS
Physical Therapy Treatment    Patient Name:  Bobbi Haywood   MRN:  [id number]    Recommendations:     Discharge Recommendations: Moderate Intensity Therapy  Discharge Equipment Recommendations: none  Barriers to discharge:  limited mobility    Assessment:     Bobbi Haywood is a 73 y.o. female admitted with a medical diagnosis of Severe sepsis.  She presents with the following impairments/functional limitations: weakness, impaired functional mobility, impaired endurance, pain Patient still very weak and unable to ambulate due to generalized weakness and pain right le. Awaiting sb placement.    Rehab Prognosis: Good; patient would benefit from acute skilled PT services to address these deficits and reach maximum level of function.    Recent Surgery: Procedure(s) (LRB):  INCISION AND DRAINAGE, ABSCESS (Right) 4 Days Post-Op    Plan:     During this hospitalization, patient to be seen 5 x/week to address the identified rehab impairments via gait training, therapeutic activities, therapeutic exercises and progress toward the following goals:    Plan of Care Expires:  10/11/24    Subjective     Chief Complaint: right le pain  Patient/Family Comments/goals: Patient states pain is slowly improving. Hopes to go to sb soon  Pain/Comfort:  Pain Rating 1: 4/10  Location - Side 1: Right  Location 1: leg  Pain Addressed 1: Cessation of Activity, Pre-medicate for activity      Objective:     Communicated with nurse prior to session.  Patient found supine with PureWick, peripheral IV upon PT entry to room.     General Precautions: Standard, fall  Orthopedic Precautions:    Braces:    Respiratory Status: Room air     Functional Mobility:  Bed Mobility:     Supine to Sit: moderate assistance  Sit to Supine: moderate assistance  Transfers:     Sit to Stand:  moderate assistance with rolling walker  Gait: 2-3 steps to chair      AM-PAC 6 CLICK MOBILITY  Turning over in bed (including adjusting bedclothes, sheets and  blankets)?: 2  Sitting down on and standing up from a chair with arms (e.g., wheelchair, bedside commode, etc.): 2  Moving from lying on back to sitting on the side of the bed?: 2  Moving to and from a bed to a chair (including a wheelchair)?: 2  Need to walk in hospital room?: 2  Climbing 3-5 steps with a railing?: 1  Basic Mobility Total Score: 11       Treatment & Education:  BLE: aps, hs, saq, abd-add, 3x10 each    Patient left supine with call button in reach..    GOALS:   Multidisciplinary Problems       Physical Therapy Goals          Problem: Physical Therapy    Goal Priority Disciplines Outcome Goal Variances Interventions   Physical Therapy Goal     PT, PT/OT Progressing     Description: Short Term Goals  Ambulate SBA - 100 feet with rolling walker assistive device.   Supine to sit minimum  Sit to stand minimum  SPT minimum  5. Independent with HEP    Long Term Goals  Ambulate SBA - 150 feet with rolling walker assistive device.   Supine to sit stand-by  Sit to stand stand-by  SPT stand-by  Needed equipment for home.                              Time Tracking:     PT Received On: 09/16/24  PT Start Time: 1650     PT Stop Time: 1715  PT Total Time (min): 25 min     Billable Minutes: there act 10, exercise 15    Treatment Type: Treatment  PT/PTA: PT     Number of PTA visits since last PT visit: 1 09/16/2024

## 2024-09-16 NOTE — PROGRESS NOTES
Afebrile with stable vital signs.  WBC 33358, down from 25,000 prior to debridement.  Wound culture grew Proteus mirabilis with near pan sensitivity.    Right lower extremity dressing change.  Base of the wound contains granulation tissue without drainage or malodor.  Continue wet-to-dry, wound care can adjust as indicated.  Awaiting referral to swing bed.  General surgery will sign off.

## 2024-09-16 NOTE — PLAN OF CARE
Follow up with kody at [facility]. Still waiting on insurance approval for [facility].

## 2024-09-16 NOTE — PHYSICIAN QUERY
Question: Please clarify the integumentary diagnosis related to the documentation of sacral spine .    Provider, please hit the Enter button after selecting your response.    Provider Query Response:  Pressure Injury/Decubitus Ulcer, Stage 2

## 2024-09-16 NOTE — PROGRESS NOTES
"[facility] Hospital Medicine  Progress Note    Patient Name: Bobbi Haywood  MRN: [id number]  Patient Class: IP- Inpatient   Admission Date: 9/10/2024  Length of Stay: 5 days  Attending Physician: Abrahan Farmer MD  Primary Care Provider: [facility]        Subjective:     Principal Problem:Severe sepsis        HPI:  Patient is a 73-year-old female who presents to [facility] after a fall from home where she reportedly was too weak to get up.  Patient has a past medical history of AFib on Eliquis, neuropathy and depression. Patient reports having inverted ankles that affect her walking when she turned her foot wrong way she fell down.  She denies hitting her head or losing consciousness.  Patient also reports falling a couple of weeks ago as well where she got her right posterior calf stuck in a truck door.  She reports developing a wound which bled and it now "appears infected".  Over the past few weeks patient endorses weakness and fatigue more than usual.  The patient reportedly lives with her  who is also present and contributes to the history. The patient denies headache, chest pain, shortness of breath, abdominal pain, fevers or chills.    Significant findings in the ED, lactic acid 2.6, Mag 1.3, UA positive for leukocytes, WBC 25.    The patient will be admitted to [facility] for continued care and medical management.    Overview/Hospital Course:  9/11- Checking CT leg as concern for deep infection. Urine culture with GNB. Continue current antibiotics. PT/OT consulted.   9/12- CT showed abscess, general surgery consulted and plan for I&D in OR today. Continue broad spectrum antibiotics. Swing bed placement in progress.   9/13- Wound culture with Group G strep and GNB, Dc'ed vancomycin, continue Zosyn. Swing bed placement next week.   9/16- Awaiting insurance approval for swing bed placement. " Transitioned to Augmentin to complete 7 day course post drainage of abscess. Home Eliquis resumed.     Interval History: Patient seen and examined at the bedside, reports doing Ok.     Review of Systems   All other systems reviewed and are negative.    Objective:     Vital Signs (Most Recent):  Temp: 97.6 °F (36.4 °C) (09/16/24 1200)  Pulse: 84 (09/16/24 1200)  Resp: 20 (09/16/24 1200)  BP: 102/66 (09/16/24 1200)  SpO2: 97 % (09/16/24 1200) Vital Signs (24h Range):  Temp:  [97.6 °F (36.4 °C)-98.4 °F (36.9 °C)] 97.6 °F (36.4 °C)  Pulse:  [65-84] 84  Resp:  [18-20] 20  SpO2:  [95 %-97 %] 97 %  BP: (102-123)/(65-79) 102/66     Weight: 93.4 kg (205 lb 14.6 oz)  Body mass index is 28.72 kg/m².    Intake/Output Summary (Last 24 hours) at 9/16/2024 1417  Last data filed at 9/16/2024 0436  Gross per 24 hour   Intake 394.03 ml   Output 400 ml   Net -5.97 ml         Physical Exam  Vitals and nursing note reviewed.   Constitutional:       Appearance: Normal appearance.   HENT:      Head: Normocephalic and atraumatic.      Mouth/Throat:      Mouth: Mucous membranes are moist.   Eyes:      Extraocular Movements: Extraocular movements intact.      Pupils: Pupils are equal, round, and reactive to light.   Cardiovascular:      Rate and Rhythm: Normal rate and regular rhythm.   Pulmonary:      Effort: Pulmonary effort is normal.      Breath sounds: Normal breath sounds.   Abdominal:      General: Bowel sounds are normal.      Palpations: Abdomen is soft.   Musculoskeletal:      Cervical back: Normal range of motion and neck supple.      Right lower leg: Edema present.      Comments: Right thigh and calf dressing.    Skin:     Findings: Erythema present.   Neurological:      General: No focal deficit present.      Mental Status: She is alert and oriented to person, place, and time. Mental status is at baseline.   Psychiatric:         Mood and Affect: Mood normal.         Behavior: Behavior normal.             Significant Labs: All  "pertinent labs within the past 24 hours have been reviewed.    Significant Imaging: I have reviewed all pertinent imaging results/findings within the past 24 hours.    Assessment/Plan:      * Severe sepsis  This patient does have evidence of infective focus  My overall impression is sepsis.  Source: Urinary Tract and Skin and Soft Tissue (location right lower extremity)  Antibiotics given-   Antibiotics (72h ago, onward)      Start     Stop Route Frequency Ordered    09/16/24 2100  amoxicillin-clavulanate 875-125mg per tablet 1 tablet         09/20/24 2059 Oral Every 12 hours 09/16/24 1416    09/11/24 0900  mupirocin 2 % ointment         09/16/24 0859 Top 2 times daily 09/11/24 0643          Latest lactate reviewed-  No results for input(s): "LACTATE", "POCLAC" in the last 72 hours.    Blood culture NGTD.   Source control achieved by: antibiotics, abscess drainage- see below.     Cellulitis and abscess of right leg  2 week history after fall from home  CT leg showed probable right calf cellulitis with superimposed 23 x 68 x 92 mm thick walled air containing superficial abscess along the posterolateral soft tissues of the proximal right calf.   General surgery consulted; s/p I&D of abscess in calf and thigh on 9/12.  Per op report:  Infected right calf hematoma in the lateral aspect cavity proximally 6 x 5 x 1 and 0.5 cm.  Right medial thigh abscess cavity proximally 7 x 4 x 3 cm in size   Wound cultures with Group G strep, proteus, anaerobes  Received IV Zosyn for several days, transitioned to Augmentin on 9/16 to complete total 10 day course.           INOCENTE (acute kidney injury)  INOCENTE is likely due to acute tubular necrosis caused by infection . Baseline creatinine is  ~ 1 . Most recent creatinine and eGFR are listed below.  Recent Labs     09/14/24  0544 09/15/24  0327 09/16/24  0541   CREATININE 1.66* 1.56* 1.56*   EGFRNORACEVR 32* 35* 35*        Plan  - INOCENTE is stable  - s/p IV fluids.  - Avoid nephrotoxins and " renally dose meds for GFR listed above  - Monitor urine output, serial BMP, and adjust therapy as needed      Acute cystitis with hematuria  UA positive for leukocytes.   Urine culture with E.coli.  Continue antibiotics as above.    Normocytic anemia  Anemia is likely due to  acute surgical blood loss superimposed on chronic anemia . Most recent hemoglobin and hematocrit are listed below.  Recent Labs     09/14/24  0544 09/15/24  0327 09/16/24  0541   HGB 8.3* 8.0* 8.3*   HCT 26.9* 26.3* 27.1*       Plan  - Monitor serial CBC: Daily  - Transfuse PRBC if patient becomes hemodynamically unstable, symptomatic or H/H drops below 7/21.  - Patient has not received any PRBC transfusions to date  - Patient's anemia is currently stable  - B12, folate WNL, iron panel with iron deficiency.  - Started on iron replacement.      Debility  Patient with Acute on chronic debility due to chronic unspecified fatigue and age-related physical debility. Latest AMPAC and GEMS scores have not been reviewed. Evaluation for etiology is complete. Plan includes progressive mobility protocol initated and PT/OT consulted.  Swing bed placement in progress.     Longstanding persistent atrial fibrillation  Patient with Long standing persistent (>12 months) atrial fibrillation which is uncontrolled currently with Beta Blocker. Patient is currently in atrial fibrillation.[id number] Score: 3. HASBLED Score: 1. Anticoagulation indicated. Anticoagulation done with Eliquis .      Neuropathy  Moderately well-controlled  Continue home Cymbalta and gabapentin        VTE Risk Mitigation (From admission, onward)           Ordered     apixaban tablet 5 mg  2 times daily         09/16/24 1416     Reason for No Pharmacological VTE Prophylaxis  Once        Question:  Reasons:  Answer:  Already adequately anticoagulated on oral Anticoagulants    09/11/24 0119     IP VTE HIGH RISK PATIENT  Once         09/11/24 0119     Place sequential compression device  Until  discontinued         09/11/24 0119                    Discharge Planning   SHASHI: 9/17/2024     Code Status: Full Code   Is the patient medically ready for discharge?:     Reason for patient still in hospital (select all that apply): Pending disposition  Discharge Plan A: Skilled Nursing Facility   Discharge Delays: None known at this time              SAVANAH SÁNCHEZ MD  Department of Hospital Medicine   [facility]

## 2024-09-16 NOTE — PLAN OF CARE
Sent kody at [facility] a secure chat as follow up for [facility] approval by patients insurance. Packet ready. Awaiting approval. Cm will follow.

## 2024-09-16 NOTE — ASSESSMENT & PLAN NOTE
INOCENTE is likely due to acute tubular necrosis caused by infection . Baseline creatinine is  ~ 1 . Most recent creatinine and eGFR are listed below.  Recent Labs     09/14/24  0544 09/15/24  0327 09/16/24  0541   CREATININE 1.66* 1.56* 1.56*   EGFRNORACEVR 32* 35* 35*        Plan  - INOCENTE is stable  - s/p IV fluids.  - Avoid nephrotoxins and renally dose meds for GFR listed above  - Monitor urine output, serial BMP, and adjust therapy as needed

## 2024-09-17 ENCOUNTER — HOSPITAL ENCOUNTER (INPATIENT)
Facility: HOSPITAL | Age: 74
LOS: 20 days | Discharge: HOME | DRG: 556 | End: 2024-10-07
Attending: EMERGENCY MEDICINE | Admitting: EMERGENCY MEDICINE
Payer: MEDICARE

## 2024-09-17 VITALS
RESPIRATION RATE: 18 BRPM | OXYGEN SATURATION: 99 % | WEIGHT: 205.94 LBS | HEART RATE: 72 BPM | SYSTOLIC BLOOD PRESSURE: 106 MMHG | TEMPERATURE: 98 F | HEIGHT: 71 IN | BODY MASS INDEX: 28.83 KG/M2 | DIASTOLIC BLOOD PRESSURE: 67 MMHG

## 2024-09-17 DIAGNOSIS — M62.81 MUSCLE WEAKNESS: Primary | ICD-10-CM

## 2024-09-17 DIAGNOSIS — R53.1 WEAKNESS: ICD-10-CM

## 2024-09-17 PROBLEM — Z29.9 ENCOUNTER FOR DEEP VEIN THROMBOSIS (DVT) PROPHYLAXIS: Status: ACTIVE | Noted: 2024-09-17

## 2024-09-17 LAB
ALBUMIN SERPL BCP-MCNC: 2.1 G/DL (ref 3.5–5)
ALP SERPL-CCNC: 92 U/L (ref 55–142)
ALT SERPL W P-5'-P-CCNC: 13 U/L (ref 13–56)
ANION GAP SERPL CALCULATED.3IONS-SCNC: 11 MMOL/L (ref 7–16)
ANION GAP SERPL CALCULATED.3IONS-SCNC: 13 MMOL/L (ref 7–16)
ANISOCYTOSIS BLD QL SMEAR: ABNORMAL
AST SERPL W P-5'-P-CCNC: 10 U/L (ref 15–37)
BACTERIA #/AREA URNS HPF: ABNORMAL /HPF
BASOPHILS # BLD AUTO: 0.05 K/UL (ref 0–0.2)
BASOPHILS # BLD AUTO: 0.08 K/UL (ref 0–0.2)
BASOPHILS NFR BLD AUTO: 0.3 % (ref 0–1)
BASOPHILS NFR BLD AUTO: 0.5 % (ref 0–1)
BILIRUB DIRECT SERPL-MCNC: 0.2 MG/DL (ref 0–0.2)
BILIRUB SERPL-MCNC: 0.3 MG/DL (ref ?–1.2)
BILIRUB UR QL STRIP: NEGATIVE
BUN SERPL-MCNC: 25 MG/DL (ref 7–18)
BUN SERPL-MCNC: 26 MG/DL (ref 7–18)
BUN/CREAT SERPL: 17 (ref 6–20)
BUN/CREAT SERPL: 18 (ref 6–20)
CALCIUM SERPL-MCNC: 8.4 MG/DL (ref 8.5–10.1)
CALCIUM SERPL-MCNC: 8.6 MG/DL (ref 8.5–10.1)
CHLORIDE SERPL-SCNC: 101 MMOL/L (ref 98–107)
CHLORIDE SERPL-SCNC: 105 MMOL/L (ref 98–107)
CLARITY UR: CLEAR
CO2 SERPL-SCNC: 27 MMOL/L (ref 21–32)
CO2 SERPL-SCNC: 29 MMOL/L (ref 21–32)
COLOR UR: YELLOW
CREAT SERPL-MCNC: 1.41 MG/DL (ref 0.55–1.02)
CREAT SERPL-MCNC: 1.49 MG/DL (ref 0.55–1.02)
DIFFERENTIAL METHOD BLD: ABNORMAL
DIFFERENTIAL METHOD BLD: ABNORMAL
EGFR (NO RACE VARIABLE) (RUSH/TITUS): 36 ML/MIN/1.73M2
EGFR (NO RACE VARIABLE) (RUSH/TITUS): 39 ML/MIN/1.73M2
EOSINOPHIL # BLD AUTO: 0.43 K/UL (ref 0–0.5)
EOSINOPHIL # BLD AUTO: 0.46 K/UL (ref 0–0.5)
EOSINOPHIL NFR BLD AUTO: 2.3 % (ref 1–4)
EOSINOPHIL NFR BLD AUTO: 2.9 % (ref 1–4)
EOSINOPHIL NFR BLD MANUAL: 2 % (ref 1–4)
ERYTHROCYTE [DISTWIDTH] IN BLOOD BY AUTOMATED COUNT: 14.5 % (ref 11.5–14.5)
ERYTHROCYTE [DISTWIDTH] IN BLOOD BY AUTOMATED COUNT: 14.7 % (ref 11.5–14.5)
GLUCOSE SERPL-MCNC: 113 MG/DL (ref 74–106)
GLUCOSE SERPL-MCNC: 93 MG/DL (ref 74–106)
GLUCOSE UR STRIP-MCNC: NEGATIVE MG/DL
HCT VFR BLD AUTO: 26.6 % (ref 38–47)
HCT VFR BLD AUTO: 29.9 % (ref 38–47)
HGB BLD-MCNC: 8.2 G/DL (ref 12–16)
HGB BLD-MCNC: 9.1 G/DL (ref 12–16)
HOWELL-JOLLY BOD BLD QL SMEAR: ABNORMAL
HYALINE CASTS #/AREA URNS LPF: ABNORMAL /LPF
HYPOCHROMIA BLD QL SMEAR: ABNORMAL
IMM GRANULOCYTES # BLD AUTO: 0.68 K/UL (ref 0–0.04)
IMM GRANULOCYTES NFR BLD: 4.3 % (ref 0–0.4)
KETONES UR STRIP-SCNC: NEGATIVE MG/DL
LEUKOCYTE ESTERASE UR QL STRIP: ABNORMAL
LYMPHOCYTES # BLD AUTO: 2.8 K/UL (ref 1–4.8)
LYMPHOCYTES # BLD AUTO: 2.85 K/UL (ref 1–4.8)
LYMPHOCYTES NFR BLD AUTO: 15 % (ref 27–41)
LYMPHOCYTES NFR BLD AUTO: 18.1 % (ref 27–41)
LYMPHOCYTES NFR BLD MANUAL: 15 % (ref 27–41)
LYMPHOCYTES NFR BLD MANUAL: 6 % (ref 27–41)
MCH RBC QN AUTO: 29.2 PG (ref 27–31)
MCH RBC QN AUTO: 29.4 PG (ref 27–31)
MCHC RBC AUTO-ENTMCNC: 30.4 G/DL (ref 32–36)
MCHC RBC AUTO-ENTMCNC: 30.8 G/DL (ref 32–36)
MCV RBC AUTO: 94.7 FL (ref 80–96)
MCV RBC AUTO: 96.8 FL (ref 80–96)
METAMYELOCYTES NFR BLD MANUAL: 2 %
MONOCYTES # BLD AUTO: 1.52 K/UL (ref 0–0.8)
MONOCYTES # BLD AUTO: 1.76 K/UL (ref 0–0.8)
MONOCYTES NFR BLD AUTO: 9.4 % (ref 2–6)
MONOCYTES NFR BLD AUTO: 9.6 % (ref 2–6)
MONOCYTES NFR BLD MANUAL: 8 % (ref 2–6)
MONOCYTES NFR BLD MANUAL: 8 % (ref 2–6)
MPC BLD CALC-MCNC: 10.3 FL (ref 9.4–12.4)
MPC BLD CALC-MCNC: 9.5 FL (ref 9.4–12.4)
MUCOUS THREADS #/AREA URNS HPF: ABNORMAL /HPF
NEUTROPHILS # BLD AUTO: 10.17 K/UL (ref 1.8–7.7)
NEUTROPHILS # BLD AUTO: 13.65 K/UL (ref 1.8–7.7)
NEUTROPHILS NFR BLD AUTO: 64.6 % (ref 53–65)
NEUTROPHILS NFR BLD AUTO: 73 % (ref 53–65)
NEUTS BAND NFR BLD MANUAL: 1 % (ref 1–5)
NEUTS BAND NFR BLD MANUAL: 2 % (ref 1–5)
NEUTS SEG NFR BLD MANUAL: 72 % (ref 50–62)
NEUTS SEG NFR BLD MANUAL: 84 % (ref 50–62)
NITRITE UR QL STRIP: NEGATIVE
NRBC # BLD AUTO: 0.02 X10E3/UL
NRBC BLD MANUAL-RTO: 5 /100 WBC
NRBC, AUTO (.00): 0.1 %
OHS QRS DURATION: 154 MS
OHS QTC CALCULATION: 461 MS
PH UR STRIP: 6 PH UNITS
PLATELET # BLD AUTO: 383 K/UL (ref 150–400)
PLATELET # BLD AUTO: 444 K/UL (ref 150–400)
PLATELET MORPHOLOGY: ABNORMAL
PLATELET MORPHOLOGY: NORMAL
POIKILOCYTOSIS BLD QL SMEAR: ABNORMAL
POLYCHROMASIA BLD QL SMEAR: ABNORMAL
POTASSIUM SERPL-SCNC: 3.8 MMOL/L (ref 3.5–5.1)
POTASSIUM SERPL-SCNC: 3.9 MMOL/L (ref 3.5–5.1)
PROT SERPL-MCNC: 6.7 G/DL (ref 6.4–8.2)
PROT UR QL STRIP: NEGATIVE
RBC # BLD AUTO: 2.81 M/UL (ref 4.2–5.4)
RBC # BLD AUTO: 3.09 M/UL (ref 4.2–5.4)
RBC # UR STRIP: NEGATIVE /UL
RBC #/AREA URNS HPF: ABNORMAL /HPF
RBC MORPH BLD: NORMAL
RENAL EPI CELLS #/AREA URNS LPF: ABNORMAL /LPF
SARS-COV-2 RDRP RESP QL NAA+PROBE: NEGATIVE
SODIUM SERPL-SCNC: 139 MMOL/L (ref 136–145)
SODIUM SERPL-SCNC: 139 MMOL/L (ref 136–145)
SP GR UR STRIP: 1.02
SQUAMOUS #/AREA URNS LPF: ABNORMAL /LPF
TRANS CELLS #/AREA URNS LPF: ABNORMAL /LPF
UROBILINOGEN UR STRIP-ACNC: 0.2 MG/DL
WBC # BLD AUTO: 15.76 K/UL (ref 4.5–11)
WBC # BLD AUTO: 18.69 K/UL (ref 4.5–11)
WBC #/AREA URNS HPF: ABNORMAL /HPF
YEAST #/AREA URNS HPF: ABNORMAL /HPF

## 2024-09-17 PROCEDURE — 80076 HEPATIC FUNCTION PANEL: CPT | Performed by: EMERGENCY MEDICINE

## 2024-09-17 PROCEDURE — 36415 COLL VENOUS BLD VENIPUNCTURE: CPT | Performed by: EMERGENCY MEDICINE

## 2024-09-17 PROCEDURE — 94761 N-INVAS EAR/PLS OXIMETRY MLT: CPT

## 2024-09-17 PROCEDURE — 87635 SARS-COV-2 COVID-19 AMP PRB: CPT | Performed by: PHYSICIAN ASSISTANT

## 2024-09-17 PROCEDURE — A6212 FOAM DRG <=16 SQ IN W/BORDER: HCPCS

## 2024-09-17 PROCEDURE — 99239 HOSP IP/OBS DSCHRG MGMT >30: CPT | Mod: ,,, | Performed by: INTERNAL MEDICINE

## 2024-09-17 PROCEDURE — 84443 ASSAY THYROID STIM HORMONE: CPT | Performed by: PHYSICIAN ASSISTANT

## 2024-09-17 PROCEDURE — 97116 GAIT TRAINING THERAPY: CPT

## 2024-09-17 PROCEDURE — 11000004 HC SNF PRIVATE

## 2024-09-17 PROCEDURE — 82306 VITAMIN D 25 HYDROXY: CPT | Performed by: PHYSICIAN ASSISTANT

## 2024-09-17 PROCEDURE — 97110 THERAPEUTIC EXERCISES: CPT

## 2024-09-17 PROCEDURE — 25000003 PHARM REV CODE 250

## 2024-09-17 PROCEDURE — 80048 BASIC METABOLIC PNL TOTAL CA: CPT | Performed by: PHYSICIAN ASSISTANT

## 2024-09-17 PROCEDURE — 85025 COMPLETE CBC W/AUTO DIFF WBC: CPT | Performed by: PHYSICIAN ASSISTANT

## 2024-09-17 PROCEDURE — 36415 COLL VENOUS BLD VENIPUNCTURE: CPT

## 2024-09-17 PROCEDURE — 80048 BASIC METABOLIC PNL TOTAL CA: CPT

## 2024-09-17 PROCEDURE — 81003 URINALYSIS AUTO W/O SCOPE: CPT | Performed by: PHYSICIAN ASSISTANT

## 2024-09-17 PROCEDURE — 36415 COLL VENOUS BLD VENIPUNCTURE: CPT | Performed by: PHYSICIAN ASSISTANT

## 2024-09-17 PROCEDURE — 81001 URINALYSIS AUTO W/SCOPE: CPT | Performed by: PHYSICIAN ASSISTANT

## 2024-09-17 PROCEDURE — 99900035 HC TECH TIME PER 15 MIN (STAT)

## 2024-09-17 PROCEDURE — 85025 COMPLETE CBC W/AUTO DIFF WBC: CPT

## 2024-09-17 PROCEDURE — 94799 UNLISTED PULMONARY SVC/PX: CPT

## 2024-09-17 PROCEDURE — 25000003 PHARM REV CODE 250: Performed by: PHYSICIAN ASSISTANT

## 2024-09-17 PROCEDURE — 1111F DSCHRG MED/CURRENT MED MERGE: CPT | Mod: CPTII,,, | Performed by: INTERNAL MEDICINE

## 2024-09-17 PROCEDURE — 25000003 PHARM REV CODE 250: Performed by: INTERNAL MEDICINE

## 2024-09-17 PROCEDURE — 99305 1ST NF CARE MODERATE MDM 35: CPT | Mod: AI,,, | Performed by: EMERGENCY MEDICINE

## 2024-09-17 RX ORDER — ONDANSETRON 4 MG/1
4 TABLET, ORALLY DISINTEGRATING ORAL EVERY 8 HOURS PRN
Status: DISCONTINUED | OUTPATIENT
Start: 2024-09-17 | End: 2024-10-07 | Stop reason: HOSPADM

## 2024-09-17 RX ORDER — ACETAMINOPHEN 325 MG/1
650 TABLET ORAL EVERY 6 HOURS PRN
Status: DISCONTINUED | OUTPATIENT
Start: 2024-09-17 | End: 2024-10-07 | Stop reason: HOSPADM

## 2024-09-17 RX ORDER — OXYBUTYNIN CHLORIDE 5 MG/1
5 TABLET, EXTENDED RELEASE ORAL DAILY
Status: DISCONTINUED | OUTPATIENT
Start: 2024-09-18 | End: 2024-10-07 | Stop reason: HOSPADM

## 2024-09-17 RX ORDER — ESTRADIOL 0.5 MG/1
2 TABLET ORAL DAILY
Status: DISCONTINUED | OUTPATIENT
Start: 2024-09-18 | End: 2024-09-18

## 2024-09-17 RX ORDER — AMOXICILLIN AND CLAVULANATE POTASSIUM 875; 125 MG/1; MG/1
1 TABLET, FILM COATED ORAL EVERY 12 HOURS
Status: COMPLETED | OUTPATIENT
Start: 2024-09-17 | End: 2024-09-27

## 2024-09-17 RX ORDER — PRAVASTATIN SODIUM 40 MG/1
80 TABLET ORAL NIGHTLY
Status: DISCONTINUED | OUTPATIENT
Start: 2024-09-17 | End: 2024-10-07 | Stop reason: HOSPADM

## 2024-09-17 RX ORDER — TALC
6 POWDER (GRAM) TOPICAL NIGHTLY PRN
Status: DISCONTINUED | OUTPATIENT
Start: 2024-09-17 | End: 2024-10-07 | Stop reason: HOSPADM

## 2024-09-17 RX ORDER — FERROUS SULFATE 325(65) MG
325 TABLET, DELAYED RELEASE (ENTERIC COATED) ORAL DAILY
Status: ON HOLD
Start: 2024-09-17

## 2024-09-17 RX ORDER — AMOXICILLIN AND CLAVULANATE POTASSIUM 875; 125 MG/1; MG/1
1 TABLET, FILM COATED ORAL EVERY 12 HOURS
Status: ON HOLD
Start: 2024-09-17 | End: 2024-09-21

## 2024-09-17 RX ORDER — METOPROLOL TARTRATE 25 MG/1
25 TABLET, FILM COATED ORAL DAILY
Status: DISCONTINUED | OUTPATIENT
Start: 2024-09-18 | End: 2024-10-07 | Stop reason: HOSPADM

## 2024-09-17 RX ORDER — CALCIUM CARBONATE 200(500)MG
500 TABLET,CHEWABLE ORAL 2 TIMES DAILY PRN
Status: DISCONTINUED | OUTPATIENT
Start: 2024-09-17 | End: 2024-10-07 | Stop reason: HOSPADM

## 2024-09-17 RX ORDER — SELENIUM 50 MCG
1 TABLET ORAL
Status: DISCONTINUED | OUTPATIENT
Start: 2024-09-18 | End: 2024-10-07 | Stop reason: HOSPADM

## 2024-09-17 RX ORDER — FOLIC ACID 0.8 MG
800 TABLET ORAL DAILY
Status: DISCONTINUED | OUTPATIENT
Start: 2024-09-18 | End: 2024-09-18 | Stop reason: CLARIF

## 2024-09-17 RX ORDER — AMOXICILLIN 250 MG
1 CAPSULE ORAL 2 TIMES DAILY
Status: DISCONTINUED | OUTPATIENT
Start: 2024-09-17 | End: 2024-10-07 | Stop reason: HOSPADM

## 2024-09-17 RX ORDER — DULOXETIN HYDROCHLORIDE 30 MG/1
60 CAPSULE, DELAYED RELEASE ORAL DAILY
Status: DISCONTINUED | OUTPATIENT
Start: 2024-09-18 | End: 2024-10-07 | Stop reason: HOSPADM

## 2024-09-17 RX ORDER — LANOLIN ALCOHOL/MO/W.PET/CERES
1 CREAM (GRAM) TOPICAL DAILY
Status: DISCONTINUED | OUTPATIENT
Start: 2024-09-18 | End: 2024-10-07 | Stop reason: HOSPADM

## 2024-09-17 RX ORDER — GABAPENTIN 600 MG/1
600 TABLET ORAL 2 TIMES DAILY
Status: DISCONTINUED | OUTPATIENT
Start: 2024-09-17 | End: 2024-10-07 | Stop reason: HOSPADM

## 2024-09-17 RX ORDER — UBIDECARENONE 75 MG
500 CAPSULE ORAL DAILY
Status: DISCONTINUED | OUTPATIENT
Start: 2024-09-18 | End: 2024-10-07 | Stop reason: HOSPADM

## 2024-09-17 RX ADMIN — AMOXICILLIN AND CLAVULANATE POTASSIUM 1 TABLET: 875; 125 TABLET, FILM COATED ORAL at 09:09

## 2024-09-17 RX ADMIN — OXYBUTYNIN CHLORIDE 5 MG: 5 TABLET, EXTENDED RELEASE ORAL at 09:09

## 2024-09-17 RX ADMIN — DULOXETINE HYDROCHLORIDE 60 MG: 30 CAPSULE, DELAYED RELEASE ORAL at 09:09

## 2024-09-17 RX ADMIN — APIXABAN 5 MG: 5 TABLET, FILM COATED ORAL at 09:09

## 2024-09-17 RX ADMIN — METOPROLOL TARTRATE 25 MG: 25 TABLET, FILM COATED ORAL at 09:09

## 2024-09-17 RX ADMIN — GABAPENTIN 600 MG: 600 TABLET, FILM COATED ORAL at 09:09

## 2024-09-17 RX ADMIN — GABAPENTIN 300 MG: 300 CAPSULE ORAL at 09:09

## 2024-09-17 RX ADMIN — FERROUS SULFATE TAB 325 MG (65 MG ELEMENTAL FE) 1 EACH: 325 (65 FE) TAB at 09:09

## 2024-09-17 RX ADMIN — PRAVASTATIN SODIUM 80 MG: 40 TABLET ORAL at 09:09

## 2024-09-17 NOTE — DISCHARGE SUMMARY
"[facility] Hospital Medicine  Discharge Summary      Patient Name: Bobbi Haywood  MRN: [id number]  NINFA: [id number]  Patient Class: IP- Inpatient  Admission Date: 9/10/2024  Hospital Length of Stay: 6 days  Discharge Date and Time:  09/17/2024 11:43 AM  Attending Physician: Abrahan Farmer MD   Discharging Provider: ABRAHAN FARMER MD  Primary Care Provider: [facility]    Primary Care Team: Networked reference to record PCT     HPI:   Patient is a 73-year-old female who presents to [facility] after a fall from home where she reportedly was too weak to get up.  Patient has a past medical history of AFib on Eliquis, neuropathy and depression. Patient reports having inverted ankles that affect her walking when she turned her foot wrong way she fell down.  She denies hitting her head or losing consciousness.  Patient also reports falling a couple of weeks ago as well where she got her right posterior calf stuck in a truck door.  She reports developing a wound which bled and it now "appears infected".  Over the past few weeks patient endorses weakness and fatigue more than usual.  The patient reportedly lives with her  who is also present and contributes to the history. The patient denies headache, chest pain, shortness of breath, abdominal pain, fevers or chills.    Significant findings in the ED, lactic acid 2.6, Mag 1.3, UA positive for leukocytes, WBC 25.    The patient will be admitted to [facility] for continued care and medical management.    Procedure(s) (LRB):  INCISION AND DRAINAGE, ABSCESS (Right)      Hospital Course:   9/11- Checking CT leg as concern for deep infection. Urine culture with GNB. Continue current antibiotics. PT/OT consulted.   9/12- CT showed abscess, general surgery consulted and plan for I&D in OR today. Continue broad spectrum antibiotics. Swing bed placement in progress. "   9/13- Wound culture with Group G strep and GNB, Dc'ed vancomycin, continue Zosyn. Swing bed placement next week.   9/16- Awaiting insurance approval for swing bed placement. Transitioned to Augmentin to complete 7 day course post drainage of abscess. Home Eliquis resumed.   9/17- Discharge to swing bed today.      Goals of Care Treatment Preferences:  Code Status: Full Code      SDOH Screening:  The patient was screened for utility difficulties, food insecurity, transport difficulties, housing insecurity, and interpersonal safety and there were no concerns identified this admission.     Consults:   Consults (From admission, onward)          Status Ordering Provider     Inpatient consult to General Surgery  Once        Provider:  Joe Alberto MD    Completed SAVANAH SÁNCHEZ     Inpatient consult to Social Work  Once        Provider:  (Not yet assigned)    Completed SAVANAH SÁNCHEZ            Neuro  Neuropathy  Moderately well-controlled  Continue home Cymbalta and gabapentin      Cardiac/Vascular  Longstanding persistent atrial fibrillation  Patient with Long standing persistent (>12 months) atrial fibrillation which is uncontrolled currently with Beta Blocker. Patient is currently in atrial fibrillation.[id number] Score: 3. HASBLED Score: 1. Anticoagulation indicated. Anticoagulation done with Eliquis .      Renal/  Acute cystitis with hematuria  UA positive for leukocytes.   Urine culture with E.coli.  She has received enough days of appropriate antibiotics.     INOCENTE (acute kidney injury)  INOCENTE is likely due to acute tubular necrosis caused by infection . Baseline creatinine is  ~ 1 . Most recent creatinine and eGFR are listed below.  Recent Labs     09/15/24  0327 09/16/24  0541 09/17/24  0321   CREATININE 1.56* 1.56* 1.41*   EGFRNORACEVR 35* 35* 39*        Plan  - INOCENTE is stable, likely has underlying stage 3 CKD.   - s/p IV fluids.  - Avoid nephrotoxins and renally dose meds for GFR listed above  -  "Monitor urine output, serial BMP, and adjust therapy as needed      ID  * Severe sepsis  This patient does have evidence of infective focus  My overall impression is sepsis.  Source: Urinary Tract and Skin and Soft Tissue (location right lower extremity)  Antibiotics given-   Antibiotics (72h ago, onward)      Start     Stop Route Frequency Ordered    09/16/24 2100  amoxicillin-clavulanate 875-125mg per tablet 1 tablet         09/20/24 2059 Oral Every 12 hours 09/16/24 1416    09/11/24 0900  mupirocin 2 % ointment         09/16/24 0859 Top 2 times daily 09/11/24 0643          Latest lactate reviewed-  No results for input(s): "LACTATE", "POCLAC" in the last 72 hours.    Blood culture NGTD.   Source control achieved by: antibiotics, abscess drainage- see below.     Cellulitis and abscess of right leg  2 week history after fall from home  CT leg showed probable right calf cellulitis with superimposed 23 x 68 x 92 mm thick walled air containing superficial abscess along the posterolateral soft tissues of the proximal right calf.   General surgery consulted; s/p I&D of abscess in calf and thigh on 9/12.  Per op report:  Infected right calf hematoma in the lateral aspect cavity proximally 6 x 5 x 1 and 0.5 cm.  Right medial thigh abscess cavity proximally 7 x 4 x 3 cm in size   Wound cultures with Group G strep, proteus, anaerobes  Received IV Zosyn for several days, transitioned to Augmentin on 9/16 to complete total 10 day course.           Oncology  Normocytic anemia  Anemia is likely due to  acute surgical blood loss superimposed on chronic anemia . Most recent hemoglobin and hematocrit are listed below.  Recent Labs     09/15/24  0327 09/16/24  0541 09/17/24  0321   HGB 8.0* 8.3* 8.2*   HCT 26.3* 27.1* 26.6*       Plan  - Monitor serial CBC: Daily  - Transfuse PRBC if patient becomes hemodynamically unstable, symptomatic or H/H drops below 7/21.  - Patient has not received any PRBC transfusions to date  - Patient's " anemia is currently stable  - B12, folate WNL, iron panel with iron deficiency.  - Started on iron replacement.      Other  Debility  Patient with Acute on chronic debility due to chronic unspecified fatigue and age-related physical debility. Latest AMPAC and GEMS scores have not been reviewed. Evaluation for etiology is complete. Plan includes progressive mobility protocol initated and PT/OT consulted.  Swing bed placement in progress.       Final Active Diagnoses:    Diagnosis Date Noted POA    PRINCIPAL PROBLEM:  Severe sepsis [A41.9, R65.20] 09/11/2024 Yes    Cellulitis and abscess of right leg [L03.115, L02.415] 09/11/2024 Yes    INOCENTE (acute kidney injury) [N17.9] 09/11/2024 Yes    Acute cystitis with hematuria [N30.01] 09/11/2024 Yes    Longstanding persistent atrial fibrillation [I48.11] 09/11/2024 Yes    Debility [R53.81] 09/11/2024 Yes    Normocytic anemia [D64.9] 09/11/2024 Yes    Neuropathy [G62.9]  Yes     Chronic      Problems Resolved During this Admission:    Diagnosis Date Noted Date Resolved POA    Hypomagnesemia [E83.42] 09/11/2024 09/11/2024 Yes       Discharged Condition: good    Disposition: Skilled Nursing Facility    Follow Up:   Contact information for follow-up providers       Joe Alberto MD. Schedule an appointment as soon as possible for a visit on 10/1/2024.    Specialties: General Surgery, Surgery  Why: 9:45 am  Contact information:  [address] [place] [address]  [phone]                       Contact information for after-discharge care       Destination       [facility] .    Service: Skilled Nursing  Contact information:  [id number] [address] [address]  [phone]                                 Patient Instructions:   No discharge procedures on file.    Significant Diagnostic Studies: Labs: BMP:   Recent Labs   Lab 09/16/24  0541 09/17/24  0321   GLU 90 93    139   K 4.2 3.9    105   CO2 28 27   BUN 26* 26*    CREATININE 1.56* 1.41*   CALCIUM 8.9 8.4*    and CBC   Recent Labs   Lab 09/16/24  0541 09/17/24  0321   WBC 15.85* 15.76*   HGB 8.3* 8.2*   HCT 27.1* 26.6*    383       Pending Diagnostic Studies:       None           Medications:  Reconciled Home Medications:      Medication List        START taking these medications      amoxicillin-clavulanate 875-125mg 875-125 mg per tablet  Commonly known as: AUGMENTIN  Take 1 tablet by mouth every 12 (twelve) hours. for 4 days     ferrous sulfate 325 (65 FE) MG EC tablet  Take 1 tablet (325 mg total) by mouth once daily.            CONTINUE taking these medications      ACIDOPHILUS PROBIOTIC BLEND ORAL  Take 1 tablet by mouth once daily.     CULTURELLE IMMUNE DEFENSE ORAL  Take 1 tablet by mouth once daily.     cyanocobalamin 500 MCG tablet  Take 500 mcg by mouth once daily.     DULoxetine 60 MG capsule  Commonly known as: CYMBALTA  Take 1 capsule by mouth once daily.     ELIQUIS 5 mg Tab  Generic drug: apixaban  Take 5 mg by mouth 2 (two) times daily.     estradioL 2 MG tablet  Commonly known as: ESTRACE  Take 1 tablet by mouth once daily.     fesoterodine 4 mg Tb24  Commonly known as: TOVIAZ  Take 4 mg by mouth once daily.     folic acid 800 MCG Tab  Commonly known as: FOLVITE  Take 800 mcg by mouth once daily.     gabapentin 600 MG tablet  Commonly known as: NEURONTIN  Take 1 tablet by mouth 2 (two) times a day.     metoprolol tartrate 25 MG tablet  Commonly known as: LOPRESSOR  Take 1 tablet by mouth once daily.     rosuvastatin 20 MG tablet  Commonly known as: CRESTOR  Take 20 mg by mouth every evening.              Indwelling Lines/Drains at time of discharge:   Lines/Drains/Airways       None                   Time spent on the discharge of patient: 35 minutes         SAVANAH SÁNCHEZ MD  Department of Hospital Medicine  [facility]

## 2024-09-17 NOTE — PLAN OF CARE
Secure chat sent to kody at [facility] as follow up. Approval for [facility] is pending. Waiting on [employer] insurance. Packet is ready. Cm will follow.

## 2024-09-17 NOTE — PLAN OF CARE
Patient is accepted and approved for swb at [facility] for today. Cm will take packet to nursing unit. Physician aware.

## 2024-09-17 NOTE — PT/OT/SLP PROGRESS
Occupational Therapy   Treatment    Name: Bobbi Haywood  MRN: [id number]  Admitting Diagnosis:  Severe sepsis  5 Days Post-Op    Recommendations:     Discharge Recommendations: Moderate Intensity Therapy  Discharge Equipment Recommendations:  none  Barriers to discharge:       Assessment:     Bobbi Haywood is a 73 y.o. female with a medical diagnosis of Severe sepsis.  She presents with weakness. Performance deficits affecting function are weakness, impaired endurance, impaired self care skills, impaired functional mobility, gait instability, impaired balance, pain.     Rehab Prognosis:  Good; patient would benefit from acute skilled OT services to address these deficits and reach maximum level of function.       Plan:     Patient to be seen 5 x/week to address the above listed problems via self-care/home management, therapeutic activities, therapeutic exercises  Plan of Care Expires: 10/09/24  Plan of Care Reviewed with: patient    Subjective     Chief Complaint: Pt had no complaints  Patient/Family Comments/goals: return to PLOF  Pain/Comfort:       Objective:     Communicated with: SUKHDEV Mccabe prior to session.  Patient found HOB elevated with   upon OT entry to room.    General Precautions: Standard, fall    Orthopedic Precautions:N/A  Braces: N/A  Respiratory Status: Room air     Occupational Performance:     Bed Mobility:         Functional Mobility/Transfers:    Functional Mobility:     Activities of Daily Living:        [facility] 6 Click ADL:      Treatment & Education:  Pt completed BUE ex to increase strength for improved performance in functional transfers    Patient left with bed in chair position with all lines intact and call button in reach    GOALS:   Multidisciplinary Problems       Occupational Therapy Goals          Problem: Occupational Therapy    Goal Priority Disciplines Outcome Interventions   Occupational Therapy Goal     OT, PT/OT Progressing    Description: STG:  Pt will  perform grooming with setup  Pt will bathe with Esvin with setup at EOB  Pt will perform UE dressing with Charlene  Pt will perform LE dressing with Esvin  Pt will sit EOB x 10 min with SBA   Pt will transfer bed/chair/bsc with CGA with RW  Pt will perform standing task x 2 min with CGA with RW   Pt will tolerate 15 minutes of tx without fatigue      LT.Restore to max I with self care and mobility.                           Time Tracking:     OT Date of Treatment: 24  OT Start Time: 1008  OT Stop Time: 1025  OT Total Time (min): 17 min    Billable Minutes:Therapeutic Exercise 17               2024

## 2024-09-17 NOTE — ASSESSMENT & PLAN NOTE
INOCENTE is likely due to acute tubular necrosis caused by infection . Baseline creatinine is  ~ 1 . Most recent creatinine and eGFR are listed below.  Recent Labs     09/15/24  0327 09/16/24  0541 09/17/24  0321   CREATININE 1.56* 1.56* 1.41*   EGFRNORACEVR 35* 35* 39*        Plan  - INOCENTE is stable, likely has underlying stage 3 CKD.   - s/p IV fluids.  - Avoid nephrotoxins and renally dose meds for GFR listed above  - Monitor urine output, serial BMP, and adjust therapy as needed

## 2024-09-17 NOTE — PT/OT/SLP PROGRESS
Physical Therapy Treatment    Patient Name:  Bobbi Haywood   MRN:  [id number]    Recommendations:     Discharge Recommendations: Moderate Intensity Therapy  Discharge Equipment Recommendations: none  Barriers to discharge: Decreased caregiver support    Assessment:     Bobbi Haywood is a 73 y.o. female admitted with a medical diagnosis of Severe sepsis.  She presents with the following impairments/functional limitations: weakness, impaired functional mobility, impaired endurance, pain Patient able to progress to ambulation today. Still very weak and needs sb but improving.    Rehab Prognosis: Good; patient would benefit from acute skilled PT services to address these deficits and reach maximum level of function.    Recent Surgery: Procedure(s) (LRB):  INCISION AND DRAINAGE, ABSCESS (Right) 5 Days Post-Op    Plan:     During this hospitalization, patient to be seen 5 x/week to address the identified rehab impairments via gait training, therapeutic activities, therapeutic exercises and progress toward the following goals:    Plan of Care Expires:  10/11/24    Subjective     Chief Complaint: generalized weakness  Patient/Family Comments/goals: Patient agreeable to oob activity  Pain/Comfort:  Pain Rating 1: 4/10  Location - Side 1: Right  Location 1: leg  Pain Addressed 1: Cessation of Activity, Pre-medicate for activity      Objective:     Communicated with nruse prior to session.  Patient found supine with PureWick, peripheral IV upon PT entry to room.     General Precautions: Standard, fall  Orthopedic Precautions:    Braces:    Respiratory Status: Room air     Functional Mobility:  Bed Mobility:     Supine to Sit: moderate assistance  Sit to Supine: moderate assistance  Transfers:     Sit to Stand:  minimum assistance with rolling walker  Gait: ambulated 8 feet with walker min assist, 90% decreaed horacio      AM-PAC 6 CLICK MOBILITY  Turning over in bed (including adjusting bedclothes, sheets  and blankets)?: 3  Sitting down on and standing up from a chair with arms (e.g., wheelchair, bedside commode, etc.): 3  Moving from lying on back to sitting on the side of the bed?: 3  Moving to and from a bed to a chair (including a wheelchair)?: 3  Need to walk in hospital room?: 3  Climbing 3-5 steps with a railing?: 1  Basic Mobility Total Score: 16       Treatment & Education:  BLE: aps, hs, saq, abd-add, mre flexion ext 3x10    Patient left up in chair with call button in reach..    GOALS:   Multidisciplinary Problems       Physical Therapy Goals          Problem: Physical Therapy    Goal Priority Disciplines Outcome Goal Variances Interventions   Physical Therapy Goal     PT, PT/OT Progressing     Description: Short Term Goals  Ambulate SBA - 100 feet with rolling walker assistive device.   Supine to sit minimum  Sit to stand minimum  SPT minimum  5. Independent with HEP    Long Term Goals  Ambulate SBA - 150 feet with rolling walker assistive device.   Supine to sit stand-by  Sit to stand stand-by  SPT stand-by  Needed equipment for home.                              Time Tracking:     PT Received On: 09/17/24  PT Start Time: 1030     PT Stop Time: 1055  PT Total Time (min): 25 min     Billable Minutes: Gait Training 10 and Therapeutic Exercise 15    Treatment Type: Treatment  PT/PTA: PT     Number of PTA visits since last PT visit: 0     09/17/2024

## 2024-09-17 NOTE — PLAN OF CARE
[facility] [place] [facility]  Discharge Final Note    Primary Care Provider: [facility]    Expected Discharge Date: 9/17/2024    Final Discharge Note (most recent)       Final Note - 09/17/24 1230          Final Note    Assessment Type Final Discharge Note     Anticipated Discharge Disposition Swing Bed        Post-Acute Status    Post-Acute Authorization Placement     Post-Acute Placement Status Set-up Complete/Auth obtained     Coverage humana     Patient choice form signed by patient/caregiver List with quality metrics by geographic area provided;List from CMS Compare;List from System Post-Acute Care     Discharge Delays None known at this time                     Important Message from Medicare  Important Message from Medicare regarding Discharge Appeal Rights: Explained to patient/caregiver, Signed/date by patient/caregiver     Date IMM was signed: 09/17/24  Time IMM was signed: 1138     Follow-up providers       Joe Alberto MD   Specialty: General Surgery, Surgery    [address] [place] [id number]   Phone: [phone]       Next Steps: Schedule an appointment as soon as possible for a visit on 10/1/2024    Instructions: 9:45 am              After-discharge care                Destination       [facility]   Service: Skilled Nursing    [address] [place] [address] [place] [id number]   Phone: [phone]                             Dc to [facility]. Packet on nursing unit. Patient aware. IM done.

## 2024-09-17 NOTE — PLAN OF CARE
Problem: Skin Injury Risk Increased  Goal: Skin Health and Integrity  Outcome: Progressing     Problem: Adult Inpatient Plan of Care  Goal: Plan of Care Review  Outcome: Progressing  Goal: Patient-Specific Goal (Individualized)  Outcome: Progressing  Goal: Absence of Hospital-Acquired Illness or Injury  Outcome: Progressing  Goal: Optimal Comfort and Wellbeing  Outcome: Progressing  Goal: Readiness for Transition of Care  Outcome: Progressing     Problem: Sepsis/Septic Shock  Goal: Optimal Coping  Outcome: Progressing  Goal: Absence of Bleeding  Outcome: Progressing  Goal: Blood Glucose Level Within Targeted Range  Outcome: Progressing  Goal: Absence of Infection Signs and Symptoms  Outcome: Progressing  Goal: Optimal Nutrition Intake  Outcome: Progressing     Problem: Acute Kidney Injury/Impairment  Goal: Fluid and Electrolyte Balance  Outcome: Progressing  Goal: Improved Oral Intake  Outcome: Progressing  Goal: Effective Renal Function  Outcome: Progressing     Problem: Wound  Goal: Optimal Coping  Outcome: Progressing  Goal: Optimal Functional Ability  Outcome: Progressing  Goal: Absence of Infection Signs and Symptoms  Outcome: Progressing  Goal: Improved Oral Intake  Outcome: Progressing  Goal: Optimal Pain Control and Function  Outcome: Progressing  Goal: Skin Health and Integrity  Outcome: Progressing  Goal: Optimal Wound Healing  Outcome: Progressing     Problem: Fall Injury Risk  Goal: Absence of Fall and Fall-Related Injury  Outcome: Progressing

## 2024-09-17 NOTE — ASSESSMENT & PLAN NOTE
Patient with Long standing persistent (>12 months) atrial fibrillation which is uncontrolled currently with Beta Blocker. Patient is currently in atrial fibrillation.[id number] Score: 3. HASBLED Score: 1. Anticoagulation indicated. Anticoagulation done with Eliquis .

## 2024-09-17 NOTE — ASSESSMENT & PLAN NOTE
UA positive for leukocytes.   Urine culture with E.coli.  She has received enough days of appropriate antibiotics.

## 2024-09-17 NOTE — PLAN OF CARE
Problem: Skin Injury Risk Increased  Goal: Skin Health and Integrity  Outcome: Progressing     Problem: Adult Inpatient Plan of Care  Goal: Plan of Care Review  Outcome: Progressing  Goal: Patient-Specific Goal (Individualized)  Outcome: Progressing  Goal: Absence of Hospital-Acquired Illness or Injury  Outcome: Progressing  Goal: Optimal Comfort and Wellbeing  Outcome: Progressing

## 2024-09-17 NOTE — PLAN OF CARE
Problem: Adult Inpatient Plan of Care  Goal: Plan of Care Review  Outcome: Progressing  Goal: Patient-Specific Goal (Individualized)  Outcome: Progressing  Goal: Absence of Hospital-Acquired Illness or Injury  Outcome: Progressing  Goal: Optimal Comfort and Wellbeing  Outcome: Progressing  Goal: Readiness for Transition of Care  Outcome: Progressing     Problem: Sepsis/Septic Shock  Goal: Optimal Coping  Outcome: Progressing  Goal: Absence of Bleeding  Outcome: Progressing  Goal: Blood Glucose Level Within Targeted Range  Outcome: Progressing  Goal: Absence of Infection Signs and Symptoms  Outcome: Progressing  Goal: Optimal Nutrition Intake  Outcome: Progressing     Problem: Acute Kidney Injury/Impairment  Goal: Fluid and Electrolyte Balance  Outcome: Progressing  Goal: Improved Oral Intake  Outcome: Progressing  Goal: Effective Renal Function  Outcome: Progressing     Problem: Infection  Goal: Absence of Infection Signs and Symptoms  Outcome: Progressing     Problem: Wound  Goal: Optimal Coping  Outcome: Progressing  Goal: Optimal Functional Ability  Outcome: Progressing  Goal: Absence of Infection Signs and Symptoms  Outcome: Progressing  Goal: Improved Oral Intake  Outcome: Progressing  Goal: Optimal Pain Control and Function  Outcome: Progressing  Goal: Skin Health and Integrity  Outcome: Progressing  Goal: Optimal Wound Healing  Outcome: Progressing     Problem: Fall Injury Risk  Goal: Absence of Fall and Fall-Related Injury  Outcome: Progressing

## 2024-09-17 NOTE — ASSESSMENT & PLAN NOTE
Anemia is likely due to  acute surgical blood loss superimposed on chronic anemia . Most recent hemoglobin and hematocrit are listed below.  Recent Labs     09/15/24  0327 09/16/24  0541 09/17/24  0321   HGB 8.0* 8.3* 8.2*   HCT 26.3* 27.1* 26.6*       Plan  - Monitor serial CBC: Daily  - Transfuse PRBC if patient becomes hemodynamically unstable, symptomatic or H/H drops below 7/21.  - Patient has not received any PRBC transfusions to date  - Patient's anemia is currently stable  - B12, folate WNL, iron panel with iron deficiency.  - Started on iron replacement.

## 2024-09-17 NOTE — NURSING
1707 Pt arrived via private vehicle with son and daughter in law. Pt ambulated to wheelchair and was escorted to room. Pt ambulated to bed. Pt stated 0 pain on a 0-10 pain scale on arrival. New preventive heel and sacral dressing placed. Preventive elbow dressing in place. Date on dressing 9/15. Redness to heels, Abrasions to toes on bilateral feet, Breakdown to sacrum, Bruising to bilateral legs and arms, Abrasion R hip, R side, and lower buttock and redness to vagina.  Pt states she's had a sore throat for a few days. No fever noted. 4-eyes completed with CT,RN. Ring on L hand. Phone and phone  in room. Clothes noted in bag. Pt stated she has a living will. Swelling to R extremity and foot. Pt stated that dressing to upper thigh and calf on R side was changed today. Doppler obtained to get dorsalis pedis and posterior tibialis pulses. Pt stated she was on a purwick at prior facility, but is continent and can tell staff when she needs to use the restroom.

## 2024-09-18 LAB
25(OH)D3 SERPL-MCNC: 27.8 NG/ML
TSH SERPL DL<=0.005 MIU/L-ACNC: 1.02 UIU/ML (ref 0.36–3.74)

## 2024-09-18 PROCEDURE — 94761 N-INVAS EAR/PLS OXIMETRY MLT: CPT

## 2024-09-18 PROCEDURE — 11000004 HC SNF PRIVATE

## 2024-09-18 PROCEDURE — 25000003 PHARM REV CODE 250: Performed by: EMERGENCY MEDICINE

## 2024-09-18 PROCEDURE — 25000003 PHARM REV CODE 250: Performed by: PHYSICIAN ASSISTANT

## 2024-09-18 PROCEDURE — 97166 OT EVAL MOD COMPLEX 45 MIN: CPT

## 2024-09-18 PROCEDURE — 97162 PT EVAL MOD COMPLEX 30 MIN: CPT

## 2024-09-18 RX ORDER — FAMOTIDINE 20 MG/1
20 TABLET, FILM COATED ORAL 2 TIMES DAILY
Status: DISCONTINUED | OUTPATIENT
Start: 2024-09-18 | End: 2024-09-26

## 2024-09-18 RX ORDER — CHOLECALCIFEROL (VITAMIN D3) 125 MCG
5000 CAPSULE ORAL DAILY
Status: COMPLETED | OUTPATIENT
Start: 2024-09-18 | End: 2024-09-27

## 2024-09-18 RX ORDER — ACETAMINOPHEN 500 MG
1000 TABLET ORAL EVERY 6 HOURS PRN
Status: DISCONTINUED | OUTPATIENT
Start: 2024-09-18 | End: 2024-10-07 | Stop reason: HOSPADM

## 2024-09-18 RX ORDER — ESTRADIOL 0.5 MG/1
2 TABLET ORAL DAILY
Status: DISCONTINUED | OUTPATIENT
Start: 2024-09-19 | End: 2024-10-07 | Stop reason: HOSPADM

## 2024-09-18 RX ORDER — ESTRADIOL 0.5 MG/1
2 TABLET ORAL DAILY
Status: DISCONTINUED | OUTPATIENT
Start: 2024-09-18 | End: 2024-09-18

## 2024-09-18 RX ADMIN — DULOXETINE HYDROCHLORIDE 60 MG: 30 CAPSULE, DELAYED RELEASE ORAL at 09:09

## 2024-09-18 RX ADMIN — Medication 1 CAPSULE: at 05:09

## 2024-09-18 RX ADMIN — GABAPENTIN 600 MG: 600 TABLET, FILM COATED ORAL at 08:09

## 2024-09-18 RX ADMIN — CYANOCOBALAMIN TAB 500 MCG 500 MCG: 500 TAB at 09:09

## 2024-09-18 RX ADMIN — FAMOTIDINE 20 MG: 20 TABLET, FILM COATED ORAL at 08:09

## 2024-09-18 RX ADMIN — Medication 6 MG: at 08:09

## 2024-09-18 RX ADMIN — ACETAMINOPHEN 1000 MG: 500 TABLET ORAL at 09:09

## 2024-09-18 RX ADMIN — APIXABAN 5 MG: 5 TABLET, FILM COATED ORAL at 08:09

## 2024-09-18 RX ADMIN — APIXABAN 5 MG: 5 TABLET, FILM COATED ORAL at 09:09

## 2024-09-18 RX ADMIN — Medication 1 CAPSULE: at 11:09

## 2024-09-18 RX ADMIN — OXYBUTYNIN CHLORIDE 5 MG: 5 TABLET, EXTENDED RELEASE ORAL at 09:09

## 2024-09-18 RX ADMIN — AMOXICILLIN AND CLAVULANATE POTASSIUM 1 TABLET: 875; 125 TABLET, FILM COATED ORAL at 09:09

## 2024-09-18 RX ADMIN — Medication 5000 UNITS: at 11:09

## 2024-09-18 RX ADMIN — SENNOSIDES AND DOCUSATE SODIUM 1 TABLET: 8.6; 5 TABLET ORAL at 08:09

## 2024-09-18 RX ADMIN — ACETAMINOPHEN 1000 MG: 500 TABLET ORAL at 08:09

## 2024-09-18 RX ADMIN — GABAPENTIN 600 MG: 600 TABLET, FILM COATED ORAL at 09:09

## 2024-09-18 RX ADMIN — AMOXICILLIN AND CLAVULANATE POTASSIUM 1 TABLET: 875; 125 TABLET, FILM COATED ORAL at 08:09

## 2024-09-18 RX ADMIN — FERROUS SULFATE TAB EC 324 MG (65 MG FE EQUIVALENT) 1 EACH: 324 (65 FE) TABLET DELAYED RESPONSE at 09:09

## 2024-09-18 RX ADMIN — PRAVASTATIN SODIUM 80 MG: 40 TABLET ORAL at 08:09

## 2024-09-18 RX ADMIN — Medication 1 TABLET: at 11:09

## 2024-09-18 RX ADMIN — ACETAMINOPHEN 1000 MG: 500 TABLET ORAL at 01:09

## 2024-09-18 RX ADMIN — Medication 1 CAPSULE: at 09:09

## 2024-09-18 RX ADMIN — METOPROLOL TARTRATE 25 MG: 25 TABLET, FILM COATED ORAL at 09:09

## 2024-09-18 RX ADMIN — FAMOTIDINE 20 MG: 20 TABLET, FILM COATED ORAL at 11:09

## 2024-09-18 NOTE — PT/OT/SLP EVAL
Occupational Therapy   Evaluation    Name: Bobbi Haywood  MRN: [id number]  Admitting Diagnosis: Debility/generalized weakness: recent Fall resulting; wound with multiple abscesses and cellulitis in Gastrocnemius area   Recent Surgery: * No surgery found *      Recommendations:     Discharge Recommendations:    Discharge Equipment Recommendations:  wheelchair  Barriers to discharge:  Inaccessible home environment, Other (Comment) (limitations with 3 steps within her home)    Assessment:     Bobbi Haywood is a 73 y.o. female with a medical diagnosis of Debility/generalized weakness: recent Fall resulting; wound and cellulitis in Gastrocnemius area .  She presents with limited mobility after fall x2 when descending steps at home when ambulating to the restroom. Performance deficits affecting function: weakness, impaired endurance, impaired self care skills, impaired functional mobility, gait instability, impaired balance, decreased safety awareness, decreased lower extremity function, decreased ROM. Pt has significant B foot inversion and supination which is a chronic issues. Pt reports no pain with ambulation and completed functional ambulation from the bedside to the restroom. Pt currently has limited ADL, mobility, coordination which places her a risk for a fall with diminished ADL performance    Rehab Prognosis: Fair; patient would benefit from acute skilled OT services to address these deficits and reach maximum level of function.       Plan:     Patient to be seen 5 x/week to address the above listed problems via self-care/home management, therapeutic activities, therapeutic exercises, neuromuscular re-education  Plan of Care Expires: 10/18/24  Plan of Care Reviewed with: patient    Subjective     Chief Complaint: decreased LE coordination and weakness  Patient/Family Comments/goals: increase mobility and ADL    Occupational Profile:  Living Environment: lives at home with ; and has 3  step to go into the area of the house with the restroom; Pt will likely need a ramp to reduce falls in this area; pt reports having Bilateral ramps  Previous level of function: CGA-SVN  Equipment Used at Home: walker, rolling, bedside commode, shower chair  Assistance upon Discharge: TBD    Pain/Comfort:  Pain Rating 1: 0/10    Patients cultural, spiritual, Orthodox conflicts given the current situation: no    Objective:     Communicated with: Pt prior to session.  Patient found supine with   upon OT entry to room.    General Precautions: Standard, fall  Orthopedic Precautions:    Braces:    Respiratory Status: Room air    Occupational Performance:    Bed Mobility:    Patient completed Supine to Sit with contact guard assistance    Functional Mobility/Transfers:  Patient completed Sit <> Stand Transfer with contact guard assistance and minimum assistance  with  rolling walker   Functional Mobility: Pt completed functional ambulation to the restroom with Min A for safety balance and device management    Activities of Daily Living:  Upper Body Dressing: supervision    Lower Body Dressing: minimum assistance and moderate assistance donning LLE due to weakness  Toileting: minimum assistance and moderate assistance for safety balance    Cognitive/Visual Perceptual:  Cognitive/Psychosocial Skills:     -       Oriented to: Person, Place, Time, and Situation   -       Safety awareness/insight to disability: impaired     Physical Exam:  Balance:    -       fair minus  Upper Extremity Range of Motion:     -       Right Upper Extremity: WFL  -       Left Upper Extremity: Deficits: 3/4 ROM  Upper Extremity Strength:    -       Right Upper Extremity: 3-/5  -       Left Upper Extremity: Deficits: 2+/5    AMPAC 6 Click ADL:  AMPAC Total Score: 15    Treatment & Education:  OT eval    Patient left supine with all lines intact and call button in reach    GOALS:   Multidisciplinary Problems       Occupational Therapy Goals           Problem: Occupational Therapy    Goal Priority Disciplines Outcome Interventions   Occupational Therapy Goal     OT, PT/OT     Description: Goals to be met by: 10/18/24     Patient will increase functional independence with ADLs by performing:    UE Dressing with Modified McDonald.  LE Dressing with Modified McDonald and Supervision with a/e as needed  Grooming while standing at sink with Modified McDonald and Supervision.  Toileting from toilet with Modified McDonald and Supervision for hygiene and clothing management.   Standing x3-5 minutes with Supervision.  Supine to sit with McDonald.  Squat pivot transfers with Modified McDonald and Supervision.  Step transfer with Modified McDonald and Supervision  Toilet transfer to toilet with Modified McDonald and Supervision.                         History:     Past Medical History:   Diagnosis Date    A-fib     Fall 09/10/2024    Hematoma     Hypertension     Neuropathy          Past Surgical History:   Procedure Laterality Date    CATARACT EXTRACTION, BILATERAL      CHOLECYSTECTOMY      HYSTERECTOMY      INCISION AND DRAINAGE OF ABSCESS Right 9/12/2024    Procedure: INCISION AND DRAINAGE, ABSCESS;  Surgeon: Joe Alberto MD;  Location: [facility] [place] [facility];  Service: General;  Laterality: Right;  right thigh and right calf    INJECTION OF ANESTHETIC AGENT AROUND MEDIAL BRANCH NERVES INNERVATING LUMBAR FACET JOINT Bilateral 8/26/2021    Procedure: Bilateral L4-5,5-S1 MBB;  Surgeon: Sulma Nelson MD;  Location: [facility];  Service: Pain Management;  Laterality: Bilateral;  Per Dr Bradford ok to hold Eliquis for 4 days not 5, Per Dr Nelson this is fine with her. Pt will bring her verification card for COVID vaccination.    INJECTION OF ANESTHETIC AGENT AROUND MEDIAL BRANCH NERVES INNERVATING LUMBAR FACET JOINT Bilateral 10/5/2021    Procedure: Bilateral L4-5,5-S1 MBB;  Surgeon: Sulma Nelson MD;  Location: [facility];   Service: Pain Management;  Laterality: Bilateral;  PT AWARE ON OV TO BE TESTED    KNEE CARTILAGE SURGERY Right     RADIOFREQUENCY ABLATION OF LUMBAR MEDIAL BRANCH NERVE AT SINGLE LEVEL Bilateral 11/9/2021    Procedure: Radiofrequency Ablation, Nerve, Spinal, Lumbar, Medial Branch, 1 Level L4-S1  BILATERAL;  Surgeon: Sulma Nelson MD;  Location: [facility];  Service: Pain Management;  Laterality: Bilateral;  per alicia pt is on eliquis but she will get permission for it to be held 5 days prior to procedure.   HAD VAC   CARD SCANNED IN    REDUCTION OF BOTH BREASTS      RETINAL DETACHMENT SURGERY  right    VAGINAL DELIVERY      X1       Time Tracking:     OT Date of Treatment:    OT Start Time: 0902  OT Stop Time: 0922  OT Total Time (min): 20 min    Billable Minutes:Evaluation 20 9/18/2024

## 2024-09-18 NOTE — PROGRESS NOTES
The patient has   Famotidine tab 20 mg 1 po bid   ordered for Stress Ulcer Prevention during this admission. Thank You

## 2024-09-18 NOTE — CONSULTS
[facility]  Adult Nutrition  Consult Note         Reason for Assessment  Reason For Assessment: consult assess  Nutrition Risk Screen: no indicators present    Assessment and Plan  Consult received and appreciated. Patient admitted 9/17 from [facility] with a dx of INOCENTE, Debility, and cellulitis of R leg. Patient is ordered a cardiac diet with 50% intakes per flowsheets. Noted skin breakdown to sacral area (stage 2 pressure injury).    Patient is 100.7 kg with a BMI of 30.96. Recommend to liberalize diet to regular to increase po intakes. Will add Fawad BID to aid in wound healing of decubitus. Encourage po intakes. RD Following.         Learning Needs/Social Determinants of Health  Learning Assessment       09/17/2024 1724 [facility] (9/17/2024 - Present)   Created by Rajwinder Bailon, RN - RN (Nurse) Status: Complete                 PRIMARY LEARNER     Primary Learner Name:  Bobbi Haywood CT - 09/17/2024 1724    Relationship:  Patient CT - 09/17/2024 1724    Does the primary learner have any barriers to learning?:  No Barriers CT - 09/17/2024 1724    What is the preferred language of the primary learner?:  English CT - 09/17/2024 1724    Is an  required?:  No CT - 09/17/2024 1724    How does the primary learner prefer to learn new concepts?:  Listening, Reading CT - 09/17/2024 1724    How often do you need to have someone help you read instructions, pamphlets, or written material from your doctor or pharmacy?:  Often CT - 09/17/2024 1724        CO-LEARNER #1     No question answered        CO-LEARNER #2     No question answered        SPECIAL TOPICS     No question answered        ANSWERED BY:     No question answered        Comments         Edit History       Rajwinder Bailon, RN - RN (Nurse)   09/17/2024 1724                           Social Determinants of Health     Tobacco Use: Low Risk  (9/10/2024)    Patient History     Smoking  Tobacco Use: Never     Smokeless Tobacco Use: Never     Passive Exposure: Not on file   Alcohol Use: Not At Risk (9/18/2024)    AUDIT-C     Frequency of Alcohol Consumption: Never     Average Number of Drinks: Patient does not drink     Frequency of Binge Drinking: Never   Financial Resource Strain: Low Risk  (9/18/2024)    Overall Financial Resource Strain (CARDIA)     Difficulty of Paying Living Expenses: Not hard at all   Food Insecurity: No Food Insecurity (9/18/2024)    Hunger Vital Sign     Worried About Running Out of Food in the Last Year: Never true     Ran Out of Food in the Last Year: Never true   Transportation Needs: No Transportation Needs (9/18/2024)    TRANSPORTATION NEEDS     Transportation : No   Physical Activity: Inactive (9/18/2024)    Exercise Vital Sign     Days of Exercise per Week: 0 days     Minutes of Exercise per Session: 0 min   Stress: No Stress Concern Present (9/18/2024)    [employer] - Occupational Stress Questionnaire     Feeling of Stress : Only a little   Housing Stability: Low Risk  (9/18/2024)    Housing Stability Vital Sign     Unable to Pay for Housing in the Last Year: No     Homeless in the Last Year: No   Depression: Not on file   Utilities: Not At Risk (9/18/2024)    [facility] Utilities     Threatened with loss of utilities: No   Health Literacy: Adequate Health Literacy (9/18/2024)     Health Literacy     Frequency of need for help with medical instructions: Never   Social Isolation: Socially Integrated (9/18/2024)    Social Isolation     Social Isolation: 1            Malnutrition  Is Patient Malnourished: No    Nutrition Diagnosis  Altered nutrition related laboratory values related to Renal dysfunction as evidenced by dx INOCENTE with elevated Bun/Cr  Comments:     Recent Labs   Lab 09/17/24  2100   *     Comments on Glucose: elevated no pmh Dm noted    Nutrition Prescription / Recommendations  Recommendation/Intervention: Recommend to  "change diet to regular  Goals: po intakes 75% during admission  Nutrition Goal Status: new  Current Diet Order: Cardiac  Nutrition Order Comments: 50-75% intakes  Chewing or Swallowing Difficulty?: No Chewing or swallowing difficulty  Recommended Diet: Regular  Recommended Oral Supplement: Fawad [90 kcals, 2.5g Protein, 10g Carbs(3g Sugar), 7g L-Arginine, 7g L-Glutamine, Vitamin C 300mg, 9.5mg Zinc] 2 times a day  Is Nutrition Support Recommended: [facility] Support: No  Is Nutrition Education Recommended: No    Monitor and Evaluation  % current Intake: P.O. intake of 50 - 75 %  % intake to meet estimated needs: 75 - 100 %  Food and Nutrient Intake: energy intake, food and beverage intake  Food and Nutrient Adminstration: diet order  Anthropometric Measurements: height/length, weight, weight change, body mass index  Biochemical Data, Medical Tests and Procedures: electrolyte and renal panel, gastrointestinal profile, glucose/endocrine profile, inflammatory profile, lipid profile  Tolerance: tolerating    Current Medical Diagnosis and Past Medical History     Past Medical History:   Diagnosis Date    A-fib     Fall 09/10/2024    Hematoma     Hypertension     Neuropathy        Nutrition/Diet History  Spiritual, Cultural Beliefs, [employer] Practices, Values that Affect Care: no  Food Allergies: NKFA  Factors Affecting Nutritional Intake: None identified at this time    Lab/Procedures/Meds  Recent Labs   Lab 09/17/24  1900 09/17/24  2100   NA  --  139   K  --  3.8   BUN  --  25*   CREATININE  --  1.49*   CALCIUM  --  8.6   ALBUMIN 2.1*  --    CL  --  101   ALT 13  --    AST 10*  --    Dx INOCENTE  Last A1c: No results found for: "HGBA1C"  Lab Results   Component Value Date    RBC 3.09 (L) 09/17/2024    HGB 9.1 (L) 09/17/2024    HCT 29.9 (L) 09/17/2024    MCV 96.8 (H) 09/17/2024    MCH 29.4 09/17/2024    MCHC 30.4 (L) 09/17/2024    TIBC 312 09/11/2024     Pertinent Labs Reviewed: reviewed  Pertinent Medications " "Reviewed: reviewed  Scheduled Meds:   amoxicillin-clavulanate 875-125mg  1 tablet Oral Q12H    apixaban  5 mg Oral BID    cholecalciferol (vitamin D3)  5,000 Units Oral Daily    cyanocobalamin  500 mcg Oral Daily    DULoxetine  60 mg Oral Daily    [START ON 9/19/2024] estradioL  2 mg Oral Daily    famotidine  20 mg Oral BID    ferrous sulfate  1 tablet Oral Daily    folic acid-vit B6-vit B12 2.5-25-2 mg  1 tablet Oral Daily    gabapentin  600 mg Oral BID    Lactobacillus acidophilus  1 capsule Oral TID WM    metoprolol tartrate  25 mg Oral Daily    oxybutynin  5 mg Oral Daily    pravastatin  80 mg Oral QHS    senna-docusate 8.6-50 mg  1 tablet Oral BID     Continuous Infusions:  PRN Meds:.  Current Facility-Administered Medications:     acetaminophen, 1,000 mg, Oral, Q6H PRN    acetaminophen, 650 mg, Oral, Q6H PRN    calcium carbonate, 500 mg, Oral, BID PRN    melatonin, 6 mg, Oral, Nightly PRN    ondansetron, 4 mg, Oral, Q8H PRN    Anthropometrics  Temp: 97.6 °F (36.4 °C)  Height Method: Stated  Height: 5' 11" (180.3 cm)  Height (inches): 71 in  Weight Method: Bed Scale  Weight: 100.7 kg (222 lb)  Weight (lb): 222 lb  Ideal Body Weight (IBW), Female: 155 lb  % Ideal Body Weight, Female (lb): 143.23 %  BMI (Calculated): 31       Estimated/Assessed Needs      Temp: 97.6 °F (36.4 °C)Oral  Weight Used For Calorie Calculations: 100.7 kg (222 lb 0.1 oz)   Energy Need Method: Kcal/kg Energy Calorie Requirements (kcal): 2014-2518  Weight Used For Protein Calculations: 100.7 kg (222 lb 0.1 oz)  Protein Requirements:   Estimated Fluid Requirement Method: RDA Method    RDA Method (mL): 2014       Nutrition by Nursing     Intake (%): 50%        Last Bowel Movement: 09/18/24                Nutrition Follow-Up  RD Follow-up?: Yes               Available via Secure Chat  "

## 2024-09-18 NOTE — PLAN OF CARE
Problem: Physical Therapy  Goal: Physical Therapy Goal  Description: Short-term Goals: 1.5 weeks  1. Patient will perform supine to/from sit with standby assist to improve independence and safety with bed mobility.  2. Patient will perform sit to/from stand with a rolling walker with contact guard assist to improve independence and safety with transfers.  3. Patient will ambulate 50 feet with a rolling walker with contact guard assist to improve independence and safety with gait.  4. Patient will tolerate 15 minutes of physical therapy intervention to improve endurance and activity tolerance.  5. Patient will ascend/descend 3 steps with bilateral handrail support with contact guard assist to improve independence and safety with stair negotiation.     Long-term goals: 3 weeks  1. Patient will perform supine to/from sit with modified independence to improve independence and safety with bed mobility.  2. Patient will perform sit to/from stand with a rolling walker with standby assist to improve independence and safety with transfers.  3. Patient will ambulate 100 feet with a rolling walker with standby assist to improve independence and safety with gait.  4. Patient will tolerate 30 minutes of physical therapy intervention to improve endurance and activity tolerance.  5. Patient will ascend/descend 3 steps with bilateral handrail support with standby assist to improve independence and safety with stair negotiation.     Outcome: Progressing

## 2024-09-18 NOTE — PLAN OF CARE
Problem: Adult Inpatient Plan of Care  Goal: Plan of Care Review  Outcome: Progressing  Goal: Patient-Specific Goal (Individualized)  Outcome: Progressing  Goal: Absence of Hospital-Acquired Illness or Injury  Outcome: Progressing  Goal: Optimal Comfort and Wellbeing  Outcome: Progressing  Goal: Readiness for Transition of Care  Outcome: Progressing     Problem: Sepsis/Septic Shock  Goal: Optimal Coping  Outcome: Progressing  Goal: Absence of Bleeding  Outcome: Progressing  Goal: Blood Glucose Level Within Targeted Range  Outcome: Progressing  Goal: Absence of Infection Signs and Symptoms  Outcome: Progressing  Goal: Optimal Nutrition Intake  Outcome: Progressing     Problem: Acute Kidney Injury/Impairment  Goal: Fluid and Electrolyte Balance  Outcome: Progressing  Goal: Improved Oral Intake  Outcome: Progressing  Goal: Effective Renal Function  Outcome: Progressing     Problem: Infection  Goal: Absence of Infection Signs and Symptoms  Outcome: Progressing     Problem: Wound  Goal: Optimal Coping  Outcome: Progressing  Goal: Optimal Functional Ability  Outcome: Progressing  Goal: Absence of Infection Signs and Symptoms  Outcome: Progressing  Goal: Improved Oral Intake  Outcome: Progressing  Goal: Optimal Pain Control and Function  Outcome: Progressing  Goal: Skin Health and Integrity  Outcome: Progressing  Goal: Optimal Wound Healing  Outcome: Progressing     Problem: Fall Injury Risk  Goal: Absence of Fall and Fall-Related Injury  Outcome: Progressing     Problem: Skin Injury Risk Increased  Goal: Skin Health and Integrity  Outcome: Progressing

## 2024-09-18 NOTE — PLAN OF CARE
[facility]  Initial Discharge Assessment       Primary Care Provider: [facility]    Admission Diagnosis: Generalized weakness [R53.1]  Fall at home [W19.XXXA, Y92.009]  Severe sepsis [A41.9, R65.20]  Atrial fibrillation [I48.91]  Cellulitis and abscess of right leg [L03.115, L02.415]  INOCENTE (acute kidney injury) [N17.9]  Acute cystitis with hematuria [N30.01]  Debility [R53.81]  Normocytic anemia [D64.9]  Neuropathy [G62.9]  Weakness [R53.1]    Admission Date: 9/17/2024  Expected Discharge Date: 10/4/2024    Transition of Care Barriers: None    Payor: HUMA[employer] MANAGED MEDICARE / Plan: HU[employer]NA MEDICARE PPO / Product Type: Medicare Advantage /     Extended Emergency Contact Information  Primary Emergency Contact: Ned Haywood  Address: [address] [place] [address] [place] [address] [facility]  Home Phone: [phone]  Relation: Spouse  Preferred language: English   needed? No  Secondary Emergency Contact: lisa may  Mobile Phone: [phone]  Relation: Son    Discharge Plan A: Home with family  Discharge Plan B: Home with family, [facility] [address] [facility]  [address] [facility] [address]  Phone: [phone] Fax: [phone]    [id number] [place] [address] [address] [facility] [address]   [address]  [place] [address]  Phone: [phone] Fax: [phone]      Initial Assessment (most recent)       Adult Discharge Assessment - 09/18/24 0946          Discharge Assessment    Assessment Type Discharge Planning Assessment     Confirmed/corrected address, phone number and insurance Yes     Confirmed Demographics Correct on Facesheet     Source of Information patient;family;health record     If unable to respond/provide information was family/caregiver contacted? Yes     Contact Name/Number Ned Haywood, spouse [phone]      Communicated SHASHI with patient/caregiver Date not available/Unable to determine     Reason For Admission Fall at home, severe sepsis, cellulitis and abscess to Rt. thigh & lateral calf area, INOCENTE, debility, acute cystitis with hematuria, A. Fib, neuropathy, anemia     People in Home spouse     Facility Arrived From: [facility]     Do you expect to return to your current living situation? Yes     Do you have help at home or someone to help you manage your care at home? Yes     Who are your caregiver(s) and their phone number(s)? Ned, spouse and son, Alejandro Sandoval [phone]     Prior to hospitilization cognitive status: Alert/Oriented     Current cognitive status: Alert/Oriented     Walking or Climbing Stairs Difficulty yes     Walking or Climbing Stairs ambulation difficulty, requires equipment;stair climbing difficulty, requires equipment;stair climbing difficulty, assistance 1 person;transferring difficulty, requires equipment     Mobility Management RW     Dressing/Bathing Difficulty yes     Dressing/Bathing bathing difficulty, assistance 1 person;dressing difficulty, assistance 1 person     Home Accessibility wheelchair accessible     Home Layout Able to live on 1st floor     Equipment Currently Used at Home bedside commode;shower chair;raised toilet;walker, rolling;rollator     Readmission within 30 days? No     Patient currently being followed by outpatient case management? No     Do you currently have service(s) that help you manage your care at home? No     Do you take prescription medications? Yes     Do you have prescription coverage? Yes     Coverage [employer]     Do you have any problems affording any of your prescribed medications? No     Is the patient taking medications as prescribed? yes     Who is going to help you get home at discharge? Ned Haywood, spouse and son Alejandro     How do you get to doctors appointments? family or friend will provide     Are you on dialysis? No     Do you take  coumadin? No     Discharge Plan A Home with family     Discharge Plan B Home with family;Home Health     DME Needed Upon Discharge  none     Discharge Plan discussed with: Patient     Transition of Care Barriers None        Physical Activity    On average, how many days per week do you engage in moderate to strenuous exercise (like a brisk walk)? 0 days     On average, how many minutes do you engage in exercise at this level? 0 min        Financial Resource Strain    How hard is it for you to pay for the very basics like food, housing, medical care, and heating? Not hard at all        Housing Stability    In the last 12 months, was there a time when you were not able to pay the mortgage or rent on time? No     At any time in the past 12 months, were you homeless or living in a shelter (including now)? No        Transportation Needs    Has the lack of transportation kept you from medical appointments, meetings, work or from getting things needed for daily living? No        Food Insecurity    Within the past 12 months, you worried that your food would run out before you got the money to buy more. Never true     Within the past 12 months, the food you bought just didn't last and you didn't have money to get more. Never true        Stress    Do you feel stress - tense, restless, nervous, or anxious, or unable to sleep at night because your mind is troubled all the time - these days? Only a little        Social Isolation    How often do you feel lonely or isolated from those around you?  Never        Alcohol Use    Q1: How often do you have a drink containing alcohol? Never     Q2: How many drinks containing alcohol do you have on a typical day when you are drinking? Patient does not drink     Q3: How often do you have six or more drinks on one occasion? Never        Utilities    In the past 12 months has the electric, gas, oil, or water company threatened to shut off services in your home? No        Health Literacy     How often do you need to have someone help you when you read instructions, pamphlets, or other written material from your doctor or pharmacy? Never        OTHER    Name(s) of People in Home Ned Haywood, spouse                 Pt. Admitted to swing bed services for continued wound care to RLE, medication management, antibiotics, PT/OT and supportive care. Pt. Had a fall at home and a worsening abscess/cellulitis to her RLE and was brought to the hospital and found to be in Severe Sepsis. Pt. Had and I&D of her Rt. Lateral calf/medial thigh and is receiving wound care daily to these areas. Pt. Lives with her spouse. Son and daughter-in-law assist with her care when they can but per pt. They live in [place] and work full time. Pt. States that she has no HH services at this time. Pt. Uses a RW, Rollator walker, BSC, raised toilet, and shower chair at home. Plans to return home with spouse and HH services if recommended at d/c from swing bed. Will cont. To follow pt. And assist as needed throughout stay.

## 2024-09-18 NOTE — ASSESSMENT & PLAN NOTE
INOCENTE is likely due to pre-renal azotemia due to dehydration. Baseline creatinine is unknown. Most recent creatinine and eGFR are listed below.  Recent Labs     09/16/24  0541 09/17/24  0321 09/17/24  2100   CREATININE 1.56* 1.41* 1.49*   EGFRNORACEVR 35* 39* 36*      Plan  - INOCENTE is improving  - Avoid nephrotoxins and renally dose meds for GFR listed above  - Monitor urine output, serial BMP, and adjust therapy as needed  - BUN/CREAT 25/1.49

## 2024-09-18 NOTE — PT/OT/SLP EVAL
Physical Therapy Evaluation    Patient Name:  Bobbi Haywood   MRN:  [id number]    Recommendations:     Discharge Recommendations: Low Intensity Therapy   Discharge Equipment Recommendations: none   Barriers to discharge: Inaccessible home and Decreased caregiver support    Assessment:     Bobbi Haywood is a 73 y.o. female admitted with a medical diagnosis of generalized weakness. Patient presented to [facility] following 2 falls at home while trying to descend the 3 steps inside her home. Patient had a previous fall a few weeks prior resulting in multiple abscesses to her right lower extremity requiring surgical irrigation and debridement while at [facility]. Patient reports a long history of issues with her feet with her right being the worst (over-supination/inversion). She presents with the following impairments/functional limitations: weakness, impaired endurance, impaired sensation, impaired self care skills, impaired functional mobility, gait instability, impaired balance, decreased coordination, decreased lower extremity function, decreased safety awareness.    Rehab Prognosis: Good; patient would benefit from acute skilled PT services to address these deficits and reach maximum level of function.    Recent Surgery: * No surgery found *      Plan:     During this hospitalization, patient to be seen 5 x/week to address the identified rehab impairments via gait training, therapeutic activities, therapeutic exercises, neuromuscular re-education and progress toward the following goals:    Plan of Care Expires:  10/09/24    Subjective     Chief Complaint: mild right lower extremity soreness  Patient/Family Comments/goals: Patient's goal is to return home with her  when functionally able. Patient realized the stairs getting from her den to her bathroom/kitchen will be her biggest challenge.   Pain/Comfort:  Pain Rating 1: 0/10  Pain Rating  Post-Intervention 1: 0/10    Patients cultural, spiritual, Restorationist conflicts given the current situation: no    Living Environment: Patient lives with her . She has 3 steps inside her home from her den to her bathroom/kitchen that she must be able to ascend/descend. These are the steps where patient had her 2 falls prior to admission to [facility]. The steps have bilateral handrails. Patient reports her  is not able to help her much functionally due to his physical condition. Prior to admission, patients level of function was modified independent using a rolling walker vs rollator vs furniture cruising. Equipment used at home: walker, rolling, rollator, shower chair, bedside commode. DME owned (not currently used): none. Upon discharge, patient will have assistance from her  (if able).    Objective:     Communicated with nurse prior to session. Patient found supine with bed alarm upon PT entry to room.    General Precautions: Standard, fall  Orthopedic Precautions:N/A   Braces: N/A  Respiratory Status: Room air    Exams:  Gross Motor Coordination:  WFL  Sensation:    -       Impaired  light/touch diminished in bilateral feet (pins/needles due to reports of neuropathy)  RLE ROM: Deficits: hip/knee flexion limited by soft tissue approximation  RLE Strength: Deficits: 4/5  LLE ROM: Deficits: hip/knee flexion limited by soft tissue approximation  LLE Strength: Deficits: 4/5    Functional Mobility:  Bed Mobility:     Supine to Sit: contact guard assistance  Transfers:     Sit to Stand:  minimum assistance and of 2 persons with rolling walker  Gait: 10 feet x 2 trials with sitting rest break between trials; minimal assist with rolling walker; walks on lateral surface of right foot (over-supination/inversion); decreased step lengths; slow horacio; mild axial flexion  Balance: contact guard assist with static standing balance with rolling walker; minimal assist with dynamic standing  balance with rolling walker    AM-PAC 6 CLICK MOBILITY  Total Score:17     Treatment & Education:    Bed mobility, transfers, and gait performed as listed above. Patient educated on the physical therapy plan of care.     Patient left up in chair with call button in reach and Isabel () present.    GOALS:   Multidisciplinary Problems       Physical Therapy Goals          Problem: Physical Therapy    Goal Priority Disciplines Outcome Goal Variances Interventions   Physical Therapy Goal     PT, PT/OT Progressing     Description: Short-term Goals: 1.5 weeks  1. Patient will perform supine to/from sit with standby assist to improve independence and safety with bed mobility.  2. Patient will perform sit to/from stand with a rolling walker with contact guard assist to improve independence and safety with transfers.  3. Patient will ambulate 50 feet with a rolling walker with contact guard assist to improve independence and safety with gait.  4. Patient will tolerate 15 minutes of physical therapy intervention to improve endurance and activity tolerance.  5. Patient will ascend/descend 3 steps with bilateral handrail support with contact guard assist to improve independence and safety with stair negotiation.     Long-term goals: 3 weeks  1. Patient will perform supine to/from sit with modified independence to improve independence and safety with bed mobility.  2. Patient will perform sit to/from stand with a rolling walker with standby assist to improve independence and safety with transfers.  3. Patient will ambulate 100 feet with a rolling walker with standby assist to improve independence and safety with gait.  4. Patient will tolerate 30 minutes of physical therapy intervention to improve endurance and activity tolerance.  5. Patient will ascend/descend 3 steps with bilateral handrail support with standby assist to improve independence and safety with stair negotiation.                        History:     Past  Medical History:   Diagnosis Date    A-fib     Fall 09/10/2024    Hematoma     Hypertension     Neuropathy      Past Surgical History:   Procedure Laterality Date    CATARACT EXTRACTION, BILATERAL      CHOLECYSTECTOMY      HYSTERECTOMY      INCISION AND DRAINAGE OF ABSCESS Right 9/12/2024    Procedure: INCISION AND DRAINAGE, ABSCESS;  Surgeon: Joe Alberto MD;  Location: [facility] OR;  Service: General;  Laterality: Right;  right thigh and right calf    INJECTION OF ANESTHETIC AGENT AROUND MEDIAL BRANCH NERVES INNERVATING LUMBAR FACET JOINT Bilateral 8/26/2021    Procedure: Bilateral L4-5,5-S1 MBB;  Surgeon: Sulma Nelson MD;  Location: [facility] PAIN MGMT;  Service: Pain Management;  Laterality: Bilateral;  Per Dr Bradford ok to hold Eliquis for 4 days not 5, Per Dr Nelson this is fine with her. Pt will bring her verification card for COVID vaccination.    INJECTION OF ANESTHETIC AGENT AROUND MEDIAL BRANCH NERVES INNERVATING LUMBAR FACET JOINT Bilateral 10/5/2021    Procedure: Bilateral L4-5,5-S1 MBB;  Surgeon: Sulma Nelson MD;  Location: [facility] PAIN MGMT;  Service: Pain Management;  Laterality: Bilateral;  PT AWARE ON OV TO BE TESTED    KNEE CARTILAGE SURGERY Right     RADIOFREQUENCY ABLATION OF LUMBAR MEDIAL BRANCH NERVE AT SINGLE LEVEL Bilateral 11/9/2021    Procedure: Radiofrequency Ablation, Nerve, Spinal, Lumbar, Medial Branch, 1 Level L4-S1  BILATERAL;  Surgeon: Sulma Nelson MD;  Location: [facility] PAIN [facility]MT;  Service: Pain Management;  Laterality: Bilateral;  per alicia pt is on eliquis but she will get permission for it to be held 5 days prior to procedure.   HAD VAC   CARD SCANNED IN    REDUCTION OF BOTH BREASTS      RETINAL DETACHMENT SURGERY  right    VAGINAL DELIVERY      X1     Time Tracking:     PT Received On: 09/18/24  PT Start Time: 0902     PT Stop Time: 0923  PT Total Time (min): 21 min     Billable Minutes: Evaluation (moderate complexity) 21 minutes      09/18/2024

## 2024-09-18 NOTE — PROGRESS NOTES
The patient has   Apixaban tab 5 mg 1 po bid ([facility] continued)  for Venous Thrombus Prophylaxis during this admission. Thank You

## 2024-09-18 NOTE — SUBJECTIVE & OBJECTIVE
Past Medical History:   Diagnosis Date    A-fib     Fall 09/10/2024    Hematoma     Hypertension     Neuropathy        Past Surgical History:   Procedure Laterality Date    CATARACT EXTRACTION, BILATERAL      CHOLECYSTECTOMY      HYSTERECTOMY      INCISION AND DRAINAGE OF ABSCESS Right 9/12/2024    Procedure: INCISION AND DRAINAGE, ABSCESS;  Surgeon: Joe Alberto MD;  Location: [facility] OR;  Service: General;  Laterality: Right;  right thigh and right calf    INJECTION OF ANESTHETIC AGENT AROUND MEDIAL BRANCH NERVES INNERVATING LUMBAR FACET JOINT Bilateral 8/26/2021    Procedure: Bilateral L4-5,5-S1 MBB;  Surgeon: Sulma Nelson MD;  [facility];  Service: Pain Management;  Laterality: Bilateral;  Per Dr Bradford ok to hold Eliquis for 4 days not 5, Per Dr Nelson this is fine with her. Pt will bring her verification card for COVID vaccination.    INJECTION OF ANESTHETIC AGENT AROUND MEDIAL BRANCH NERVES INNERVATING LUMBAR FACET JOINT Bilateral 10/5/2021    Procedure: Bilateral L4-5,5-S1 MBB;  Surgeon: Sulma Nelson MD;  [facility];  Service: Pain Management;  Laterality: Bilateral;  PT AWARE ON OV TO BE TESTED    KNEE CARTILAGE SURGERY Right     RADIOFREQUENCY ABLATION OF LUMBAR MEDIAL BRANCH NERVE AT SINGLE LEVEL Bilateral 11/9/2021    Procedure: Radiofrequency Ablation, Nerve, Spinal, Lumbar, Medial Branch, 1 Level L4-S1  BILATERAL;  Surgeon: Sulma Nelson MD;  [facility];  Service: Pain Management;  Laterality: Bilateral;  per alicia pt is on eliquis but she will get permission for it to be held 5 days prior to procedure.   HAD VAC   CARD SCANNED IN    REDUCTION OF BOTH BREASTS      RETINAL DETACHMENT SURGERY  right    VAGINAL DELIVERY      X1       Review of patient's allergies indicates:   Allergen Reactions    Sulfa (sulfonamide antibiotics) Hives and Other (See Comments)     Sore Throat, Sore Mouth       Current Facility-Administered Medications  on File Prior to Encounter   Medication    [DISCONTINUED] acetaminophen tablet 650 mg    [DISCONTINUED] amoxicillin-clavulanate 875-125mg per tablet 1 tablet    [DISCONTINUED] apixaban tablet 5 mg    [DISCONTINUED] dextrose 10% bolus 125 mL 125 mL    [DISCONTINUED] dextrose 10% bolus 250 mL 250 mL    [DISCONTINUED] DULoxetine DR capsule 60 mg    [DISCONTINUED] ferrous sulfate tablet 1 each    [DISCONTINUED] gabapentin capsule 300 mg    [DISCONTINUED] glucagon (human recombinant) injection 1 mg    [DISCONTINUED] glucose chewable tablet 16 g    [DISCONTINUED] glucose chewable tablet 24 g    [DISCONTINUED] metoprolol tartrate (LOPRESSOR) tablet 25 mg    [DISCONTINUED] naloxone 0.4 mg/mL injection 0.02 mg    [DISCONTINUED] ondansetron injection 4 mg    [DISCONTINUED] oxybutynin 24 hr tablet 5 mg    [DISCONTINUED] sodium chloride 0.9% flush 10 mL     Current Outpatient Medications on File Prior to Encounter   Medication Sig    amoxicillin-clavulanate 875-125mg (AUGMENTIN) 875-125 mg per tablet Take 1 tablet by mouth every 12 (twelve) hours. for 4 days    cyanocobalamin 500 MCG tablet Take 500 mcg by mouth once daily.    DULoxetine (CYMBALTA) 60 MG capsule Take 1 capsule by mouth once daily.    ELIQUIS 5 mg Tab Take 5 mg by mouth 2 (two) times daily.    estradioL (ESTRACE) 2 MG tablet Take 1 tablet by mouth once daily.    ferrous sulfate 325 (65 FE) MG EC tablet Take 1 tablet (325 mg total) by mouth once daily.    fesoterodine (TOVIAZ) 4 mg Tb24 Take 4 mg by mouth once daily.    folic acid (FOLVITE) 800 MCG Tab Take 800 mcg by mouth once daily.    gabapentin (NEURONTIN) 600 MG tablet Take 1 tablet by mouth 2 (two) times a day.    L. rhamnosus/C/zinc/elderberry (CULTURELLE IMMUNE DEFENSE ORAL) Take 1 tablet by mouth once daily.    L.acidoph,saliva/B.bif/S.therm (ACIDOPHILUS PROBIOTIC BLEND ORAL) Take 1 tablet by mouth once daily.    metoprolol tartrate (LOPRESSOR) 25 MG tablet Take 1 tablet by mouth once daily.     rosuvastatin (CRESTOR) 20 MG tablet Take 20 mg by mouth every evening.     Family History       Problem Relation (Age of Onset)    Atrial fibrillation Mother    Diabetes Paternal Grandfather    Hypertension Mother    Stroke Father          Tobacco Use    Smoking status: Never    Smokeless tobacco: Never   Substance and Sexual Activity    Alcohol use: Not Currently    Drug use: Not on file    Sexual activity: Not on file     Review of Systems   Constitutional:  Positive for activity change and fatigue.   HENT:  Positive for sore throat.    Eyes: Negative.    Respiratory:  Positive for cough (MINIMAL).    Cardiovascular: Negative.    Gastrointestinal: Negative.    Endocrine: Negative.    Genitourinary: Negative.    Musculoskeletal:  Positive for arthralgias, back pain, gait problem (RLE), myalgias and neck pain.   Skin:  Positive for wound.   Allergic/Immunologic: Positive for immunocompromised state.   Neurological:  Positive for tremors.   Hematological:  Bruises/bleeds easily.   Psychiatric/Behavioral: Negative.       Objective:     Vital Signs (Most Recent):  Temp: 98.6 °F (37 °C) (09/17/24 1929)  Pulse: 68 (09/17/24 2023)  Resp: 16 (09/17/24 2023)  BP: 119/78 (09/17/24 1929)  SpO2: 95 % (09/17/24 2023) Vital Signs (24h Range):  Temp:  [97.9 °F (36.6 °C)-98.8 °F (37.1 °C)] 98.6 °F (37 °C)  Pulse:  [63-84] 68  Resp:  [16-20] 16  SpO2:  [93 %-99 %] 95 %  BP: (106-138)/(67-80) 119/78     Weight: 100.7 kg (222 lb)  Body mass index is 30.96 kg/m².     Physical Exam  Vitals and nursing note reviewed. Exam conducted with a chaperone present.   Constitutional:       Appearance: She is obese. She is ill-appearing.   HENT:      Head: Normocephalic and atraumatic.      Right Ear: Tympanic membrane normal.      Left Ear: Tympanic membrane normal.      Nose: Nose normal.      Mouth/Throat:      Mouth: Mucous membranes are moist.   Eyes:      Extraocular Movements: Extraocular movements intact.      Conjunctiva/sclera:  Conjunctivae normal.      Pupils: Pupils are equal, round, and reactive to light.   Cardiovascular:      Rate and Rhythm: Normal rate.      Pulses: Normal pulses.      Heart sounds: Normal heart sounds.   Pulmonary:      Effort: Pulmonary effort is normal. No respiratory distress.      Breath sounds: Normal breath sounds.   Abdominal:      General: Bowel sounds are normal. There is no distension.      Palpations: Abdomen is soft.      Tenderness: There is no abdominal tenderness.   Musculoskeletal:         General: Swelling (RLE), tenderness (RLE) and deformity (B ANKLES/FEET) present. Normal range of motion.      Cervical back: Normal range of motion.      Right lower leg: Right lower leg edema: RLE.   Skin:     General: Skin is warm.      Capillary Refill: Capillary refill takes less than 2 seconds.      Coloration: Skin is pale.      Findings: Bruising and lesion (POST OP ABSCESS I AND D RLOWER LEG LATERALLY AND MEDIAL THIGH SEE  AND SACRAL PRESSURE INJURY SEE , MINOR ADDITIOAN LESIONS SEE ) present.   Neurological:      General: No focal deficit present.      Mental Status: She is alert and oriented to person, place, and time.      Motor: Weakness present.   Psychiatric:         Mood and Affect: Mood normal.         Behavior: Behavior normal.         Thought Content: Thought content normal.         Judgment: Judgment normal.              CRANIAL NERVES     CN III, IV, VI   Pupils are equal, round, and reactive to light.       Significant Labs: All pertinent labs within the past 24 hours have been reviewed.  Recent Lab Results         09/17/24  2100   09/17/24  2035   09/17/24  0321        Anion Gap 13     11       Aniso 2+           Bands 2     1       Baso # 0.05     0.08       Basophil % 0.3     0.5       BUN 25     26       BUN/CREAT RATIO 17     18       Calcium 8.6     8.4       Chloride 101     105       CO2 29     27       SARS COV-2 MOLECULAR   Negative          Creatinine 1.49     1.41       Differential Method Manual     Manual       eGFR 36     39       Eos # 0.43     0.46       Eos % 2.3     2.9            2       Glucose 113     93       Hematocrit 29.9     26.6       Hemoglobin 9.1     8.2       [place] Bodies Few           Hypo 1+           Immature Grans (Abs)     0.68       Immature Granulocytes     4.3       Lymph # 2.80     2.85       Lymph % 15.0     18.1        6     15       MCH 29.4     29.2       MCHC 30.4     30.8       MCV 96.8     94.7       Metamyelocytes     2       Mono # 1.76     1.52       Mono % 9.4     9.6        8     8       MPV 9.5     10.3       Neutrophils, Abs 13.65     10.17       Neutrophils Relative 73.0     64.6       nRBC 5     0.1       NUCLEATED RBC ABSOLUTE     0.02       PLATELET MORPHOLOGY Increased     Normal       Platelet Count 444     383       Poikilocytosis 2+           Poly 1+           Potassium 3.8     3.9       RBC 3.09     2.81       RBC Morphology     Normal       RDW 14.7     14.5       Segmented Neutrophils, Man % 84     72       Sodium 139     139       WBC 18.69     15.76               Significant Imaging: I have reviewed all pertinent imaging results/findings within the past 24 hours.  CXR: I have reviewed all pertinent results/findings within the past 24 hours and my personal findings are:  NEG
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
## 2024-09-19 PROCEDURE — 97110 THERAPEUTIC EXERCISES: CPT

## 2024-09-19 PROCEDURE — 97116 GAIT TRAINING THERAPY: CPT

## 2024-09-19 PROCEDURE — 11000004 HC SNF PRIVATE

## 2024-09-19 PROCEDURE — 94799 UNLISTED PULMONARY SVC/PX: CPT

## 2024-09-19 PROCEDURE — 25000003 PHARM REV CODE 250: Performed by: PHYSICIAN ASSISTANT

## 2024-09-19 PROCEDURE — 25000003 PHARM REV CODE 250: Performed by: EMERGENCY MEDICINE

## 2024-09-19 PROCEDURE — 97530 THERAPEUTIC ACTIVITIES: CPT

## 2024-09-19 PROCEDURE — 94761 N-INVAS EAR/PLS OXIMETRY MLT: CPT

## 2024-09-19 PROCEDURE — 99900035 HC TECH TIME PER 15 MIN (STAT)

## 2024-09-19 RX ADMIN — CYANOCOBALAMIN TAB 500 MCG 500 MCG: 500 TAB at 09:09

## 2024-09-19 RX ADMIN — Medication 5000 UNITS: at 09:09

## 2024-09-19 RX ADMIN — Medication 1 CAPSULE: at 05:09

## 2024-09-19 RX ADMIN — GABAPENTIN 600 MG: 600 TABLET, FILM COATED ORAL at 09:09

## 2024-09-19 RX ADMIN — FAMOTIDINE 20 MG: 20 TABLET, FILM COATED ORAL at 08:09

## 2024-09-19 RX ADMIN — AMOXICILLIN AND CLAVULANATE POTASSIUM 1 TABLET: 875; 125 TABLET, FILM COATED ORAL at 09:09

## 2024-09-19 RX ADMIN — SENNOSIDES AND DOCUSATE SODIUM 1 TABLET: 8.6; 5 TABLET ORAL at 09:09

## 2024-09-19 RX ADMIN — Medication 1 CAPSULE: at 12:09

## 2024-09-19 RX ADMIN — ACETAMINOPHEN 1000 MG: 500 TABLET ORAL at 08:09

## 2024-09-19 RX ADMIN — DULOXETINE HYDROCHLORIDE 60 MG: 30 CAPSULE, DELAYED RELEASE ORAL at 09:09

## 2024-09-19 RX ADMIN — ACETAMINOPHEN 1000 MG: 500 TABLET ORAL at 05:09

## 2024-09-19 RX ADMIN — APIXABAN 5 MG: 5 TABLET, FILM COATED ORAL at 08:09

## 2024-09-19 RX ADMIN — Medication 1 TABLET: at 09:09

## 2024-09-19 RX ADMIN — PRAVASTATIN SODIUM 80 MG: 40 TABLET ORAL at 08:09

## 2024-09-19 RX ADMIN — Medication 1 CAPSULE: at 09:09

## 2024-09-19 RX ADMIN — GABAPENTIN 600 MG: 600 TABLET, FILM COATED ORAL at 08:09

## 2024-09-19 RX ADMIN — APIXABAN 5 MG: 5 TABLET, FILM COATED ORAL at 09:09

## 2024-09-19 RX ADMIN — AMOXICILLIN AND CLAVULANATE POTASSIUM 1 TABLET: 875; 125 TABLET, FILM COATED ORAL at 08:09

## 2024-09-19 RX ADMIN — SENNOSIDES AND DOCUSATE SODIUM 1 TABLET: 8.6; 5 TABLET ORAL at 08:09

## 2024-09-19 RX ADMIN — FAMOTIDINE 20 MG: 20 TABLET, FILM COATED ORAL at 09:09

## 2024-09-19 RX ADMIN — FERROUS SULFATE TAB EC 324 MG (65 MG FE EQUIVALENT) 1 EACH: 324 (65 FE) TABLET DELAYED RESPONSE at 09:09

## 2024-09-19 RX ADMIN — Medication 6 MG: at 08:09

## 2024-09-19 RX ADMIN — METOPROLOL TARTRATE 25 MG: 25 TABLET, FILM COATED ORAL at 09:09

## 2024-09-19 RX ADMIN — OXYBUTYNIN CHLORIDE 5 MG: 5 TABLET, EXTENDED RELEASE ORAL at 09:09

## 2024-09-19 NOTE — PT/OT/SLP PROGRESS
Physical Therapy Treatment    Patient Name:  Bobbi Haywood   MRN:  [id number]    Recommendations:     Discharge Recommendations: Low Intensity Therapy  Discharge Equipment Recommendations: wheelchair  Barriers to discharge: None    Assessment:     Bobbi Haywood is a 73 y.o. female admitted with a medical diagnosis of generalized weakness. Patient presented to [facility] following 2 falls at home while trying to descend the 3 steps inside her home .  She presents with the following impairments/functional limitations: weakness, impaired endurance, impaired self care skills, impaired functional mobility, gait instability, impaired balance, decreased lower extremity function .    Rehab Prognosis: Good; patient would benefit from acute skilled PT services to address these deficits and reach maximum level of function.    Recent Surgery: * No surgery found *      Plan:     During this hospitalization, patient to be seen 5 x/week to address the identified rehab impairments via gait training, therapeutic activities, therapeutic exercises and progress toward the following goals:    Plan of Care Expires:  10/09/24    Subjective     Chief Complaint: none voiced.  Patient/Family Comments/goals: to improve mobility and return home.  Pain/Comfort:  Pain Rating 1: 0/10      Objective:     Communicated with patient prior to session.  Patient found supine with   upon PT entry to room.     General Precautions: Standard, fall  Orthopedic Precautions: N/A  Braces: N/A  Respiratory Status: Room air     Functional Mobility:  Bed Mobility:     Rolling Right: minimum assistance  Supine to Sit: minimum assistance  Transfers:     Sit to Stand:  minimum assistance and moderate assistance with rolling walker  Gait: 45 feet with RW CGA  Balance: standing fair      AM-PAC 6 CLICK MOBILITY  Turning over in bed (including adjusting bedclothes, sheets and blankets)?: 3  Sitting down on and standing up from a  chair with arms (e.g., wheelchair, bedside commode, etc.): 2  Moving from lying on back to sitting on the side of the bed?: 3  Moving to and from a bed to a chair (including a wheelchair)?: 3  Need to walk in hospital room?: 3  Climbing 3-5 steps with a railing?: 1  Basic Mobility Total Score: 15       Treatment & Education:  TE: patient performed seated Scifit stepper x 10 minutes; seated long arc quad and hip flexion 2 x 10 t facilitate LE strength.    Gait: patient ambulated on level surface in room and hallway with RW for 45 feet. She demonstrates a step to gait with forward leaning posture. Verbal cues provided to instruct on gait and postural deficits.    Patient left up in chair with  OT present..    GOALS:   Multidisciplinary Problems       Physical Therapy Goals          Problem: Physical Therapy    Goal Priority Disciplines Outcome Goal Variances Interventions   Physical Therapy Goal     PT, PT/OT Progressing     Description: Short-term Goals: 1.5 weeks  1. Patient will perform supine to/from sit with standby assist to improve independence and safety with bed mobility.  2. Patient will perform sit to/from stand with a rolling walker with contact guard assist to improve independence and safety with transfers.  3. Patient will ambulate 50 feet with a rolling walker with contact guard assist to improve independence and safety with gait.  4. Patient will tolerate 15 minutes of physical therapy intervention to improve endurance and activity tolerance.  5. Patient will ascend/descend 3 steps with bilateral handrail support with contact guard assist to improve independence and safety with stair negotiation.     Long-term goals: 3 weeks  1. Patient will perform supine to/from sit with modified independence to improve independence and safety with bed mobility.  2. Patient will perform sit to/from stand with a rolling walker with standby assist to improve independence and safety with transfers.  3. Patient will  ambulate 100 feet with a rolling walker with standby assist to improve independence and safety with gait.  4. Patient will tolerate 30 minutes of physical therapy intervention to improve endurance and activity tolerance.  5. Patient will ascend/descend 3 steps with bilateral handrail support with standby assist to improve independence and safety with stair negotiation.                          Time Tracking:     PT Received On: 09/19/24  PT Start Time: 1250     PT Stop Time: 1320  PT Total Time (min): 30 min     Billable Minutes: Gait Training 15 and Therapeutic Exercise 20    Treatment Type: Treatment  PT/PTA: PT     Number of PTA visits since last PT visit: 0     09/19/2024

## 2024-09-19 NOTE — PT/OT/SLP PROGRESS
Occupational Therapy   Treatment    Name: Bobbi Haywood  MRN: [id number]  Admitting Diagnosis:  Debility/generalized weakness: recent Fall resulting; wound with multiple abscesses and cellulitis in Gastrocnemius area        Recommendations:     Discharge Recommendations:    Discharge Equipment Recommendations:  wheelchair  Barriers to discharge:  Inaccessible home environment, Other (Comment) (limitations with 3 steps within her home)    Assessment:     Bobbi Haywood is a 73 y.o. female with a medical diagnosis of Debility/generalized weakness: recent Fall resulting; wound with multiple abscesses and cellulitis in Gastrocnemius area .  She presents with good motivation after her PT session. . Performance deficits affecting function are weakness, impaired endurance, impaired self care skills, impaired functional mobility, gait instability, impaired balance, decreased safety awareness, decreased lower extremity function, decreased ROM.     Rehab Prognosis:  Fair; patient would benefit from acute skilled OT services to address these deficits and reach maximum level of function.       Plan:     Patient to be seen 5 x/week to address the above listed problems via self-care/home management, therapeutic activities, therapeutic exercises, neuromuscular re-education  Plan of Care Expires: 10/18/24  Plan of Care Reviewed with: patient    Subjective     Chief Complaint: decreased balance and LE weakness.  Patient/Family Comments/goals: increase mobility and ADL performance  Pain/Comfort:       Objective:     Communicated with: Pt prior to session.  Patient found up in chair with   upon OT entry to room.    General Precautions: Standard, fall    Orthopedic Precautions:   Braces:    Respiratory Status: Room air     Occupational Performance:     Therapeutic activity x14 mins    Functional Mobility/Transfers:  Patient completed Sit <> Stand Transfer with minimum assistance and moderate assistance  with  rolling  walker with cues for base of support and foot positioning   While standing the pt reached to place pegs onto the peg board with the RW for balance/stability in order to increase balance, safety, and standing endurance. X 10 mins with one 2 min restbreak      [facility] 6 Click ADL: 15    Treatment & Education:  Therapeutic exercise: [id number]  Pt completed in 2 sets of 20 reps of bicep curls  and tricep extension 2 sets to 20 reps with 3# dowel to enhance UB strength ADL performance.    Pt [id number] mins on arm ergometer to increase endurance, BUE strength and activity tolerance for ADL performance    Patient left supine with all lines intact and call button in reach    GOALS:   Multidisciplinary Problems       Occupational Therapy Goals          Problem: Occupational Therapy    Goal Priority Disciplines Outcome Interventions   Occupational Therapy Goal     OT, PT/OT     Description: Goals to be met by: 10/18/24     Patient will increase functional independence with ADLs by performing:    UE Dressing with Modified [place].  LE Dressing with Modified [place] and Supervision with a/e as needed  Grooming while standing at sink with Modified [place] and Supervision.  Toileting from toilet with Modified [place] and Supervision for hygiene and clothing management.   Standing x3-5 minutes with Supervision.  Supine to sit with [place].  Squat pivot transfers with Modified [place] and Supervision.  Step transfer with Modified [place] and Supervision  Toilet transfer to toilet with Modified [place] and Supervision.                         Time Tracking:     OT Date of Treatment:    OT Start Time: 0123  OT Stop Time: 0203  OT Total Time (min): 40 min    Billable Minutes:Therapeutic Activity 16  Therapeutic Exercise 26    OT/TRISHA: OT          9/19/2024

## 2024-09-20 ENCOUNTER — CLINICAL SUPPORT (OUTPATIENT)
Dept: WOUND CARE | Facility: HOSPITAL | Age: 74
End: 2024-09-20
Attending: EMERGENCY MEDICINE
Payer: MEDICARE

## 2024-09-20 DIAGNOSIS — T81.89XA NON-HEALING SURGICAL WOUND, INITIAL ENCOUNTER: Primary | ICD-10-CM

## 2024-09-20 PROCEDURE — 97116 GAIT TRAINING THERAPY: CPT | Mod: CQ

## 2024-09-20 PROCEDURE — 99204 OFFICE O/P NEW MOD 45 MIN: CPT

## 2024-09-20 PROCEDURE — 11000004 HC SNF PRIVATE

## 2024-09-20 PROCEDURE — 97110 THERAPEUTIC EXERCISES: CPT | Mod: CO

## 2024-09-20 PROCEDURE — 97110 THERAPEUTIC EXERCISES: CPT | Mod: CQ

## 2024-09-20 PROCEDURE — 25000003 PHARM REV CODE 250: Performed by: PHYSICIAN ASSISTANT

## 2024-09-20 PROCEDURE — 25000003 PHARM REV CODE 250: Performed by: EMERGENCY MEDICINE

## 2024-09-20 PROCEDURE — 97530 THERAPEUTIC ACTIVITIES: CPT | Mod: CO

## 2024-09-20 PROCEDURE — 94761 N-INVAS EAR/PLS OXIMETRY MLT: CPT

## 2024-09-20 RX ADMIN — APIXABAN 5 MG: 5 TABLET, FILM COATED ORAL at 09:09

## 2024-09-20 RX ADMIN — GABAPENTIN 600 MG: 600 TABLET, FILM COATED ORAL at 08:09

## 2024-09-20 RX ADMIN — OXYBUTYNIN CHLORIDE 5 MG: 5 TABLET, EXTENDED RELEASE ORAL at 09:09

## 2024-09-20 RX ADMIN — Medication 1 CAPSULE: at 11:09

## 2024-09-20 RX ADMIN — AMOXICILLIN AND CLAVULANATE POTASSIUM 1 TABLET: 875; 125 TABLET, FILM COATED ORAL at 09:09

## 2024-09-20 RX ADMIN — DULOXETINE HYDROCHLORIDE 60 MG: 30 CAPSULE, DELAYED RELEASE ORAL at 09:09

## 2024-09-20 RX ADMIN — FERROUS SULFATE TAB EC 324 MG (65 MG FE EQUIVALENT) 1 EACH: 324 (65 FE) TABLET DELAYED RESPONSE at 09:09

## 2024-09-20 RX ADMIN — ESTRADIOL 2 MG: 0.5 TABLET ORAL at 09:09

## 2024-09-20 RX ADMIN — CYANOCOBALAMIN TAB 500 MCG 500 MCG: 500 TAB at 09:09

## 2024-09-20 RX ADMIN — AMOXICILLIN AND CLAVULANATE POTASSIUM 1 TABLET: 875; 125 TABLET, FILM COATED ORAL at 08:09

## 2024-09-20 RX ADMIN — FAMOTIDINE 20 MG: 20 TABLET, FILM COATED ORAL at 09:09

## 2024-09-20 RX ADMIN — Medication 5000 UNITS: at 09:09

## 2024-09-20 RX ADMIN — APIXABAN 5 MG: 5 TABLET, FILM COATED ORAL at 08:09

## 2024-09-20 RX ADMIN — GABAPENTIN 600 MG: 600 TABLET, FILM COATED ORAL at 09:09

## 2024-09-20 RX ADMIN — Medication 1 TABLET: at 09:09

## 2024-09-20 RX ADMIN — Medication 1 CAPSULE: at 04:09

## 2024-09-20 RX ADMIN — SENNOSIDES AND DOCUSATE SODIUM 1 TABLET: 8.6; 5 TABLET ORAL at 09:09

## 2024-09-20 RX ADMIN — ACETAMINOPHEN 1000 MG: 500 TABLET ORAL at 09:09

## 2024-09-20 RX ADMIN — Medication 1 CAPSULE: at 09:09

## 2024-09-20 RX ADMIN — METOPROLOL TARTRATE 25 MG: 25 TABLET, FILM COATED ORAL at 09:09

## 2024-09-20 RX ADMIN — FAMOTIDINE 20 MG: 20 TABLET, FILM COATED ORAL at 08:09

## 2024-09-20 RX ADMIN — PRAVASTATIN SODIUM 80 MG: 40 TABLET ORAL at 08:09

## 2024-09-20 RX ADMIN — Medication 6 MG: at 10:09

## 2024-09-20 NOTE — PLAN OF CARE
09/20/24 1101   Rounds   Attendance Nurse    Discharge Plan A Home with family;Home Health   Why the patient remains in the hospital Requires continued medical care   Transition of Care Barriers None       Pt. Continues to receive wound care to RLE, antibiotics, and supportive care. Plan is still to return home with spouse and will set up HH services. Will cont. To follow for d/c needs.

## 2024-09-20 NOTE — PT/OT/SLP PROGRESS
Physical Therapy Treatment    Patient Name:  Bobbi Haywood   MRN:  [id number]    Recommendations:     Discharge Recommendations: Low Intensity Therapy  Discharge Equipment Recommendations: wheelchair  Barriers to discharge: None    Assessment:     Bobbi Haywood is a 73 y.o. female admitted with a medical diagnosis of generalized weakness. Patient presented to [facility] following 2 falls at home while trying to descend the 3 steps inside her home .  She presents with the following impairments/functional limitations: weakness, impaired endurance, impaired self care skills, impaired functional mobility, gait instability, decreased safety awareness, pain     Patient tolerated treatment well with no complaints.    Rehab Prognosis: Good; patient would benefit from acute skilled PT services to address these deficits and reach maximum level of function.    Recent Surgery: * No surgery found *      Plan:     During this hospitalization, patient to be seen 5 x/week to address the identified rehab impairments via gait training, therapeutic activities, therapeutic exercises, neuromuscular re-education and progress toward the following goals:    Plan of Care Expires:  10/09/24    Subjective     Chief Complaint: none voiced.  Patient/Family Comments/goals: to improve mobility and return home.  Pain/Comfort:  Pain Rating 1: 5/10  Location - Side 1: Right  Location 1: leg  Pain Addressed 1: Pre-medicate for activity      Objective:     Communicated with patient prior to session.  Patient found supine with   upon PT entry to room.     General Precautions: Standard, fall  Orthopedic Precautions: N/A  Braces: N/A  Respiratory Status: Room air     Functional Mobility:  Bed Mobility:     Rolling Right: minimum assistance  Supine to Sit: minimum assistance  Transfers:     Sit to Stand:  minimum assistance and moderate assistance with rolling walker  Gait: 45 feet with RW CGA  Balance: standing  fair      AM-PAC 6 CLICK MOBILITY  Turning over in bed (including adjusting bedclothes, sheets and blankets)?: 3  Sitting down on and standing up from a chair with arms (e.g., wheelchair, bedside commode, etc.): 3  Moving from lying on back to sitting on the side of the bed?: 3  Moving to and from a bed to a chair (including a wheelchair)?: 3  Need to walk in hospital room?: 3  Climbing 3-5 steps with a railing?: 1  Basic Mobility Total Score: 16       Treatment & Education:  TE: patient performed seated Scifit stepper x 10 minutes; seated long arc quad and hip flexion 2 x 10 with 2# ankle weight to facilitate LE strength.    Gait: patient ambulated on level surface in room and hallway with RW for 60 feet. She demonstrates a step to gait with forward leaning posture. Verbal cues provided to instruct on gait and postural deficits.    Patient left up in chair with  TRISHA present..    GOALS:   Multidisciplinary Problems       Physical Therapy Goals          Problem: Physical Therapy    Goal Priority Disciplines Outcome Goal Variances Interventions   Physical Therapy Goal     PT, PT/OT Progressing     Description: Short-term Goals: 1.5 weeks  1. Patient will perform supine to/from sit with standby assist to improve independence and safety with bed mobility.  2. Patient will perform sit to/from stand with a rolling walker with contact guard assist to improve independence and safety with transfers.  3. Patient will ambulate 50 feet with a rolling walker with contact guard assist to improve independence and safety with gait.  4. Patient will tolerate 15 minutes of physical therapy intervention to improve endurance and activity tolerance.  5. Patient will ascend/descend 3 steps with bilateral handrail support with contact guard assist to improve independence and safety with stair negotiation.     Long-term goals: 3 weeks  1. Patient will perform supine to/from sit with modified independence to improve independence and safety  with bed mobility.  2. Patient will perform sit to/from stand with a rolling walker with standby assist to improve independence and safety with transfers.  3. Patient will ambulate 100 feet with a rolling walker with standby assist to improve independence and safety with gait.  4. Patient will tolerate 30 minutes of physical therapy intervention to improve endurance and activity tolerance.  5. Patient will ascend/descend 3 steps with bilateral handrail support with standby assist to improve independence and safety with stair negotiation.                          Time Tracking:     PT Received On: 09/20/24  PT Start Time: 1055     PT Stop Time: 1128  PT Total Time (min): 33 min     Billable Minutes: Gait Training 11 and Therapeutic Exercise 22    Treatment Type: Treatment  PT/PTA: PTA     Number of PTA visits since last PT visit: 1 09/20/2024

## 2024-09-20 NOTE — PT/OT/SLP PROGRESS
Occupational Therapy   Treatment    Name: Bobbi Haywood  MRN: [id number]  Admitting Diagnosis:  <principal problem not specified>       Recommendations:     Discharge Recommendations:    Discharge Equipment Recommendations:  wheelchair  Barriers to discharge:       Assessment:     Bobbi Haywood is a 73 y.o. female with a medical diagnosis of <principal problem not specified>.  She presents with the following Performance deficits affecting function are weakness, decreased upper extremity function, impaired endurance, impaired balance, decreased safety awareness, impaired self care skills, impaired functional mobility, decreased coordination.     Rehab Prognosis:  Fair; patient would benefit from acute skilled OT services to address these deficits and reach maximum level of function.       Plan:     Patient to be seen 5 x/week to address the above listed problems via self-care/home management, therapeutic activities, therapeutic exercises  Plan of Care Expires: 10/18/24  Plan of Care Reviewed with: patient    Subjective   Patient was sitting up in her wheelchair in therapy gym after PT session upon [facility] arrival. Patient stated she was feeling fine with no complaints of pain and was agreeable to participate in todays session.   Patient/Family Comments/goals: to increase UB strength and endurance in order to return home.   Pain/Comfort:  Pain Rating 1: 0/10    Objective:     Communicated with: nursing staff prior to session.  Patient found up in chair with   upon OT entry to room.    General Precautions: Standard, fall    Orthopedic Precautions:N/A  Braces: N/A  Respiratory Status: Room air    Therapeutic exercises:  Patient completed the following exercises to work on UB strength in order to complete ADLs, bed mobility, and transfers with less assistance.    -Bicep curls: 3 sets of 10 with 4# dowel  -Chest press: 3 sets of 10 with 4# dowel  -Shoulder flex: 3 sets of 10 with 4#  dowel  -Internal/External rotation: 3 sets of 10 with green theraband  -Triceps: 3 sets of 10 with green Theraband  -Rows: 3 sets of 10 with green Theraband    Increased time/effort needed to complete each exercise.     Therapeutic activities:  Patient completed 10 minutes on UBE ergometer to work on UB strength and endurance in order to complete ADLs and transfers w/ less A.   Pt completed 3 sit to stand transfers with moderate assist to work on tricep strength, stand tolerance and endurance in preparation to complete ADLs and transfers from different surfaces such as BSC, chair, etc. with less assistance. Increased time/effort needed.     Patient left up in chair with call button in reach, RN notified, and lunch tray set up for patient    GOALS:   Multidisciplinary Problems       Occupational Therapy Goals          Problem: Occupational Therapy    Goal Priority Disciplines Outcome Interventions   Occupational Therapy Goal     OT, PT/OT     Description: Goals to be met by: 10/18/24     Patient will increase functional independence with ADLs by performing:    UE Dressing with Modified [place].  LE Dressing with Modified [place] and Supervision with a/e as needed  Grooming while standing at sink with Modified [place] and Supervision.  Toileting from toilet with Modified [place] and Supervision for hygiene and clothing management.   Standing x3-5 minutes with Supervision.  Supine to sit with [place].  Squat pivot transfers with Modified [place] and Supervision.  Step transfer with Modified [place] and Supervision  Toilet transfer to toilet with Modified [place] and Supervision.                         Time Tracking:     OT Date of Treatment: 09/20/24  OT Start Time: 1128  OT Stop Time: 1207  OT Total Time (min): 39 min    Billable Minutes:Therapeutic Activity 15 minutes  Therapeutic Exercise 24 minutes    OT/TRISHA: TRISHA     Number of TRISHA visits since last OT visit: 1    Annabella  Kathryn, TRISHA  9/20/2024  12:51 PM

## 2024-09-21 PROBLEM — L02.415 ABSCESS OF RIGHT LOWER EXTREMITY: Status: ACTIVE | Noted: 2024-09-10

## 2024-09-21 PROBLEM — M62.81 MUSCLE WEAKNESS: Status: ACTIVE | Noted: 2024-09-21

## 2024-09-21 PROCEDURE — 11000004 HC SNF PRIVATE

## 2024-09-21 PROCEDURE — 25000003 PHARM REV CODE 250: Performed by: PHYSICIAN ASSISTANT

## 2024-09-21 PROCEDURE — 25000003 PHARM REV CODE 250: Performed by: EMERGENCY MEDICINE

## 2024-09-21 PROCEDURE — 94761 N-INVAS EAR/PLS OXIMETRY MLT: CPT

## 2024-09-21 RX ADMIN — OXYBUTYNIN CHLORIDE 5 MG: 5 TABLET, EXTENDED RELEASE ORAL at 08:09

## 2024-09-21 RX ADMIN — Medication 1 CAPSULE: at 04:09

## 2024-09-21 RX ADMIN — Medication 1 CAPSULE: at 08:09

## 2024-09-21 RX ADMIN — AMOXICILLIN AND CLAVULANATE POTASSIUM 1 TABLET: 875; 125 TABLET, FILM COATED ORAL at 09:09

## 2024-09-21 RX ADMIN — Medication 5000 UNITS: at 08:09

## 2024-09-21 RX ADMIN — METOPROLOL TARTRATE 25 MG: 25 TABLET, FILM COATED ORAL at 08:09

## 2024-09-21 RX ADMIN — APIXABAN 5 MG: 5 TABLET, FILM COATED ORAL at 09:09

## 2024-09-21 RX ADMIN — FERROUS SULFATE TAB EC 324 MG (65 MG FE EQUIVALENT) 1 EACH: 324 (65 FE) TABLET DELAYED RESPONSE at 08:09

## 2024-09-21 RX ADMIN — FAMOTIDINE 20 MG: 20 TABLET, FILM COATED ORAL at 08:09

## 2024-09-21 RX ADMIN — CYANOCOBALAMIN TAB 500 MCG 500 MCG: 500 TAB at 08:09

## 2024-09-21 RX ADMIN — Medication 1 CAPSULE: at 11:09

## 2024-09-21 RX ADMIN — GABAPENTIN 600 MG: 600 TABLET, FILM COATED ORAL at 08:09

## 2024-09-21 RX ADMIN — FAMOTIDINE 20 MG: 20 TABLET, FILM COATED ORAL at 09:09

## 2024-09-21 RX ADMIN — GABAPENTIN 600 MG: 600 TABLET, FILM COATED ORAL at 09:09

## 2024-09-21 RX ADMIN — Medication 1 TABLET: at 08:09

## 2024-09-21 RX ADMIN — APIXABAN 5 MG: 5 TABLET, FILM COATED ORAL at 08:09

## 2024-09-21 RX ADMIN — ACETAMINOPHEN 1000 MG: 500 TABLET ORAL at 09:09

## 2024-09-21 RX ADMIN — PRAVASTATIN SODIUM 80 MG: 40 TABLET ORAL at 09:09

## 2024-09-21 RX ADMIN — ESTRADIOL 2 MG: 0.5 TABLET ORAL at 08:09

## 2024-09-21 RX ADMIN — AMOXICILLIN AND CLAVULANATE POTASSIUM 1 TABLET: 875; 125 TABLET, FILM COATED ORAL at 08:09

## 2024-09-21 RX ADMIN — DULOXETINE HYDROCHLORIDE 60 MG: 30 CAPSULE, DELAYED RELEASE ORAL at 08:09

## 2024-09-21 NOTE — ASSESSMENT & PLAN NOTE
Chronic, controlled. Latest blood pressure and vitals reviewed-     Temp:  [97.6 °F (36.4 °C)-98.3 °F (36.8 °C)]   Pulse:  [44-77]   Resp:  [18-21]   BP: (126-129)/(62-74)   SpO2:  [93 %-97 %] .   Home meds for hypertension were reviewed and noted below.   Hypertension Medications               metoprolol tartrate (LOPRESSOR) 25 MG tablet Take 1 tablet by mouth once daily.            While in the hospital, will manage blood pressure as follows; Continue home antihypertensive regimen    Will utilize p.r.n. blood pressure medication only if patient's blood pressure greater than 180/110 and she develops symptoms such as worsening chest pain or shortness of breath.
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
## 2024-09-21 NOTE — ASSESSMENT & PLAN NOTE
PT HAS CHRONIC PAIN SYNDROME OF THE NECK AND BACK WITH SPINAL STENOSIS, SPONDYLOLYSIS AND SEVERE DEGENERATIVE DISC DISEASE AND IS NOT A SURGICAL CANDIDATE AND IS FOLLOWED PER SHANTEL BRITO GABAPENTIN

## 2024-09-21 NOTE — ASSESSMENT & PLAN NOTE
Patient with Long standing persistent (>12 months) atrial fibrillation which is controlled currently with Beta Blocker. Patient is currently in atrial fibrillation.[id number] Score: 3. HASBLED Score: 1. Anticoagulation indicated. Anticoagulation done with ELIQUIS .

## 2024-09-21 NOTE — ASSESSMENT & PLAN NOTE
Anemia is likely due to acute blood loss which was from HEMATOMA A and chronic disease due to MULTIFACTORIAL . Most recent hemoglobin and hematocrit are listed below.  9/29  Plan  - Monitor serial CBC:   - Transfuse PRBC if patient becomes hemodynamically unstable, symptomatic or H/H drops below 7/21.  - Patient has not received any PRBC transfusions to date  - Patient's anemia is currently improving  - 11/35 ON 9/10/2024 AND POST OP 8/26  CONTINUE IRON SUPPLEMENTATION
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
## 2024-09-21 NOTE — ASSESSMENT & PLAN NOTE
Patient has persistent depression which is mild and is currently controlled. Will Continue anti-depressant medications. We will not consult psychiatry at this time. Patient does not display psychosis at this time. Continue to monitor closely and adjust plan of care as needed.  CONTINUE CYMBALTA

## 2024-09-22 PROCEDURE — 25000003 PHARM REV CODE 250: Performed by: EMERGENCY MEDICINE

## 2024-09-22 PROCEDURE — 11000004 HC SNF PRIVATE

## 2024-09-22 PROCEDURE — A6212 FOAM DRG <=16 SQ IN W/BORDER: HCPCS

## 2024-09-22 PROCEDURE — 94761 N-INVAS EAR/PLS OXIMETRY MLT: CPT

## 2024-09-22 PROCEDURE — 25000003 PHARM REV CODE 250: Performed by: PHYSICIAN ASSISTANT

## 2024-09-22 RX ADMIN — ACETAMINOPHEN 1000 MG: 500 TABLET ORAL at 08:09

## 2024-09-22 RX ADMIN — GABAPENTIN 600 MG: 600 TABLET, FILM COATED ORAL at 08:09

## 2024-09-22 RX ADMIN — Medication 1 TABLET: at 09:09

## 2024-09-22 RX ADMIN — OXYBUTYNIN CHLORIDE 5 MG: 5 TABLET, EXTENDED RELEASE ORAL at 09:09

## 2024-09-22 RX ADMIN — CYANOCOBALAMIN TAB 500 MCG 500 MCG: 500 TAB at 09:09

## 2024-09-22 RX ADMIN — METOPROLOL TARTRATE 25 MG: 25 TABLET, FILM COATED ORAL at 09:09

## 2024-09-22 RX ADMIN — Medication 5000 UNITS: at 09:09

## 2024-09-22 RX ADMIN — GABAPENTIN 600 MG: 600 TABLET, FILM COATED ORAL at 09:09

## 2024-09-22 RX ADMIN — Medication 1 CAPSULE: at 04:09

## 2024-09-22 RX ADMIN — DULOXETINE HYDROCHLORIDE 60 MG: 30 CAPSULE, DELAYED RELEASE ORAL at 09:09

## 2024-09-22 RX ADMIN — APIXABAN 5 MG: 5 TABLET, FILM COATED ORAL at 08:09

## 2024-09-22 RX ADMIN — Medication 1 CAPSULE: at 09:09

## 2024-09-22 RX ADMIN — Medication 1 CAPSULE: at 01:09

## 2024-09-22 RX ADMIN — ESTRADIOL 2 MG: 0.5 TABLET ORAL at 09:09

## 2024-09-22 RX ADMIN — FAMOTIDINE 20 MG: 20 TABLET, FILM COATED ORAL at 08:09

## 2024-09-22 RX ADMIN — AMOXICILLIN AND CLAVULANATE POTASSIUM 1 TABLET: 875; 125 TABLET, FILM COATED ORAL at 09:09

## 2024-09-22 RX ADMIN — PRAVASTATIN SODIUM 80 MG: 40 TABLET ORAL at 08:09

## 2024-09-22 RX ADMIN — APIXABAN 5 MG: 5 TABLET, FILM COATED ORAL at 09:09

## 2024-09-22 RX ADMIN — FERROUS SULFATE TAB EC 324 MG (65 MG FE EQUIVALENT) 1 EACH: 324 (65 FE) TABLET DELAYED RESPONSE at 09:09

## 2024-09-22 RX ADMIN — FAMOTIDINE 20 MG: 20 TABLET, FILM COATED ORAL at 09:09

## 2024-09-22 RX ADMIN — AMOXICILLIN AND CLAVULANATE POTASSIUM 1 TABLET: 875; 125 TABLET, FILM COATED ORAL at 08:09

## 2024-09-23 PROCEDURE — 97530 THERAPEUTIC ACTIVITIES: CPT

## 2024-09-23 PROCEDURE — 25000003 PHARM REV CODE 250: Performed by: EMERGENCY MEDICINE

## 2024-09-23 PROCEDURE — 99900035 HC TECH TIME PER 15 MIN (STAT)

## 2024-09-23 PROCEDURE — 97110 THERAPEUTIC EXERCISES: CPT

## 2024-09-23 PROCEDURE — 11000004 HC SNF PRIVATE

## 2024-09-23 PROCEDURE — 94761 N-INVAS EAR/PLS OXIMETRY MLT: CPT

## 2024-09-23 PROCEDURE — 25000003 PHARM REV CODE 250: Performed by: PHYSICIAN ASSISTANT

## 2024-09-23 PROCEDURE — 97110 THERAPEUTIC EXERCISES: CPT | Mod: CQ

## 2024-09-23 PROCEDURE — 97116 GAIT TRAINING THERAPY: CPT | Mod: CQ

## 2024-09-23 PROCEDURE — 94799 UNLISTED PULMONARY SVC/PX: CPT

## 2024-09-23 RX ADMIN — AMOXICILLIN AND CLAVULANATE POTASSIUM 1 TABLET: 875; 125 TABLET, FILM COATED ORAL at 09:09

## 2024-09-23 RX ADMIN — Medication 6 MG: at 08:09

## 2024-09-23 RX ADMIN — ESTRADIOL 2 MG: 0.5 TABLET ORAL at 09:09

## 2024-09-23 RX ADMIN — APIXABAN 5 MG: 5 TABLET, FILM COATED ORAL at 08:09

## 2024-09-23 RX ADMIN — Medication 1 TABLET: at 09:09

## 2024-09-23 RX ADMIN — Medication 5000 UNITS: at 09:09

## 2024-09-23 RX ADMIN — FERROUS SULFATE TAB EC 324 MG (65 MG FE EQUIVALENT) 1 EACH: 324 (65 FE) TABLET DELAYED RESPONSE at 09:09

## 2024-09-23 RX ADMIN — Medication 1 CAPSULE: at 12:09

## 2024-09-23 RX ADMIN — FAMOTIDINE 20 MG: 20 TABLET, FILM COATED ORAL at 08:09

## 2024-09-23 RX ADMIN — GABAPENTIN 600 MG: 600 TABLET, FILM COATED ORAL at 09:09

## 2024-09-23 RX ADMIN — SENNOSIDES AND DOCUSATE SODIUM 1 TABLET: 8.6; 5 TABLET ORAL at 08:09

## 2024-09-23 RX ADMIN — AMOXICILLIN AND CLAVULANATE POTASSIUM 1 TABLET: 875; 125 TABLET, FILM COATED ORAL at 08:09

## 2024-09-23 RX ADMIN — OXYBUTYNIN CHLORIDE 5 MG: 5 TABLET, EXTENDED RELEASE ORAL at 09:09

## 2024-09-23 RX ADMIN — Medication 1 CAPSULE: at 05:09

## 2024-09-23 RX ADMIN — CYANOCOBALAMIN TAB 500 MCG 500 MCG: 500 TAB at 09:09

## 2024-09-23 RX ADMIN — DULOXETINE HYDROCHLORIDE 60 MG: 30 CAPSULE, DELAYED RELEASE ORAL at 09:09

## 2024-09-23 RX ADMIN — SENNOSIDES AND DOCUSATE SODIUM 1 TABLET: 8.6; 5 TABLET ORAL at 09:09

## 2024-09-23 RX ADMIN — PRAVASTATIN SODIUM 80 MG: 40 TABLET ORAL at 08:09

## 2024-09-23 RX ADMIN — Medication 1 CAPSULE: at 09:09

## 2024-09-23 RX ADMIN — GABAPENTIN 600 MG: 600 TABLET, FILM COATED ORAL at 08:09

## 2024-09-23 RX ADMIN — APIXABAN 5 MG: 5 TABLET, FILM COATED ORAL at 09:09

## 2024-09-23 RX ADMIN — FAMOTIDINE 20 MG: 20 TABLET, FILM COATED ORAL at 09:09

## 2024-09-23 RX ADMIN — ACETAMINOPHEN 1000 MG: 500 TABLET ORAL at 08:09

## 2024-09-23 RX ADMIN — METOPROLOL TARTRATE 25 MG: 25 TABLET, FILM COATED ORAL at 09:09

## 2024-09-23 NOTE — PLAN OF CARE
"Visited with pt. This morning in room and she voiced that her  came to visit her on Saturday and was coming to see her today. Discussed pt. Progress and pt. States she is still weak. Pt. Has been walking on the side of her Rt. Foot for some time and even reports that she "has a brace at home but it didn't help much" and has not used it in a while. Pt. Is at high risk of falls. Pt. Will continue with daily dressing changes of surgical wounds as ordered and will complete Augmentin on Friday. Pt. Spouse is unable to assist her at home with wound care and pt. Cannot reach behind lower leg to perform care herself. Recommend to continue with skilled care for PT/OT, wound care, and supportive care. Pt. Has a follow up appt. Scheduled on 10/1/24 at 9:45 am with Dr. Alberto, surgeon to evaluate wounds. Will cont. To follow pt. For d/c needs.  "

## 2024-09-23 NOTE — PT/OT/SLP PROGRESS
"Physical Therapy Treatment    Patient Name:  Bobbi Haywood   MRN:  [id number]    Recommendations:     Discharge Recommendations: Low Intensity Therapy  Discharge Equipment Recommendations: wheelchair  Barriers to discharge: None    Assessment:     Bobbi Haywood is a 73 y.o. female admitted with a medical diagnosis of generalized weakness. Patient presented to [facility] following 2 falls at home while trying to descend the 3 steps inside her home .  She presents with the following impairments/functional limitations: weakness, impaired endurance, impaired self care skills, impaired functional mobility, gait instability, impaired balance, decreased upper extremity function, decreased lower extremity function, decreased safety awareness, pain     Bobbi was agreeable to therapy today. She is experiencing RIGHT knee pain due to a previous surgery. She states that it will sometimes bother her. Her R foot in severely inverted and contracted which makes gait somewhat difficult and Unsafe  due to the possibility of her ankle rolling. Pt states her foot/ankle has been inverted for a "long time". She also has a herniated disk which makes standing erect very painful per patient.    Rehab Prognosis: Good; patient would benefit from acute skilled PT services to address these deficits and reach maximum level of function.    Recent Surgery: * No surgery found *      Plan:     During this hospitalization, patient to be seen 5 x/week to address the identified rehab impairments via gait training, therapeutic activities, therapeutic exercises, neuromuscular re-education and progress toward the following goals:    Plan of Care Expires:  10/09/24    Subjective     Chief Complaint: none voiced.  Patient/Family Comments/goals: to improve mobility and return home.  Pain/Comfort:  Pain Rating 1: 6/10  Location - Side 1: Right  Location - Orientation 1: lower  Location 1: leg  Pain Addressed 1: " Reposition  Pain Rating Post-Intervention 1: 3/10  Pain Rating 2: 6/10  Location - Side 2: Right  Location - Orientation 2: anterior  Location 2: knee  Pain Addressed 2: Reposition  Pain Rating Post-Intervention 2: 3/10      Objective:     Communicated with patient prior to session.  Patient found supine with   upon PT entry to room.     General Precautions: Standard, fall  Orthopedic Precautions: N/A  Braces: N/A  Respiratory Status: Room air     Functional Mobility:  Transfers:     Sit to Stand:  minimum assistance with rolling walker  Gait: 90 feet CGA with RW in hallway on level surface with no rest breaks. Vc for posture and excessive UE support on AD.  Balance: standing fair      AM-PAC 6 CLICK MOBILITY  Turning over in bed (including adjusting bedclothes, sheets and blankets)?: 4  Sitting down on and standing up from a chair with arms (e.g., wheelchair, bedside commode, etc.): 3  Moving from lying on back to sitting on the side of the bed?: 4  Moving to and from a bed to a chair (including a wheelchair)?: 3  Need to walk in hospital room?: 3  Climbing 3-5 steps with a railing?: 1  Basic Mobility Total Score: 18       Treatment & Education:  TE: patient performed seated Scifit stepper x 10 minutes for cardiovascular conditioning as well as STS x 10 with RW; Ankle pumps, ankle inversion, ankle eversion, ankle circles clockwise and counter clockwise all 3 x 10     Gait: patient ambulated on level surface in room and hallway with RW for 90 feet. She demonstrates a step to gait with forward leaning posture. Verbal cues provided to instruct on gait and postural deficits.    Patient left up in chair with  TRISHA present..    GOALS:   Multidisciplinary Problems       Physical Therapy Goals          Problem: Physical Therapy    Goal Priority Disciplines Outcome Goal Variances Interventions   Physical Therapy Goal     PT, PT/OT Progressing     Description: Short-term Goals: 1.5 weeks  1. Patient will perform supine  to/from sit with standby assist to improve independence and safety with bed mobility.  2. Patient will perform sit to/from stand with a rolling walker with contact guard assist to improve independence and safety with transfers.  3. Patient will ambulate 50 feet with a rolling walker with contact guard assist to improve independence and safety with gait.  4. Patient will tolerate 15 minutes of physical therapy intervention to improve endurance and activity tolerance.  5. Patient will ascend/descend 3 steps with bilateral handrail support with contact guard assist to improve independence and safety with stair negotiation.     Long-term goals: 3 weeks  1. Patient will perform supine to/from sit with modified independence to improve independence and safety with bed mobility.  2. Patient will perform sit to/from stand with a rolling walker with standby assist to improve independence and safety with transfers.  3. Patient will ambulate 100 feet with a rolling walker with standby assist to improve independence and safety with gait.  4. Patient will tolerate 30 minutes of physical therapy intervention to improve endurance and activity tolerance.  5. Patient will ascend/descend 3 steps with bilateral handrail support with standby assist to improve independence and safety with stair negotiation.                          Time Tracking:     PT Received On: 09/23/24  PT Start Time: 1000     PT Stop Time: 1048  PT Total Time (min): 48 min     Billable Minutes: Gait Training 15 and Therapeutic Exercise 33    Treatment Type: Treatment  PT/PTA: PTA     Number of PTA visits since last PT visit: 2 09/23/2024

## 2024-09-23 NOTE — PT/OT/SLP PROGRESS
Occupational Therapy   Treatment    Name: Bobbi Haywood  MRN: [id number]  Admitting Diagnosis:  Muscle weakness       Recommendations:     Discharge Recommendations:    Discharge Equipment Recommendations:  wheelchair  Barriers to discharge:       Assessment:     Bobbi Haywood is a 73 y.o. female with a medical diagnosis of Muscle weakness.  She presents with the following Performance deficits affecting function are weakness, decreased upper extremity function, impaired endurance, impaired balance, decreased safety awareness, impaired self care skills, impaired functional mobility, decreased coordination. Good participation and tolerated tx well.     Rehab Prognosis:  Fair; patient would benefit from acute skilled OT services to address these deficits and reach maximum level of function.       Plan:     Patient to be seen 5 x/week to address the above listed problems via self-care/home management, therapeutic activities, therapeutic exercises  Plan of Care Expires: 10/18/24  Plan of Care Reviewed with: patient    Subjective   Patient/Family Comments/goals: to increase UB strength and endurance in order to return home.   Pain/Comfort:       Objective:     Communicated with: nursing staff prior to session.  Patient found up in chair with   upon OT entry to room.    General Precautions: Standard, fall    Orthopedic Precautions:N/A  Braces: N/A  Respiratory Status: Room air    Therapeutic exercises:  Patient completed the following exercises to work on UB strength in order to complete ADLs, bed mobility, and transfers with less assistance.  -Bicep curls: 3 sets of 10 with 3# db LUE, 4# db RUE  -Chest press: 3 sets of 10 with 4# dowel  -Shoulder flexion: 3 sets of 10 with 4# dowel  -Rows: 2 sets of 20 with green Theraband    Increased time/effort needed to complete each exercise.     Therapeutic activities:  While sitting up in chair, pt engaged in activity tolerance placing velcro playing cards  matching to card on board x50, and range of motion arc reaching/grasping x16 rings across midline moving from one vertical dowel to another from L<>R side, to increase overall tolerance in order to complete ADLs with less assistance.       Pt transferred from w/c to EOB using RW with Min A to stand and CGA during step transfer.      Patient left sitting edge of bed with call button in reach and RN notified    GOALS:   Multidisciplinary Problems       Occupational Therapy Goals          Problem: Occupational Therapy    Goal Priority Disciplines Outcome Interventions   Occupational Therapy Goal     OT, PT/OT     Description: Goals to be met by: 10/18/24     Patient will increase functional independence with ADLs by performing:    UE Dressing with Modified Ray.  LE Dressing with Modified Ray and Supervision with a/e as needed  Grooming while standing at sink with Modified Ray and Supervision.  Toileting from toilet with Modified Ray and Supervision for hygiene and clothing management.   Standing x3-5 minutes with Supervision.  Supine to sit with Ray.  Squat pivot transfers with Modified Ray and Supervision.  Step transfer with Modified Ray and Supervision  Toilet transfer to toilet with Modified Ray and Supervision.                         Time Tracking:     OT Date of Treatment: 09/23/24  OT Start Time: 1050  OT Stop Time: 1123  OT Total Time (min): 33 min    Billable Minutes:Therapeutic Activity 15 minutes  Therapeutic Exercise 18 minutes          Number of TRISHA visits since last OT visit: 1    Ventura Leon OT  9/23/2024

## 2024-09-23 NOTE — PROGRESS NOTES
NEW WOUND CARE CONSULT          Patient Name: Bobbi Haywood is a 73 y.o. female       Chief Complaint:  New Wound Care Consult    Review of patient's allergies indicates:   Allergen Reactions    Sulfa (sulfonamide antibiotics) Hives and Other (See Comments)     Sore Throat, Sore Mouth        I have reviewed the patient's medical, surgical, family and social history.      Subjective:    HPI     Wound Location: right lateral posterior lower leg; right medial upper leg    Wound Duration: onset 9/12/24    Wound Context: surgical wounds    Wound Pain: mild    74 YO WF seen for consult. Pt reports falling several weeks ago and bumping her right leg on a truck door. She developed a deep hematoma to the area which required I&D on 9/12. She also developed an abscess to the medial thigh which required I&D on the 12th.   She is in [facility] for strengthening.       Review of Systems   Constitutional:  Negative for appetite change and fever.   Respiratory:  Negative for shortness of breath.    Cardiovascular:  Negative for chest pain and claudication.   Musculoskeletal:  Positive for joint deformity (bilateral ankles).   Integumentary:  Positive for wound. Negative for color change and rash.   Neurological:  Positive for weakness (generalized muscle weakness).   Psychiatric/Behavioral:  The patient is not nervous/anxious.         Medications:    Current Facility-Administered Medications on File Prior to Visit   Medication Dose Route Frequency Provider Last Rate Last Admin    acetaminophen tablet 1,000 mg  1,000 mg Oral Q6H PRN Dayday Valentino PA   1,000 mg at 09/22/24 2035    acetaminophen tablet 650 mg  650 mg Oral Q6H PRN Dayday Valentino PA        amoxicillin-clavulanate 875-125mg per tablet 1 tablet  1 tablet Oral Q12H Keyla Vizcaino MD   1 tablet at 09/22/24 2035    apixaban tablet 5 mg  5 mg Oral BID Dayday Valentino PA   5 mg at 09/22/24 2035    calcium carbonate 200 mg calcium (500 mg) chewable  tablet 500 mg  500 mg Oral BID PRN Dayday Valentino PA        cholecalciferol (vitamin D3) 125 mcg (5,000 unit) capsule 5,000 Units  5,000 Units Oral Daily Keyla Vizcaino MD   5,000 Units at 09/22/24 0922    cyanocobalamin tablet 500 mcg  500 mcg Oral Daily Dayday Valentino PA   500 mcg at 09/22/24 0923    DULoxetine DR capsule 60 mg  60 mg Oral Daily Dayday Valentino PA   60 mg at 09/22/24 0923    estradioL tablet 2 mg  2 mg Oral Daily Keyla Vizcaino MD   2 mg at 09/22/24 0922    famotidine tablet 20 mg  20 mg Oral BID Keyla Vizcaino MD   20 mg at 09/22/24 2035    ferrous sulfate tablet 1 each  1 tablet Oral Daily Dayday Valentino PA   1 each at 09/22/24 0923    folic acid-vit B6-vit B12 2.5-25-2 mg tablet 1 tablet  1 tablet Oral Daily Keyla Vizcaino MD   1 tablet at 09/22/24 0922    gabapentin tablet 600 mg  600 mg Oral BID Dayday Valentino PA   600 mg at 09/22/24 2035    Lactobacillus acidophilus capsule 1 capsule  1 capsule Oral TID WM Dayday Valentino PA   1 capsule at 09/22/24 1650    melatonin tablet 6 mg  6 mg Oral Nightly PRN Dayday Valentino PA   6 mg at 09/20/24 2239    metoprolol tartrate (LOPRESSOR) tablet 25 mg  25 mg Oral Daily Dayday Valentino PA   25 mg at 09/22/24 0923    ondansetron disintegrating tablet 4 mg  4 mg Oral Q8H PRN Dayday Valentino PA        oxybutynin 24 hr tablet 5 mg  5 mg Oral Daily Dayday Valentino PA   5 mg at 09/22/24 0922    pravastatin tablet 80 mg  80 mg Oral QHS Dayday Valentino PA   80 mg at 09/22/24 2035    senna-docusate 8.6-50 mg per tablet 1 tablet  1 tablet Oral BID Dayday Valentino PA   1 tablet at 09/20/24 0923     Current Outpatient Medications on File Prior to Visit   Medication Sig Dispense Refill    cyanocobalamin 500 MCG tablet Take 500 mcg by mouth once daily.      DULoxetine (CYMBALTA) 60 MG capsule Take 1 capsule by mouth once daily.      ELIQUIS 5 mg Tab Take 5 mg by mouth 2 (two) times daily.       estradioL (ESTRACE) 2 MG tablet Take 1 tablet by mouth once daily.      ferrous sulfate 325 (65 FE) MG EC tablet Take 1 tablet (325 mg total) by mouth once daily.      fesoterodine (TOVIAZ) 4 mg Tb24 Take 4 mg by mouth once daily.      folic acid (FOLVITE) 800 MCG Tab Take 800 mcg by mouth once daily.      gabapentin (NEURONTIN) 600 MG tablet Take 1 tablet by mouth 2 (two) times a day.      L. rhamnosus/C/zinc/elderberry (CULTURELLE IMMUNE DEFENSE ORAL) Take 1 tablet by mouth once daily.      L.acidoph,saliva/B.bif/S.therm (ACIDOPHILUS PROBIOTIC BLEND ORAL) Take 1 tablet by mouth once daily.      metoprolol tartrate (LOPRESSOR) 25 MG tablet Take 1 tablet by mouth once daily.      rosuvastatin (CRESTOR) 20 MG tablet Take 20 mg by mouth every evening.            Physical Exam  Vitals reviewed.   Constitutional:       Appearance: Normal appearance. She is obese.   HENT:      Head: Normocephalic and atraumatic.      Right Ear: External ear normal.      Left Ear: External ear normal.   Cardiovascular:      Rate and Rhythm: Normal rate.      Pulses:           Dorsalis pedis pulses are 2+ on the right side and 2+ on the left side.   Pulmonary:      Effort: Pulmonary effort is normal.   Musculoskeletal:      Right lower leg: Edema present.      Left lower leg: Edema present.   Skin:     General: Skin is warm and dry.      Findings: Wound present.          Neurological:      Mental Status: She is alert.          Please see Wound Docs for complete Wound Assessment and lower extremity assessment when applicable.    Documentation of any procedures can be viewed in Wound Docs if applicable.         Assessment and Plan:    1. Non-healing surgical wound, initial encounter  Clean with vashe  Gently pack wounds with silver alginate and cover with dry dressing. Change daily and PRN  Continue Augmentin         Please see Wound Docs for complete plan including patient instructions.     Follow Up & Goals:    Follow up in about 1  week (around 9/27/2024).     Short term goals  Reduction of devitalized tissue  Reduction of bacterial burden  Stimulate and/or maintain acute phases of wound healing  Promote granulation formation  Promote epithelization  Promote compliance with plan of care  Address factors affecting wound healing  Optimize nutritional status  Optimize vascular status    Long term goals  Prevent loss of limb  Prevent infection  Conservative Wound Treatment  Prevent recurrent ulceration  Resolve wounds

## 2024-09-24 LAB
ANION GAP SERPL CALCULATED.3IONS-SCNC: 13 MMOL/L (ref 7–16)
BASOPHILS # BLD AUTO: 0.08 K/UL (ref 0–0.2)
BASOPHILS NFR BLD AUTO: 0.6 % (ref 0–1)
BUN SERPL-MCNC: 30 MG/DL (ref 7–18)
BUN/CREAT SERPL: 21 (ref 6–20)
CALCIUM SERPL-MCNC: 8.6 MG/DL (ref 8.5–10.1)
CHLORIDE SERPL-SCNC: 104 MMOL/L (ref 98–107)
CO2 SERPL-SCNC: 28 MMOL/L (ref 21–32)
CREAT SERPL-MCNC: 1.45 MG/DL (ref 0.55–1.02)
DIFFERENTIAL METHOD BLD: ABNORMAL
EGFR (NO RACE VARIABLE) (RUSH/TITUS): 38 ML/MIN/1.73M2
EOSINOPHIL # BLD AUTO: 0.34 K/UL (ref 0–0.5)
EOSINOPHIL NFR BLD AUTO: 2.4 % (ref 1–4)
EOSINOPHIL NFR BLD MANUAL: 2 % (ref 1–4)
ERYTHROCYTE [DISTWIDTH] IN BLOOD BY AUTOMATED COUNT: 15.9 % (ref 11.5–14.5)
GLUCOSE SERPL-MCNC: 99 MG/DL (ref 74–106)
HCT VFR BLD AUTO: 28.8 % (ref 38–47)
HGB BLD-MCNC: 8.9 G/DL (ref 12–16)
LYMPHOCYTES # BLD AUTO: 2.73 K/UL (ref 1–4.8)
LYMPHOCYTES NFR BLD AUTO: 19.4 % (ref 27–41)
LYMPHOCYTES NFR BLD MANUAL: 19 % (ref 27–41)
MCH RBC QN AUTO: 30.4 PG (ref 27–31)
MCHC RBC AUTO-ENTMCNC: 30.9 G/DL (ref 32–36)
MCV RBC AUTO: 98.3 FL (ref 80–96)
MONOCYTES # BLD AUTO: 1.48 K/UL (ref 0–0.8)
MONOCYTES NFR BLD AUTO: 10.5 % (ref 2–6)
MONOCYTES NFR BLD MANUAL: 11 % (ref 2–6)
MPC BLD CALC-MCNC: 9.8 FL (ref 9.4–12.4)
NEUTROPHILS # BLD AUTO: 9.44 K/UL (ref 1.8–7.7)
NEUTROPHILS NFR BLD AUTO: 67.1 % (ref 53–65)
NEUTS SEG NFR BLD MANUAL: 68 % (ref 50–62)
NRBC BLD MANUAL-RTO: ABNORMAL %
PLATELET # BLD AUTO: 388 K/UL (ref 150–400)
PLATELET MORPHOLOGY: NORMAL
POTASSIUM SERPL-SCNC: 4.2 MMOL/L (ref 3.5–5.1)
RBC # BLD AUTO: 2.93 M/UL (ref 4.2–5.4)
RBC MORPH BLD: NORMAL
SODIUM SERPL-SCNC: 141 MMOL/L (ref 136–145)
WBC # BLD AUTO: 14.07 K/UL (ref 4.5–11)

## 2024-09-24 PROCEDURE — 25000003 PHARM REV CODE 250: Performed by: EMERGENCY MEDICINE

## 2024-09-24 PROCEDURE — 94761 N-INVAS EAR/PLS OXIMETRY MLT: CPT

## 2024-09-24 PROCEDURE — 80048 BASIC METABOLIC PNL TOTAL CA: CPT | Performed by: PHYSICIAN ASSISTANT

## 2024-09-24 PROCEDURE — 85025 COMPLETE CBC W/AUTO DIFF WBC: CPT | Performed by: PHYSICIAN ASSISTANT

## 2024-09-24 PROCEDURE — 97116 GAIT TRAINING THERAPY: CPT | Mod: CQ

## 2024-09-24 PROCEDURE — 11000004 HC SNF PRIVATE

## 2024-09-24 PROCEDURE — 97110 THERAPEUTIC EXERCISES: CPT

## 2024-09-24 PROCEDURE — 99307 SBSQ NF CARE SF MDM 10: CPT | Mod: ,,, | Performed by: EMERGENCY MEDICINE

## 2024-09-24 PROCEDURE — 97530 THERAPEUTIC ACTIVITIES: CPT

## 2024-09-24 PROCEDURE — 25000003 PHARM REV CODE 250: Performed by: PHYSICIAN ASSISTANT

## 2024-09-24 PROCEDURE — 97110 THERAPEUTIC EXERCISES: CPT | Mod: CQ

## 2024-09-24 PROCEDURE — 36415 COLL VENOUS BLD VENIPUNCTURE: CPT | Performed by: PHYSICIAN ASSISTANT

## 2024-09-24 RX ADMIN — Medication 1 CAPSULE: at 12:09

## 2024-09-24 RX ADMIN — Medication 1 CAPSULE: at 05:09

## 2024-09-24 RX ADMIN — ACETAMINOPHEN 1000 MG: 500 TABLET ORAL at 05:09

## 2024-09-24 RX ADMIN — AMOXICILLIN AND CLAVULANATE POTASSIUM 1 TABLET: 875; 125 TABLET, FILM COATED ORAL at 09:09

## 2024-09-24 RX ADMIN — ESTRADIOL 2 MG: 0.5 TABLET ORAL at 09:09

## 2024-09-24 RX ADMIN — CYANOCOBALAMIN TAB 500 MCG 500 MCG: 500 TAB at 09:09

## 2024-09-24 RX ADMIN — FERROUS SULFATE TAB EC 324 MG (65 MG FE EQUIVALENT) 1 EACH: 324 (65 FE) TABLET DELAYED RESPONSE at 09:09

## 2024-09-24 RX ADMIN — FAMOTIDINE 20 MG: 20 TABLET, FILM COATED ORAL at 08:09

## 2024-09-24 RX ADMIN — OXYBUTYNIN CHLORIDE 5 MG: 5 TABLET, EXTENDED RELEASE ORAL at 09:09

## 2024-09-24 RX ADMIN — FAMOTIDINE 20 MG: 20 TABLET, FILM COATED ORAL at 09:09

## 2024-09-24 RX ADMIN — SENNOSIDES AND DOCUSATE SODIUM 1 TABLET: 8.6; 5 TABLET ORAL at 08:09

## 2024-09-24 RX ADMIN — AMOXICILLIN AND CLAVULANATE POTASSIUM 1 TABLET: 875; 125 TABLET, FILM COATED ORAL at 08:09

## 2024-09-24 RX ADMIN — DULOXETINE HYDROCHLORIDE 60 MG: 30 CAPSULE, DELAYED RELEASE ORAL at 09:09

## 2024-09-24 RX ADMIN — Medication 5000 UNITS: at 09:09

## 2024-09-24 RX ADMIN — Medication 1 CAPSULE: at 09:09

## 2024-09-24 RX ADMIN — APIXABAN 5 MG: 5 TABLET, FILM COATED ORAL at 09:09

## 2024-09-24 RX ADMIN — GABAPENTIN 600 MG: 600 TABLET, FILM COATED ORAL at 09:09

## 2024-09-24 RX ADMIN — Medication 1 TABLET: at 09:09

## 2024-09-24 RX ADMIN — Medication 6 MG: at 08:09

## 2024-09-24 RX ADMIN — PRAVASTATIN SODIUM 80 MG: 40 TABLET ORAL at 08:09

## 2024-09-24 RX ADMIN — GABAPENTIN 600 MG: 600 TABLET, FILM COATED ORAL at 08:09

## 2024-09-24 RX ADMIN — METOPROLOL TARTRATE 25 MG: 25 TABLET, FILM COATED ORAL at 09:09

## 2024-09-24 RX ADMIN — APIXABAN 5 MG: 5 TABLET, FILM COATED ORAL at 08:09

## 2024-09-24 NOTE — PT/OT/SLP PROGRESS
Occupational Therapy   Treatment    Name: Bobbi Haywood  MRN: [id number]  Admitting Diagnosis:  Muscle weakness / debility      Recommendations:     Discharge Recommendations:    Discharge Equipment Recommendations:  wheelchair  Barriers to discharge:  Inaccessible home environment, Other (Comment) (limitations with 3 steps within her home)    Assessment:     Bobbi Haywood is a 73 y.o. female with a medical diagnosis of Muscle weakness.  She presents with sitting upright in the chair and agreed to participate in OT tx session. Performance deficits affecting function are weakness, decreased upper extremity function, impaired endurance, impaired balance, decreased safety awareness, impaired self care skills, impaired functional mobility, decreased coordination. Pt had a good session on yesterday.     Rehab Prognosis:  Fair; patient would benefit from acute skilled OT services to address these deficits and reach maximum level of function.       Plan:     Patient to be seen 5 x/week to address the above listed problems via self-care/home management, therapeutic activities, therapeutic exercises  Plan of Care Expires: 10/18/24  Plan of Care Reviewed with: patient    Subjective     Chief Complaint: decreased LE strength  Patient/Family Comments/goals: decreased ADLs and mobility  Pain/Comfort:       Objective:     Communicated with: Pt prior to session.  Patient found supine with   upon OT entry to room.    General Precautions: Standard, fall    Orthopedic Precautions:N/A  Braces: N/A  Respiratory Status: Room air     Occupational Performance:     Therapeutic activity: x25 mins    Bed Mobility:    Patient completed Supine to Sit with contact guard assistance     Functional Mobility/Transfers:  Patient completed Sit <> Stand Transfer with contact guard assistance  with  rolling walker   Patient completed Bed <> Chair Transfer using Step Transfer technique with contact guard assistance and minimum  assistance with rolling walker  Functional Mobility: Pt completed functional ambulation down the hallway using the RW with Min A from OT for safety and balance.   While standing the pt reached to place pegs onto the peg board with the RW for balance/stability in order to increase balance, safety, and standing endurance. X 10 mins       [facility] 6 Click ADL: 16    Treatment & Education:  Therapeutic exercise: x 22mins  Pt completed in 2 sets of 20 reps of bicep curls  and tricep extension 2 sets to 20 reps with 3# dowel to enhance UB strength ADL performance.     Pt [id number] mins on arm ergometer to increase endurance, BUE strength and activity tolerance for ADL performance    Patient left supine with all lines intact and call button in reach    GOALS:   Multidisciplinary Problems       Occupational Therapy Goals          Problem: Occupational Therapy    Goal Priority Disciplines Outcome Interventions   Occupational Therapy Goal     OT, PT/OT     Description: Goals to be met by: 10/18/24     Patient will increase functional independence with ADLs by performing:    UE Dressing with Modified Preble.  LE Dressing with Modified Preble and Supervision with a/e as needed  Grooming while standing at sink with Modified Preble and Supervision.  Toileting from toilet with Modified Preble and Supervision for hygiene and clothing management.   Standing x3-5 minutes with Supervision.  Supine to sit with Preble.  Squat pivot transfers with Modified Preble and Supervision.  Step transfer with Modified Preble and Supervision  Toilet transfer to toilet with Modified Preble and Supervision.                         Time Tracking:     OT Date of Treatment:    OT Start Time: 1445  OT Stop Time: 1532  OT Total Time (min): 47 min    Billable Minutes:Therapeutic Activity 25  Therapeutic Exercise 22    OT/TRISHA: OT     Number of TRISHA visits since last OT visit: 1 9/24/2024

## 2024-09-24 NOTE — PLAN OF CARE
[facility] - Medical Surgical Unit - Swing Bed   Interdisciplinary Team Meeting    Patient: Bobbi Haywood   Today's Date: 9/24/2024   Estimated D/C Date: 10/4/2024        Physician: Dr. Vizcaino Nurse Practitioner:    Pharmacy:Erick Beal, Pharm D Unit Director:  JOHN Maldonado   : Ghazal Parker RN Physical/Occupational Therapy: Nadeen Eaton OT   Speech Therapy: ST Juarez    Nursing:  Cherie Paul RN  Respiratory:  Cris Hurt, RT Dietary:  Onel Morales, Dietary  Other:      Nurse  New Symptoms/Problems: BUN/Creat--30/1.45  Last Bowel Movement: 09/23/24  Urine: continent  Francisco: No  Bowel: continent   Constipated: No  Diarrhea: No   Isolation: No  DC Date of Isolation: N/A  Cognition: WNL  Aspiration Precaution: Yes  Wound Care: Yes  Wound Location/Tx: Rt. Thigh, Rt. Calf area  Comment(s): wound care per orders    Respiratory   O2 Device: Room Air  O2 Flow: N/A  SpO2: 95%  Neb Tx: No  Comment(s):     Dietary   Nutrition: Regular  Comment(s):     Speech Therapy  Speech/Swallowing: No current speech or swallowing issues  Comment(s):     Physical Therapy  Gait/Assistive Device: ambulated 90 ft with RW at CGA, verbal cues for posture and excessive UE support on AD ELOS: Plan to DC 10/4/2024    Transfers: Minimal Assistance  Bed Mobility: Standby Assistance Range of Motion/Restrictions:   Comment(s): Rt. Foot inverted     Occupational Therapy  Eating/Grooming: Supervision or Set-up Assistance Toileting: Minimal Assistance   Bathing: Minimal Assistance to Mod A Dressing (Upper Body): Minimal Assistance   Dressing (Lower Body): Moderate Assistance Comment(s):      Pharmacy  Medication Changes (see MD orders in chart): No  Labs Reviewed: Yes  New Lab Orders: Yes  Comment(s): Labs are reviewed weekly      Tx Plan/Recommendations reviewed with family and/or patient on (date) 9/24/24.  Additional family Conference/Training:   D/C  Plan/Recommendations: Home with HH and Home with family  SHASHI: 10/4/2024  Comment(s): Plans to d/c home with spouse and HH services

## 2024-09-24 NOTE — HOSPITAL COURSE
9/24/2024 HOSPITAL DAY 7    PT IS PARTICIPATING WITH PT/OT PERFORMING BED MOBILITY WITH INDEPENDENCE, TRANSFERS WITH CGA WITH RW, AND AMBULATING 140  FEET WITH CGA WITH RW THEN 80 FEET ALSO WITH CGA AND RW ITH VERBAL CUES FOR POSTURE, SAFETY AND EXCESSIVE BUE SUPPORT ON AD WITH FAIR STANDING BALANCE.  PT HAS RLE WOUNDS EVALUATED PER THE WOUND CARE TEAM WITH RECOMMENDATIONS CONTINUED WITH WOUNDS OF THE RLE AND SACRAL PRESSURE  PT HAS RA O2 SAT 95 %. PT IS BEING TREATED PER RESPIRATORY THERAPY WITH INCENTIVE SPIROMETRY AND O2 MONITORING.  PT CONTINUES AUGMENTIN.THE PHARMACY IS MONITORING ALL MEDICATIONS. RD CONTINUES TO CONSULT WITH DIET LIBERALIZED TO A REGULAR DIET WITH % AND CONTINUED TREATMENT WITH DONNA AND WILL ADD VIT C.  PT HAS STABLE LABS EXCEPT FOR BMP: BUN/CREAT 30/1.45 , CBC: WBC 14 K, H/H 8.9/28.8.       09/30/2024 HOSPITAL DAY 13    PT IS PARTICIPATING WITH PT/OT PERFORMING BED MOBILITY WITH SBA, TRANSFERS WITH SBA WITH RW, AND AMBULATING 180  FEET WITH SBA WITH RW WITH GOOD STANDING BALANCE.  PT HAS RLE WOUNDS WITH CONTINUED EVALUATION PER THE WOUND CARE TEAM WITH RECOMMENDATIONS CONTINUED WITH WOUNDS OF THE RLE AND SACRAL PRESSURE WITH MINIMAL IMPROVEMENT AND POSITIVE CULTURE AFTER COMPLETION OF AUGMENTIN  FOR ACINETOBACTER IWOFFII AND WILL BE TREATED WITH MERREM IV. PT HAS INCREASED EDEMA RLE WITH VENOUS DOPPLERS NEGATIVE BLE. PT  WILL BE FOLLOWED UP PER DR HIGHTOWER IN THE CLINIC 10/07/2024.  PT HAS RA O2 SAT 98 %. PT IS BEING TREATED PER RESPIRATORY THERAPY WITH INCENTIVE SPIROMETRY AND O2 MONITORING.  THE PHARMACY CONTINUES TO MONITOR ALL MEDICATIONS. RD CONTINUES TO CONSULT WITH DIET LIBERALIZED TO A REGULAR DIET WITH % AND CONTINUED TREATMENT WITH DNONA AND VIT C.  PT HAS STABLE LABS EXCEPT FOR BMP: BUN/CREAT 38/1.50 (30/1.45) , CBC: WBC 10.94 K (14 K), H/H (8.5/28) 8.9/28.8.      10/7/24    Patient is sitting up at side of bed eating lunch with no complaints.  She has completed her IV  antibiotic Merrem for RLE cellulitis this a.m. Due to her ankle inversion and chronic knee pain,  she has met most of her therapy goals and reached her limitations to have met discharge criteria from PT/OT.  She has been set up for home health to continue therapy at home and for wound checks.  She has appointment today at 1415 with Dr. Alberto at [facility] for general surgery follow up.  She will be D/C in order to leave for that appointment.  Her her son is taking her to this appointment and then she will be going home with her .

## 2024-09-24 NOTE — PT/OT/SLP PROGRESS
Physical Therapy Treatment    Patient Name:  Bobbi Haywood   MRN:  [id number]    Recommendations:     Discharge Recommendations: Low Intensity Therapy  Discharge Equipment Recommendations: wheelchair  Barriers to discharge: None    Assessment:     Bobbi Haywood is a 73 y.o. female admitted with a medical diagnosis of generalized weakness. Patient presented to [facility] following 2 falls at home while trying to descend the 3 steps inside her home .  She presents with the following impairments/functional limitations: weakness, impaired endurance, impaired self care skills, impaired functional mobility, gait instability, impaired balance, decreased lower extremity function, decreased safety awareness, pain, impaired skin     Bobbi was able to increase gait distance this session with fewer rest breaks.    Rehab Prognosis: Good; patient would benefit from acute skilled PT services to address these deficits and reach maximum level of function.    Recent Surgery: * No surgery found *      Plan:     During this hospitalization, patient to be seen 5 x/week to address the identified rehab impairments via gait training, therapeutic activities, therapeutic exercises, neuromuscular re-education and progress toward the following goals:    Plan of Care Expires:  10/09/24    Subjective     Chief Complaint: none voiced.  Patient/Family Comments/goals: to improve mobility and return home.  Pain/Comfort:  Pain Rating 1: 5/10  Location - Side 1: Right  Location - Orientation 1: anterior  Location 1: knee  Pain Addressed 1: Pre-medicate for activity  Pain Rating Post-Intervention 1: 5/10      Objective:     Communicated with patient prior to session.  Patient found supine with   upon PT entry to room.     General Precautions: Standard, fall  Orthopedic Precautions: N/A  Braces: N/A  Respiratory Status: Room air     Functional Mobility:  Transfers:     Sit to Stand:  contact guard assistance with  rolling walker  Gait: 140 feet CGA with RW in hallway on level surface with no rest breaks. 80 feet CGA with RW for second bout. Vc for posture, safety and excessive UE support on AD.  Balance: standing fair      AM-PAC 6 CLICK MOBILITY  Turning over in bed (including adjusting bedclothes, sheets and blankets)?: 3  Sitting down on and standing up from a chair with arms (e.g., wheelchair, bedside commode, etc.): 4  Moving from lying on back to sitting on the side of the bed?: 3  Moving to and from a bed to a chair (including a wheelchair)?: 4  Need to walk in hospital room?: 3  Climbing 3-5 steps with a railing?: 1  Basic Mobility Total Score: 18       Treatment & Education:  TE: patient performed seated Scifit stepper x 10 minutes for cardiovascular conditioning as well as seated LAQ and hip flexion 2 x 20 with 4# ankle weight. HS curls and hip adduction 2 x 20    Gait: patient ambulated on level surface in room and hallway with RW for a total of 220 feet. She demonstrates forward leaning posture and . Verbal cues provided to instruct on gait and postural deficits.    Patient left up in chair with  MD and  present..    GOALS:   Multidisciplinary Problems       Physical Therapy Goals          Problem: Physical Therapy    Goal Priority Disciplines Outcome Goal Variances Interventions   Physical Therapy Goal     PT, PT/OT Progressing     Description: Short-term Goals: 1.5 weeks  1. Patient will perform supine to/from sit with standby assist to improve independence and safety with bed mobility.  2. Patient will perform sit to/from stand with a rolling walker with contact guard assist to improve independence and safety with transfers.  3. Patient will ambulate 50 feet with a rolling walker with contact guard assist to improve independence and safety with gait.  4. Patient will tolerate 15 minutes of physical therapy intervention to improve endurance and activity tolerance.  5. Patient will ascend/descend 3  steps with bilateral handrail support with contact guard assist to improve independence and safety with stair negotiation.     Long-term goals: 3 weeks  1. Patient will perform supine to/from sit with modified independence to improve independence and safety with bed mobility.  2. Patient will perform sit to/from stand with a rolling walker with standby assist to improve independence and safety with transfers.  3. Patient will ambulate 100 feet with a rolling walker with standby assist to improve independence and safety with gait.  4. Patient will tolerate 30 minutes of physical therapy intervention to improve endurance and activity tolerance.  5. Patient will ascend/descend 3 steps with bilateral handrail support with standby assist to improve independence and safety with stair negotiation.                          Time Tracking:     PT Received On: 09/24/24  PT Start Time: 0950     PT Stop Time: 1050  PT Total Time (min): 60 min     Billable Minutes: Gait Training 25 and Therapeutic Exercise 35    Treatment Type: Treatment  PT/PTA: PTA     Number of PTA visits since last PT visit: 3     09/24/2024

## 2024-09-24 NOTE — PROGRESS NOTES
[facility]  Adult Nutrition  Follow-up Note         Reason for Assessment  Reason For Assessment: RD follow-up   Nutrition Risk Screen: no indicators present    Assessment and Plan    9/24/2024: RD follow up. Patient liberalized to a Regular diet on 9/18 and tolerating well. Documented intakes generally 100%, however only 50% the last day or so. Patient also receiving Fawad BID to aid in wound healing. Recommend continue current POC as tolerated. Encourage good PO intakes. Current weight 103.4kg. Last BM 9/23 per flowsheet. RD following.    9/18/2024: Consult received and appreciated. Patient admitted 9/17 from [facility] with a dx of INOCENTE, Debility, and cellulitis of R leg. Patient is ordered a cardiac diet with 50% intakes per flowsheets. Noted skin breakdown to sacral area (stage 2 pressure injury).    Patient is 100.7 kg with a BMI of 30.96. Recommend to liberalize diet to regular to increase po intakes. Will add Fawad BID to aid in wound healing of decubitus. Encourage po intakes. RD Following.         Learning Needs/Social Determinants of Health  Learning Assessment       09/17/2024 1724 [facility] (9/17/2024 - Present)   Created by Rajwinder Bailon, RN - RN (Nurse) Status: Complete                 PRIMARY LEARNER     Primary Learner Name:  Bobbi Haywood CT - 09/17/2024 1724    Relationship:  Patient CT - 09/17/2024 1724    Does the primary learner have any barriers to learning?:  No Barriers CT - 09/17/2024 1724    What is the preferred language of the primary learner?:  English CT - 09/17/2024 1724    Is an  required?:  No CT - 09/17/2024 1724    How does the primary learner prefer to learn new concepts?:  Listening, Reading CT - 09/17/2024 1724    How often do you need to have someone help you read instructions, pamphlets, or written material from your doctor or pharmacy?:  Often CT - 09/17/2024 1724        CO-LEARNER #1     No  question answered        CO-LEARNER #2     No question answered        SPECIAL TOPICS     No question answered        ANSWERED BY:     No question answered        Comments         Edit History       Rajwinder Bailon, RN - RN (Nurse)   09/17/2024 1724                           Social Determinants of Health     Tobacco Use: Low Risk  (9/23/2024)    Patient History     Smoking Tobacco Use: Never     Smokeless Tobacco Use: Never     Passive Exposure: Not on file   Alcohol Use: Not At Risk (9/18/2024)    AUDIT-C     Frequency of Alcohol Consumption: Never     Average Number of Drinks: Patient does not drink     Frequency of Binge Drinking: Never   Financial Resource Strain: Low Risk  (9/18/2024)    Overall Financial Resource Strain (CARDIA)     Difficulty of Paying Living Expenses: Not hard at all   Food Insecurity: No Food Insecurity (9/18/2024)    Hunger Vital Sign     Worried About Running Out of Food in the Last Year: Never true     Ran Out of Food in the Last Year: Never true   Transportation Needs: No Transportation Needs (9/18/2024)    TRANSPORTATION NEEDS     Transportation : No   Physical Activity: Inactive (9/18/2024)    Exercise Vital Sign     Days of Exercise per Week: 0 days     Minutes of Exercise per Session: 0 min   Stress: No Stress Concern Present (9/18/2024)    [employer] - Occupational Stress Questionnaire     Feeling of Stress : Only a little   Housing Stability: Low Risk  (9/18/2024)    Housing Stability Vital Sign     Unable to Pay for Housing in the Last Year: No     Homeless in the Last Year: No   Depression: Not on file   Utilities: Not At Risk (9/18/2024)    [employer] Utilities     Threatened with loss of utilities: No   Health Literacy: Adequate Health Literacy (9/18/2024)     Health Literacy     Frequency of need for help with medical instructions: Never   Social Isolation: Socially Integrated (9/18/2024)    Social Isolation     Social Isolation: 1             Malnutrition  Is Patient Malnourished: No    Nutrition Diagnosis  Altered nutrition related laboratory values related to Renal dysfunction as evidenced by dx INOCENTE with elevated Bun/Cr  Comments:     Recent Labs   Lab 09/24/24  0504   GLU 99     Comments on Glucose: elevated no pmh Dm noted    Nutrition Prescription / Recommendations  Recommendation/Intervention: Recommend to change diet to regular  Goals: po intakes 75% during admission  Nutrition Goal Status: new  Current Diet Order: Cardiac  Nutrition Order Comments: 50-75% intakes  Chewing or Swallowing Difficulty?: No Chewing or swallowing difficulty  Recommended Diet: Regular  Recommended Oral Supplement: Fawad [90 kcals, 2.5g Protein, 10g Carbs(3g Sugar), 7g L-Arginine, 7g L-Glutamine, Vitamin C 300mg, 9.5mg Zinc] 2 times a day  Is Nutrition Support Recommended: [facility] Support: No  Is Nutrition Education Recommended: No    Monitor and Evaluation  % current Intake: P.O. intake of 50 - 75 %  % intake to meet estimated needs: 75 - 100 %  Food and Nutrient Intake: energy intake, food and beverage intake  Food and Nutrient Adminstration: diet order  Anthropometric Measurements: height/length, weight, weight change, body mass index  Biochemical Data, Medical Tests and Procedures: electrolyte and renal panel, gastrointestinal profile, glucose/endocrine profile, inflammatory profile, lipid profile  Tolerance: tolerating    Current Medical Diagnosis and Past Medical History     Past Medical History:   Diagnosis Date    A-fib     Abnormal gait     Abscess of right lower extremity 09/10/2024    Acquired deformity of both ankles     Acute blood loss anemia     INOCENTE (acute kidney injury) 09/10/2024    Chronic pain     Contusion of right lower extremity     Degenerative joint disease of cervical and lumbar spine     Depression     E-coli UTI 09/10/2024    Fall 09/10/2024    Hematoma     Hypertension     Hypomagnesemia 09/10/2024    Iron deficiency  "anemia, unspecified     Neuropathy     Neuropathy     Spinal stenosis of cervical region     Spinal stenosis of lumbar region at multiple levels     Spondylolysis of lumbar region        Nutrition/Diet History  Spiritual, Cultural Beliefs, Gnosticism Practices, Values that Affect Care: no  Food Allergies: NKFA  Factors Affecting Nutritional Intake: None identified at this time    Lab/Procedures/Meds  Recent Labs   Lab 09/24/24  0504      K 4.2   BUN 30*   CREATININE 1.45*   CALCIUM 8.6      Note: BUN/Cr elevated. PMH INOCENTE.     Last A1c: No results found for: "HGBA1C"  Lab Results   Component Value Date    RBC 2.93 (L) 09/24/2024    HGB 8.9 (L) 09/24/2024    HCT 28.8 (L) 09/24/2024    MCV 98.3 (H) 09/24/2024    MCH 30.4 09/24/2024    MCHC 30.9 (L) 09/24/2024    TIBC 312 09/11/2024   Note: H&H low    Pertinent Labs Reviewed: reviewed  Pertinent Medications Reviewed: reviewed  Scheduled Meds:   amoxicillin-clavulanate 875-125mg  1 tablet Oral Q12H    apixaban  5 mg Oral BID    cholecalciferol (vitamin D3)  5,000 Units Oral Daily    cyanocobalamin  500 mcg Oral Daily    DULoxetine  60 mg Oral Daily    estradioL  2 mg Oral Daily    famotidine  20 mg Oral BID    ferrous sulfate  1 tablet Oral Daily    folic acid-vit B6-vit B12 2.5-25-2 mg  1 tablet Oral Daily    gabapentin  600 mg Oral BID    Lactobacillus acidophilus  1 capsule Oral TID WM    metoprolol tartrate  25 mg Oral Daily    oxybutynin  5 mg Oral Daily    pravastatin  80 mg Oral QHS    senna-docusate 8.6-50 mg  1 tablet Oral BID     Continuous Infusions:  PRN Meds:.  Current Facility-Administered Medications:     acetaminophen, 1,000 mg, Oral, Q6H PRN    acetaminophen, 650 mg, Oral, Q6H PRN    calcium carbonate, 500 mg, Oral, BID PRN    melatonin, 6 mg, Oral, Nightly PRN    ondansetron, 4 mg, Oral, Q8H PRN    Anthropometrics  Temp: 97.4 °F (36.3 °C)  Height Method: Stated  Height: 5' 11" (180.3 cm)  Height (inches): 71 in  Weight Method: Bed " Scale  Weight: 103.4 kg (228 lb)  Weight (lb): 228 lb  Ideal Body Weight (IBW), Female: 155 lb  % Ideal Body Weight, Female (lb): 143.23 %  BMI (Calculated): 31.8       Estimated/Assessed Needs      Temp: 97.4 °F (36.3 °C)Oral  Weight Used For Calorie Calculations: 100.7 kg (222 lb 0.1 oz)   Energy Need Method: Kcal/kg Energy Calorie Requirements (kcal): 2014-2518  Weight Used For Protein Calculations: 100.7 kg (222 lb 0.1 oz)  Protein Requirements:   Estimated Fluid Requirement Method: RDA Method    RDA Method (mL): 2014       Nutrition by Nursing     Intake (%): 50%        Last Bowel Movement: 09/23/24                Nutrition Follow-Up  RD Follow-up?: Yes      Nutrition Discharge Planning: Per CM, plan to d/c home with home health. Will benefit from liberalized diet on discharge.        Available via Secure Chat

## 2024-09-25 PROCEDURE — 97116 GAIT TRAINING THERAPY: CPT | Mod: CQ

## 2024-09-25 PROCEDURE — 99900035 HC TECH TIME PER 15 MIN (STAT)

## 2024-09-25 PROCEDURE — 11000004 HC SNF PRIVATE

## 2024-09-25 PROCEDURE — 97530 THERAPEUTIC ACTIVITIES: CPT

## 2024-09-25 PROCEDURE — 94799 UNLISTED PULMONARY SVC/PX: CPT

## 2024-09-25 PROCEDURE — 97110 THERAPEUTIC EXERCISES: CPT

## 2024-09-25 PROCEDURE — 94761 N-INVAS EAR/PLS OXIMETRY MLT: CPT

## 2024-09-25 PROCEDURE — 97535 SELF CARE MNGMENT TRAINING: CPT

## 2024-09-25 PROCEDURE — 97110 THERAPEUTIC EXERCISES: CPT | Mod: CQ

## 2024-09-25 PROCEDURE — 25000003 PHARM REV CODE 250: Performed by: PHYSICIAN ASSISTANT

## 2024-09-25 PROCEDURE — 25000003 PHARM REV CODE 250: Performed by: EMERGENCY MEDICINE

## 2024-09-25 RX ADMIN — Medication 1 CAPSULE: at 01:09

## 2024-09-25 RX ADMIN — ACETAMINOPHEN 1000 MG: 500 TABLET ORAL at 09:09

## 2024-09-25 RX ADMIN — AMOXICILLIN AND CLAVULANATE POTASSIUM 1 TABLET: 875; 125 TABLET, FILM COATED ORAL at 09:09

## 2024-09-25 RX ADMIN — PRAVASTATIN SODIUM 80 MG: 40 TABLET ORAL at 08:09

## 2024-09-25 RX ADMIN — GABAPENTIN 600 MG: 600 TABLET, FILM COATED ORAL at 08:09

## 2024-09-25 RX ADMIN — GABAPENTIN 600 MG: 600 TABLET, FILM COATED ORAL at 09:09

## 2024-09-25 RX ADMIN — APIXABAN 5 MG: 5 TABLET, FILM COATED ORAL at 09:09

## 2024-09-25 RX ADMIN — APIXABAN 5 MG: 5 TABLET, FILM COATED ORAL at 08:09

## 2024-09-25 RX ADMIN — CYANOCOBALAMIN TAB 500 MCG 500 MCG: 500 TAB at 09:09

## 2024-09-25 RX ADMIN — FAMOTIDINE 20 MG: 20 TABLET, FILM COATED ORAL at 08:09

## 2024-09-25 RX ADMIN — Medication 1 CAPSULE: at 09:09

## 2024-09-25 RX ADMIN — FERROUS SULFATE TAB EC 324 MG (65 MG FE EQUIVALENT) 1 EACH: 324 (65 FE) TABLET DELAYED RESPONSE at 09:09

## 2024-09-25 RX ADMIN — Medication 1 CAPSULE: at 05:09

## 2024-09-25 RX ADMIN — AMOXICILLIN AND CLAVULANATE POTASSIUM 1 TABLET: 875; 125 TABLET, FILM COATED ORAL at 08:09

## 2024-09-25 RX ADMIN — Medication 5000 UNITS: at 09:09

## 2024-09-25 RX ADMIN — OXYBUTYNIN CHLORIDE 5 MG: 5 TABLET, EXTENDED RELEASE ORAL at 09:09

## 2024-09-25 RX ADMIN — METOPROLOL TARTRATE 25 MG: 25 TABLET, FILM COATED ORAL at 09:09

## 2024-09-25 RX ADMIN — ACETAMINOPHEN 1000 MG: 500 TABLET ORAL at 07:09

## 2024-09-25 RX ADMIN — SENNOSIDES AND DOCUSATE SODIUM 1 TABLET: 8.6; 5 TABLET ORAL at 09:09

## 2024-09-25 RX ADMIN — Medication 1 TABLET: at 09:09

## 2024-09-25 RX ADMIN — DULOXETINE HYDROCHLORIDE 60 MG: 30 CAPSULE, DELAYED RELEASE ORAL at 09:09

## 2024-09-25 RX ADMIN — ESTRADIOL 2 MG: 0.5 TABLET ORAL at 09:09

## 2024-09-25 RX ADMIN — FAMOTIDINE 20 MG: 20 TABLET, FILM COATED ORAL at 09:09

## 2024-09-25 NOTE — PT/OT/SLP PROGRESS
Occupational Therapy   Treatment    Name: Bobbi Haywood  MRN: [id number]  Admitting Diagnosis:  Muscle weakness       Recommendations:     Discharge Recommendations:    Discharge Equipment Recommendations:  wheelchair  Barriers to discharge:  Inaccessible home environment, Other (Comment) (limitations with 3 steps within her home)    Assessment:     Bobbi Haywood is a 73 y.o. female with a medical diagnosis of Muscle weakness.  She presents with laying supine. Performance deficits affecting function are weakness, decreased upper extremity function, impaired endurance, impaired balance, decreased safety awareness, impaired self care skills, impaired functional mobility, decreased coordination. Pt has improved with ADLs and mobility. Pt continue to have RLE ankle instability and decreased balance.     Rehab Prognosis:  Fair; patient would benefit from acute skilled OT services to address these deficits and reach maximum level of function.       Plan:     Patient to be seen 5 x/week to address the above listed problems via self-care/home management, therapeutic activities, therapeutic exercises, neuromuscular re-education  Plan of Care Expires: 10/18/24  Plan of Care Reviewed with: patient    Subjective     Chief Complaint: decreased LE strength  Patient/Family Comments/goals: increase ADL and mobility  Pain/Comfort:       Objective:     Communicated with: Pt prior to session.  Patient found supine with   upon OT entry to room.    General Precautions: Standard, fall    Orthopedic Precautions:N/A  Braces: N/A  Respiratory Status: Room air     Occupational Performance:     Therapeutic activity    Functional Mobility/Transfers:  Patient completed Sit <> Stand Transfer with supervision  with  rolling walker   Patient completed Toilet Transfer Step Transfer technique with supervision with  rolling walker  Functional Mobility: Pt completed functional ambulation down the hallway using the RW with Min A  from OT for safety and balance.     Activities of Daily Living:  Upper Body Dressing: supervision sitting on edge of mat  Lower Body Dressing: contact guard assistance sitting edge of mat with education on donning pants on RLE.      [facility] 6 Click ADL: 19    Treatment & Education:  Therapeutic exercise: x 22mins  Pt completed in 2 sets of 20 reps of bicep curls  and tricep extension 2 sets to 20 reps with 3# dowel to enhance UB strength ADL performance.     Pt [id number] mins on arm ergometer to increase endurance, BUE strength and activity tolerance for ADL performance       Patient left supine with all lines intact and call button in reach    GOALS:   Multidisciplinary Problems       Occupational Therapy Goals          Problem: Occupational Therapy    Goal Priority Disciplines Outcome Interventions   Occupational Therapy Goal     OT, PT/OT     Description: Goals to be met by: 10/18/24     Patient will increase functional independence with ADLs by performing:    UE Dressing with Modified [place].  LE Dressing with Modified [place] and Supervision with a/e as needed  Grooming while standing at sink with Modified [place] and Supervision.  Toileting from toilet with Modified [place] and Supervision for hygiene and clothing management.   Standing x3-5 minutes with Supervision.  Supine to sit with [place].  Squat pivot transfers with Modified [place] and Supervision.  Step transfer with Modified [place] and Supervision  Toilet transfer to toilet with Modified [place] and Supervision.                         Time Tracking:     OT Date of Treatment:    OT Start Time: 0200  OT Stop Time: 0315  OT Total Time (min): 75 min    Billable Minutes:Self Care/Home Management 20  Therapeutic Activity 15  Therapeutic Exercise 23    OT/TRISHA: OT     Number of TRISHA visits since last OT visit: 1    9/25/2024

## 2024-09-25 NOTE — PT/OT/SLP PROGRESS
Physical Therapy Treatment    Patient Name:  Bobbi Haywood   MRN:  [id number]    Recommendations:     Discharge Recommendations: Low Intensity Therapy  Discharge Equipment Recommendations: wheelchair  Barriers to discharge: None    Assessment:     Bobbi Haywood is a 73 y.o. female admitted with a medical diagnosis of generalized weakness. Patient presented to [facility] following 2 falls at home while trying to descend the 3 steps inside her home .  She presents with the following impairments/functional limitations: weakness, impaired endurance, impaired self care skills, impaired functional mobility, gait instability, impaired balance, decreased lower extremity function, decreased safety awareness, pain, decreased ROM     Bobbi was able to increase gait distance this session with fewer rest breaks as well as improved ability to transfer.     Rehab Prognosis: Good; patient would benefit from acute skilled PT services to address these deficits and reach maximum level of function.    Recent Surgery: * No surgery found *      Plan:     During this hospitalization, patient to be seen 5 x/week to address the identified rehab impairments via gait training, therapeutic activities, therapeutic exercises, neuromuscular re-education and progress toward the following goals:    Plan of Care Expires:  10/09/24    Subjective     Chief Complaint: none voiced.  Patient/Family Comments/goals: to improve mobility and return home.  Pain/Comfort:  Pain Rating 1: 5/10  Location - Side 1: Right  Location - Orientation 1: anterior  Location 1: knee  Pain Addressed 1: Pre-medicate for activity  Pain Rating Post-Intervention 1: 5/10      Objective:     Communicated with patient prior to session.  Patient found supine with   upon PT entry to room.     General Precautions: Standard, fall  Orthopedic Precautions: N/A  Braces: N/A  Respiratory Status: Room air     Functional Mobility:  Transfers:     Sit to  Stand:  contact guard assistance with rolling walker  Gait: 200 feet CGA with RW in hallway on level surface with no rest breaks.  Vc for posture, safety and excessive UE support on AD.  Balance: standing fair      AM-PAC 6 CLICK MOBILITY  Turning over in bed (including adjusting bedclothes, sheets and blankets)?: 4  Sitting down on and standing up from a chair with arms (e.g., wheelchair, bedside commode, etc.): 3  Moving from lying on back to sitting on the side of the bed?: 3  Moving to and from a bed to a chair (including a wheelchair)?: 3  Need to walk in hospital room?: 3  Climbing 3-5 steps with a railing?: 1  Basic Mobility Total Score: 17       Treatment & Education:  TE: patient performed seated Scifit stepper x 10 minutes for cardiovascular conditioning as well as standing heel raises, hip flexion, hip abduction all 2 x 20 to improve bilateral LE strength.  Gait: patient ambulated on level surface in room and hallway with RW for 200 feet with no rest breaks. She demonstrates forward leaning posture and . Verbal cues provided to instruct on gait and postural deficits.    Patient left supine with all lines intact and call button in reach..    GOALS:   Multidisciplinary Problems       Physical Therapy Goals          Problem: Physical Therapy    Goal Priority Disciplines Outcome Goal Variances Interventions   Physical Therapy Goal     PT, PT/OT Progressing     Description: Short-term Goals: 1.5 weeks  1. Patient will perform supine to/from sit with standby assist to improve independence and safety with bed mobility.  2. Patient will perform sit to/from stand with a rolling walker with contact guard assist to improve independence and safety with transfers.  3. Patient will ambulate 50 feet with a rolling walker with contact guard assist to improve independence and safety with gait.  4. Patient will tolerate 15 minutes of physical therapy intervention to improve endurance and activity tolerance.  5. Patient  will ascend/descend 3 steps with bilateral handrail support with contact guard assist to improve independence and safety with stair negotiation.     Long-term goals: 3 weeks  1. Patient will perform supine to/from sit with modified independence to improve independence and safety with bed mobility.  2. Patient will perform sit to/from stand with a rolling walker with standby assist to improve independence and safety with transfers.  3. Patient will ambulate 100 feet with a rolling walker with standby assist to improve independence and safety with gait.  4. Patient will tolerate 30 minutes of physical therapy intervention to improve endurance and activity tolerance.  5. Patient will ascend/descend 3 steps with bilateral handrail support with standby assist to improve independence and safety with stair negotiation.                          Time Tracking:     PT Received On: 09/25/24  PT Start Time: 1000     PT Stop Time: 1054  PT Total Time (min): 54 min     Billable Minutes: Gait Training 24 and Therapeutic Exercise 30    Treatment Type: Treatment  PT/PTA: PTA     Number of PTA visits since last PT visit: 4     09/25/2024

## 2024-09-26 PROCEDURE — 25000003 PHARM REV CODE 250: Performed by: PHYSICIAN ASSISTANT

## 2024-09-26 PROCEDURE — 25000003 PHARM REV CODE 250: Performed by: EMERGENCY MEDICINE

## 2024-09-26 PROCEDURE — 11000004 HC SNF PRIVATE

## 2024-09-26 PROCEDURE — 94761 N-INVAS EAR/PLS OXIMETRY MLT: CPT

## 2024-09-26 PROCEDURE — 97110 THERAPEUTIC EXERCISES: CPT

## 2024-09-26 PROCEDURE — 97530 THERAPEUTIC ACTIVITIES: CPT

## 2024-09-26 PROCEDURE — 97116 GAIT TRAINING THERAPY: CPT | Mod: CQ

## 2024-09-26 PROCEDURE — 97110 THERAPEUTIC EXERCISES: CPT | Mod: CQ

## 2024-09-26 PROCEDURE — A6212 FOAM DRG <=16 SQ IN W/BORDER: HCPCS

## 2024-09-26 RX ORDER — FAMOTIDINE 20 MG/1
20 TABLET, FILM COATED ORAL DAILY
Status: DISCONTINUED | OUTPATIENT
Start: 2024-09-27 | End: 2024-10-07 | Stop reason: HOSPADM

## 2024-09-26 RX ADMIN — DULOXETINE HYDROCHLORIDE 60 MG: 30 CAPSULE, DELAYED RELEASE ORAL at 09:09

## 2024-09-26 RX ADMIN — AMOXICILLIN AND CLAVULANATE POTASSIUM 1 TABLET: 875; 125 TABLET, FILM COATED ORAL at 08:09

## 2024-09-26 RX ADMIN — Medication 1 CAPSULE: at 12:09

## 2024-09-26 RX ADMIN — APIXABAN 5 MG: 5 TABLET, FILM COATED ORAL at 08:09

## 2024-09-26 RX ADMIN — GABAPENTIN 600 MG: 600 TABLET, FILM COATED ORAL at 09:09

## 2024-09-26 RX ADMIN — GABAPENTIN 600 MG: 600 TABLET, FILM COATED ORAL at 08:09

## 2024-09-26 RX ADMIN — OXYBUTYNIN CHLORIDE 5 MG: 5 TABLET, EXTENDED RELEASE ORAL at 09:09

## 2024-09-26 RX ADMIN — Medication 1 CAPSULE: at 04:09

## 2024-09-26 RX ADMIN — ACETAMINOPHEN 1000 MG: 500 TABLET ORAL at 05:09

## 2024-09-26 RX ADMIN — SENNOSIDES AND DOCUSATE SODIUM 1 TABLET: 8.6; 5 TABLET ORAL at 09:09

## 2024-09-26 RX ADMIN — CYANOCOBALAMIN TAB 500 MCG 500 MCG: 500 TAB at 09:09

## 2024-09-26 RX ADMIN — FERROUS SULFATE TAB EC 324 MG (65 MG FE EQUIVALENT) 1 EACH: 324 (65 FE) TABLET DELAYED RESPONSE at 09:09

## 2024-09-26 RX ADMIN — Medication 1 CAPSULE: at 09:09

## 2024-09-26 RX ADMIN — ACETAMINOPHEN 1000 MG: 500 TABLET ORAL at 08:09

## 2024-09-26 RX ADMIN — METOPROLOL TARTRATE 25 MG: 25 TABLET, FILM COATED ORAL at 09:09

## 2024-09-26 RX ADMIN — APIXABAN 5 MG: 5 TABLET, FILM COATED ORAL at 09:09

## 2024-09-26 RX ADMIN — ESTRADIOL 2 MG: 0.5 TABLET ORAL at 09:09

## 2024-09-26 RX ADMIN — Medication 5000 UNITS: at 09:09

## 2024-09-26 RX ADMIN — PRAVASTATIN SODIUM 80 MG: 40 TABLET ORAL at 08:09

## 2024-09-26 RX ADMIN — AMOXICILLIN AND CLAVULANATE POTASSIUM 1 TABLET: 875; 125 TABLET, FILM COATED ORAL at 09:09

## 2024-09-26 RX ADMIN — Medication 1 TABLET: at 09:09

## 2024-09-26 NOTE — PT/OT/SLP PROGRESS
Physical Therapy Treatment    Patient Name:  Bobbi Haywood   MRN:  [id number]    Recommendations:     Discharge Recommendations: Low Intensity Therapy  Discharge Equipment Recommendations: wheelchair  Barriers to discharge: None    Assessment:     Bobbi Haywood is a 73 y.o. female admitted with a medical diagnosis of generalized weakness. Patient presented to [facility] following 2 falls at home while trying to descend the 3 steps inside her home .  She presents with the following impairments/functional limitations: weakness, impaired endurance, impaired self care skills, impaired functional mobility, gait instability, impaired balance, decreased lower extremity function, decreased safety awareness, pain, decreased ROM     Bobbi continues to improve with mobility and transfers. She in increasing in strength and endurance everyday. Her R knee is causing her pain when bending and weight bearing. She has been taking tylenol to help with pain.     Rehab Prognosis: Good; patient would benefit from acute skilled PT services to address these deficits and reach maximum level of function.    Recent Surgery: * No surgery found *      Plan:     During this hospitalization, patient to be seen 5 x/week to address the identified rehab impairments via gait training, therapeutic activities, therapeutic exercises, neuromuscular re-education and progress toward the following goals:    Plan of Care Expires:  10/09/24    Subjective     Chief Complaint: none voiced.  Patient/Family Comments/goals: to improve mobility and return home.  Pain/Comfort:  Pain Rating 1: 5/10  Location - Side 1: Right  Location - Orientation 1: lower  Location 1: leg  Pain Addressed 1: Pre-medicate for activity  Pain Rating Post-Intervention 1: 5/10  Pain Rating 2: 5/10  Location - Side 2: Right  Location - Orientation 2: anterior  Location 2: knee  Pain Addressed 2: Pre-medicate for activity  Pain Rating Post-Intervention  2: 5/10      Objective:     Communicated with patient prior to session.  Patient found supine with   upon PT entry to room.     General Precautions: Standard, fall  Orthopedic Precautions: N/A  Braces: N/A  Respiratory Status: Room air     Functional Mobility:  Transfers:     Sit to Stand:  stand by assistance with rolling walker  Gait: 240 feet SBA with RW in hallway on level surface with no rest breaks.  Vc for posture, safety and excessive UE support on AD.  Balance: standing fair      AM-PAC 6 CLICK MOBILITY  Turning over in bed (including adjusting bedclothes, sheets and blankets)?: 4  Sitting down on and standing up from a chair with arms (e.g., wheelchair, bedside commode, etc.): 3  Moving from lying on back to sitting on the side of the bed?: 4  Moving to and from a bed to a chair (including a wheelchair)?: 4  Need to walk in hospital room?: 4  Climbing 3-5 steps with a railing?: 1  Basic Mobility Total Score: 20       Treatment & Education:  TE: patient performed seated Scifit stepper x 10 minutes for cardiovascular conditioning as well as seated long arc quads, seated alternating hip flexion, and hamstring curls with 4# ankle weights and green thera band 2 x 20     Gait: patient ambulated on level surface in room and hallway with RW for 240 feet with no rest breaks. She demonstrates forward leaning posture and . Verbal cues provided to instruct on gait and postural deficits.    Patient left supine with all lines intact and call button in reach..    GOALS:   Multidisciplinary Problems       Physical Therapy Goals          Problem: Physical Therapy    Goal Priority Disciplines Outcome Goal Variances Interventions   Physical Therapy Goal     PT, PT/OT Progressing     Description: Short-term Goals: 1.5 weeks  1. Patient will perform supine to/from sit with standby assist to improve independence and safety with bed mobility.  2. Patient will perform sit to/from stand with a rolling walker with contact guard  assist to improve independence and safety with transfers.  3. Patient will ambulate 50 feet with a rolling walker with contact guard assist to improve independence and safety with gait.  4. Patient will tolerate 15 minutes of physical therapy intervention to improve endurance and activity tolerance.  5. Patient will ascend/descend 3 steps with bilateral handrail support with contact guard assist to improve independence and safety with stair negotiation.     Long-term goals: 3 weeks  1. Patient will perform supine to/from sit with modified independence to improve independence and safety with bed mobility.  2. Patient will perform sit to/from stand with a rolling walker with standby assist to improve independence and safety with transfers.  3. Patient will ambulate 100 feet with a rolling walker with standby assist to improve independence and safety with gait.  4. Patient will tolerate 30 minutes of physical therapy intervention to improve endurance and activity tolerance.  5. Patient will ascend/descend 3 steps with bilateral handrail support with standby assist to improve independence and safety with stair negotiation.                          Time Tracking:     PT Received On: 09/26/24  PT Start Time: 0130     PT Stop Time: 0210  PT Total Time (min): 40 min     Billable Minutes: Gait Training 15 and Therapeutic Exercise 25    Treatment Type: Treatment  PT/PTA: PTA     Number of PTA visits since last PT visit: 5     09/26/2024

## 2024-09-26 NOTE — PLAN OF CARE
09/26/24 1309   Rounds   Attendance Nurse    Discharge Plan A Home with family;Home Health   Why the patient remains in the hospital Requires continued medical care   Transition of Care Barriers None     Pt. Insurance has approved additional days through 10/1/24 for continued PT/OT services and wound care to Rt. Leg. Will plan for home with HH services by end of next week. Cont. To follow pt.

## 2024-09-26 NOTE — PT/OT/SLP PROGRESS
Occupational Therapy   Treatment    Name: Bobbi Haywood  MRN: [id number]  Admitting Diagnosis:  Muscle weakness       Recommendations:     Discharge Recommendations:    Discharge Equipment Recommendations:  wheelchair  Barriers to discharge:  Inaccessible home environment, Other (Comment) (limitations with 3 steps within her home)    Assessment:     Bobbi Haywood is a 73 y.o. female with a medical diagnosis of Muscle weakness.  She presents with LE weakness and decreased strength . Performance deficits affecting function are weakness, decreased upper extremity function, impaired endurance, impaired balance, decreased safety awareness, impaired self care skills, impaired functional mobility, decreased coordination. Pt is making progress with mobility, strength and balance and continue to have ankle instability.  Pt's overall mobility and ADLs has improved since admission. Pt is continuing to make progress with ADLs and mobility.    Rehab Prognosis:  Fair; patient would benefit from acute skilled OT services to address these deficits and reach maximum level of function.       Plan:     Patient to be seen 5 x/week to address the above listed problems via self-care/home management, therapeutic activities, therapeutic exercises, neuromuscular re-education  Plan of Care Expires: 10/18/24  Plan of Care Reviewed with: patient    Subjective     Chief Complaint: decreased endurance and balance  Patient/Family Comments/goals: increased standing balance, mobility and ADLS  Pain/Comfort:       Objective:     Communicated with: Pt prior to session.  Patient found supine with   upon OT entry to room.    General Precautions: Standard, fall    Orthopedic Precautions:N/A  Braces: N/A  Respiratory Status: Room air     Occupational Performance:     Therapeutic activity x15 mins    Functional Mobility/Transfers:  Patient completed Sit <> Stand Transfer with supervision  with  rolling walker  from w/c with cues on  foot and hand positioning  While standing the pt reached to place cards on a Velcro board to increase BUE ROM, functional reaching, standing endurance and dynamic balance to increase independence with ADLs.      [facility] 6 Click ADL: 21    Treatment & Education:    Therapeutic exercise [id number]    While sitting in the w/c the, pt [id number] mins on arm ergometer to increase endurance, BUE strength and activity tolerance for ADL performance.    While sitting in the w/c the pt completed in 2 sets of 20 reps of bicep curls with 5#  and chest press 2 sets to 20 reps with 5# dowel to enhance UB strength ADL performance.        Patient left supine with all lines intact and call button in reach    GOALS:   Multidisciplinary Problems       Occupational Therapy Goals          Problem: Occupational Therapy    Goal Priority Disciplines Outcome Interventions   Occupational Therapy Goal     OT, PT/OT     Description: Goals to be met by: 10/18/24     Patient will increase functional independence with ADLs by performing:    UE Dressing with Modified [place].  LE Dressing with Modified [place] and Supervision with a/e as needed  Grooming while standing at sink with Modified [place] and Supervision.  Toileting from toilet with Modified [place] and Supervision for hygiene and clothing management.   Standing x3-5 minutes with Supervision.  Supine to sit with [place].  Squat pivot transfers with Modified [place] and Supervision.  Step transfer with Modified [place] and Supervision  Toilet transfer to toilet with Modified [place] and Supervision.                         Time Tracking:     OT Date of Treatment:    OT Start Time: 0215  OT Stop Time: 0255  OT Total Time (min): 40 min    Billable Minutes:Therapeutic Activity 15  Therapeutic Exercise 25    OT/TRISHA: OT     Number of TRISHA visits since last OT visit: 1    9/26/2024

## 2024-09-26 NOTE — PLAN OF CARE
Problem: Adult Inpatient Plan of Care  Goal: Plan of Care Review  9/26/2024 0432 by Trudy Morales LPN  Outcome: Progressing  9/26/2024 0432 by Trudy Morales LPN  Outcome: Progressing  Goal: Patient-Specific Goal (Individualized)  Outcome: Progressing  Goal: Absence of Hospital-Acquired Illness or Injury  Outcome: Progressing  Goal: Optimal Comfort and Wellbeing  Outcome: Progressing  Goal: Readiness for Transition of Care  Outcome: Progressing     Problem: Sepsis/Septic Shock  Goal: Optimal Coping  Outcome: Progressing  Goal: Absence of Bleeding  Outcome: Progressing  Goal: Blood Glucose Level Within Targeted Range  Outcome: Progressing  Goal: Absence of Infection Signs and Symptoms  Outcome: Progressing  Goal: Optimal Nutrition Intake  Outcome: Progressing     Problem: Acute Kidney Injury/Impairment  Goal: Fluid and Electrolyte Balance  Outcome: Progressing  Goal: Improved Oral Intake  Outcome: Progressing  Goal: Effective Renal Function  Outcome: Progressing     Problem: Infection  Goal: Absence of Infection Signs and Symptoms  Outcome: Progressing     Problem: Wound  Goal: Optimal Coping  Outcome: Progressing  Goal: Optimal Functional Ability  Outcome: Progressing  Goal: Absence of Infection Signs and Symptoms  Outcome: Progressing  Goal: Improved Oral Intake  Outcome: Progressing  Goal: Optimal Pain Control and Function  Outcome: Progressing  Goal: Skin Health and Integrity  Outcome: Progressing  Goal: Optimal Wound Healing  Outcome: Progressing     Problem: Fall Injury Risk  Goal: Absence of Fall and Fall-Related Injury  Outcome: Progressing     Problem: Skin Injury Risk Increased  Goal: Skin Health and Integrity  Outcome: Progressing

## 2024-09-26 NOTE — PLAN OF CARE
Problem: Physical Therapy  Goal: Physical Therapy Goal  Description: Short-term Goals: 1.5 weeks  1. Patient will perform supine to/from sit with standby assist to improve independence and safety with bed mobility. Met  2. Patient will perform sit to/from stand with a rolling walker with contact guard assist to improve independence and safety with transfers. Met  3. Patient will ambulate 50 feet with a rolling walker with contact guard assist to improve independence and safety with gait. Met  4. Patient will tolerate 15 minutes of physical therapy intervention to improve endurance and activity tolerance. Met  5. Patient will ascend/descend 3 steps with bilateral handrail support with contact guard assist to improve independence and safety with stair negotiation.     Long-term goals: 3 weeks  1. Patient will perform supine to/from sit with modified independence to improve independence and safety with bed mobility.  2. Patient will perform sit to/from stand with a rolling walker with standby assist to improve independence and safety with transfers.  3. Patient will ambulate 100 feet with a rolling walker with standby assist to improve independence and safety with gait.  4. Patient will tolerate 30 minutes of physical therapy intervention to improve endurance and activity tolerance.  5. Patient will ascend/descend 3 steps with bilateral handrail support with standby assist to improve independence and safety with stair negotiation.     Outcome: Progressing

## 2024-09-27 ENCOUNTER — CLINICAL SUPPORT (OUTPATIENT)
Dept: WOUND CARE | Facility: HOSPITAL | Age: 74
End: 2024-09-27
Attending: EMERGENCY MEDICINE
Payer: MEDICARE

## 2024-09-27 DIAGNOSIS — T81.89XA NON-HEALING SURGICAL WOUND, INITIAL ENCOUNTER: Primary | ICD-10-CM

## 2024-09-27 PROCEDURE — 25000003 PHARM REV CODE 250: Performed by: EMERGENCY MEDICINE

## 2024-09-27 PROCEDURE — 97116 GAIT TRAINING THERAPY: CPT

## 2024-09-27 PROCEDURE — 25000003 PHARM REV CODE 250: Performed by: PHYSICIAN ASSISTANT

## 2024-09-27 PROCEDURE — 97530 THERAPEUTIC ACTIVITIES: CPT

## 2024-09-27 PROCEDURE — 87070 CULTURE OTHR SPECIMN AEROBIC: CPT

## 2024-09-27 PROCEDURE — 97110 THERAPEUTIC EXERCISES: CPT

## 2024-09-27 PROCEDURE — 94761 N-INVAS EAR/PLS OXIMETRY MLT: CPT

## 2024-09-27 PROCEDURE — 99212 OFFICE O/P EST SF 10 MIN: CPT

## 2024-09-27 PROCEDURE — 11000004 HC SNF PRIVATE

## 2024-09-27 RX ADMIN — Medication 1 CAPSULE: at 11:09

## 2024-09-27 RX ADMIN — APIXABAN 5 MG: 5 TABLET, FILM COATED ORAL at 08:09

## 2024-09-27 RX ADMIN — CYANOCOBALAMIN TAB 500 MCG 500 MCG: 500 TAB at 09:09

## 2024-09-27 RX ADMIN — GABAPENTIN 600 MG: 600 TABLET, FILM COATED ORAL at 08:09

## 2024-09-27 RX ADMIN — OXYBUTYNIN CHLORIDE 5 MG: 5 TABLET, EXTENDED RELEASE ORAL at 09:09

## 2024-09-27 RX ADMIN — FERROUS SULFATE TAB EC 324 MG (65 MG FE EQUIVALENT) 1 EACH: 324 (65 FE) TABLET DELAYED RESPONSE at 09:09

## 2024-09-27 RX ADMIN — AMOXICILLIN AND CLAVULANATE POTASSIUM 1 TABLET: 875; 125 TABLET, FILM COATED ORAL at 09:09

## 2024-09-27 RX ADMIN — ACETAMINOPHEN 1000 MG: 500 TABLET ORAL at 08:09

## 2024-09-27 RX ADMIN — PRAVASTATIN SODIUM 80 MG: 40 TABLET ORAL at 08:09

## 2024-09-27 RX ADMIN — DULOXETINE HYDROCHLORIDE 60 MG: 30 CAPSULE, DELAYED RELEASE ORAL at 09:09

## 2024-09-27 RX ADMIN — Medication 1 CAPSULE: at 09:09

## 2024-09-27 RX ADMIN — SENNOSIDES AND DOCUSATE SODIUM 1 TABLET: 8.6; 5 TABLET ORAL at 08:09

## 2024-09-27 RX ADMIN — Medication 5000 UNITS: at 09:09

## 2024-09-27 RX ADMIN — APIXABAN 5 MG: 5 TABLET, FILM COATED ORAL at 09:09

## 2024-09-27 RX ADMIN — Medication 1 CAPSULE: at 05:09

## 2024-09-27 RX ADMIN — ESTRADIOL 2 MG: 0.5 TABLET ORAL at 09:09

## 2024-09-27 RX ADMIN — GABAPENTIN 600 MG: 600 TABLET, FILM COATED ORAL at 09:09

## 2024-09-27 RX ADMIN — Medication 6 MG: at 08:09

## 2024-09-27 RX ADMIN — Medication 1 TABLET: at 09:09

## 2024-09-27 RX ADMIN — METOPROLOL TARTRATE 25 MG: 25 TABLET, FILM COATED ORAL at 09:09

## 2024-09-27 RX ADMIN — FAMOTIDINE 20 MG: 20 TABLET, FILM COATED ORAL at 09:09

## 2024-09-27 NOTE — PT/OT/SLP PROGRESS
Occupational Therapy   Treatment    Name: Bobbi Haywood  MRN: [id number]  Admitting Diagnosis:  Muscle weakness       Recommendations:     Discharge Recommendations:    Discharge Equipment Recommendations:  wheelchair  Barriers to discharge:  Inaccessible home environment, Other (Comment) (limitations with 3 steps within her home)    Assessment:     Bobbi Haywood is a 73 y.o. female with a medical diagnosis of Muscle weakness.  She presents with LE weakness and decreased strength . Performance deficits affecting function are weakness, decreased upper extremity function, impaired endurance, impaired balance, decreased safety awareness, impaired self care skills, impaired functional mobility, decreased coordination. Pt report being tired but agreed to participate in OT tx in the room.    Rehab Prognosis:  Fair; patient would benefit from acute skilled OT services to address these deficits and reach maximum level of function.       Plan:     Patient to be seen 5 x/week to address the above listed problems via self-care/home management, therapeutic activities, therapeutic exercises, neuromuscular re-education  Plan of Care Expires: 10/18/24  Plan of Care Reviewed with: patient    Subjective     Chief Complaint: fatigue on today  Patient/Family Comments/goals: increase ADLs and t/f  Pain/Comfort:       Objective:     Communicated with: Pt prior to session.  Patient sitting upright  in chair   upon OT entry to room.    General Precautions: Standard, fall    Orthopedic Precautions:N/A  Braces: N/A  Respiratory Status: Room air     Occupational Performance:       [facility] 6 Click ADL: 21    Treatment & Education:  Therapeutic exercise   While sitting in the w/c the pt completed in 2 sets of 20 reps of bicep curls with 4# db and chest press 2 sets to 20 reps with 4# db, scapular retraction 2 sets of 15 rep with red theraband to enhance UB strength ADL performance.       Patient left supine with all lines  intact and call button in reach    GOALS:   Multidisciplinary Problems       Occupational Therapy Goals          Problem: Occupational Therapy    Goal Priority Disciplines Outcome Interventions   Occupational Therapy Goal     OT, PT/OT     Description: Goals to be met by: 10/18/24     Patient will increase functional independence with ADLs by performing:    UE Dressing with Modified [place].  LE Dressing with Modified [place] and Supervision with a/e as needed  Grooming while standing at sink with Modified [place] and Supervision.  Toileting from toilet with Modified [place] and Supervision for hygiene and clothing management.   Standing x3-5 minutes with Supervision.  Supine to sit with [place].  Squat pivot transfers with Modified [place] and Supervision.  Step transfer with Modified [place] and Supervision  Toilet transfer to toilet with Modified [place] and Supervision.                         Time Tracking:     OT Date of Treatment:    OT Start Time: 0240  OT Stop Time: 0255  OT Total Time (min): 15 min    Billable Minutes:Therapeutic Exercise 15    OT/TRISHA: OT     Number of TRISHA visits since last OT visit: 1    9/27/2024

## 2024-09-27 NOTE — ASSESSMENT & PLAN NOTE
Patient with Long standing persistent (>12 months) atrial fibrillation which is controlled currently with Beta Blocker. Patient is currently in atrial fibrillation.[id number] Score: 3. HASBLED Score: 1. Anticoagulation indicated. Anticoagulation done with ELIQUIS .  09/24/2024  CONTINUE POC WITH ELIQUIS

## 2024-09-27 NOTE — ASSESSMENT & PLAN NOTE
"INOCENTE is likely due to pre-renal azotemia due to dehydration. Baseline creatinine is unknown. Most recent creatinine and eGFR are listed below.  No results for input(s): "CREATININE", "EGFRNORACEVR" in the last 72 hours.     Plan  - INOCENTE is improving  - Avoid nephrotoxins and renally dose meds for GFR listed above  - Monitor urine output, serial BMP, and adjust therapy as needed  - BUN/CREAT 25/1.49    09/24/2024  CONTINUE POC  BUN/CREAT 30/1.45    "

## 2024-09-27 NOTE — ASSESSMENT & PLAN NOTE
PT HAS HX NEUROPATHY  CONTINUE CYMBALTA AND GABAPENTIN    09/24/2024  PT IS PARTICIPATING WITH PT/OT PERFORMING BED MOBILITY WITH INDEPENDENCE, TRANSFERS WITH CGA WITH RW, AND AMBULATING 140  FEET WITH CGA WITH RW THEN 80 FEET ALSO WITH CGA AND RW ITH VERBAL CUES FOR POSTURE, SAFETY AND EXCESSIVE BUE SUPPORT ON AD WITH FAIR STANDING BALANCE.  CONTINUE CYMBALTA AND GABAPENTIN

## 2024-09-27 NOTE — ASSESSMENT & PLAN NOTE
Anemia is likely due to acute blood loss which was from HEMATOMA A and chronic disease due to MULTIFACTORIAL . Most recent hemoglobin and hematocrit are listed below.  9/29  Plan  - Monitor serial CBC:   - Transfuse PRBC if patient becomes hemodynamically unstable, symptomatic or H/H drops below 7/21.  - Patient has not received any PRBC transfusions to date  - Patient's anemia is currently improving  - 11/35 ON 9/10/2024 AND POST OP 8/26  CONTINUE IRON SUPPLEMENTATION    09/24/2024  BUN/CREAT 8.9/28.8  CONTINUE TO MONITOR WEEKLY  CONTINUE FESO4 DAILY

## 2024-09-27 NOTE — PLAN OF CARE
Problem: Physical Therapy  Goal: Physical Therapy Goal  Description: Short-term Goals: 1.5 weeks  1. Patient will perform supine to/from sit with standby assist to improve independence and safety with bed mobility. Met.  2. Patient will perform sit to/from stand with a rolling walker with contact guard assist to improve independence and safety with transfers. Met.  3. Patient will ambulate 50 feet with a rolling walker with contact guard assist to improve independence and safety with gait. Met.  4. Patient will tolerate 15 minutes of physical therapy intervention to improve endurance and activity tolerance. Met.  5. Patient will ascend/descend 3 steps with bilateral handrail support with contact guard assist to improve independence and safety with stair negotiation.     Long-term goals: 3 weeks  1. Patient will perform supine to/from sit with modified independence to improve independence and safety with bed mobility.  2. Patient will perform sit to/from stand with a rolling walker with standby assist to improve independence and safety with transfers.  3. Patient will ambulate 100 feet with a rolling walker with standby assist to improve independence and safety with gait. Met.  4. Patient will tolerate 30 minutes of physical therapy intervention to improve endurance and activity tolerance.  5. Patient will ascend/descend 3 steps with bilateral handrail support with standby assist to improve independence and safety with stair negotiation.     Outcome: Progressing

## 2024-09-27 NOTE — PROGRESS NOTES
WOUND CARE CONSULT          Patient Name: Bobbi Haywood is a 73 y.o. female       Chief Complaint:  Wound Care Consult- Follow Up    Review of patient's allergies indicates:   Allergen Reactions    Sulfa (sulfonamide antibiotics) Hives and Other (See Comments)     Sore Throat, Sore Mouth        I have reviewed the patient's medical, surgical, family and social history.      Subjective:    HPI     Wound Location: right lateral posterior lower leg; right medial upper leg    Wound Duration: onset 9/12/24    Wound Context: surgical wounds    Wound Pain: mild    9/20/24- 74 YO WF seen for consult. Pt reports falling several weeks ago and bumping her right leg on a truck door. She developed a deep hematoma to the area which required I&D on 9/12. She also developed an abscess to the medial thigh which required I&D on the 12th.   She is in B for strengthening.   9/27/24- Pt seen for follow up. Nursing staff reports large amount of increased drainage from thigh wound. She will complete antibiotics tomorrow. We will obtain wound culture.           Medications:    Current Facility-Administered Medications on File Prior to Visit   Medication Dose Route Frequency Provider Last Rate Last Admin    acetaminophen tablet 1,000 mg  1,000 mg Oral Q6H PRN Dayday Valentino PA   1,000 mg at 09/26/24 2019    acetaminophen tablet 650 mg  650 mg Oral Q6H PRN Dayday Valentino PA        [COMPLETED] amoxicillin-clavulanate 875-125mg per tablet 1 tablet  1 tablet Oral Q12H Keyla Vizcaino MD   1 tablet at 09/27/24 0935    apixaban tablet 5 mg  5 mg Oral BID Dayday Valentino PA   5 mg at 09/27/24 0936    calcium carbonate 200 mg calcium (500 mg) chewable tablet 500 mg  500 mg Oral BID PRN Dayday Valentino PA        [COMPLETED] cholecalciferol (vitamin D3) 125 mcg (5,000 unit) capsule 5,000 Units  5,000 Units Oral Daily Keyla Vizcaino MD   5,000 Units at 09/27/24 0935    cyanocobalamin tablet 500 mcg  500 mcg  Oral Daily Dayday Valentino PA   500 mcg at 09/27/24 0936    DULoxetine DR capsule 60 mg  60 mg Oral Daily Dayday Valentino PA   60 mg at 09/27/24 0935    estradioL tablet 2 mg  2 mg Oral Daily Keyla Vizcaino MD   2 mg at 09/27/24 0935    famotidine tablet 20 mg  20 mg Oral Daily Keyla Vizcaino MD   20 mg at 09/27/24 0935    ferrous sulfate tablet 1 each  1 tablet Oral Daily Dayday Valentino PA   1 each at 09/27/24 0935    folic acid-vit B6-vit B12 2.5-25-2 mg tablet 1 tablet  1 tablet Oral Daily Keyla Vizcaino MD   1 tablet at 09/27/24 0935    gabapentin tablet 600 mg  600 mg Oral BID Dayday Valentino PA   600 mg at 09/27/24 0935    Lactobacillus acidophilus capsule 1 capsule  1 capsule Oral TID WM Dayday Valentino PA   1 capsule at 09/27/24 1132    melatonin tablet 6 mg  6 mg Oral Nightly PRN Dayday Valentino PA   6 mg at 09/24/24 2022    metoprolol tartrate (LOPRESSOR) tablet 25 mg  25 mg Oral Daily Dayday Valentino PA   25 mg at 09/27/24 0936    ondansetron disintegrating tablet 4 mg  4 mg Oral Q8H PRN Dayday Valentino PA        oxybutynin 24 hr tablet 5 mg  5 mg Oral Daily Dayday Valentino PA   5 mg at 09/27/24 0935    pravastatin tablet 80 mg  80 mg Oral QHS Dayday Valentino PA   80 mg at 09/26/24 2018    senna-docusate 8.6-50 mg per tablet 1 tablet  1 tablet Oral BID Dayday Valentino PA   1 tablet at 09/26/24 0914     Current Outpatient Medications on File Prior to Visit   Medication Sig Dispense Refill    cyanocobalamin 500 MCG tablet Take 500 mcg by mouth once daily.      DULoxetine (CYMBALTA) 60 MG capsule Take 1 capsule by mouth once daily.      ELIQUIS 5 mg Tab Take 5 mg by mouth 2 (two) times daily.      estradioL (ESTRACE) 2 MG tablet Take 1 tablet by mouth once daily.      ferrous sulfate 325 (65 FE) MG EC tablet Take 1 tablet (325 mg total) by mouth once daily.      fesoterodine (TOVIAZ) 4 mg Tb24 Take 4 mg by mouth once daily.      folic acid  (FOLVITE) 800 MCG Tab Take 800 mcg by mouth once daily.      gabapentin (NEURONTIN) 600 MG tablet Take 1 tablet by mouth 2 (two) times a day.      L. rhamnosus/C/zinc/elderberry (CULTURELLE IMMUNE DEFENSE ORAL) Take 1 tablet by mouth once daily.      L.acidoph,saliva/B.bif/S.therm (ACIDOPHILUS PROBIOTIC BLEND ORAL) Take 1 tablet by mouth once daily.      metoprolol tartrate (LOPRESSOR) 25 MG tablet Take 1 tablet by mouth once daily.      rosuvastatin (CRESTOR) 20 MG tablet Take 20 mg by mouth every evening.            Physical Exam  Vitals reviewed.   Constitutional:       Appearance: Normal appearance. She is obese.   HENT:      Head: Normocephalic and atraumatic.      Right Ear: External ear normal.      Left Ear: External ear normal.   Cardiovascular:      Rate and Rhythm: Normal rate.      Pulses:           Dorsalis pedis pulses are 2+ on the right side and 2+ on the left side.   Pulmonary:      Effort: Pulmonary effort is normal.   Musculoskeletal:      Right lower leg: Edema present.      Left lower leg: Edema present.   Skin:     General: Skin is warm and dry.      Findings: Wound present.          Neurological:      Mental Status: She is alert.          Please see Wound Docs for complete Wound Assessment and lower extremity assessment when applicable.    Documentation of any procedures can be viewed in Wound Docs if applicable.         Assessment and Plan:    1. Non-healing surgical wound, initial encounter  Clean with vashe  Gently pack wounds with silver alginate and cover with dry dressing. Change daily and PRN  Continue Augmentin       - Culture, Wound   Attending to follow up and treat accordingly.       Please see Wound Docs for complete plan including patient instructions.   Follow up in about 1 week (around 10/4/2024).

## 2024-09-27 NOTE — PROGRESS NOTES
[facility] - Medical Surgical Unit  Hospital Medicine  Progress Note    Patient Name: Bobbi Haywood  MRN: [id number]  Patient Class: IP- Swing   Admission Date: 9/17/2024  Length of Stay: 10 days  Attending Physician: Keyla Vizcaino MD  Primary Care Provider: [facility]        Subjective:     Principal Problem:Muscle weakness        HPI:  PT IS A 73 YR OLD WF ADMITTED TO [facility] SWING BED FOR PT/OT/REHABILITATION FOR MUSCLE WEAKNESS AND MEDICAL MANAGEMENT. PT WAS TRANSFERRED FROM [facility] WHERE SHE WAS ADMITTED ON 09/10/2024 FOR A FALL AT HOME WITH WEAKNESS, SUSPECTED SEPSIS (BC REVEALED NG AT 5 DAYS), INOCENTE WITH DEHYDRATION, UTI, AND AN ABSCESS X 2 OF THE RLE DUE TO A PRIOR HEMATOMA SUSTAINED AS CAR DOOR STRUCK RLE 2 WEEKS PRIOR TO FALL.     PT WAS TREATED INITIALLY WITH IV ZOSYN AND VANCOMYCIN AND BASED ON CULTURES ZOSYN THEN AUGMENTIN. PT'S URINE CULTURE WAS POSITIVE FOR E COLI AND RLE WOUND CULTURE WAS POSITIVE FOR GROUP G STREP AND PROTEUS MIRALBIS PT REQUIRED I AND D OF RLE ABSCESS X 2 SITES PER DR HIGHTOWER ON 09/12/2024.     PT HAS MULTIPLE CHRONIC MEDICAL PROBLEMS INCLUDING ANEMIA, AF LONGSTANDING ON ELIQUIS AND LOPRESSOR, NEUROPATHY ON CYMBALTA,GABAPENTIN, CHRONIC DEPRESSION ON CYMBALTA, CHRONIC PAIN SYNDROME OF THE NECK AND BACK WITH SPINAL STENOSIS, SPONDYLOLYSIS AND SEVERE DEGENERATIVE DISC DISEASE AND IS NOT A SURGICAL CANDIDATE AND IS FOLLOWED PER DR MARÍA PA-C SHOWS; AND HYPERTENSION ON LOPRESSOR WITH ECHO (9/11/2024) REVEALING EF 55-60%.PT HAS A CHRONIC GAIT ABNORMALITY WITH NEUROPATHY AND AN ANKLE/FOOT DEFORMITY RESULTING IN FREQUENT FALLS USING A ROLLING WALKER AT HOME.  PT IMPROVED AT [facility]; BUT HAS PERSISTENT MUSCLE WEAKNESS AND DEBILITY, AND WILL REQUIRE SWING BED FOR REHABILITATION, CONTINUED WOUND CARE,AND MEDICAL MANAGEMENT.  PT WILL BE EVALUATED PER PT/OT AND A TREATMENT PLAN WILL BE INITIATED. THE PHARMACIST WILL REVIEW MEDICATIONS AND  MAKE RECOMMENDATIONS. PT WILL SEE THE DIETICIAN  FOR NUTRITIONAL SUPPORT WITH WEIGHT  100.7 KG AND ALBUMIN OF 2.1. PT'S ABNORMAL ADMITTING LABS ARE: CMP:, BUN/CREAT 25/1.49,, CBC:WBC 18 K, H/H 9.1/29.9,  K, UA:NEG X UROBILINOGEN. PT HAS ADDITIONAL LABS INCLUDING  MAG 2.0, TSH 1.020 AND VIT D 27.8.  PT WILL HAVE WEEKLY LABS.     CXR: NO ACUTE CHANGE  Updated   Order   09/17/24 2111  X-Ray Chest AP Portable  Performed: 09/17/24 2051  Final        Impression: No acute radiographic abnormality. Electronically signed by: Donta Yi Date: 09/17/2024 Time: 21:09        PT CODE STATUS IS FULL CODE  PT COVID STATUS IS NEG    PT VTE PPX IS ASA/ELIQUIS/TEDS/EARLY AMBULATION      Overview/Hospital Course:  9/24/2024 HOSPITAL DAY 7    PT IS PARTICIPATING WITH PT/OT PERFORMING BED MOBILITY WITH INDEPENDENCE, TRANSFERS WITH CGA WITH RW, AND AMBULATING 140  FEET WITH CGA WITH RW THEN 80 FEET ALSO WITH CGA AND RW ITH VERBAL CUES FOR POSTURE, SAFETY AND EXCESSIVE BUE SUPPORT ON AD WITH FAIR STANDING BALANCE.  PT HAS RLE WOUNDS EVALUATED PER THE WOUND CARE TEAM WITH RECOMMENDATIONS CONTINUED WITH WOUNDS OF THE RLE AND SACRAL PRESSURE  PT HAS RA O2 SAT 95 %. PT IS BEING TREATED PER RESPIRATORY THERAPY WITH INCENTIVE SPIROMETRY AND O2 MONITORING.  PT CONTINUES AUGMENTIN FOR RLE WOUNDS. THE PHARMACY IS MONITORING ALL MEDICATIONS. RD CONTINUES TO CONSULT WITH DIET LIBERALIZED TO A REGULAR DIET WITH % AND CONTINUED TREATMENT WITH DONNA AND WILL ADD VIT C.  PT HAS STABLE LABS EXCEPT FOR BMP: BUN/CREAT 30/1.45 , CBC: WBC 14 K, H/H 8.9/28.8.        Interval History: PT IS PARTICIPATING WITH PT/OT PERFORMING BED MOBILITY WITH INDEPENDENCE, TRANSFERS WITH CGA WITH RW, AND AMBULATING 140  FEET WITH CGA WITH RW THEN 80 FEET ALSO WITH CGA AND RW ITH VERBAL CUES FOR POSTURE, SAFETY AND EXCESSIVE BUE SUPPORT ON AD WITH FAIR STANDING BALANCE.  PT HAS RLE WOUNDS EVALUATED PER THE WOUND CARE TEAM WITH RECOMMENDATIONS CONTINUED  WITH WOUNDS OF THE RLE AND SACRAL PRESSURE  PT HAS RA O2 SAT 95 %. PT IS BEING TREATED PER RESPIRATORY THERAPY WITH INCENTIVE SPIROMETRY AND O2 MONITORING.  PT CONTINUES AUGMENTIN FOR RLE WOUNDS. THE PHARMACY IS MONITORING ALL MEDICATIONS. RD CONTINUES TO CONSULT WITH DIET LIBERALIZED TO A REGULAR DIET WITH % AND CONTINUED TREATMENT WITH DONNA AND WILL ADD VIT C.  PT HAS STABLE LABS EXCEPT FOR BMP: BUN/CREAT 30/1.45 , CBC: WBC 14 K, H/H 8.9/28.8.      Review of Systems   Constitutional:  Positive for activity change and fatigue.   HENT:  Positive for sore throat. Negative for congestion.    Eyes: Negative.    Respiratory:  Negative for cough (MINIMAL).    Cardiovascular: Negative.    Gastrointestinal: Negative.    Endocrine: Negative.    Genitourinary: Negative.    Musculoskeletal:  Positive for arthralgias, back pain, gait problem (RLE), myalgias and neck pain.   Skin:  Positive for wound.   Allergic/Immunologic: Positive for immunocompromised state.   Neurological:  Positive for tremors and weakness.   Hematological:  Bruises/bleeds easily.   Psychiatric/Behavioral: Negative.       Objective:     Vital Signs (Most Recent):  Temp: 98.1 °F (36.7 °C) (09/27/24 0726)  Pulse: 65 (09/27/24 0726)  Resp: 18 (09/27/24 0726)  BP: 128/78 (09/27/24 0726)  SpO2: (!) 89 % (09/27/24 0726) Vital Signs (24h Range):  Temp:  [97.9 °F (36.6 °C)-98.1 °F (36.7 °C)] 98.1 °F (36.7 °C)  Pulse:  [65-76] 65  Resp:  [18-20] 18  SpO2:  [89 %-97 %] 89 %  BP: (124-128)/(66-78) 128/78     Weight: 103 kg (227 lb)  Body mass index is 31.66 kg/m².    Intake/Output Summary (Last 24 hours) at 9/27/2024 1234  Last data filed at 9/27/2024 0644  Gross per 24 hour   Intake 960 ml   Output --   Net 960 ml         Physical Exam  Vitals and nursing note reviewed. Exam conducted with a chaperone present.   Constitutional:       Appearance: She is obese. She is ill-appearing.   HENT:      Head: Normocephalic and atraumatic.      Right Ear: Tympanic  membrane normal.      Left Ear: Tympanic membrane normal.      Nose: Nose normal.      Mouth/Throat:      Mouth: Mucous membranes are moist.   Eyes:      Extraocular Movements: Extraocular movements intact.      Conjunctiva/sclera: Conjunctivae normal.      Pupils: Pupils are equal, round, and reactive to light.   Cardiovascular:      Rate and Rhythm: Normal rate.      Pulses: Normal pulses.      Heart sounds: Normal heart sounds.   Pulmonary:      Effort: Pulmonary effort is normal. No respiratory distress.      Breath sounds: Normal breath sounds.   Abdominal:      General: Bowel sounds are normal. There is no distension.      Palpations: Abdomen is soft.      Tenderness: There is no abdominal tenderness.   Musculoskeletal:         General: Swelling (RLE), tenderness (RLE) and deformity (B ANKLES/FEET) present. Normal range of motion.      Cervical back: Normal range of motion.      Right lower leg: Right lower leg edema: RLE.   Skin:     General: Skin is warm.      Capillary Refill: Capillary refill takes less than 2 seconds.      Coloration: Skin is pale.      Findings: Bruising and lesion (POST OP ABSCESS I AND D RLOWER LEG LATERALLY AND MEDIAL THIGH SEE  AND SACRAL PRESSURE INJURY SEE , MINOR ADDITIOAN LESIONS SEE ) present.   Neurological:      General: No focal deficit present.      Mental Status: She is alert and oriented to person, place, and time.      Motor: Weakness present.   Psychiatric:         Mood and Affect: Mood normal.         Behavior: Behavior normal.         Thought Content: Thought content normal.         Judgment: Judgment normal.             Significant Labs: All pertinent labs within the past 24 hours have been reviewed.  SEE HOSPITAL COURSE      Significant Imaging: I have reviewed all pertinent imaging results/findings within the past 24 hours.NONE    Assessment/Plan:      * Muscle weakness  PT/OT WILL EVALUATE PT AND INITIATE A TREATMENT  PLAN  09/24/2024  PT IS PARTICIPATING WITH PT/OT PERFORMING BED MOBILITY WITH INDEPENDENCE, TRANSFERS WITH CGA WITH RW, AND AMBULATING 140  FEET WITH CGA WITH RW THEN 80 FEET ALSO WITH CGA AND RW ITH VERBAL CUES FOR POSTURE, SAFETY AND EXCESSIVE BUE SUPPORT ON AD WITH FAIR STANDING BALANCE.      Acute blood loss anemia  Anemia is likely due to acute blood loss which was from HEMATOMA A and chronic disease due to MULTIFACTORIAL . Most recent hemoglobin and hematocrit are listed below.  9/29  Plan  - Monitor serial CBC:   - Transfuse PRBC if patient becomes hemodynamically unstable, symptomatic or H/H drops below 7/21.  - Patient has not received any PRBC transfusions to date  - Patient's anemia is currently improving  - 11/35 ON 9/10/2024 AND POST OP 8/26  CONTINUE IRON SUPPLEMENTATION    09/24/2024  BUN/CREAT 8.9/28.8  CONTINUE TO MONITOR WEEKLY  CONTINUE FESO4 DAILY    Chronic pain  PT HAS CHRONIC PAIN SYNDROME OF THE NECK AND BACK WITH SPINAL STENOSIS, SPONDYLOLYSIS AND SEVERE DEGENERATIVE DISC DISEASE AND IS NOT A SURGICAL CANDIDATE AND IS FOLLOWED PER DR DANIEL,  PA-C SHOWS  CYMBALTA, GABAPENTIN  09/24/2024  CONTINUE POC      Abscess of right lower extremity  PT SUSTAINED A HEMATOMA RLE 3 WEEKS AGO DUE TO CAR DOOR INJURY  PT DEVELOPED AN ABSCESS REQUIRING I AND D 09/12/2024 PER DR HIGHTOWER  WOUND CULTURE REVEALED GROUP G STREP, PROTEUS  TREATED INITIALLY WITH VANC AND ZOSYN THEN TRANSITIONED TO AUGMENTIN BASED ON SENSITIVITY  WILL CONTINUE WET TO DRY WOUND CARE PER WOUND CARE TEAM NURSING STAFF  CONSULT WOUND CARE TEAM  DONNA, VIT C  09/24/2024  CONTINUE  POC  CONTINUE WOUND CARE  CONTINUE AUGMENTIN    A-fib  Patient with Long standing persistent (>12 months) atrial fibrillation which is controlled currently with Beta Blocker. Patient is currently in atrial fibrillation.[id number] Score: 3. HASBLED Score: 1. Anticoagulation indicated. Anticoagulation done with ELIQUIS .  09/24/2024  CONTINUE POC WITH  "ELIQUIS    Hypertension  Chronic, controlled. Latest blood pressure and vitals reviewed-     Temp:  [97.6 °F (36.4 °C)-98.3 °F (36.8 °C)]   Pulse:  [44-77]   Resp:  [18-21]   BP: (126-129)/(62-74)   SpO2:  [93 %-97 %] .   Home meds for hypertension were reviewed and noted below.   Hypertension Medications               metoprolol tartrate (LOPRESSOR) 25 MG tablet Take 1 tablet by mouth once daily.            While in the hospital, will manage blood pressure as follows; Continue home antihypertensive regimen    Will utilize p.r.n. blood pressure medication only if patient's blood pressure greater than 180/110 and she develops symptoms such as worsening chest pain or shortness of breath.    Depression  Patient has persistent depression which is mild and is currently controlled. Will Continue anti-depressant medications. We will not consult psychiatry at this time. Patient does not display psychosis at this time. Continue to monitor closely and adjust plan of care as needed.  CONTINUE CYMBALTA  09/24/224  CONTINUE POC  PT'S MOOD IS ELEVATED, WITH ID SMILING FREQUENTLY        Encounter for deep vein thrombosis (DVT) prophylaxis  PT IS AT RISK FOR VTE  VTE PPX\  ELIQUIS /TEDS/EARLY AMBULATION  09/24/2024  CONTINUE POC        INOCENTE (acute kidney injury)  INOCENTE is likely due to pre-renal azotemia due to dehydration. Baseline creatinine is unknown. Most recent creatinine and eGFR are listed below.  No results for input(s): "CREATININE", "EGFRNORACEVR" in the last 72 hours.     Plan  - INOCENTE is improving  - Avoid nephrotoxins and renally dose meds for GFR listed above  - Monitor urine output, serial BMP, and adjust therapy as needed  - BUN/CREAT 25/1.49    09/24/2024  CONTINUE POC  BUN/CREAT 30/1.45      Neuropathy  PT HAS HX NEUROPATHY  CONTINUE CYMBALTA AND GABAPENTIN    09/24/2024  PT IS PARTICIPATING WITH PT/OT PERFORMING BED MOBILITY WITH INDEPENDENCE, TRANSFERS WITH CGA WITH RW, AND AMBULATING 140  FEET WITH CGA WITH RW THEN " 80 FEET ALSO WITH CGA AND RW ITH VERBAL CUES FOR POSTURE, SAFETY AND EXCESSIVE BUE SUPPORT ON AD WITH FAIR STANDING BALANCE.  CONTINUE CYMBALTA AND GABAPENTIN        VTE Risk Mitigation (From admission, onward)           Ordered     apixaban tablet 5 mg  2 times daily         09/17/24 2020     IP VTE HIGH RISK PATIENT  Once         09/17/24 2018     Place sequential compression device  Until discontinued         09/17/24 2018                    Discharge Planning   SHASHI: 10/4/2024     Code Status: Full Code   Is the patient medically ready for discharge?:     Reason for patient still in hospital (select all that apply): Patient trending condition, Laboratory test, Treatment, Consult recommendations, and PT / OT recommendations  Discharge Plan A: Home with family, Home Health                  Keyla Vizcaino MD  Department of Hospital Medicine   [facility] - Medical Surgical Unit

## 2024-09-27 NOTE — SUBJECTIVE & OBJECTIVE
Interval History: PT IS PARTICIPATING WITH PT/OT PERFORMING BED MOBILITY WITH INDEPENDENCE, TRANSFERS WITH CGA WITH RW, AND AMBULATING 140  FEET WITH CGA WITH RW THEN 80 FEET ALSO WITH CGA AND RW ITH VERBAL CUES FOR POSTURE, SAFETY AND EXCESSIVE BUE SUPPORT ON AD WITH FAIR STANDING BALANCE.  PT HAS RLE WOUNDS EVALUATED PER THE WOUND CARE TEAM WITH RECOMMENDATIONS CONTINUED WITH WOUNDS OF THE RLE AND SACRAL PRESSURE  PT HAS RA O2 SAT 95 %. PT IS BEING TREATED PER RESPIRATORY THERAPY WITH INCENTIVE SPIROMETRY AND O2 MONITORING.  PT CONTINUES AUGMENTIN FOR RLE WOUNDS. THE PHARMACY IS MONITORING ALL MEDICATIONS. RD CONTINUES TO CONSULT WITH DIET LIBERALIZED TO A REGULAR DIET WITH % AND CONTINUED TREATMENT WITH DONNA AND WILL ADD VIT C.  PT HAS STABLE LABS EXCEPT FOR BMP: BUN/CREAT 30/1.45 , CBC: WBC 14 K, H/H 8.9/28.8.      Review of Systems   Constitutional:  Positive for activity change and fatigue.   HENT:  Positive for sore throat. Negative for congestion.    Eyes: Negative.    Respiratory:  Negative for cough (MINIMAL).    Cardiovascular: Negative.    Gastrointestinal: Negative.    Endocrine: Negative.    Genitourinary: Negative.    Musculoskeletal:  Positive for arthralgias, back pain, gait problem (RLE), myalgias and neck pain.   Skin:  Positive for wound.   Allergic/Immunologic: Positive for immunocompromised state.   Neurological:  Positive for tremors and weakness.   Hematological:  Bruises/bleeds easily.   Psychiatric/Behavioral: Negative.       Objective:     Vital Signs (Most Recent):  Temp: 98.1 °F (36.7 °C) (09/27/24 0726)  Pulse: 65 (09/27/24 0726)  Resp: 18 (09/27/24 0726)  BP: 128/78 (09/27/24 0726)  SpO2: (!) 89 % (09/27/24 0726) Vital Signs (24h Range):  Temp:  [97.9 °F (36.6 °C)-98.1 °F (36.7 °C)] 98.1 °F (36.7 °C)  Pulse:  [65-76] 65  Resp:  [18-20] 18  SpO2:  [89 %-97 %] 89 %  BP: (124-128)/(66-78) 128/78     Weight: 103 kg (227 lb)  Body mass index is 31.66 kg/m².    Intake/Output Summary  (Last 24 hours) at 9/27/2024 1234  Last data filed at 9/27/2024 0644  Gross per 24 hour   Intake 960 ml   Output --   Net 960 ml         Physical Exam  Vitals and nursing note reviewed. Exam conducted with a chaperone present.   Constitutional:       Appearance: She is obese. She is ill-appearing.   HENT:      Head: Normocephalic and atraumatic.      Right Ear: Tympanic membrane normal.      Left Ear: Tympanic membrane normal.      Nose: Nose normal.      Mouth/Throat:      Mouth: Mucous membranes are moist.   Eyes:      Extraocular Movements: Extraocular movements intact.      Conjunctiva/sclera: Conjunctivae normal.      Pupils: Pupils are equal, round, and reactive to light.   Cardiovascular:      Rate and Rhythm: Normal rate.      Pulses: Normal pulses.      Heart sounds: Normal heart sounds.   Pulmonary:      Effort: Pulmonary effort is normal. No respiratory distress.      Breath sounds: Normal breath sounds.   Abdominal:      General: Bowel sounds are normal. There is no distension.      Palpations: Abdomen is soft.      Tenderness: There is no abdominal tenderness.   Musculoskeletal:         General: Swelling (RLE), tenderness (RLE) and deformity (B ANKLES/FEET) present. Normal range of motion.      Cervical back: Normal range of motion.      Right lower leg: Right lower leg edema: RLE.   Skin:     General: Skin is warm.      Capillary Refill: Capillary refill takes less than 2 seconds.      Coloration: Skin is pale.      Findings: Bruising and lesion (POST OP ABSCESS I AND D RLOWER LEG LATERALLY AND MEDIAL THIGH SEE  AND SACRAL PRESSURE INJURY SEE , MINOR ADDITIOAN LESIONS SEE ) present.   Neurological:      General: No focal deficit present.      Mental Status: She is alert and oriented to person, place, and time.      Motor: Weakness present.   Psychiatric:         Mood and Affect: Mood normal.         Behavior: Behavior normal.         Thought Content: Thought  content normal.         Judgment: Judgment normal.             Significant Labs: All pertinent labs within the past 24 hours have been reviewed.  SEE HOSPITAL COURSE      Significant Imaging: I have reviewed all pertinent imaging results/findings within the past 24 hours.NONE

## 2024-09-27 NOTE — ASSESSMENT & PLAN NOTE
Patient has persistent depression which is mild and is currently controlled. Will Continue anti-depressant medications. We will not consult psychiatry at this time. Patient does not display psychosis at this time. Continue to monitor closely and adjust plan of care as needed.  CONTINUE CYMBALTA  09/24/224  CONTINUE POC  PT'S MOOD IS ELEVATED, WITH VA SMILING FREQUENTLY

## 2024-09-27 NOTE — ASSESSMENT & PLAN NOTE
PT HAS CHRONIC PAIN SYNDROME OF THE NECK AND BACK WITH SPINAL STENOSIS, SPONDYLOLYSIS AND SEVERE DEGENERATIVE DISC DISEASE AND IS NOT A SURGICAL CANDIDATE AND IS FOLLOWED PER DR MARÍA PA-C SHOWS  CYMBALTA, GABAPENTIN  09/24/2024  CONTINUE POC

## 2024-09-27 NOTE — PT/OT/SLP PROGRESS
Physical Therapy Treatment    Patient Name:  Bobbi Haywood   MRN:  [id number]    Recommendations:     Discharge Recommendations: Low Intensity Therapy  Discharge Equipment Recommendations: wheelchair  Barriers to discharge: None    Assessment:     Bobbi Haywood is a 73 y.o. female admitted with a medical diagnosis of generalized weakness. Patient presented to [facility] following 2 falls at home while trying to descend the 3 steps inside her home .  She presents with the following impairments/functional limitations: weakness, impaired functional mobility, gait instability     Bobbi is progressing well and able to ambulate 250 feet today with a RW.     Rehab Prognosis: Good; patient would benefit from acute skilled PT services to address these deficits and reach maximum level of function.    Recent Surgery: * No surgery found *      Plan:     During this hospitalization, patient to be seen 5 x/week to address the identified rehab impairments via gait training, therapeutic activities, therapeutic exercises and progress toward the following goals:    Plan of Care Expires:  10/09/24    Subjective     Chief Complaint: none voiced.  Patient/Family Comments/goals: to improve mobility and return home.  Pain/Comfort:  Pain Rating 1: 0/10      Objective:     Communicated with patient prior to session.  Patient found supine with   upon PT entry to room.     General Precautions: Standard, fall  Orthopedic Precautions: N/A  Braces: N/A  Respiratory Status: Room air     Functional Mobility:  Bed Mobility:     Supine to Sit: stand by assistance  Transfers:     Sit to Stand:  stand by assistance with rolling walker  Gait: 250 feet SBA with RW in hallway on level surface.  Balance: standing Good      AM-PAC 6 CLICK MOBILITY  Turning over in bed (including adjusting bedclothes, sheets and blankets)?: 4  Sitting down on and standing up from a chair with arms (e.g., wheelchair, bedside commode,  etc.): 3  Moving from lying on back to sitting on the side of the bed?: 4  Moving to and from a bed to a chair (including a wheelchair)?: 4  Need to walk in hospital room?: 4  Climbing 3-5 steps with a railing?: 1  Basic Mobility Total Score: 20       Treatment & Education:  TA: patient performed LE dressing donning socks Min assist and shoes Mod assist. Patient has swelling of right foot and ankle     Gait: patient ambulated on level surface in room and hallway with RW for 250 feet x 2 with one rest breaks. She demonstrates reciprocal steps with good balance.    Patient left supine with all lines intact and call button in reach..    GOALS:   Multidisciplinary Problems       Physical Therapy Goals          Problem: Physical Therapy    Goal Priority Disciplines Outcome Goal Variances Interventions   Physical Therapy Goal     PT, PT/OT Progressing     Description: Short-term Goals: 1.5 weeks  1. Patient will perform supine to/from sit with standby assist to improve independence and safety with bed mobility. Met.  2. Patient will perform sit to/from stand with a rolling walker with contact guard assist to improve independence and safety with transfers. Met.  3. Patient will ambulate 50 feet with a rolling walker with contact guard assist to improve independence and safety with gait. Met.  4. Patient will tolerate 15 minutes of physical therapy intervention to improve endurance and activity tolerance. Met.  5. Patient will ascend/descend 3 steps with bilateral handrail support with contact guard assist to improve independence and safety with stair negotiation.     Long-term goals: 3 weeks  1. Patient will perform supine to/from sit with modified independence to improve independence and safety with bed mobility.  2. Patient will perform sit to/from stand with a rolling walker with standby assist to improve independence and safety with transfers.  3. Patient will ambulate 100 feet with a rolling walker with standby assist  to improve independence and safety with gait. Met.  4. Patient will tolerate 30 minutes of physical therapy intervention to improve endurance and activity tolerance.  5. Patient will ascend/descend 3 steps with bilateral handrail support with standby assist to improve independence and safety with stair negotiation.                          Time Tracking:     PT Received On: 09/27/24  PT Start Time: 0952     PT Stop Time: 1018  PT Total Time (min): 26 min     Billable Minutes: Gait Training 16 and Therapeutic Activity 10    Treatment Type: Treatment  PT/PTA: PT     Number of PTA visits since last PT visit: 0     09/27/2024

## 2024-09-27 NOTE — ASSESSMENT & PLAN NOTE
PT/OT WILL EVALUATE PT AND INITIATE A TREATMENT PLAN  09/24/2024  PT IS PARTICIPATING WITH PT/OT PERFORMING BED MOBILITY WITH INDEPENDENCE, TRANSFERS WITH CGA WITH RW, AND AMBULATING 140  FEET WITH CGA WITH RW THEN 80 FEET ALSO WITH CGA AND RW ITH VERBAL CUES FOR POSTURE, SAFETY AND EXCESSIVE BUE SUPPORT ON AD WITH FAIR STANDING BALANCE.

## 2024-09-27 NOTE — ASSESSMENT & PLAN NOTE
PT SUSTAINED A HEMATOMA RLE 3 WEEKS AGO DUE TO CAR DOOR INJURY  PT DEVELOPED AN ABSCESS REQUIRING I AND D 09/12/2024 PER DR HIGHTOWER  WOUND CULTURE REVEALED GROUP G STREP, PROTEUS  TREATED INITIALLY WITH VANC AND ZOSYN THEN TRANSITIONED TO AUGMENTIN BASED ON SENSITIVITY  WILL CONTINUE WET TO DRY WOUND CARE PER WOUND CARE TEAM NURSING STAFF  CONSULT WOUND CARE TEAM  DONNA, VIT C  09/24/2024  CONTINUE  POC  CONTINUE WOUND CARE  CONTINUE AUGMENTIN

## 2024-09-28 LAB — MICROORGANISM SPEC CULT: ABNORMAL

## 2024-09-28 PROCEDURE — 94761 N-INVAS EAR/PLS OXIMETRY MLT: CPT

## 2024-09-28 PROCEDURE — 25000003 PHARM REV CODE 250: Performed by: EMERGENCY MEDICINE

## 2024-09-28 PROCEDURE — 11000004 HC SNF PRIVATE

## 2024-09-28 PROCEDURE — 25000003 PHARM REV CODE 250: Performed by: PHYSICIAN ASSISTANT

## 2024-09-28 PROCEDURE — 99900035 HC TECH TIME PER 15 MIN (STAT)

## 2024-09-28 RX ADMIN — Medication 1 TABLET: at 09:09

## 2024-09-28 RX ADMIN — PRAVASTATIN SODIUM 80 MG: 40 TABLET ORAL at 08:09

## 2024-09-28 RX ADMIN — OXYBUTYNIN CHLORIDE 5 MG: 5 TABLET, EXTENDED RELEASE ORAL at 09:09

## 2024-09-28 RX ADMIN — Medication 1 CAPSULE: at 05:09

## 2024-09-28 RX ADMIN — APIXABAN 5 MG: 5 TABLET, FILM COATED ORAL at 09:09

## 2024-09-28 RX ADMIN — Medication 6 MG: at 08:09

## 2024-09-28 RX ADMIN — APIXABAN 5 MG: 5 TABLET, FILM COATED ORAL at 08:09

## 2024-09-28 RX ADMIN — Medication 1 CAPSULE: at 12:09

## 2024-09-28 RX ADMIN — METOPROLOL TARTRATE 25 MG: 25 TABLET, FILM COATED ORAL at 09:09

## 2024-09-28 RX ADMIN — GABAPENTIN 600 MG: 600 TABLET, FILM COATED ORAL at 09:09

## 2024-09-28 RX ADMIN — DULOXETINE HYDROCHLORIDE 60 MG: 30 CAPSULE, DELAYED RELEASE ORAL at 09:09

## 2024-09-28 RX ADMIN — FAMOTIDINE 20 MG: 20 TABLET, FILM COATED ORAL at 09:09

## 2024-09-28 RX ADMIN — SENNOSIDES AND DOCUSATE SODIUM 1 TABLET: 8.6; 5 TABLET ORAL at 08:09

## 2024-09-28 RX ADMIN — CYANOCOBALAMIN TAB 500 MCG 500 MCG: 500 TAB at 09:09

## 2024-09-28 RX ADMIN — FERROUS SULFATE TAB EC 324 MG (65 MG FE EQUIVALENT) 1 EACH: 324 (65 FE) TABLET DELAYED RESPONSE at 09:09

## 2024-09-28 RX ADMIN — Medication 1 CAPSULE: at 09:09

## 2024-09-28 RX ADMIN — ESTRADIOL 2 MG: 0.5 TABLET ORAL at 09:09

## 2024-09-28 RX ADMIN — GABAPENTIN 600 MG: 600 TABLET, FILM COATED ORAL at 08:09

## 2024-09-28 RX ADMIN — ACETAMINOPHEN 1000 MG: 500 TABLET ORAL at 08:09

## 2024-09-28 NOTE — PROGRESS NOTES
Right lower extremity dressings changed.  Are becoming excessively moist likely secondary to Aquacel dressing.  Overall, wound beds look good with mostly granulation tissue.  No malodor or purulent drainage.  Will modify wound care orders to lightly moistened Vashe gauze wet-to-dry.

## 2024-09-29 PROCEDURE — 11000004 HC SNF PRIVATE

## 2024-09-29 PROCEDURE — 25000003 PHARM REV CODE 250: Performed by: EMERGENCY MEDICINE

## 2024-09-29 PROCEDURE — 25000003 PHARM REV CODE 250: Performed by: PHYSICIAN ASSISTANT

## 2024-09-29 PROCEDURE — 94761 N-INVAS EAR/PLS OXIMETRY MLT: CPT

## 2024-09-29 RX ADMIN — APIXABAN 5 MG: 5 TABLET, FILM COATED ORAL at 08:09

## 2024-09-29 RX ADMIN — OXYBUTYNIN CHLORIDE 5 MG: 5 TABLET, EXTENDED RELEASE ORAL at 09:09

## 2024-09-29 RX ADMIN — CYANOCOBALAMIN TAB 500 MCG 500 MCG: 500 TAB at 09:09

## 2024-09-29 RX ADMIN — FAMOTIDINE 20 MG: 20 TABLET, FILM COATED ORAL at 09:09

## 2024-09-29 RX ADMIN — ACETAMINOPHEN 1000 MG: 500 TABLET ORAL at 08:09

## 2024-09-29 RX ADMIN — GABAPENTIN 600 MG: 600 TABLET, FILM COATED ORAL at 08:09

## 2024-09-29 RX ADMIN — PRAVASTATIN SODIUM 80 MG: 40 TABLET ORAL at 08:09

## 2024-09-29 RX ADMIN — Medication 1 CAPSULE: at 04:09

## 2024-09-29 RX ADMIN — METOPROLOL TARTRATE 25 MG: 25 TABLET, FILM COATED ORAL at 09:09

## 2024-09-29 RX ADMIN — Medication 1 CAPSULE: at 11:09

## 2024-09-29 RX ADMIN — APIXABAN 5 MG: 5 TABLET, FILM COATED ORAL at 09:09

## 2024-09-29 RX ADMIN — ESTRADIOL 2 MG: 0.5 TABLET ORAL at 09:09

## 2024-09-29 RX ADMIN — GABAPENTIN 600 MG: 600 TABLET, FILM COATED ORAL at 09:09

## 2024-09-29 RX ADMIN — DULOXETINE HYDROCHLORIDE 60 MG: 30 CAPSULE, DELAYED RELEASE ORAL at 09:09

## 2024-09-29 RX ADMIN — SENNOSIDES AND DOCUSATE SODIUM 1 TABLET: 8.6; 5 TABLET ORAL at 08:09

## 2024-09-29 RX ADMIN — SENNOSIDES AND DOCUSATE SODIUM 1 TABLET: 8.6; 5 TABLET ORAL at 09:09

## 2024-09-29 RX ADMIN — Medication 1 CAPSULE: at 09:09

## 2024-09-29 RX ADMIN — Medication 6 MG: at 08:09

## 2024-09-29 RX ADMIN — ACETAMINOPHEN 1000 MG: 500 TABLET ORAL at 06:09

## 2024-09-29 RX ADMIN — Medication 1 TABLET: at 09:09

## 2024-09-29 RX ADMIN — FERROUS SULFATE TAB EC 324 MG (65 MG FE EQUIVALENT) 1 EACH: 324 (65 FE) TABLET DELAYED RESPONSE at 09:09

## 2024-09-30 PROCEDURE — 63600175 PHARM REV CODE 636 W HCPCS: Performed by: EMERGENCY MEDICINE

## 2024-09-30 PROCEDURE — 97110 THERAPEUTIC EXERCISES: CPT | Mod: CQ

## 2024-09-30 PROCEDURE — 25000003 PHARM REV CODE 250: Performed by: PHYSICIAN ASSISTANT

## 2024-09-30 PROCEDURE — 97116 GAIT TRAINING THERAPY: CPT | Mod: CQ

## 2024-09-30 PROCEDURE — 94761 N-INVAS EAR/PLS OXIMETRY MLT: CPT

## 2024-09-30 PROCEDURE — 97110 THERAPEUTIC EXERCISES: CPT

## 2024-09-30 PROCEDURE — 11000004 HC SNF PRIVATE

## 2024-09-30 PROCEDURE — 99308 SBSQ NF CARE LOW MDM 20: CPT | Mod: ,,, | Performed by: EMERGENCY MEDICINE

## 2024-09-30 PROCEDURE — 25000003 PHARM REV CODE 250: Performed by: EMERGENCY MEDICINE

## 2024-09-30 RX ORDER — ASCORBIC ACID 500 MG
500 TABLET ORAL DAILY
Status: DISCONTINUED | OUTPATIENT
Start: 2024-09-30 | End: 2024-10-07 | Stop reason: HOSPADM

## 2024-09-30 RX ORDER — SODIUM CHLORIDE 9 MG/ML
INJECTION, SOLUTION INTRAVENOUS
Status: DISCONTINUED | OUTPATIENT
Start: 2024-09-30 | End: 2024-10-07 | Stop reason: HOSPADM

## 2024-09-30 RX ADMIN — Medication 1 CAPSULE: at 04:09

## 2024-09-30 RX ADMIN — DULOXETINE HYDROCHLORIDE 60 MG: 30 CAPSULE, DELAYED RELEASE ORAL at 09:09

## 2024-09-30 RX ADMIN — SODIUM CHLORIDE: 9 INJECTION, SOLUTION INTRAVENOUS at 04:09

## 2024-09-30 RX ADMIN — GABAPENTIN 600 MG: 600 TABLET, FILM COATED ORAL at 08:09

## 2024-09-30 RX ADMIN — APIXABAN 5 MG: 5 TABLET, FILM COATED ORAL at 08:09

## 2024-09-30 RX ADMIN — OXYCODONE HYDROCHLORIDE AND ACETAMINOPHEN 500 MG: 500 TABLET ORAL at 11:09

## 2024-09-30 RX ADMIN — APIXABAN 5 MG: 5 TABLET, FILM COATED ORAL at 09:09

## 2024-09-30 RX ADMIN — GABAPENTIN 600 MG: 600 TABLET, FILM COATED ORAL at 09:09

## 2024-09-30 RX ADMIN — Medication 1 TABLET: at 09:09

## 2024-09-30 RX ADMIN — PRAVASTATIN SODIUM 80 MG: 40 TABLET ORAL at 08:09

## 2024-09-30 RX ADMIN — FAMOTIDINE 20 MG: 20 TABLET, FILM COATED ORAL at 09:09

## 2024-09-30 RX ADMIN — ACETAMINOPHEN 325MG 650 MG: 325 TABLET ORAL at 03:09

## 2024-09-30 RX ADMIN — ESTRADIOL 2 MG: 0.5 TABLET ORAL at 09:09

## 2024-09-30 RX ADMIN — MEROPENEM 1 G: 1 INJECTION INTRAVENOUS at 04:09

## 2024-09-30 RX ADMIN — CYANOCOBALAMIN TAB 500 MCG 500 MCG: 500 TAB at 09:09

## 2024-09-30 RX ADMIN — Medication 1 CAPSULE: at 11:09

## 2024-09-30 RX ADMIN — Medication 1 CAPSULE: at 09:09

## 2024-09-30 RX ADMIN — FERROUS SULFATE TAB EC 324 MG (65 MG FE EQUIVALENT) 1 EACH: 324 (65 FE) TABLET DELAYED RESPONSE at 09:09

## 2024-09-30 RX ADMIN — ACETAMINOPHEN 1000 MG: 500 TABLET ORAL at 08:09

## 2024-09-30 RX ADMIN — METOPROLOL TARTRATE 25 MG: 25 TABLET, FILM COATED ORAL at 09:09

## 2024-09-30 RX ADMIN — OXYBUTYNIN CHLORIDE 5 MG: 5 TABLET, EXTENDED RELEASE ORAL at 09:09

## 2024-09-30 NOTE — PT/OT/SLP PROGRESS
Physical Therapy Treatment    Patient Name:  Bobbi Haywood   MRN:  [id number]    Recommendations:     Discharge Recommendations: Low Intensity Therapy  Discharge Equipment Recommendations: wheelchair  Barriers to discharge: None    Assessment:     Bobbi Haywood is a 73 y.o. female admitted with a medical diagnosis of generalized weakness. Patient presented to [facility] following 2 falls at home while trying to descend the 3 steps inside her home .  She presents with the following impairments/functional limitations: weakness, impaired endurance, impaired self care skills, impaired functional mobility, gait instability, impaired balance, decreased lower extremity function, decreased safety awareness, pain, decreased ROM     Bobbi tolerated therapy well with mild/moderate lower leg and knee pain. She did have some swelling in her R foot today.     Rehab Prognosis: Good; patient would benefit from acute skilled PT services to address these deficits and reach maximum level of function.    Recent Surgery: * No surgery found *      Plan:     During this hospitalization, patient to be seen 5 x/week to address the identified rehab impairments via gait training, therapeutic activities, therapeutic exercises, neuromuscular re-education and progress toward the following goals:    Plan of Care Expires:  10/09/24    Subjective     Chief Complaint: none voiced.  Patient/Family Comments/goals: to improve mobility and return home.  Pain/Comfort:  Pain Rating 1: 6/10  Location - Side 1: Right  Location - Orientation 1: lower  Location 1: leg  Pain Addressed 1: Pre-medicate for activity  Pain Rating Post-Intervention 1: 6/10  Pain Rating 2: 6/10  Location - Side 2: Right  Location - Orientation 2: anterior  Location 2: knee  Pain Addressed 2: Pre-medicate for activity  Pain Rating Post-Intervention 2: 6/10      Objective:     Communicated with patient prior to session.  Patient found supine with    upon PT entry to room.     General Precautions: Standard, fall  Orthopedic Precautions: N/A  Braces: N/A  Respiratory Status: Room air     Functional Mobility:  Bed Mobility:     Supine to Sit: stand by assistance  Transfers:     Sit to Stand:  stand by assistance with rolling walker  Gait: 180 feet SBA with RW in hallway on level surface.  Balance: standing Good      AM-PAC 6 CLICK MOBILITY  Turning over in bed (including adjusting bedclothes, sheets and blankets)?: 4  Sitting down on and standing up from a chair with arms (e.g., wheelchair, bedside commode, etc.): 3  Moving from lying on back to sitting on the side of the bed?: 4  Moving to and from a bed to a chair (including a wheelchair)?: 4  Need to walk in hospital room?: 3  Climbing 3-5 steps with a railing?: 1  Basic Mobility Total Score: 19       Treatment & Education:  TE: Scifit bike x 23 minutes for cardiopulmonary fitness and endurance     Gait: patient ambulated on level surface in room and hallway into therapy gym and back to room with RW for 180 feet with one standing rest break. She demonstrates reciprocal steps with good balance.    Patient left up in chair with  RN present..    GOALS:   Multidisciplinary Problems       Physical Therapy Goals          Problem: Physical Therapy    Goal Priority Disciplines Outcome Interventions   Physical Therapy Goal     PT, PT/OT Progressing    Description: Short-term Goals: 1.5 weeks  1. Patient will perform supine to/from sit with standby assist to improve independence and safety with bed mobility. Met.  2. Patient will perform sit to/from stand with a rolling walker with contact guard assist to improve independence and safety with transfers. Met.  3. Patient will ambulate 50 feet with a rolling walker with contact guard assist to improve independence and safety with gait. Met.  4. Patient will tolerate 15 minutes of physical therapy intervention to improve endurance and activity tolerance. Met.  5. Patient  will ascend/descend 3 steps with bilateral handrail support with contact guard assist to improve independence and safety with stair negotiation.     Long-term goals: 3 weeks  1. Patient will perform supine to/from sit with modified independence to improve independence and safety with bed mobility.  2. Patient will perform sit to/from stand with a rolling walker with standby assist to improve independence and safety with transfers.  3. Patient will ambulate 100 feet with a rolling walker with standby assist to improve independence and safety with gait. Met.  4. Patient will tolerate 30 minutes of physical therapy intervention to improve endurance and activity tolerance.  5. Patient will ascend/descend 3 steps with bilateral handrail support with standby assist to improve independence and safety with stair negotiation.                          Time Tracking:     PT Received On: 09/30/24  PT Start Time: 1000     PT Stop Time: 1038  PT Total Time (min): 38 min     Billable Minutes: Gait Training 15 and Therapeutic Exercise 23    Treatment Type: Treatment  PT/PTA: PTA     Number of PTA visits since last PT visit: 1 09/30/2024

## 2024-09-30 NOTE — SUBJECTIVE & OBJECTIVE
Inner r thigh  Outer r calf  Sores with good granulation tissue  Packed , non malodourous.  Pt seemingly satisfied with progress  Plan continue care    Sores no larger apply vashe daily qd

## 2024-09-30 NOTE — PT/OT/SLP PROGRESS
Occupational Therapy   Treatment    Name: Bobbi Haywood  MRN: [id number]  Admitting Diagnosis:  Muscle weakness       Recommendations:     Discharge Recommendations:    Discharge Equipment Recommendations:  wheelchair  Barriers to discharge:  Inaccessible home environment, Other (Comment) (limitations with 3 steps within her home)    Assessment:     Bobbi Haywood is a 73 y.o. female with a medical diagnosis of Muscle weakness.  She presents with increased BLE swelling. Performance deficits affecting function are weakness, decreased upper extremity function, impaired endurance, impaired balance, decreased safety awareness, impaired self care skills, impaired functional mobility, decreased coordination. Pt reports having pain in BLE but was agreeable to participate in bed thera ex. Pt and OT discussed her pain and she was willing to get up but ultimately decided to remain in bed    Rehab Prognosis:  Fair; patient would benefit from acute skilled OT services to address these deficits and reach maximum level of function.       Plan:     Patient to be seen 5 x/week to address the above listed problems via self-care/home management, therapeutic activities, therapeutic exercises, neuromuscular re-education  Plan of Care Expires: 10/18/24  Plan of Care Reviewed with: patient    Subjective     Chief Complaint: LE pain swelling  Patient/Family Comments/goals: increase ADLS and mobility  Pain/Comfort:       Objective:     Communicated with: Pt prior to session.  Patient found supine with   upon OT entry to room.    General Precautions: Standard, fall    Orthopedic Precautions:N/A  Braces: N/A  Respiratory Status: Room air     Occupational Performance:     AMPAC 6 Click ADL: 21    Treatment & Education:  Therapeutic exercises: x mins   While supine the pt completed     Pt completed in 2 sets of 20 reps of bicep curls  with 4# DB and lat pulls 2 sets of 20 reps reps with read theraband,  to enhance UB  strength ADL performance.         Patient left supine with all lines intact and call button in reach    GOALS:   Multidisciplinary Problems       Occupational Therapy Goals          Problem: Occupational Therapy    Goal Priority Disciplines Outcome Interventions   Occupational Therapy Goal     OT, PT/OT     Description: Goals to be met by: 10/18/24     Patient will increase functional independence with ADLs by performing:    UE Dressing with Modified [place].  LE Dressing with Modified [place] and Supervision with a/e as needed  Grooming while standing at sink with Modified [place] and Supervision.  Toileting from toilet with Modified [place] and Supervision for hygiene and clothing management.   Standing x3-5 minutes with Supervision.  Supine to sit with [place].  Squat pivot transfers with Modified [place] and Supervision.  Step transfer with Modified [place] and Supervision  Toilet transfer to toilet with Modified [place] and Supervision.                         Time Tracking:     OT Date of Treatment:    OT Start Time: 1510  OT Stop Time: 1530  OT Total Time (min): 20 min    Billable Minutes:Therapeutic Exercise 15    OT/TRISHA: OT     Number of TRISHA visits since last OT visit: 1    9/30/2024

## 2024-09-30 NOTE — SUBJECTIVE & OBJECTIVE
Past Medical History:   Diagnosis Date    A-fib     Abnormal gait     Abscess of right lower extremity 09/10/2024    Acquired deformity of both ankles     Acute blood loss anemia     INOCENTE (acute kidney injury) 09/10/2024    Chronic pain     Contusion of right lower extremity     Degenerative joint disease of cervical and lumbar spine     Depression     E-coli UTI 09/10/2024    Fall 09/10/2024    Hematoma     Hypertension     Hypomagnesemia 09/10/2024    Iron deficiency anemia, unspecified     Neuropathy     Neuropathy     Spinal stenosis of cervical region     Spinal stenosis of lumbar region at multiple levels     Spondylolysis of lumbar region        Past Surgical History:   Procedure Laterality Date    CATARACT EXTRACTION, BILATERAL      CHOLECYSTECTOMY      HYSTERECTOMY      INCISION AND DRAINAGE OF ABSCESS Right 9/12/2024    Procedure: INCISION AND DRAINAGE, ABSCESS;  Surgeon: Joe Alberto MD;  Location: [facility];  Service: General;  Laterality: Right;  right thigh and right calf    INJECTION OF ANESTHETIC AGENT AROUND MEDIAL BRANCH NERVES INNERVATING LUMBAR FACET JOINT Bilateral 8/26/2021    Procedure: Bilateral L4-5,5-S1 MBB;  Surgeon: Sulma Nelson MD;  Location: [facility];  Service: Pain Management;  Laterality: Bilateral;  Per Dr Bradford ok to hold Eliquis for 4 days not 5, Per Dr Nelson this is fine with her. Pt will bring her verification card for COVID vaccination.    INJECTION OF ANESTHETIC AGENT AROUND MEDIAL BRANCH NERVES INNERVATING LUMBAR FACET JOINT Bilateral 10/5/2021    Procedure: Bilateral L4-5,5-S1 MBB;  Surgeon: Sulma Nelson MD;  Location: [facility];  Service: Pain Management;  Laterality: Bilateral;  PT AWARE ON OV TO BE TESTED    KNEE CARTILAGE SURGERY Right     RADIOFREQUENCY ABLATION OF LUMBAR MEDIAL BRANCH NERVE AT SINGLE LEVEL Bilateral 11/9/2021    Procedure: Radiofrequency Ablation, Nerve, Spinal, Lumbar, Medial Branch, 1 Level L4-S1  BILATERAL;   Surgeon: Sulma Nelson MD;  Location: [facility];  Service: Pain Management;  Laterality: Bilateral;  per lessia pt is on eliquis but she will get permission for it to be held 5 days prior to procedure.   HAD VAC   CARD SCANNED IN    REDUCTION OF BOTH BREASTS      RETINAL DETACHMENT SURGERY  right    VAGINAL DELIVERY      X1       Review of patient's allergies indicates:   Allergen Reactions    Sulfa (sulfonamide antibiotics) Hives and Other (See Comments)     Sore Throat, Sore Mouth       No current facility-administered medications on file prior to encounter.     Current Outpatient Medications on File Prior to Encounter   Medication Sig    cyanocobalamin 500 MCG tablet Take 500 mcg by mouth once daily.    DULoxetine (CYMBALTA) 60 MG capsule Take 1 capsule by mouth once daily.    ELIQUIS 5 mg Tab Take 5 mg by mouth 2 (two) times daily.    estradioL (ESTRACE) 2 MG tablet Take 1 tablet by mouth once daily.    ferrous sulfate 325 (65 FE) MG EC tablet Take 1 tablet (325 mg total) by mouth once daily.    fesoterodine (TOVIAZ) 4 mg Tb24 Take 4 mg by mouth once daily.    folic acid (FOLVITE) 800 MCG Tab Take 800 mcg by mouth once daily.    gabapentin (NEURONTIN) 600 MG tablet Take 1 tablet by mouth 2 (two) times a day.    L. rhamnosus/C/zinc/elderberry (CULTURELLE IMMUNE DEFENSE ORAL) Take 1 tablet by mouth once daily.    L.acidoph,saliva/B.bif/S.therm (ACIDOPHILUS PROBIOTIC BLEND ORAL) Take 1 tablet by mouth once daily.    metoprolol tartrate (LOPRESSOR) 25 MG tablet Take 1 tablet by mouth once daily.    rosuvastatin (CRESTOR) 20 MG tablet Take 20 mg by mouth every evening.     Family History       Problem Relation (Age of Onset)    Atrial fibrillation Mother    Diabetes Paternal Grandfather    Hypertension Mother    Stroke Father          Tobacco Use    Smoking status: Never    Smokeless tobacco: Never   Substance and Sexual Activity    Alcohol use: Not Currently    Drug use: Not on file    Sexual activity:  Not on file   HOSPITAL COURSE  PT IS PARTICIPATING WITH PT/OT PERFORMING BED MOBILITY WITH SBA, TRANSFERS WITH SBA WITH RW, AND AMBULATING 180  FEET WITH SBA WITH RW WITH GOOD STANDING BALANCE.  PT HAS RLE WOUNDS WITH CONTINUED EVALUATION PER THE WOUND CARE TEAM WITH RECOMMENDATIONS CONTINUED WITH WOUNDS OF THE RLE AND SACRAL PRESSURE WITH MINIMAL IMPROVEMENT AND POSITIVE CULTURE AFTER COMPLETION OF AUGMENTIN  FOR ACINETOBACTER IWOFFII AND WILL BE TREATED WITH MERREM IV. PT HAS INCREASED EDEMA RLE WITH VENOUS DOPPLERS NEGATIVE BLE. PT  WILL BE FOLLOWED UP PER DR HIGHTOWER IN THE CLINIC 10/07/2024.  PT HAS RA O2 SAT 98 %. PT IS BEING TREATED PER RESPIRATORY THERAPY WITH INCENTIVE SPIROMETRY AND O2 MONITORING.  THE PHARMACY CONTINUES TO MONITOR ALL MEDICATIONS. RD CONTINUES TO CONSULT WITH DIET LIBERALIZED TO A REGULAR DIET WITH % AND CONTINUED TREATMENT WITH DONNA AND VIT C.  PT HAS STABLE LABS EXCEPT FOR BMP: BUN/CREAT 38/1.50 (30/1.45) , CBC: WBC 10.94 K (14 K), H/H (8.5/28) 8.9/28.8.    Review of Systems   Constitutional:  Positive for activity change and fatigue.   HENT:  Positive for sore throat. Negative for congestion.    Eyes: Negative.    Respiratory:  Negative for cough (MINIMAL).    Cardiovascular: Negative.    Gastrointestinal: Negative.    Endocrine: Negative.    Genitourinary: Negative.    Musculoskeletal:  Positive for arthralgias, back pain, gait problem (RLE), myalgias and neck pain.   Skin:  Positive for wound.   Allergic/Immunologic: Positive for immunocompromised state.   Neurological:  Positive for weakness. Negative for tremors.   Hematological:  Bruises/bleeds easily.   Psychiatric/Behavioral: Negative.       Objective:     Vital Signs (Most Recent):  Temp: 97.4 °F (36.3 °C) (09/30/24 0734)  Pulse: 74 (09/30/24 0734)  Resp: (!) 22 (09/30/24 0734)  BP: 128/66 (09/30/24 0734)  SpO2: 98 % (09/30/24 0734) Vital Signs (24h Range):  Temp:  [97.4 °F (36.3 °C)] 97.4 °F (36.3 °C)  Pulse:  [53-74]  74  Resp:  [19-22] 22  SpO2:  [95 %-98 %] 98 %  BP: (120-128)/(66-73) 128/66     Weight: 104.3 kg (230 lb)  Body mass index is 32.08 kg/m².     Physical Exam  Vitals and nursing note reviewed. Exam conducted with a chaperone present.   Constitutional:       Appearance: She is obese. She is ill-appearing.   HENT:      Head: Normocephalic and atraumatic.      Right Ear: Tympanic membrane normal.      Left Ear: Tympanic membrane normal.      Nose: Nose normal.      Mouth/Throat:      Mouth: Mucous membranes are moist.   Eyes:      Extraocular Movements: Extraocular movements intact.      Conjunctiva/sclera: Conjunctivae normal.      Pupils: Pupils are equal, round, and reactive to light.   Cardiovascular:      Rate and Rhythm: Normal rate.      Pulses: Normal pulses.      Heart sounds: Normal heart sounds.   Pulmonary:      Effort: Pulmonary effort is normal. No respiratory distress.      Breath sounds: Normal breath sounds.   Abdominal:      General: Bowel sounds are normal. There is no distension.      Palpations: Abdomen is soft.      Tenderness: There is no abdominal tenderness.   Musculoskeletal:         General: Swelling (RLE), tenderness (RLE) and deformity (B ANKLES/FEET) present. Normal range of motion.      Cervical back: Normal range of motion.      Right lower leg: Right lower leg edema: RLE.   Skin:     General: Skin is warm.      Capillary Refill: Capillary refill takes less than 2 seconds.      Coloration: Skin is pale.      Findings: Bruising, erythema (RLE) and lesion (POST OP ABSCESS I AND D RLOWER LEG LATERALLY AND MEDIAL THIGH SEE  AND SACRAL PRESSURE INJURY SEE , MINOR ADDITIOAN LESIONS SEE ) present.   Neurological:      General: No focal deficit present.      Mental Status: She is alert and oriented to person, place, and time.      Motor: Weakness present.   Psychiatric:         Mood and Affect: Mood normal.         Behavior: Behavior normal.         Thought  Content: Thought content normal.         Judgment: Judgment normal.                                          CRANIAL NERVES     CN III, IV, VI   Pupils are equal, round, and reactive to light.  Significant Labs: All pertinent labs within the past 24 hours have been reviewed.  SEE HOSPITAL COURSE  Significant Imaging: I have reviewed all pertinent imaging results/findings within the past 24 hours.  U/S: I have reviewed all pertinent results/findings within the past 24 hours and my personal findings are:  NEG

## 2024-10-01 LAB
ANION GAP SERPL CALCULATED.3IONS-SCNC: 12 MMOL/L (ref 7–16)
BASOPHILS # BLD AUTO: 0.11 K/UL (ref 0–0.2)
BASOPHILS NFR BLD AUTO: 1 % (ref 0–1)
BUN SERPL-MCNC: 38 MG/DL (ref 7–18)
BUN/CREAT SERPL: 25 (ref 6–20)
CALCIUM SERPL-MCNC: 8.7 MG/DL (ref 8.5–10.1)
CHLORIDE SERPL-SCNC: 102 MMOL/L (ref 98–107)
CO2 SERPL-SCNC: 28 MMOL/L (ref 21–32)
CREAT SERPL-MCNC: 1.5 MG/DL (ref 0.55–1.02)
DIFFERENTIAL METHOD BLD: ABNORMAL
EGFR (NO RACE VARIABLE) (RUSH/TITUS): 36 ML/MIN/1.73M2
EOSINOPHIL # BLD AUTO: 0.14 K/UL (ref 0–0.5)
EOSINOPHIL NFR BLD AUTO: 1.3 % (ref 1–4)
ERYTHROCYTE [DISTWIDTH] IN BLOOD BY AUTOMATED COUNT: 16.6 % (ref 11.5–14.5)
GLUCOSE SERPL-MCNC: 105 MG/DL (ref 74–106)
HCT VFR BLD AUTO: 28 % (ref 38–47)
HGB BLD-MCNC: 8.5 G/DL (ref 12–16)
LYMPHOCYTES # BLD AUTO: 2.2 K/UL (ref 1–4.8)
LYMPHOCYTES NFR BLD AUTO: 20.1 % (ref 27–41)
MCH RBC QN AUTO: 29.5 PG (ref 27–31)
MCHC RBC AUTO-ENTMCNC: 30.4 G/DL (ref 32–36)
MCV RBC AUTO: 97.2 FL (ref 80–96)
MONOCYTES # BLD AUTO: 1.66 K/UL (ref 0–0.8)
MONOCYTES NFR BLD AUTO: 15.2 % (ref 2–6)
MPC BLD CALC-MCNC: 10.4 FL (ref 9.4–12.4)
NEUTROPHILS # BLD AUTO: 6.83 K/UL (ref 1.8–7.7)
NEUTROPHILS NFR BLD AUTO: 62.4 % (ref 53–65)
PLATELET # BLD AUTO: 368 K/UL (ref 150–400)
POTASSIUM SERPL-SCNC: 4.4 MMOL/L (ref 3.5–5.1)
RBC # BLD AUTO: 2.88 M/UL (ref 4.2–5.4)
SODIUM SERPL-SCNC: 138 MMOL/L (ref 136–145)
WBC # BLD AUTO: 10.94 K/UL (ref 4.5–11)

## 2024-10-01 PROCEDURE — 97110 THERAPEUTIC EXERCISES: CPT

## 2024-10-01 PROCEDURE — 25000003 PHARM REV CODE 250: Performed by: PHYSICIAN ASSISTANT

## 2024-10-01 PROCEDURE — 85025 COMPLETE CBC W/AUTO DIFF WBC: CPT | Performed by: PHYSICIAN ASSISTANT

## 2024-10-01 PROCEDURE — 97530 THERAPEUTIC ACTIVITIES: CPT

## 2024-10-01 PROCEDURE — 97116 GAIT TRAINING THERAPY: CPT

## 2024-10-01 PROCEDURE — 25000003 PHARM REV CODE 250: Performed by: EMERGENCY MEDICINE

## 2024-10-01 PROCEDURE — 94761 N-INVAS EAR/PLS OXIMETRY MLT: CPT

## 2024-10-01 PROCEDURE — 63600175 PHARM REV CODE 636 W HCPCS: Performed by: EMERGENCY MEDICINE

## 2024-10-01 PROCEDURE — 11000004 HC SNF PRIVATE

## 2024-10-01 PROCEDURE — 80048 BASIC METABOLIC PNL TOTAL CA: CPT | Performed by: PHYSICIAN ASSISTANT

## 2024-10-01 PROCEDURE — 36415 COLL VENOUS BLD VENIPUNCTURE: CPT | Performed by: PHYSICIAN ASSISTANT

## 2024-10-01 RX ADMIN — ACETAMINOPHEN 1000 MG: 500 TABLET ORAL at 10:10

## 2024-10-01 RX ADMIN — SODIUM CHLORIDE: 9 INJECTION, SOLUTION INTRAVENOUS at 04:10

## 2024-10-01 RX ADMIN — Medication 1 CAPSULE: at 01:10

## 2024-10-01 RX ADMIN — GABAPENTIN 600 MG: 600 TABLET, FILM COATED ORAL at 08:10

## 2024-10-01 RX ADMIN — APIXABAN 5 MG: 5 TABLET, FILM COATED ORAL at 08:10

## 2024-10-01 RX ADMIN — FERROUS SULFATE TAB EC 324 MG (65 MG FE EQUIVALENT) 1 EACH: 324 (65 FE) TABLET DELAYED RESPONSE at 08:10

## 2024-10-01 RX ADMIN — METOPROLOL TARTRATE 25 MG: 25 TABLET, FILM COATED ORAL at 08:10

## 2024-10-01 RX ADMIN — OXYBUTYNIN CHLORIDE 5 MG: 5 TABLET, EXTENDED RELEASE ORAL at 08:10

## 2024-10-01 RX ADMIN — Medication 1 CAPSULE: at 04:10

## 2024-10-01 RX ADMIN — MEROPENEM 1 G: 1 INJECTION INTRAVENOUS at 04:10

## 2024-10-01 RX ADMIN — CYANOCOBALAMIN TAB 500 MCG 500 MCG: 500 TAB at 08:10

## 2024-10-01 RX ADMIN — PRAVASTATIN SODIUM 80 MG: 40 TABLET ORAL at 08:10

## 2024-10-01 RX ADMIN — Medication 1 TABLET: at 08:10

## 2024-10-01 RX ADMIN — Medication 1 CAPSULE: at 08:10

## 2024-10-01 RX ADMIN — FAMOTIDINE 20 MG: 20 TABLET, FILM COATED ORAL at 08:10

## 2024-10-01 RX ADMIN — OXYCODONE HYDROCHLORIDE AND ACETAMINOPHEN 500 MG: 500 TABLET ORAL at 08:10

## 2024-10-01 RX ADMIN — ACETAMINOPHEN 1000 MG: 500 TABLET ORAL at 06:10

## 2024-10-01 RX ADMIN — DULOXETINE HYDROCHLORIDE 60 MG: 30 CAPSULE, DELAYED RELEASE ORAL at 08:10

## 2024-10-01 RX ADMIN — ESTRADIOL 2 MG: 0.5 TABLET ORAL at 08:10

## 2024-10-01 NOTE — PLAN OF CARE
[facility] - Medical Surgical Unit - Swing Bed   Interdisciplinary Team Meeting    Patient: Bobbi Haywood   Today's Date: 10/1/2024   Estimated D/C Date: 10/9/2024        Physician: Nurse Practitioner:  Fermín Banda NP   Pharmacy:Erick Beal, Pharm D Unit Director:  JOHN Maldonado   : Ghazal Parker RN Physical/Occupational Therapy: Nadeen Eaton OT   Speech Therapy: ST Juarez    Nursing:  Cherie Paul RN  Respiratory: Lorena Benito, Resp. Dietary:  Onel Morales, Dietary  Other:      Nurse  New Symptoms/Problems: K+-5.2 on 9/30/24  Last Bowel Movement: 09/30/24  Urine: continent  Francisco: No  Bowel: continent   Constipated: No  Diarrhea: No   Isolation: No  DC Date of Isolation: N/A  Cognition: WNL  Aspiration Precaution: No  Wound Care: Yes  Wound Location/Tx: Rt. Thigh and calf areas  Comment(s): wound care per orders daily    Respiratory   O2 Device: Room Air  O2 Flow: N/A  SpO2: 96%  Neb Tx: No  Comment(s): N/A    Dietary   Nutrition: Regular  Comment(s): Fawad BID    Speech Therapy  Speech/Swallowing: No current speech or swallowing issues  Comment(s): N/A    Physical Therapy  Gait/Assistive Device: ambulated 180 ft with RW at SBA ELOS: Plan to DC 10/9/2024    Transfers: Standby Assistance  Bed Mobility: Standby Assistance Range of Motion/Restrictions:   Comment(s):      Occupational Therapy  Eating/Grooming: Independent Toileting: Contact Guard Assistance   Bathing: Minimal Assistance Dressing (Upper Body): Modified Independent   Dressing (Lower Body): Minimal Assistance Comment(s):      Pharmacy  Medication Changes (see MD orders in chart): No  Labs Reviewed: Yes  New Lab Orders: Yes  Comment(s): Labs are reviewed weekly      Tx Plan/Recommendations reviewed with family and/or patient on (date) 9/30/24.  Additional family Conference/Training:   D/C Plan/Recommendations: Home with HH and Home with family  SHASHI: 10/9/2024  Comment(s):  Plan is for d/c home with spouse and HH services

## 2024-10-01 NOTE — PT/OT/SLP PROGRESS
Occupational Therapy   Treatment    Name: Bobbi Haywood  MRN: [id number]  Admitting Diagnosis:  Muscle weakness       Recommendations:     Discharge Recommendations:    Discharge Equipment Recommendations:  wheelchair  Barriers to discharge:  Inaccessible home environment, Other (Comment) (limitations with 3 steps within her home)    Assessment:     Bobbi Haywood is a 73 y.o. female with a medical diagnosis of Muscle weakness.  She presents with R ankle instability, strength, and safety. Performance deficits affecting function are weakness, decreased upper extremity function, impaired endurance, impaired balance, decreased safety awareness, impaired self care skills, impaired functional mobility, decreased coordination. Pt's overall ADL performance has improved since admission but she continues to have limitation from wounds    Rehab Prognosis:  Fair; patient would benefit from acute skilled OT services to address these deficits and reach maximum level of function.       Plan:     Patient to be seen 5 x/week to address the above listed problems via self-care/home management, therapeutic activities, therapeutic exercises, neuromuscular re-education  Plan of Care Expires: 10/18/24  Plan of Care Reviewed with: patient    Subjective     Chief Complaint: decreased mobility;   Patient/Family Comments/goals: increase mobility and ADL performance  Pain/Comfort:       Objective:     Communicated with: Pt prior to session.  Patient found up in chair with   upon OT entry to room.    General Precautions: Standard, fall    Orthopedic Precautions:N/A  Braces: N/A  Respiratory Status: Room air     Occupational Performance:     Therapeutic activity: x14 mins    Functional Mobility/Transfers:  Patient completed Sit <> Stand Transfer with supervision  with  rolling walker with cues form OT for safety and balance  Functional Mobility: Pt completed functional ambulation down the hallway using the RW with Min A from  OT for safety and balance         [facility] 6 Click ADL: 20    Treatment & Education:  Therapeutic exercises: x 20 mins     Pt [id number] mins on arm ergometer to increase endurance, BUE strength and activity tolerance for ADL performance      Pt completed in 2 sets of 20 reps of bicep curls  with 4# DB and lat pulls 2 sets of 20 reps reps with read theraband,  to enhance UB strength ADL performance.       Patient left supine with all lines intact and call button in reach    GOALS:   Multidisciplinary Problems       Occupational Therapy Goals          Problem: Occupational Therapy    Goal Priority Disciplines Outcome Interventions   Occupational Therapy Goal     OT, PT/OT     Description: Goals to be met by: 10/18/24     Patient will increase functional independence with ADLs by performing:    UE Dressing with Modified [place].  LE Dressing with Modified [place] and Supervision with a/e as needed  Grooming while standing at sink with Modified [place] and Supervision.  Toileting from toilet with Modified [place] and Supervision for hygiene and clothing management.   Standing x3-5 minutes with Supervision.  Supine to sit with [place].  Squat pivot transfers with Modified [place] and Supervision.  Step transfer with Modified [place] and Supervision  Toilet transfer to toilet with Modified [place] and Supervision.                         Time Tracking:     OT Date of Treatment:    OT Start Time: 1359  OT Stop Time: 1433  OT Total Time (min): 34 min    Billable Minutes:Therapeutic Activity 14  Therapeutic Exercise 20    OT/TRISHA: OT     Number of TRISHA visits since last OT visit: 1    10/1/2024

## 2024-10-01 NOTE — PT/OT/SLP PROGRESS
Physical Therapy Treatment    Patient Name:  Bobbi Haywood   MRN:  [id number]    Recommendations:     Discharge Recommendations: Low Intensity Therapy  Discharge Equipment Recommendations: wheelchair  Barriers to discharge: None    Assessment:     Bobbi Haywood is a 73 y.o. female admitted with a medical diagnosis of generalized weakness. Patient presented to [facility] following 2 falls at home while trying to descend the 3 steps inside her home .  She presents with the following impairments/functional limitations: weakness, impaired functional mobility, gait instability, impaired balance, decreased lower extremity function, decreased ROM     Bobbi tolerated therapy well today. She is progressing well with mobility. She was able to perform 4 stair steps today.    Rehab Prognosis: Good; patient would benefit from acute skilled PT services to address these deficits and reach maximum level of function.    Recent Surgery: * No surgery found *      Plan:     During this hospitalization, patient to be seen 5 x/week to address the identified rehab impairments via gait training, therapeutic activities, therapeutic exercises and progress toward the following goals:    Plan of Care Expires:  10/09/24    Subjective     Chief Complaint: none voiced.  Patient/Family Comments/goals: to improve mobility and return home.  Pain/Comfort:  Pain Rating 1: 0/10      Objective:     Communicated with patient prior to session.  Patient found supine with   upon PT entry to room.     General Precautions: Standard, fall  Orthopedic Precautions: N/A  Braces: N/A  Respiratory Status: Room air     Functional Mobility:  Bed Mobility:     Supine to Sit: modified independence  Transfers:     Sit to Stand:  stand by assistance with rolling walker  Gait: 180 feet SBA with RW in hallway on level surface.  Balance: standing Good      AM-PAC 6 CLICK MOBILITY  Turning over in bed (including adjusting bedclothes,  sheets and blankets)?: 4  Sitting down on and standing up from a chair with arms (e.g., wheelchair, bedside commode, etc.): 4  Moving from lying on back to sitting on the side of the bed?: 4  Moving to and from a bed to a chair (including a wheelchair)?: 4  Need to walk in hospital room?: 4  Climbing 3-5 steps with a railing?: 3  Basic Mobility Total Score: 23       Treatment & Education:  TE: patient performed seated hamstring curls green band 3 x15; hip abduction/adduction green band 3 x 15; long arc quad 3 x 15.    Gait: patient ambulated on level surface in room and hallway into therapy gym and back to room with RW for 180 feet x 2 trials. She demonstrates reciprocal steps with good balance but has forward leaning posture and slow gait speed. Verbal cues provided for postural awareness. Patient also ambulated up and down 4 steps with bilateral handrail support SBA.    Patient left up in chair with  RN present..    GOALS:   Multidisciplinary Problems       Physical Therapy Goals          Problem: Physical Therapy    Goal Priority Disciplines Outcome Interventions   Physical Therapy Goal     PT, PT/OT Progressing    Description: Short-term Goals: 1.5 weeks  1. Patient will perform supine to/from sit with standby assist to improve independence and safety with bed mobility. Met.  2. Patient will perform sit to/from stand with a rolling walker with contact guard assist to improve independence and safety with transfers. Met.  3. Patient will ambulate 50 feet with a rolling walker with contact guard assist to improve independence and safety with gait. Met.  4. Patient will tolerate 15 minutes of physical therapy intervention to improve endurance and activity tolerance. Met.  5. Patient will ascend/descend 3 steps with bilateral handrail support with contact guard assist to improve independence and safety with stair negotiation.     Long-term goals: 3 weeks  1. Patient will perform supine to/from sit with modified  independence to improve independence and safety with bed mobility.  2. Patient will perform sit to/from stand with a rolling walker with standby assist to improve independence and safety with transfers.  3. Patient will ambulate 100 feet with a rolling walker with standby assist to improve independence and safety with gait. Met.  4. Patient will tolerate 30 minutes of physical therapy intervention to improve endurance and activity tolerance.  5. Patient will ascend/descend 3 steps with bilateral handrail support with standby assist to improve independence and safety with stair negotiation.                          Time Tracking:     PT Received On: 10/01/24  PT Start Time: 1120     PT Stop Time: 1200  PT Total Time (min): 40 min     Billable Minutes: Gait Training 25 and Therapeutic Exercise 15    Treatment Type: Treatment  PT/PTA: PT     Number of PTA visits since last PT visit: 0     10/01/2024

## 2024-10-01 NOTE — PLAN OF CARE
Pt. Wound culture obtained from Rt. Leg shows Acinetobacter lwoffi group and pt. Has now been started on IV Merrem every 12 hr for infection. Pt. Wound care orders were also changed last week d/t being too moist. Pt. Also had a ultrasound of BLE yesterday afternoon d/t increased swelling and warmth to RLE. Ultrasound showed no DVT. Pt. Is unable to care for her wounds at home and spouse cannot provide much assistance to her. Pt. Also has 3 steps to go up and down to go to bathroom and to was clothes in her home and has been unable to navigate this. Pt. Will need to continue with wound care and IV antibiotics to ensure that wounds are healing and infection has cleared. Appt. With Dr. Alberto rescheduled for 10/7/24 at 2:15 pm. Pt. And family made aware. Will cont. To follow pt.

## 2024-10-01 NOTE — PROGRESS NOTES
[facility] - Medical Surgical Unit  Adult Nutrition  Follow-up Note         Reason for Assessment  Reason For Assessment: RD follow-up   Nutrition Risk Screen: no indicators present    Assessment and Plan  10/1/2024: Patient remains on a Regular diet with Fawad BID to aid in wound healing. Intake 100% per flowsheet. Recommend continue current diet as tolerated. Encourage continued good PO intakes. Current weight 104.3kg. Last BM 9/30 per flowsheet. RD following.     9/24/2024: RD follow up. Patient liberalized to a Regular diet on 9/18 and tolerating well. Documented intakes generally 100%, however only 50% the last day or so. Patient also receiving Fawad BID to aid in wound healing. Recommend continue current POC as tolerated. Encourage good PO intakes. Current weight 103.4kg. Last BM 9/23 per flowsheet. RD following.    9/18/2024: Consult received and appreciated. Patient admitted 9/17 from [facility] with a dx of INOCENTE, Debility, and cellulitis of R leg. Patient is ordered a cardiac diet with 50% intakes per flowsheets. Noted skin breakdown to sacral area (stage 2 pressure injury).    Patient is 100.7 kg with a BMI of 30.96. Recommend to liberalize diet to regular to increase po intakes. Will add Fawad BID to aid in wound healing of decubitus. Encourage po intakes. RD Following.         Learning Needs/Social Determinants of Health  Learning Assessment       09/17/2024 1724 [facility] - Medical Surgical Unit (9/17/2024 - Present)   Created by Rajwinder Bailon RN - RN (Nurse) Status: Complete                 PRIMARY LEARNER     Primary Learner Name:  Bobbi Haywood CT - 09/17/2024 1724    Relationship:  Patient CT - 09/17/2024 1724    Does the primary learner have any barriers to learning?:  No Barriers CT - 09/17/2024 1724    What is the preferred language of the primary learner?:  English CT - 09/17/2024 1724    Is an  required?:  No CT - 09/17/2024 1724    How does the primary  learner prefer to learn new concepts?:  Listening, Reading CT - 09/17/2024 1724    How often do you need to have someone help you read instructions, pamphlets, or written material from your doctor or pharmacy?:  Often CT - 09/17/2024 1724        CO-LEARNER #1     No question answered        CO-LEARNER #2     No question answered        SPECIAL TOPICS     No question answered        ANSWERED BY:     No question answered        Comments         Edit History       Rajwinder Bailon RN - RN (Nurse)   09/17/2024 1724                           Social Drivers of Health     Tobacco Use: Low Risk  (9/27/2024)    Patient History     Smoking Tobacco Use: Never     Smokeless Tobacco Use: Never     Passive Exposure: Not on file   Alcohol Use: Not At Risk (9/18/2024)    AUDIT-C     Frequency of Alcohol Consumption: Never     Average Number of Drinks: Patient does not drink     Frequency of Binge Drinking: Never   Financial Resource Strain: Low Risk  (9/18/2024)    Overall Financial Resource Strain (CARDIA)     Difficulty of Paying Living Expenses: Not hard at all   Food Insecurity: No Food Insecurity (9/18/2024)    Hunger Vital Sign     Worried About Running Out of Food in the Last Year: Never true     Ran Out of Food in the Last Year: Never true   Transportation Needs: No Transportation Needs (9/18/2024)    TRANSPORTATION NEEDS     Transportation : No   Physical Activity: Inactive (9/18/2024)    Exercise Vital Sign     Days of Exercise per Week: 0 days     Minutes of Exercise per Session: 0 min   Stress: No Stress Concern Present (9/18/2024)    [employer] - Occupational Stress Questionnaire     Feeling of Stress : Only a little   Housing Stability: Low Risk  (9/18/2024)    Housing Stability Vital Sign     Unable to Pay for Housing in the Last Year: No     Homeless in the Last Year: No   Depression: Not on file   Utilities: Not At Risk (9/18/2024)    [facility] Utilities     Threatened with loss of  "utilities: No   Health Literacy: Adequate Health Literacy (9/18/2024)     Health Literacy     Frequency of need for help with medical instructions: Never   Social Isolation: Socially Integrated (9/18/2024)    Social Isolation     Social Isolation: 1            Malnutrition  Is Patient Malnourished: No    Nutrition Diagnosis  Altered nutrition related laboratory values related to Renal dysfunction as evidenced by dx INOCENTE with elevated Bun/Cr  Comments:     No results for input(s): "GLU", "POCGLU" in the last 72 hours.    Comments on Glucose: elevated no pmh Dm noted    Nutrition Prescription / Recommendations  Recommendation/Intervention: Recommend to change diet to regular  Goals: po intakes 75% during admission  Nutrition Goal Status: goal met  Current Diet Order: Cardiac  Nutrition Order Comments: 50-75% intakes  Chewing or Swallowing Difficulty?: No Chewing or swallowing difficulty  Recommended Diet: Regular  Recommended Oral Supplement: Fawad [90 kcals, 2.5g Protein, 10g Carbs(3g Sugar), 7g L-Arginine, 7g L-Glutamine, Vitamin C 300mg, 9.5mg Zinc] 2 times a day  Is Nutrition Support Recommended: [facility] Support: No  Is Nutrition Education Recommended: No    Monitor and Evaluation  % current Intake: P.O. intake of 75 - 100 %  % intake to meet estimated needs: 75 - 100 %  Food and Nutrient Intake: energy intake, food and beverage intake  Food and Nutrient Adminstration: diet order  Anthropometric Measurements: height/length, weight, weight change, body mass index  Biochemical Data, Medical Tests and Procedures: electrolyte and renal panel, gastrointestinal profile, glucose/endocrine profile, inflammatory profile, lipid profile  Tolerance: tolerating    Current Medical Diagnosis and Past Medical History     Past Medical History:   Diagnosis Date    A-fib     Abnormal gait     Abscess of right lower extremity 09/10/2024    Acquired deformity of both ankles     Acute blood loss anemia     INOCENTE (acute " "kidney injury) 09/10/2024    Chronic pain     Contusion of right lower extremity     Degenerative joint disease of cervical and lumbar spine     Depression     E-coli UTI 09/10/2024    Fall 09/10/2024    Hematoma     Hypertension     Hypomagnesemia 09/10/2024    Iron deficiency anemia, unspecified     Neuropathy     Neuropathy     Spinal stenosis of cervical region     Spinal stenosis of lumbar region at multiple levels     Spondylolysis of lumbar region        Nutrition/Diet History  Spiritual, Cultural Beliefs, Sabianism Practices, Values that Affect Care: no  Food Allergies: NKFA  Factors Affecting Nutritional Intake: None identified at this time    Lab/Procedures/Meds  No results for input(s): "NA", "K", "BUN", "CREATININE", "CALCIUM", "ALBUMIN", "CL", "ALT", "AST", "PHOS" in the last 72 hours.      Last A1c: No results found for: "HGBA1C"  Lab Results   Component Value Date    RBC 2.93 (L) 09/24/2024    HGB 8.9 (L) 09/24/2024    HCT 28.8 (L) 09/24/2024    MCV 98.3 (H) 09/24/2024    MCH 30.4 09/24/2024    MCHC 30.9 (L) 09/24/2024    TIBC 312 09/11/2024   Note: H&H low    Pertinent Labs Reviewed: reviewed  Pertinent Medications Reviewed: reviewed  Scheduled Meds:   apixaban  5 mg Oral BID    ascorbic acid (vitamin C)  500 mg Oral Daily    cyanocobalamin  500 mcg Oral Daily    DULoxetine  60 mg Oral Daily    estradioL  2 mg Oral Daily    famotidine  20 mg Oral Daily    ferrous sulfate  1 tablet Oral Daily    folic acid-vit B6-vit B12 2.5-25-2 mg  1 tablet Oral Daily    gabapentin  600 mg Oral BID    Lactobacillus acidophilus  1 capsule Oral TID WM    meropenem (MERREM) extended infusion  1 g Intravenous Q12H    metoprolol tartrate  25 mg Oral Daily    oxybutynin  5 mg Oral Daily    pravastatin  80 mg Oral QHS    senna-docusate 8.6-50 mg  1 tablet Oral BID     Continuous Infusions:  PRN Meds:.  Current Facility-Administered Medications:     0.9% NaCl, , Intravenous, PRN    acetaminophen, 1,000 mg, Oral, Q6H PRN    " "acetaminophen, 650 mg, Oral, Q6H PRN    calcium carbonate, 500 mg, Oral, BID PRN    melatonin, 6 mg, Oral, Nightly PRN    ondansetron, 4 mg, Oral, Q8H PRN    Anthropometrics  Temp: 97.5 °F (36.4 °C)  Height Method: Stated  Height: 5' 11" (180.3 cm)  Height (inches): 71 in  Weight Method: Bed Scale  Weight: 104.3 kg (230 lb)  Weight (lb): 230 lb  Ideal Body Weight (IBW), Female: 155 lb  % Ideal Body Weight, Female (lb): 143.23 %  BMI (Calculated): 32.1       Estimated/Assessed Needs      Temp: 97.5 °F (36.4 °C)Oral  Weight Used For Calorie Calculations: 100.7 kg (222 lb 0.1 oz)   Energy Need Method: Kcal/kg Energy Calorie Requirements (kcal): 2014-2518  Weight Used For Protein Calculations: 100.7 kg (222 lb 0.1 oz)  Protein Requirements:   Estimated Fluid Requirement Method: RDA Method    RDA Method (mL): 2014       Nutrition by Nursing     Intake (%): 100%        Last Bowel Movement: 09/30/24                Nutrition Follow-Up  RD Follow-up?: Yes      Nutrition Discharge Planning: Per CM, plan to d/c home with home health. Will benefit from liberalized diet on discharge.        Available via Secure Chat  "

## 2024-10-02 PROCEDURE — 11000004 HC SNF PRIVATE

## 2024-10-02 PROCEDURE — 97110 THERAPEUTIC EXERCISES: CPT

## 2024-10-02 PROCEDURE — 25000003 PHARM REV CODE 250: Performed by: PHYSICIAN ASSISTANT

## 2024-10-02 PROCEDURE — 97116 GAIT TRAINING THERAPY: CPT

## 2024-10-02 PROCEDURE — 94761 N-INVAS EAR/PLS OXIMETRY MLT: CPT

## 2024-10-02 PROCEDURE — 63600175 PHARM REV CODE 636 W HCPCS: Performed by: EMERGENCY MEDICINE

## 2024-10-02 PROCEDURE — 25000003 PHARM REV CODE 250: Performed by: EMERGENCY MEDICINE

## 2024-10-02 RX ADMIN — FERROUS SULFATE TAB EC 324 MG (65 MG FE EQUIVALENT) 1 EACH: 324 (65 FE) TABLET DELAYED RESPONSE at 09:10

## 2024-10-02 RX ADMIN — CYANOCOBALAMIN TAB 500 MCG 500 MCG: 500 TAB at 09:10

## 2024-10-02 RX ADMIN — Medication 1 CAPSULE: at 11:10

## 2024-10-02 RX ADMIN — ACETAMINOPHEN 1000 MG: 500 TABLET ORAL at 08:10

## 2024-10-02 RX ADMIN — GABAPENTIN 600 MG: 600 TABLET, FILM COATED ORAL at 09:10

## 2024-10-02 RX ADMIN — DULOXETINE HYDROCHLORIDE 60 MG: 30 CAPSULE, DELAYED RELEASE ORAL at 09:10

## 2024-10-02 RX ADMIN — Medication 6 MG: at 08:10

## 2024-10-02 RX ADMIN — APIXABAN 5 MG: 5 TABLET, FILM COATED ORAL at 09:10

## 2024-10-02 RX ADMIN — FAMOTIDINE 20 MG: 20 TABLET, FILM COATED ORAL at 09:10

## 2024-10-02 RX ADMIN — Medication 1 TABLET: at 09:10

## 2024-10-02 RX ADMIN — METOPROLOL TARTRATE 25 MG: 25 TABLET, FILM COATED ORAL at 09:10

## 2024-10-02 RX ADMIN — MEROPENEM 1 G: 1 INJECTION INTRAVENOUS at 04:10

## 2024-10-02 RX ADMIN — APIXABAN 2.5 MG: 2.5 TABLET, FILM COATED ORAL at 08:10

## 2024-10-02 RX ADMIN — MEROPENEM 1 G: 1 INJECTION INTRAVENOUS at 05:10

## 2024-10-02 RX ADMIN — Medication 1 CAPSULE: at 05:10

## 2024-10-02 RX ADMIN — OXYBUTYNIN CHLORIDE 5 MG: 5 TABLET, EXTENDED RELEASE ORAL at 09:10

## 2024-10-02 RX ADMIN — SODIUM CHLORIDE 20 ML: 9 INJECTION, SOLUTION INTRAVENOUS at 05:10

## 2024-10-02 RX ADMIN — GABAPENTIN 600 MG: 600 TABLET, FILM COATED ORAL at 08:10

## 2024-10-02 RX ADMIN — PRAVASTATIN SODIUM 80 MG: 40 TABLET ORAL at 08:10

## 2024-10-02 RX ADMIN — ACETAMINOPHEN 1000 MG: 500 TABLET ORAL at 09:10

## 2024-10-02 RX ADMIN — ESTRADIOL 2 MG: 0.5 TABLET ORAL at 09:10

## 2024-10-02 RX ADMIN — SENNOSIDES AND DOCUSATE SODIUM 1 TABLET: 8.6; 5 TABLET ORAL at 08:10

## 2024-10-02 RX ADMIN — Medication 1 CAPSULE: at 09:10

## 2024-10-02 RX ADMIN — OXYCODONE HYDROCHLORIDE AND ACETAMINOPHEN 500 MG: 500 TABLET ORAL at 09:10

## 2024-10-02 NOTE — PT/OT/SLP PROGRESS
Occupational Therapy   Treatment    Name: Bobbi Haywood  MRN: [id number]  Admitting Diagnosis:  Muscle weakness       Recommendations:     Discharge Recommendations:    Discharge Equipment Recommendations:  wheelchair  Barriers to discharge:  Inaccessible home environment, Other (Comment) (limitations with 3 steps within her home)    Assessment:     Bobbi Haywood is a 73 y.o. female with a medical diagnosis of Muscle weakness.  She presents with with R ankle instability and pain. Performance deficits affecting function are weakness, decreased upper extremity function, impaired endurance, impaired balance, decreased safety awareness, impaired self care skills, impaired functional mobility, decreased coordination. Pt tolerated thera ex well but did not want to stand due to ankle instability. Pt educated on the importance of wearing her ankle orthotic to increase standing tolerance. Pt will remain limited at this time due to ankle pain    Rehab Prognosis:  Fair; patient would benefit from acute skilled OT services to address these deficits and reach maximum level of function.       Plan:     Patient to be seen 5 x/week to address the above listed problems via self-care/home management, therapeutic activities, therapeutic exercises, neuromuscular re-education  Plan of Care Expires: 10/18/24  Plan of Care Reviewed with: patient    Subjective     Chief Complaint: increased ankle  Patient/Family Comments/goals: decreased mobility and standing balance  Pain/Comfort:       Objective:     Communicated with: Pt prior to session.  Patient found up in chair with   upon OT entry to room.    General Precautions: Standard, fall    Orthopedic Precautions:N/A  Braces: N/A  Respiratory Status: Room air     Occupational Performance:   Therapeutic activity:        [facility] 6 Click ADL: 21    Treatment & Education:  Therapeutic exercises: x 30 mins     Pt [id number] mins on arm ergometer to increase endurance, BUE  strength and activity tolerance for ADL performance      Pt completed in 2 sets of 20 reps of bicep curls  with 4# DB and chest press 2 sets of 20 reps reps with 5# dowel, to enhance UB strength ADL performance.    Patient left sitting in chair with all lines intact and call button in reach    GOALS:   Multidisciplinary Problems       Occupational Therapy Goals          Problem: Occupational Therapy    Goal Priority Disciplines Outcome Interventions   Occupational Therapy Goal     OT, PT/OT     Description: Goals to be met by: 10/18/24     Patient will increase functional independence with ADLs by performing:    UE Dressing with Modified [place].  LE Dressing with Modified [place] and Supervision with a/e as needed  Grooming while standing at sink with Modified [place] and Supervision.  Toileting from toilet with Modified [place] and Supervision for hygiene and clothing management.   Standing x3-5 minutes with Supervision.  Supine to sit with [place].  Squat pivot transfers with Modified [place] and Supervision.  Step transfer with Modified [place] and Supervision  Toilet transfer to toilet with Modified [place] and Supervision.                         Time Tracking:     OT Date of Treatment:    OT Start Time: 0123  OT Stop Time: 0153  OT Total Time (min): 30 min    Billable Minutes:Therapeutic Exercise 30    OT/TRISHA: OT     Number of TRISHA visits since last OT visit: 1    10/2/2024

## 2024-10-02 NOTE — PT/OT/SLP PROGRESS
Physical Therapy Treatment    Patient Name:  Bobbi Haywood   MRN:  [id number]    Recommendations:     Discharge Recommendations: Low Intensity Therapy  Discharge Equipment Recommendations: wheelchair  Barriers to discharge: None    Assessment:     Bobbi Haywood is a 73 y.o. female admitted with a medical diagnosis of generalized weakness. Patient presented to [facility] following 2 falls at home while trying to descend the 3 steps inside her home .  She presents with the following impairments/functional limitations: weakness, gait instability     Bobbi had a little difficulty with her walking today due to her right foot eversion deformity which limits proper gait mechanics.    Rehab Prognosis: Good; patient would benefit from acute skilled PT services to address these deficits and reach maximum level of function.    Recent Surgery: * No surgery found *      Plan:     During this hospitalization, patient to be seen 5 x/week to address the identified rehab impairments via gait training and progress toward the following goals:    Plan of Care Expires:  10/09/24    Subjective     Chief Complaint: complains of right foot rolling over  Patient/Family Comments/goals: to improve mobility and return home.  Pain/Comfort:  Pain Rating 1: 0/10      Objective:     Communicated with patient prior to session.  Patient found up in chair with   upon PT entry to room.     General Precautions: Standard, fall  Orthopedic Precautions: N/A  Braces: N/A  Respiratory Status: Room air     Functional Mobility:  Transfers:     Sit to Stand:  stand by assistance with rolling walker  Gait: 150 feet SBA with RW in hallway on level surface with right ankle eversion deformity  Balance: standing Good      AM-PAC 6 CLICK MOBILITY  Turning over in bed (including adjusting bedclothes, sheets and blankets)?: 4  Sitting down on and standing up from a chair with arms (e.g., wheelchair, bedside commode, etc.): 4  Moving  from lying on back to sitting on the side of the bed?: 4  Moving to and from a bed to a chair (including a wheelchair)?: 4  Need to walk in hospital room?: 4  Climbing 3-5 steps with a railing?: 3  Basic Mobility Total Score: 23       Treatment & Education:    Gait: patient ambulated on level surface in room and hallway x 150 with RW SBA. She demonstrated increased right ankle eversion due to her ankle deformity and could not bear weight through heel thus affecting her gait mechanics.    Patient left up in chair with call button in reach..    GOALS:   Multidisciplinary Problems       Physical Therapy Goals          Problem: Physical Therapy    Goal Priority Disciplines Outcome Interventions   Physical Therapy Goal     PT, PT/OT Progressing    Description: Short-term Goals: 1.5 weeks  1. Patient will perform supine to/from sit with standby assist to improve independence and safety with bed mobility. Met.  2. Patient will perform sit to/from stand with a rolling walker with contact guard assist to improve independence and safety with transfers. Met.  3. Patient will ambulate 50 feet with a rolling walker with contact guard assist to improve independence and safety with gait. Met.  4. Patient will tolerate 15 minutes of physical therapy intervention to improve endurance and activity tolerance. Met.  5. Patient will ascend/descend 3 steps with bilateral handrail support with contact guard assist to improve independence and safety with stair negotiation.     Long-term goals: 3 weeks  1. Patient will perform supine to/from sit with modified independence to improve independence and safety with bed mobility.  2. Patient will perform sit to/from stand with a rolling walker with standby assist to improve independence and safety with transfers.  3. Patient will ambulate 100 feet with a rolling walker with standby assist to improve independence and safety with gait. Met.  4. Patient will tolerate 30 minutes of physical therapy  intervention to improve endurance and activity tolerance.  5. Patient will ascend/descend 3 steps with bilateral handrail support with standby assist to improve independence and safety with stair negotiation.                          Time Tracking:     PT Received On: 10/02/24  PT Start Time: 1248     PT Stop Time: 1300  PT Total Time (min): 12 min     Billable Minutes: Gait Training 12    Treatment Type: Treatment  PT/PTA: PT     Number of PTA visits since last PT visit: 0     10/02/2024

## 2024-10-02 NOTE — PROGRESS NOTES
Pharmacist Renal Dose Adjustment Note    Bobbi Haywood is a 73 y.o. female being treated with the medication apixaban 5 mg po bid    Patient Data:    Vital Signs (Most Recent):  Temp: 97.3 °F (36.3 °C) (10/02/24 0740)  Pulse: 66 (10/02/24 0740)  Resp: 18 (10/02/24 0740)  BP: 126/81 (10/02/24 0740)  SpO2: (!) 94 % (10/02/24 0740) Vital Signs (72h Range):  Temp:  [97.3 °F (36.3 °C)-97.9 °F (36.6 °C)]   Pulse:  [53-86]   Resp:  [16-22]   BP: (112-128)/(64-83)   SpO2:  [94 %-98 %]      Recent Labs   Lab 10/01/24  2024   CREATININE 1.50*     Serum creatinine: 1.5 mg/dL (H) 10/01/24 2024  Estimated creatinine clearance: 43.1 mL/min (A)    Medication:Apixaban dose: 5mg frequency bid will be changed to medication:Apixaban dose:2.5 mg frequency:Bid    Pharmacist's Name: Erick Beal  Pharmacist's Extension: [phone]

## 2024-10-03 PROCEDURE — 25000003 PHARM REV CODE 250: Performed by: PHYSICIAN ASSISTANT

## 2024-10-03 PROCEDURE — 11000004 HC SNF PRIVATE

## 2024-10-03 PROCEDURE — 97110 THERAPEUTIC EXERCISES: CPT | Mod: CQ

## 2024-10-03 PROCEDURE — 97110 THERAPEUTIC EXERCISES: CPT

## 2024-10-03 PROCEDURE — 25000003 PHARM REV CODE 250: Performed by: EMERGENCY MEDICINE

## 2024-10-03 PROCEDURE — 63600175 PHARM REV CODE 636 W HCPCS: Performed by: EMERGENCY MEDICINE

## 2024-10-03 PROCEDURE — 94761 N-INVAS EAR/PLS OXIMETRY MLT: CPT

## 2024-10-03 RX ADMIN — OXYCODONE HYDROCHLORIDE AND ACETAMINOPHEN 500 MG: 500 TABLET ORAL at 09:10

## 2024-10-03 RX ADMIN — Medication 1 CAPSULE: at 09:10

## 2024-10-03 RX ADMIN — SENNOSIDES AND DOCUSATE SODIUM 1 TABLET: 8.6; 5 TABLET ORAL at 09:10

## 2024-10-03 RX ADMIN — OXYBUTYNIN CHLORIDE 5 MG: 5 TABLET, EXTENDED RELEASE ORAL at 09:10

## 2024-10-03 RX ADMIN — SODIUM CHLORIDE: 9 INJECTION, SOLUTION INTRAVENOUS at 06:10

## 2024-10-03 RX ADMIN — APIXABAN 2.5 MG: 2.5 TABLET, FILM COATED ORAL at 08:10

## 2024-10-03 RX ADMIN — SENNOSIDES AND DOCUSATE SODIUM 1 TABLET: 8.6; 5 TABLET ORAL at 08:10

## 2024-10-03 RX ADMIN — Medication 1 TABLET: at 09:10

## 2024-10-03 RX ADMIN — FAMOTIDINE 20 MG: 20 TABLET, FILM COATED ORAL at 09:10

## 2024-10-03 RX ADMIN — GABAPENTIN 600 MG: 600 TABLET, FILM COATED ORAL at 09:10

## 2024-10-03 RX ADMIN — METOPROLOL TARTRATE 25 MG: 25 TABLET, FILM COATED ORAL at 09:10

## 2024-10-03 RX ADMIN — APIXABAN 2.5 MG: 2.5 TABLET, FILM COATED ORAL at 09:10

## 2024-10-03 RX ADMIN — DULOXETINE HYDROCHLORIDE 60 MG: 30 CAPSULE, DELAYED RELEASE ORAL at 09:10

## 2024-10-03 RX ADMIN — MEROPENEM 1 G: 1 INJECTION INTRAVENOUS at 05:10

## 2024-10-03 RX ADMIN — Medication 1 CAPSULE: at 05:10

## 2024-10-03 RX ADMIN — PRAVASTATIN SODIUM 80 MG: 40 TABLET ORAL at 08:10

## 2024-10-03 RX ADMIN — FERROUS SULFATE TAB EC 324 MG (65 MG FE EQUIVALENT) 1 EACH: 324 (65 FE) TABLET DELAYED RESPONSE at 09:10

## 2024-10-03 RX ADMIN — ACETAMINOPHEN 1000 MG: 500 TABLET ORAL at 08:10

## 2024-10-03 RX ADMIN — Medication 6 MG: at 08:10

## 2024-10-03 RX ADMIN — GABAPENTIN 600 MG: 600 TABLET, FILM COATED ORAL at 08:10

## 2024-10-03 RX ADMIN — CYANOCOBALAMIN TAB 500 MCG 500 MCG: 500 TAB at 09:10

## 2024-10-03 RX ADMIN — ESTRADIOL 2 MG: 0.5 TABLET ORAL at 09:10

## 2024-10-03 RX ADMIN — MEROPENEM 1 G: 1 INJECTION INTRAVENOUS at 06:10

## 2024-10-03 NOTE — PT/OT/SLP PROGRESS
Physical Therapy Treatment    Patient Name:  Bobbi Haywood   MRN:  [id number]    Recommendations:     Discharge Recommendations: Low Intensity Therapy  Discharge Equipment Recommendations: wheelchair  Barriers to discharge: None    Assessment:     Bobbi Haywood is a 73 y.o. female admitted with a medical diagnosis of generalized weakness. Patient presented to [facility] following 2 falls at home while trying to descend the 3 steps inside her home .  She presents with the following impairments/functional limitations: weakness, impaired endurance, impaired self care skills, gait instability, impaired functional mobility, impaired balance, decreased lower extremity function, decreased safety awareness, pain, decreased ROM     Bobbi was unable to walk today due to R knee and ankle pain.    Rehab Prognosis: Good; patient would benefit from acute skilled PT services to address these deficits and reach maximum level of function.    Recent Surgery: * No surgery found *      Plan:     During this hospitalization, patient to be seen 5 x/week to address the identified rehab impairments via gait training, therapeutic activities, therapeutic exercises, neuromuscular re-education and progress toward the following goals:    Plan of Care Expires:  10/09/24    Subjective     Chief Complaint: complains of right foot rolling over  Patient/Family Comments/goals: to improve mobility and return home.  Pain/Comfort:  Pain Rating 1: 5/10  Location - Side 1: Right  Location - Orientation 1: anterior  Location 1: knee  Pain Addressed 1: Pre-medicate for activity  Pain Rating Post-Intervention 1: 5/10      Objective:     Communicated with patient prior to session.  Patient found up in chair with   upon PT entry to room.     General Precautions: Standard, fall  Orthopedic Precautions: N/A  Braces: N/A  Respiratory Status: Room air     Functional Mobility:  Transfers:     Sit to Stand:  stand by assistance  with rolling walker      AM-PAC 6 CLICK MOBILITY  Turning over in bed (including adjusting bedclothes, sheets and blankets)?: 3  Sitting down on and standing up from a chair with arms (e.g., wheelchair, bedside commode, etc.): 3  Moving from lying on back to sitting on the side of the bed?: 3  Moving to and from a bed to a chair (including a wheelchair)?: 3  Need to walk in hospital room?: 3  Climbing 3-5 steps with a railing?: 1  Basic Mobility Total Score: 16       Treatment & Education:    TE: scifit x 10 minutes; seated Laq, hip flexion 2 x 20 with 4# ankle weight, HS curls with green band 2 x 10     Patient left sitting edge of bed with call button in reach..    GOALS:   Multidisciplinary Problems       Physical Therapy Goals          Problem: Physical Therapy    Goal Priority Disciplines Outcome Interventions   Physical Therapy Goal     PT, PT/OT Progressing    Description: Short-term Goals: 1.5 weeks  1. Patient will perform supine to/from sit with standby assist to improve independence and safety with bed mobility. Met.  2. Patient will perform sit to/from stand with a rolling walker with contact guard assist to improve independence and safety with transfers. Met.  3. Patient will ambulate 50 feet with a rolling walker with contact guard assist to improve independence and safety with gait. Met.  4. Patient will tolerate 15 minutes of physical therapy intervention to improve endurance and activity tolerance. Met.  5. Patient will ascend/descend 3 steps with bilateral handrail support with contact guard assist to improve independence and safety with stair negotiation.     Long-term goals: 3 weeks  1. Patient will perform supine to/from sit with modified independence to improve independence and safety with bed mobility.  2. Patient will perform sit to/from stand with a rolling walker with standby assist to improve independence and safety with transfers.  3. Patient will ambulate 100 feet with a rolling walker  with standby assist to improve independence and safety with gait. Met.  4. Patient will tolerate 30 minutes of physical therapy intervention to improve endurance and activity tolerance.  5. Patient will ascend/descend 3 steps with bilateral handrail support with standby assist to improve independence and safety with stair negotiation.                          Time Tracking:     PT Received On: 10/03/24  PT Start Time: 0946     PT Stop Time: 1018  PT Total Time (min): 32 min     Billable Minutes: Therapeutic Exercise 32    Treatment Type: Treatment  PT/PTA: PTA     Number of PTA visits since last PT visit: 1     10/03/2024

## 2024-10-03 NOTE — ASSESSMENT & PLAN NOTE
Patient has persistent depression which is mild and is currently controlled. Will Continue anti-depressant medications. We will not consult psychiatry at this time. Patient does not display psychosis at this time. Continue to monitor closely and adjust plan of care as needed.  CONTINUE CYMBALTA  09/24/224  CONTINUE POC  PT'S MOOD IS ELEVATED, WITH OH SMILING FREQUENTLY  09/30/2024  CONTINUE POC  PT'S MOOD REMAINS ELEVATED, ALTHOUGH SHE IS DISAPPOINTED THAT IV ABX HAVE BEEN RESUMED
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
## 2024-10-03 NOTE — ASSESSMENT & PLAN NOTE
PT/OT WILL EVALUATE PT AND INITIATE A TREATMENT PLAN  09/24/2024  PT IS PARTICIPATING WITH PT/OT PERFORMING BED MOBILITY WITH INDEPENDENCE, TRANSFERS WITH CGA WITH RW, AND AMBULATING 140  FEET WITH CGA WITH RW THEN 80 FEET ALSO WITH CGA AND RW ITH VERBAL CUES FOR POSTURE, SAFETY AND EXCESSIVE BUE SUPPORT ON AD WITH FAIR STANDING BALANCE.    09/30/2024  PT IS PARTICIPATING WITH PT/OT PERFORMING BED MOBILITY WITH SBA, TRANSFERS WITH SBA WITH RW, AND AMBULATING 180  FEET WITH SBA WITH RW WITH GOOD STANDING BALANCE.

## 2024-10-03 NOTE — ASSESSMENT & PLAN NOTE
Chronic, controlled. Latest blood pressure and vitals reviewed-     Temp:  [97.3 °F (36.3 °C)-97.5 °F (36.4 °C)]   Pulse:  [66-88]   Resp:  [18-20]   BP: (126-130)/(75-81)   SpO2:  [94 %-97 %] .   Home meds for hypertension were reviewed and noted below.   Hypertension Medications               metoprolol tartrate (LOPRESSOR) 25 MG tablet Take 1 tablet by mouth once daily.            While in the hospital, will manage blood pressure as follows; Continue home antihypertensive regimen    Will utilize p.r.n. blood pressure medication only if patient's blood pressure greater than 180/110 and she develops symptoms such as worsening chest pain or shortness of breath.    09/30/2024  /66  CONTINUE POC

## 2024-10-03 NOTE — ASSESSMENT & PLAN NOTE
PT HAS CHRONIC PAIN SYNDROME OF THE NECK AND BACK WITH SPINAL STENOSIS, SPONDYLOLYSIS AND SEVERE DEGENERATIVE DISC DISEASE AND IS NOT A SURGICAL CANDIDATE AND IS FOLLOWED PER DR MARÍA PA-C SHOWS  CYMBALTA, GABAPENTIN  09/24/2024  CONTINUE POC    09/30/2024  CONTINUE POC

## 2024-10-03 NOTE — ASSESSMENT & PLAN NOTE
Patient with Long standing persistent (>12 months) atrial fibrillation which is controlled currently with Beta Blocker. Patient is currently in atrial fibrillation.[id number] Score: 3. HASBLED Score: 1. Anticoagulation indicated. Anticoagulation done with ELIQUIS .  09/24/2024  CONTINUE POC WITH ELIQUIS    09/30/2024  CONTINUE POC WITH ELIQUIS

## 2024-10-03 NOTE — ASSESSMENT & PLAN NOTE
PT IS AT RISK FOR VTE  VTE PPX\  ELIQUIS /TEDS/EARLY AMBULATION  09/24/2024  CONTINUE POC  09/30/2024  CONTINUE POC

## 2024-10-03 NOTE — PROGRESS NOTES
[facility] - Medical Surgical Unit  Hospital Medicine  Progress Note    Patient Name: Bobbi Haywood  MRN: [id number]  Patient Class: IP- Swing   Admission Date: 9/17/2024  Length of Stay: 15 days  Attending Physician: Keyla Vizcaino MD  Primary Care Provider: [facility]        Subjective:     Principal Problem:Muscle weakness        HPI:  PT IS A 73 YR OLD WF ADMITTED TO [facility] SWING BED FOR PT/OT/REHABILITATION FOR MUSCLE WEAKNESS AND MEDICAL MANAGEMENT. PT WAS TRANSFERRED FROM [facility] WHERE SHE WAS ADMITTED ON 09/10/2024 FOR A FALL AT HOME WITH WEAKNESS, SUSPECTED SEPSIS (BC REVEALED NG AT 5 DAYS), INOCENTE WITH DEHYDRATION, UTI, AND AN ABSCESS X 2 OF THE RLE DUE TO A PRIOR HEMATOMA SUSTAINED AS CAR DOOR STRUCK RLE 2 WEEKS PRIOR TO FALL.     PT WAS TREATED INITIALLY WITH IV ZOSYN AND VANCOMYCIN AND BASED ON CULTURES ZOSYN THEN AUGMENTIN. PT'S URINE CULTURE WAS POSITIVE FOR E COLI AND RLE WOUND CULTURE WAS POSITIVE FOR GROUP G STREP AND PROTEUS MIRALBIS PT REQUIRED I AND D OF RLE ABSCESS X 2 SITES PER DR HIGHTOWER ON 09/12/2024.     PT HAS MULTIPLE CHRONIC MEDICAL PROBLEMS INCLUDING ANEMIA, AF LONGSTANDING ON ELIQUIS AND LOPRESSOR, NEUROPATHY ON CYMBALTA,GABAPENTIN, CHRONIC DEPRESSION ON CYMBALTA, CHRONIC PAIN SYNDROME OF THE NECK AND BACK WITH SPINAL STENOSIS, SPONDYLOLYSIS AND SEVERE DEGENERATIVE DISC DISEASE AND IS NOT A SURGICAL CANDIDATE AND IS FOLLOWED PER DR MARÍA PA-C SHOWS; AND HYPERTENSION ON LOPRESSOR WITH ECHO (9/11/2024) REVEALING EF 55-60%.PT HAS A CHRONIC GAIT ABNORMALITY WITH NEUROPATHY AND AN ANKLE/FOOT DEFORMITY RESULTING IN FREQUENT FALLS USING A ROLLING WALKER AT HOME.  PT IMPROVED AT [facility]; BUT HAS PERSISTENT MUSCLE WEAKNESS AND DEBILITY, AND WILL REQUIRE SWING BED FOR REHABILITATION, CONTINUED WOUND CARE,AND MEDICAL MANAGEMENT.  PT WILL BE EVALUATED PER PT/OT AND A TREATMENT PLAN WILL BE INITIATED. THE PHARMACIST WILL REVIEW MEDICATIONS AND  MAKE RECOMMENDATIONS. PT WILL SEE THE DIETICIAN  FOR NUTRITIONAL SUPPORT WITH WEIGHT  100.7 KG AND ALBUMIN OF 2.1. PT'S ABNORMAL ADMITTING LABS ARE: CMP:, BUN/CREAT 25/1.49,, CBC:WBC 18 K, H/H 9.1/29.9,  K, UA:NEG X UROBILINOGEN. PT HAS ADDITIONAL LABS INCLUDING  MAG 2.0, TSH 1.020 AND VIT D 27.8.  PT WILL HAVE WEEKLY LABS.     CXR: NO ACUTE CHANGE  Updated   Order   09/17/24 2111  X-Ray Chest AP Portable  Performed: 09/17/24 2051  Final        Impression: No acute radiographic abnormality. Electronically signed by: Donta Yi Date: 09/17/2024 Time: 21:09        PT CODE STATUS IS FULL CODE  PT COVID STATUS IS NEG    PT VTE PPX IS ASA/ELIQUIS/TEDS/EARLY AMBULATION      Overview/Hospital Course:  9/24/2024 HOSPITAL DAY 7    PT IS PARTICIPATING WITH PT/OT PERFORMING BED MOBILITY WITH INDEPENDENCE, TRANSFERS WITH CGA WITH RW, AND AMBULATING 140  FEET WITH CGA WITH RW THEN 80 FEET ALSO WITH CGA AND RW ITH VERBAL CUES FOR POSTURE, SAFETY AND EXCESSIVE BUE SUPPORT ON AD WITH FAIR STANDING BALANCE.  PT HAS RLE WOUNDS EVALUATED PER THE WOUND CARE TEAM WITH RECOMMENDATIONS CONTINUED WITH WOUNDS OF THE RLE AND SACRAL PRESSURE  PT HAS RA O2 SAT 95 %. PT IS BEING TREATED PER RESPIRATORY THERAPY WITH INCENTIVE SPIROMETRY AND O2 MONITORING.  PT CONTINUES AUGMENTIN.THE PHARMACY IS MONITORING ALL MEDICATIONS. RD CONTINUES TO CONSULT WITH DIET LIBERALIZED TO A REGULAR DIET WITH % AND CONTINUED TREATMENT WITH DONNA AND WILL ADD VIT C.  PT HAS STABLE LABS EXCEPT FOR BMP: BUN/CREAT 30/1.45 , CBC: WBC 14 K, H/H 8.9/28.8.       09/30/2024 HOSPITAL DAY 13    PT IS PARTICIPATING WITH PT/OT PERFORMING BED MOBILITY WITH SBA, TRANSFERS WITH SBA WITH RW, AND AMBULATING 180  FEET WITH SBA WITH RW WITH GOOD STANDING BALANCE.  PT HAS RLE WOUNDS WITH CONTINUED EVALUATION PER THE WOUND CARE TEAM WITH RECOMMENDATIONS CONTINUED WITH WOUNDS OF THE RLE AND SACRAL PRESSURE WITH MINIMAL IMPROVEMENT AND POSITIVE CULTURE AFTER  COMPLETION OF AUGMENTIN  FOR ACINETOBACTER IWOFFII AND WILL BE TREATED WITH MERREM IV. PT HAS INCREASED EDEMA RLE WITH VENOUS DOPPLERS NEGATIVE BLE. PT  WILL BE FOLLOWED UP PER DR HIGHTOWER IN THE CLINIC 10/07/2024.  PT HAS RA O2 SAT 98 %. PT IS BEING TREATED PER RESPIRATORY THERAPY WITH INCENTIVE SPIROMETRY AND O2 MONITORING.  THE PHARMACY CONTINUES TO MONITOR ALL MEDICATIONS. RD CONTINUES TO CONSULT WITH DIET LIBERALIZED TO A REGULAR DIET WITH % AND CONTINUED TREATMENT WITH DONNA AND VIT C.  PT HAS STABLE LABS EXCEPT FOR BMP: BUN/CREAT 38/1.50 (30/1.45) , CBC: WBC 10.94 K (14 K), H/H (8.5/28) 8.9/28.8.                                                Past Medical History:   Diagnosis Date    A-fib     Abnormal gait     Abscess of right lower extremity 09/10/2024    Acquired deformity of both ankles     Acute blood loss anemia     INOCENTE (acute kidney injury) 09/10/2024    Chronic pain     Contusion of right lower extremity     Degenerative joint disease of cervical and lumbar spine     Depression     E-coli UTI 09/10/2024    Fall 09/10/2024    Hematoma     Hypertension     Hypomagnesemia 09/10/2024    Iron deficiency anemia, unspecified     Neuropathy     Neuropathy     Spinal stenosis of cervical region     Spinal stenosis of lumbar region at multiple levels     Spondylolysis of lumbar region        Past Surgical History:   Procedure Laterality Date    CATARACT EXTRACTION, BILATERAL      CHOLECYSTECTOMY      HYSTERECTOMY      INCISION AND DRAINAGE OF ABSCESS Right 9/12/2024    Procedure: INCISION AND DRAINAGE, ABSCESS;  Surgeon: Joe Hightower MD;  Location: [facility] OR;  Service: General;  Laterality: Right;  right thigh and right calf    INJECTION OF ANESTHETIC AGENT AROUND MEDIAL BRANCH NERVES INNERVATING LUMBAR FACET JOINT Bilateral 8/26/2021    Procedure: Bilateral L4-5,5-S1 MBB;  Surgeon: Sulma Nelson MD;  Location: [facility];  Service: Pain Management;  Laterality: Bilateral;  Per Dr Sanchez benton  to hold Eliquis for 4 days not 5, Per Dr Nelson this is fine with her. Pt will bring her verification card for COVID vaccination.    INJECTION OF ANESTHETIC AGENT AROUND MEDIAL BRANCH NERVES INNERVATING LUMBAR FACET JOINT Bilateral 10/5/2021    Procedure: Bilateral L4-5,5-S1 MBB;  Surgeon: Sulma Nelson MD;  Location: [facility];  Service: Pain Management;  Laterality: Bilateral;  PT AWARE ON OV TO BE TESTED    KNEE CARTILAGE SURGERY Right     RADIOFREQUENCY ABLATION OF LUMBAR MEDIAL BRANCH NERVE AT SINGLE LEVEL Bilateral 11/9/2021    Procedure: Radiofrequency Ablation, Nerve, Spinal, Lumbar, Medial Branch, 1 Level L4-S1  BILATERAL;  Surgeon: Sulma Nelson MD;  Location: [facility];  Service: Pain Management;  Laterality: Bilateral;  per alicia pt is on eliquis but she will get permission for it to be held 5 days prior to procedure.   HAD VAC   CARD SCANNED IN    REDUCTION OF BOTH BREASTS      RETINAL DETACHMENT SURGERY  right    VAGINAL DELIVERY      X1       Review of patient's allergies indicates:   Allergen Reactions    Sulfa (sulfonamide antibiotics) Hives and Other (See Comments)     Sore Throat, Sore Mouth       No current facility-administered medications on file prior to encounter.     Current Outpatient Medications on File Prior to Encounter   Medication Sig    cyanocobalamin 500 MCG tablet Take 500 mcg by mouth once daily.    DULoxetine (CYMBALTA) 60 MG capsule Take 1 capsule by mouth once daily.    ELIQUIS 5 mg Tab Take 5 mg by mouth 2 (two) times daily.    estradioL (ESTRACE) 2 MG tablet Take 1 tablet by mouth once daily.    ferrous sulfate 325 (65 FE) MG EC tablet Take 1 tablet (325 mg total) by mouth once daily.    fesoterodine (TOVIAZ) 4 mg Tb24 Take 4 mg by mouth once daily.    folic acid (FOLVITE) 800 MCG Tab Take 800 mcg by mouth once daily.    gabapentin (NEURONTIN) 600 MG tablet Take 1 tablet by mouth 2 (two) times a day.    L. rhamnosus/C/zinc/elderberry (CULTURELLE IMMUNE  DEFENSE ORAL) Take 1 tablet by mouth once daily.    L.acidoph,saliva/B.bif/S.therm (ACIDOPHILUS PROBIOTIC BLEND ORAL) Take 1 tablet by mouth once daily.    metoprolol tartrate (LOPRESSOR) 25 MG tablet Take 1 tablet by mouth once daily.    rosuvastatin (CRESTOR) 20 MG tablet Take 20 mg by mouth every evening.     Family History       Problem Relation (Age of Onset)    Atrial fibrillation Mother    Diabetes Paternal Grandfather    Hypertension Mother    Stroke Father          Tobacco Use    Smoking status: Never    Smokeless tobacco: Never   Substance and Sexual Activity    Alcohol use: Not Currently    Drug use: Not on file    Sexual activity: Not on file   HOSPITAL COURSE  PT IS PARTICIPATING WITH PT/OT PERFORMING BED MOBILITY WITH SBA, TRANSFERS WITH SBA WITH RW, AND AMBULATING 180  FEET WITH SBA WITH RW WITH GOOD STANDING BALANCE.  PT HAS RLE WOUNDS WITH CONTINUED EVALUATION PER THE WOUND CARE TEAM WITH RECOMMENDATIONS CONTINUED WITH WOUNDS OF THE RLE AND SACRAL PRESSURE WITH MINIMAL IMPROVEMENT AND POSITIVE CULTURE AFTER COMPLETION OF AUGMENTIN  FOR ACINETOBACTER IWOFFII AND WILL BE TREATED WITH MERREM IV. PT HAS INCREASED EDEMA RLE WITH VENOUS DOPPLERS NEGATIVE BLE. PT  WILL BE FOLLOWED UP PER DR HIGHTOWER IN THE CLINIC 10/07/2024.  PT HAS RA O2 SAT 98 %. PT IS BEING TREATED PER RESPIRATORY THERAPY WITH INCENTIVE SPIROMETRY AND O2 MONITORING.  THE PHARMACY CONTINUES TO MONITOR ALL MEDICATIONS. RD CONTINUES TO CONSULT WITH DIET LIBERALIZED TO A REGULAR DIET WITH % AND CONTINUED TREATMENT WITH DONNA AND VIT C.  PT HAS STABLE LABS EXCEPT FOR BMP: BUN/CREAT 38/1.50 (30/1.45) , CBC: WBC 10.94 K (14 K), H/H (8.5/28) 8.9/28.8.    Review of Systems   Constitutional:  Positive for activity change and fatigue.   HENT:  Positive for sore throat. Negative for congestion.    Eyes: Negative.    Respiratory:  Negative for cough (MINIMAL).    Cardiovascular: Negative.    Gastrointestinal: Negative.    Endocrine: Negative.     Genitourinary: Negative.    Musculoskeletal:  Positive for arthralgias, back pain, gait problem (RLE), myalgias and neck pain.   Skin:  Positive for wound.   Allergic/Immunologic: Positive for immunocompromised state.   Neurological:  Positive for weakness. Negative for tremors.   Hematological:  Bruises/bleeds easily.   Psychiatric/Behavioral: Negative.       Objective:     Vital Signs (Most Recent):  Temp: 97.4 °F (36.3 °C) (09/30/24 0734)  Pulse: 74 (09/30/24 0734)  Resp: (!) 22 (09/30/24 0734)  BP: 128/66 (09/30/24 0734)  SpO2: 98 % (09/30/24 0734) Vital Signs (24h Range):  Temp:  [97.4 °F (36.3 °C)] 97.4 °F (36.3 °C)  Pulse:  [53-74] 74  Resp:  [19-22] 22  SpO2:  [95 %-98 %] 98 %  BP: (120-128)/(66-73) 128/66     Weight: 104.3 kg (230 lb)  Body mass index is 32.08 kg/m².     Physical Exam  Vitals and nursing note reviewed. Exam conducted with a chaperone present.   Constitutional:       Appearance: She is obese. She is ill-appearing.   HENT:      Head: Normocephalic and atraumatic.      Right Ear: Tympanic membrane normal.      Left Ear: Tympanic membrane normal.      Nose: Nose normal.      Mouth/Throat:      Mouth: Mucous membranes are moist.   Eyes:      Extraocular Movements: Extraocular movements intact.      Conjunctiva/sclera: Conjunctivae normal.      Pupils: Pupils are equal, round, and reactive to light.   Cardiovascular:      Rate and Rhythm: Normal rate.      Pulses: Normal pulses.      Heart sounds: Normal heart sounds.   Pulmonary:      Effort: Pulmonary effort is normal. No respiratory distress.      Breath sounds: Normal breath sounds.   Abdominal:      General: Bowel sounds are normal. There is no distension.      Palpations: Abdomen is soft.      Tenderness: There is no abdominal tenderness.   Musculoskeletal:         General: Swelling (RLE), tenderness (RLE) and deformity (B ANKLES/FEET) present. Normal range of motion.      Cervical back: Normal range of motion.      Right lower leg: Right  lower leg edema: RLE.   Skin:     General: Skin is warm.      Capillary Refill: Capillary refill takes less than 2 seconds.      Coloration: Skin is pale.      Findings: Bruising, erythema (RLE) and lesion (POST OP ABSCESS I AND D RLOWER LEG LATERALLY AND MEDIAL THIGH SEE  AND SACRAL PRESSURE INJURY SEE , MINOR ADDITIOAN LESIONS SEE ) present.   Neurological:      General: No focal deficit present.      Mental Status: She is alert and oriented to person, place, and time.      Motor: Weakness present.   Psychiatric:         Mood and Affect: Mood normal.         Behavior: Behavior normal.         Thought Content: Thought content normal.         Judgment: Judgment normal.                                          CRANIAL NERVES     CN III, IV, VI   Pupils are equal, round, and reactive to light.  Significant Labs: All pertinent labs within the past 24 hours have been reviewed.  SEE HOSPITAL COURSE  Significant Imaging: I have reviewed all pertinent imaging results/findings within the past 24 hours.  U/S: I have reviewed all pertinent results/findings within the past 24 hours and my personal findings are:  NEG    Assessment/Plan:      * Muscle weakness  PT/OT WILL EVALUATE PT AND INITIATE A TREATMENT PLAN  09/24/2024  PT IS PARTICIPATING WITH PT/OT PERFORMING BED MOBILITY WITH INDEPENDENCE, TRANSFERS WITH CGA WITH RW, AND AMBULATING 140  FEET WITH CGA WITH RW THEN 80 FEET ALSO WITH CGA AND RW ITH VERBAL CUES FOR POSTURE, SAFETY AND EXCESSIVE BUE SUPPORT ON AD WITH FAIR STANDING BALANCE.    09/30/2024  PT IS PARTICIPATING WITH PT/OT PERFORMING BED MOBILITY WITH SBA, TRANSFERS WITH SBA WITH RW, AND AMBULATING 180  FEET WITH SBA WITH RW WITH GOOD STANDING BALANCE.    Acute blood loss anemia  Anemia is likely due to acute blood loss which was from HEMATOMA A and chronic disease due to MULTIFACTORIAL . Most recent hemoglobin and hematocrit are listed below.  9/29  Plan  - Monitor serial  CBC:   - Transfuse PRBC if patient becomes hemodynamically unstable, symptomatic or H/H drops below 7/21.  - Patient has not received any PRBC transfusions to date  - Patient's anemia is currently improving  - 11/35 ON 9/10/2024 AND POST OP 8/26  CONTINUE IRON SUPPLEMENTATION    09/24/2024  H/H 8.9/28.8  CONTINUE TO MONITOR WEEKLY  CONTINUE FESO4 DAILY    09/30/2024  H/H 8.5/28.0  CONTINUE POC      Chronic pain  PT HAS CHRONIC PAIN SYNDROME OF THE NECK AND BACK WITH SPINAL STENOSIS, SPONDYLOLYSIS AND SEVERE DEGENERATIVE DISC DISEASE AND IS NOT A SURGICAL CANDIDATE AND IS FOLLOWED PER DR DNAIEL,  PA-C SHOWS  CYMBALTA, GABAPENTIN  09/24/2024  CONTINUE POC    09/30/2024  CONTINUE POC      Abscess of right lower extremity  PT SUSTAINED A HEMATOMA RLE 3 WEEKS AGO DUE TO CAR DOOR INJURY  PT DEVELOPED AN ABSCESS REQUIRING I AND D 09/12/2024 PER DR HIGHTOWER  WOUND CULTURE REVEALED GROUP G STREP, PROTEUS  TREATED INITIALLY WITH VANC AND ZOSYN THEN TRANSITIONED TO AUGMENTIN BASED ON SENSITIVITY  WILL CONTINUE WET TO DRY WOUND CARE PER WOUND CARE TEAM NURSING STAFF  CONSULT WOUND CARE TEAM  DONNA, VIT C  09/24/2024  CONTINUE  POC  CONTINUE WOUND CARE  CONTINUE AUGMENTIN    09/30/2024  PT HAS RLE WOUNDS WITH CONTINUED EVALUATION PER THE WOUND CARE TEAM WITH RECOMMENDATIONS CONTINUED WITH WOUNDS OF THE RLE AND SACRAL PRESSURE WITH MINIMAL IMPROVEMENT AND POSITIVE CULTURE AFTER COMPLETION OF AUGMENTIN  FOR ACINETOBACTER IWOFFII AND WILL BE TREATED WITH MERREM IV. PT HAS INCREASED EDEMA RLE WITH VENOUS DOPPLERS NEGATIVE BLE. PT  WILL BE FOLLOWED UP PER DR HIGHTOWER IN THE CLINIC 10/07/2024.    A-fib  Patient with Long standing persistent (>12 months) atrial fibrillation which is controlled currently with Beta Blocker. Patient is currently in atrial fibrillation.[id number] Score: 3. HASBLED Score: 1. Anticoagulation indicated. Anticoagulation done with ELIQUIS .  09/24/2024  CONTINUE POC WITH ELIQUIS    09/30/2024  CONTINUE POC WITH  JERI    Hypertension  Chronic, controlled. Latest blood pressure and vitals reviewed-     Temp:  [97.3 °F (36.3 °C)-97.5 °F (36.4 °C)]   Pulse:  [66-88]   Resp:  [18-20]   BP: (126-130)/(75-81)   SpO2:  [94 %-97 %] .   Home meds for hypertension were reviewed and noted below.   Hypertension Medications               metoprolol tartrate (LOPRESSOR) 25 MG tablet Take 1 tablet by mouth once daily.            While in the hospital, will manage blood pressure as follows; Continue home antihypertensive regimen    Will utilize p.r.n. blood pressure medication only if patient's blood pressure greater than 180/110 and she develops symptoms such as worsening chest pain or shortness of breath.    09/30/2024  /66  CONTINUE POC    Depression  Patient has persistent depression which is mild and is currently controlled. Will Continue anti-depressant medications. We will not consult psychiatry at this time. Patient does not display psychosis at this time. Continue to monitor closely and adjust plan of care as needed.  CONTINUE CYMBALTA  09/24/224  CONTINUE POC  PT'S MOOD IS ELEVATED, WITH FL SMILING FREQUENTLY  09/30/2024  CONTINUE POC  PT'S MOOD REMAINS ELEVATED, ALTHOUGH SHE IS DISAPPOINTED THAT IV ABX HAVE BEEN RESUMED        Encounter for deep vein thrombosis (DVT) prophylaxis  PT IS AT RISK FOR VTE  VTE PPX\  ELIQUIS /TEDS/EARLY AMBULATION  09/24/2024  CONTINUE POC  09/30/2024  CONTINUE POC        INOCENTE (acute kidney injury)  INOCENTE is likely due to pre-renal azotemia due to dehydration. Baseline creatinine is unknown. Most recent creatinine and eGFR are listed below.  Recent Labs     10/01/24  2024   CREATININE 1.50*   EGFRNORACEVR 36*        Plan  - INOCENTE is improving  - Avoid nephrotoxins and renally dose meds for GFR listed above  - Monitor urine output, serial BMP, and adjust therapy as needed  - BUN/CREAT 25/1.49    09/24/2024  CONTINUE POC  BUN/CREAT 30/1.45  09/30/2024   BUN/CREAT 38/1.50      Neuropathy  PT HAS HX  NEUROPATHY  CONTINUE CYMBALTA AND GABAPENTIN    09/24/2024  PT IS PARTICIPATING WITH PT/OT PERFORMING BED MOBILITY WITH INDEPENDENCE, TRANSFERS WITH CGA WITH RW, AND AMBULATING 140  FEET WITH CGA WITH RW THEN 80 FEET ALSO WITH CGA AND RW ITH VERBAL CUES FOR POSTURE, SAFETY AND EXCESSIVE BUE SUPPORT ON AD WITH FAIR STANDING BALANCE.  CONTINUE CYMBALTA AND GABAPENTIN    09/30/2024  PT IS PARTICIPATING WITH PT/OT PERFORMING BED MOBILITY WITH SBA, TRANSFERS WITH SBA WITH RW, AND AMBULATING 180  FEET WITH SBA WITH RW WITH GOOD STANDING BALANCE.  CONTINUE CYMBALTA AND GABAPENTIN          VTE Risk Mitigation (From admission, onward)           Ordered     apixaban tablet 2.5 mg  2 times daily         10/02/24 0951     IP VTE HIGH RISK PATIENT  Once         09/17/24 2018     Place sequential compression device  Until discontinued         09/17/24 2018                    Discharge Planning   SHASHI: 10/9/2024     Code Status: Full Code   Is the patient medically ready for discharge?:     Reason for patient still in hospital (select all that apply): Patient new problem, Patient trending condition, Laboratory test, Treatment, Imaging, Consult recommendations, and PT / OT recommendations  Discharge Plan A: Home with family, Home Health                  Keyla Vizcaino MD  Department of Hospital Medicine   [facility] - [facility] Surgical [facility]

## 2024-10-03 NOTE — PLAN OF CARE
10/03/24 0912   Rounds   Attendance Nurse    Discharge Plan A Home with family;Home Health   Why the patient remains in the hospital Requires continued medical care   Transition of Care Barriers None     Pt. is receiving IV Merrem every 12 hours until 10/7/24 for wound infection to Rt. Leg. Pt. Has made some progress with Therapy and is motivated but is limited d/t Rt. Foot deformity. Will plan for d/c home with HH services next week after completion of IV antibiotics. Continue to follow for d/c needs.

## 2024-10-03 NOTE — ASSESSMENT & PLAN NOTE
INOCENTE is likely due to pre-renal azotemia due to dehydration. Baseline creatinine is unknown. Most recent creatinine and eGFR are listed below.  Recent Labs     10/01/24  2024   CREATININE 1.50*   EGFRNORACEVR 36*        Plan  - INOCENTE is improving  - Avoid nephrotoxins and renally dose meds for GFR listed above  - Monitor urine output, serial BMP, and adjust therapy as needed  - BUN/CREAT 25/1.49    09/24/2024  CONTINUE POC  BUN/CREAT 30/1.45  09/30/2024   BUN/CREAT 38/1.50     Awake/Alert/Cooperative

## 2024-10-03 NOTE — PT/OT/SLP PROGRESS
Occupational Therapy   Treatment    Name: Bobbi Haywood  MRN: [id number]  Admitting Diagnosis:  Muscle weakness       Recommendations:     Discharge Recommendations:    Discharge Equipment Recommendations:  wheelchair  Barriers to discharge:  Inaccessible home environment, Other (Comment) (limitations with 3 steps within her home)    Assessment:     Bobbi Haywood is a 73 y.o. female with a medical diagnosis of Muscle weakness.  She presents with knee pain. Performance deficits affecting function are weakness, decreased upper extremity function, impaired endurance, impaired balance, decreased safety awareness, impaired self care skills, impaired functional mobility, decreased coordination. Pt continues be cautious with her ankle due R ankle instability. Pt standing is limited due to pain. Pt continues to have be functional with t/f but OT will continue to monitor ankle. OT suggested that pt reinitiate wearing her orthotic brace due to  the chronic nature of this condition.    Rehab Prognosis:  Fair; patient would benefit from acute skilled OT services to address these deficits and reach maximum level of function.       Plan:     Patient to be seen 5 x/week to address the above listed problems via self-care/home management, therapeutic activities, therapeutic exercises  Plan of Care Expires: 10/18/24  Plan of Care Reviewed with: patient    Subjective     Chief Complaint: R ankle pain  Patient/Family Comments/goals: increase mobility and ADLs  Pain/Comfort:  Location 1: knee  Location - Side 2: Right    Objective:     Communicated with: Pt prior to session.  Patient found supine with   upon OT entry to room.    General Precautions: Standard, fall    Orthopedic Precautions:N/A  Braces: N/A  Respiratory Status: Room air     Occupational Performance:       Functional Mobility/Transfers:  Patient completed Sit <> Stand Transfer with supervision  with  rolling walker         [facility] 6 Click ADL:  21    Treatment & Education:    Therapeutic exercise:  While sitting in the chair Pt [id number] mins on arm ergometer to increase endurance, BUE strength and activity tolerance for ADL performance    Pt completed in 3 sets of 20 reps of bicep curls with 5# DB and chest press 2 sets to 20 reps with 5# dowel to enhance UB strength ADL performance.    Patient left up in chair with all lines intact and call button in reach    GOALS:   Multidisciplinary Problems       Occupational Therapy Goals          Problem: Occupational Therapy    Goal Priority Disciplines Outcome Interventions   Occupational Therapy Goal     OT, PT/OT     Description: Goals to be met by: 10/18/24     Patient will increase functional independence with ADLs by performing:    UE Dressing with Modified Sneads.  LE Dressing with Modified Sneads and Supervision with a/e as needed  Grooming while standing at sink with Modified Sneads and Supervision.  Toileting from toilet with Modified Sneads and Supervision for hygiene and clothing management.   Standing x3-5 minutes with Supervision.  Supine to sit with Sneads.  Squat pivot transfers with Modified Sneads and Supervision.  Step transfer with Modified Sneads and Supervision  Toilet transfer to toilet with Modified Sneads and Supervision.                         Time Tracking:     OT Date of Treatment:    OT Start Time: 0915  OT Stop Time: 0942  OT Total Time (min): 27 min    Billable Minutes:Therapeutic Exercise 25    OT/TRISHA: OT     Number of TRISHA visits since last OT visit: 1    10/3/2024

## 2024-10-03 NOTE — ASSESSMENT & PLAN NOTE
PT HAS HX NEUROPATHY  CONTINUE CYMBALTA AND GABAPENTIN    09/24/2024  PT IS PARTICIPATING WITH PT/OT PERFORMING BED MOBILITY WITH INDEPENDENCE, TRANSFERS WITH CGA WITH RW, AND AMBULATING 140  FEET WITH CGA WITH RW THEN 80 FEET ALSO WITH CGA AND RW ITH VERBAL CUES FOR POSTURE, SAFETY AND EXCESSIVE BUE SUPPORT ON AD WITH FAIR STANDING BALANCE.  CONTINUE CYMBALTA AND GABAPENTIN    09/30/2024  PT IS PARTICIPATING WITH PT/OT PERFORMING BED MOBILITY WITH SBA, TRANSFERS WITH SBA WITH RW, AND AMBULATING 180  FEET WITH SBA WITH RW WITH GOOD STANDING BALANCE.  CONTINUE CYMBALTA AND GABAPENTIN

## 2024-10-03 NOTE — ASSESSMENT & PLAN NOTE
Anemia is likely due to acute blood loss which was from HEMATOMA A and chronic disease due to MULTIFACTORIAL . Most recent hemoglobin and hematocrit are listed below.  9/29  Plan  - Monitor serial CBC:   - Transfuse PRBC if patient becomes hemodynamically unstable, symptomatic or H/H drops below 7/21.  - Patient has not received any PRBC transfusions to date  - Patient's anemia is currently improving  - 11/35 ON 9/10/2024 AND POST OP 8/26  CONTINUE IRON SUPPLEMENTATION    09/24/2024  H/H 8.9/28.8  CONTINUE TO MONITOR WEEKLY  CONTINUE FESO4 DAILY    09/30/2024  H/H 8.5/28.0  CONTINUE POC

## 2024-10-04 PROCEDURE — 94761 N-INVAS EAR/PLS OXIMETRY MLT: CPT

## 2024-10-04 PROCEDURE — 27000221 HC OXYGEN, UP TO 24 HOURS

## 2024-10-04 PROCEDURE — 25000003 PHARM REV CODE 250: Performed by: EMERGENCY MEDICINE

## 2024-10-04 PROCEDURE — 63600175 PHARM REV CODE 636 W HCPCS: Performed by: EMERGENCY MEDICINE

## 2024-10-04 PROCEDURE — 25000003 PHARM REV CODE 250: Performed by: PHYSICIAN ASSISTANT

## 2024-10-04 PROCEDURE — 97530 THERAPEUTIC ACTIVITIES: CPT | Mod: CQ

## 2024-10-04 PROCEDURE — 99900035 HC TECH TIME PER 15 MIN (STAT)

## 2024-10-04 PROCEDURE — 97110 THERAPEUTIC EXERCISES: CPT | Mod: CQ

## 2024-10-04 PROCEDURE — 11000004 HC SNF PRIVATE

## 2024-10-04 RX ADMIN — Medication 1 TABLET: at 09:10

## 2024-10-04 RX ADMIN — ACETAMINOPHEN 1000 MG: 500 TABLET ORAL at 12:10

## 2024-10-04 RX ADMIN — Medication 6 MG: at 08:10

## 2024-10-04 RX ADMIN — OXYCODONE HYDROCHLORIDE AND ACETAMINOPHEN 500 MG: 500 TABLET ORAL at 09:10

## 2024-10-04 RX ADMIN — APIXABAN 2.5 MG: 2.5 TABLET, FILM COATED ORAL at 09:10

## 2024-10-04 RX ADMIN — Medication 1 CAPSULE: at 12:10

## 2024-10-04 RX ADMIN — FAMOTIDINE 20 MG: 20 TABLET, FILM COATED ORAL at 09:10

## 2024-10-04 RX ADMIN — FERROUS SULFATE TAB EC 324 MG (65 MG FE EQUIVALENT) 1 EACH: 324 (65 FE) TABLET DELAYED RESPONSE at 09:10

## 2024-10-04 RX ADMIN — MEROPENEM 1 G: 1 INJECTION INTRAVENOUS at 05:10

## 2024-10-04 RX ADMIN — Medication 1 CAPSULE: at 09:10

## 2024-10-04 RX ADMIN — GABAPENTIN 600 MG: 600 TABLET, FILM COATED ORAL at 08:10

## 2024-10-04 RX ADMIN — DULOXETINE HYDROCHLORIDE 60 MG: 30 CAPSULE, DELAYED RELEASE ORAL at 09:10

## 2024-10-04 RX ADMIN — METOPROLOL TARTRATE 25 MG: 25 TABLET, FILM COATED ORAL at 09:10

## 2024-10-04 RX ADMIN — CYANOCOBALAMIN TAB 500 MCG 500 MCG: 500 TAB at 09:10

## 2024-10-04 RX ADMIN — APIXABAN 2.5 MG: 2.5 TABLET, FILM COATED ORAL at 08:10

## 2024-10-04 RX ADMIN — ESTRADIOL 2 MG: 0.5 TABLET ORAL at 09:10

## 2024-10-04 RX ADMIN — ACETAMINOPHEN 1000 MG: 500 TABLET ORAL at 08:10

## 2024-10-04 RX ADMIN — PRAVASTATIN SODIUM 80 MG: 40 TABLET ORAL at 08:10

## 2024-10-04 RX ADMIN — SODIUM CHLORIDE: 9 INJECTION, SOLUTION INTRAVENOUS at 05:10

## 2024-10-04 RX ADMIN — OXYBUTYNIN CHLORIDE 5 MG: 5 TABLET, EXTENDED RELEASE ORAL at 09:10

## 2024-10-04 RX ADMIN — Medication 1 CAPSULE: at 05:10

## 2024-10-04 RX ADMIN — SENNOSIDES AND DOCUSATE SODIUM 1 TABLET: 8.6; 5 TABLET ORAL at 09:10

## 2024-10-04 RX ADMIN — GABAPENTIN 600 MG: 600 TABLET, FILM COATED ORAL at 09:10

## 2024-10-04 NOTE — PROGRESS NOTES
Pt reports increased RLE pain on this evening secondary to severe ankle instability. Pt has reach most of her therapy goals and the pt is seemingly in more pain when standing.  Pt educated on the importance of utilizing her brace provided with the ortho.

## 2024-10-04 NOTE — PT/OT/SLP PROGRESS
Physical Therapy Treatment    Patient Name:  Bobbi Haywood   MRN:  [id number]    Recommendations:     Discharge Recommendations: Low Intensity Therapy  Discharge Equipment Recommendations: wheelchair  Barriers to discharge: None    Assessment:     Bobbi Haywood is a 73 y.o. female admitted with a medical diagnosis of generalized weakness. Patient presented to [facility] following 2 falls at home while trying to descend the 3 steps inside her home .  She presents with the following impairments/functional limitations: weakness, impaired endurance, impaired functional mobility, gait instability, impaired balance, decreased lower extremity function, decreased safety awareness, pain     Bobbi continues to have severe R knee pain with walking and standing. She also has significant R ankle instability.     Rehab Prognosis: Good; patient would benefit from acute skilled PT services to address these deficits and reach maximum level of function.    Recent Surgery: * No surgery found *      Plan:     During this hospitalization, patient to be seen 5 x/week to address the identified rehab impairments via gait training, therapeutic activities, therapeutic exercises, neuromuscular re-education and progress toward the following goals:    Plan of Care Expires:  10/09/24    Subjective     Chief Complaint: complains of right foot rolling over  Patient/Family Comments/goals: to improve mobility and return home.  Pain/Comfort:  Pain Rating 1: 9/10  Location - Side 1: Right  Location - Orientation 1: anterior  Location 1: knee  Pain Addressed 1: Nurse notified      Objective:     Communicated with patient prior to session.  Patient found up in chair with   upon PT entry to room.     General Precautions: Standard, fall  Orthopedic Precautions: N/A  Braces: N/A  Respiratory Status: Room air     Functional Mobility:  Transfers:     Sit to Stand:  stand by assistance with rolling walker      AM-PAC 6  CLICK MOBILITY  Turning over in bed (including adjusting bedclothes, sheets and blankets)?: 3  Sitting down on and standing up from a chair with arms (e.g., wheelchair, bedside commode, etc.): 3  Moving from lying on back to sitting on the side of the bed?: 3  Moving to and from a bed to a chair (including a wheelchair)?: 3  Need to walk in hospital room?: 3  Climbing 3-5 steps with a railing?: 1  Basic Mobility Total Score: 16       Treatment & Education:    TE: scifit x 20 minutes; seated Laq, hip flexion 20 x 3 with 4# ankle weight, HS curls with green band 20 x 2 on each     Patient left sitting edge of bed with call button in reach..    GOALS:   Multidisciplinary Problems       Physical Therapy Goals          Problem: Physical Therapy    Goal Priority Disciplines Outcome Interventions   Physical Therapy Goal     PT, PT/OT Progressing    Description: Short-term Goals: 1.5 weeks  1. Patient will perform supine to/from sit with standby assist to improve independence and safety with bed mobility. Met.  2. Patient will perform sit to/from stand with a rolling walker with contact guard assist to improve independence and safety with transfers. Met.  3. Patient will ambulate 50 feet with a rolling walker with contact guard assist to improve independence and safety with gait. Met.  4. Patient will tolerate 15 minutes of physical therapy intervention to improve endurance and activity tolerance. Met.  5. Patient will ascend/descend 3 steps with bilateral handrail support with contact guard assist to improve independence and safety with stair negotiation.     Long-term goals: 3 weeks  1. Patient will perform supine to/from sit with modified independence to improve independence and safety with bed mobility.  2. Patient will perform sit to/from stand with a rolling walker with standby assist to improve independence and safety with transfers.  3. Patient will ambulate 100 feet with a rolling walker with standby assist to  improve independence and safety with gait. Met.  4. Patient will tolerate 30 minutes of physical therapy intervention to improve endurance and activity tolerance.  5. Patient will ascend/descend 3 steps with bilateral handrail support with standby assist to improve independence and safety with stair negotiation.                          Time Tracking:     PT Received On: 10/04/24  PT Start Time: 0915     PT Stop Time: 1014  PT Total Time (min): 59 min     Billable Minutes: Therapeutic Activity 10 and Therapeutic Exercise 49    Treatment Type: Treatment  PT/PTA: PTA     Number of PTA visits since last PT visit: 2     10/04/2024

## 2024-10-05 PROCEDURE — 25000003 PHARM REV CODE 250: Performed by: EMERGENCY MEDICINE

## 2024-10-05 PROCEDURE — 11000004 HC SNF PRIVATE

## 2024-10-05 PROCEDURE — 94761 N-INVAS EAR/PLS OXIMETRY MLT: CPT

## 2024-10-05 PROCEDURE — 63600175 PHARM REV CODE 636 W HCPCS: Performed by: EMERGENCY MEDICINE

## 2024-10-05 PROCEDURE — 25000003 PHARM REV CODE 250: Performed by: PHYSICIAN ASSISTANT

## 2024-10-05 PROCEDURE — 27000958

## 2024-10-05 RX ADMIN — CYANOCOBALAMIN TAB 500 MCG 500 MCG: 500 TAB at 09:10

## 2024-10-05 RX ADMIN — Medication 1 CAPSULE: at 04:10

## 2024-10-05 RX ADMIN — SODIUM CHLORIDE: 9 INJECTION, SOLUTION INTRAVENOUS at 05:10

## 2024-10-05 RX ADMIN — MEROPENEM 1 G: 1 INJECTION INTRAVENOUS at 05:10

## 2024-10-05 RX ADMIN — GABAPENTIN 600 MG: 600 TABLET, FILM COATED ORAL at 09:10

## 2024-10-05 RX ADMIN — FERROUS SULFATE TAB EC 324 MG (65 MG FE EQUIVALENT) 1 EACH: 324 (65 FE) TABLET DELAYED RESPONSE at 09:10

## 2024-10-05 RX ADMIN — Medication 1 TABLET: at 09:10

## 2024-10-05 RX ADMIN — METOPROLOL TARTRATE 25 MG: 25 TABLET, FILM COATED ORAL at 09:10

## 2024-10-05 RX ADMIN — Medication 1 CAPSULE: at 12:10

## 2024-10-05 RX ADMIN — GABAPENTIN 600 MG: 600 TABLET, FILM COATED ORAL at 08:10

## 2024-10-05 RX ADMIN — ACETAMINOPHEN 1000 MG: 500 TABLET ORAL at 08:10

## 2024-10-05 RX ADMIN — ACETAMINOPHEN 1000 MG: 500 TABLET ORAL at 09:10

## 2024-10-05 RX ADMIN — Medication 1 CAPSULE: at 09:10

## 2024-10-05 RX ADMIN — OXYCODONE HYDROCHLORIDE AND ACETAMINOPHEN 500 MG: 500 TABLET ORAL at 09:10

## 2024-10-05 RX ADMIN — MEROPENEM 1 G: 1 INJECTION INTRAVENOUS at 04:10

## 2024-10-05 RX ADMIN — FAMOTIDINE 20 MG: 20 TABLET, FILM COATED ORAL at 09:10

## 2024-10-05 RX ADMIN — APIXABAN 2.5 MG: 2.5 TABLET, FILM COATED ORAL at 09:10

## 2024-10-05 RX ADMIN — OXYBUTYNIN CHLORIDE 5 MG: 5 TABLET, EXTENDED RELEASE ORAL at 09:10

## 2024-10-05 RX ADMIN — ESTRADIOL 2 MG: 0.5 TABLET ORAL at 09:10

## 2024-10-05 RX ADMIN — APIXABAN 2.5 MG: 2.5 TABLET, FILM COATED ORAL at 08:10

## 2024-10-05 RX ADMIN — PRAVASTATIN SODIUM 80 MG: 40 TABLET ORAL at 08:10

## 2024-10-05 RX ADMIN — DULOXETINE HYDROCHLORIDE 60 MG: 30 CAPSULE, DELAYED RELEASE ORAL at 09:10

## 2024-10-05 NOTE — NURSING
0800 Received pt resting in bed.Lings CTA. Int intact to (R) forearm. Dressing to (R) upper thigh and (R) lower extremity intact. Redness noted to sacral crease. Assisted pt to side of bed for breakfast. Denies pain. Safety measures ongoing.    1800 Resting in  bed with no complaints. Denies pain. CB near

## 2024-10-05 NOTE — NURSING
Flushed IV at this time. No resistance noted. Pt tolerated. IV med infusing at this time per provider order. Pt stated she was okay. NADN. Bed lowest position and locked. Call bell within reach noted. Reminded pt to use call bell for assistance. Pt stated okay.

## 2024-10-06 PROCEDURE — 11000004 HC SNF PRIVATE

## 2024-10-06 PROCEDURE — 27000958

## 2024-10-06 PROCEDURE — 63600175 PHARM REV CODE 636 W HCPCS: Performed by: EMERGENCY MEDICINE

## 2024-10-06 PROCEDURE — 25000003 PHARM REV CODE 250: Performed by: PHYSICIAN ASSISTANT

## 2024-10-06 PROCEDURE — 94761 N-INVAS EAR/PLS OXIMETRY MLT: CPT

## 2024-10-06 PROCEDURE — 25000003 PHARM REV CODE 250: Performed by: EMERGENCY MEDICINE

## 2024-10-06 RX ADMIN — ACETAMINOPHEN 1000 MG: 500 TABLET ORAL at 09:10

## 2024-10-06 RX ADMIN — OXYCODONE HYDROCHLORIDE AND ACETAMINOPHEN 500 MG: 500 TABLET ORAL at 08:10

## 2024-10-06 RX ADMIN — DULOXETINE HYDROCHLORIDE 60 MG: 30 CAPSULE, DELAYED RELEASE ORAL at 08:10

## 2024-10-06 RX ADMIN — Medication 1 TABLET: at 08:10

## 2024-10-06 RX ADMIN — ESTRADIOL 2 MG: 0.5 TABLET ORAL at 08:10

## 2024-10-06 RX ADMIN — GABAPENTIN 600 MG: 600 TABLET, FILM COATED ORAL at 08:10

## 2024-10-06 RX ADMIN — SODIUM CHLORIDE: 9 INJECTION, SOLUTION INTRAVENOUS at 05:10

## 2024-10-06 RX ADMIN — Medication 1 CAPSULE: at 11:10

## 2024-10-06 RX ADMIN — APIXABAN 2.5 MG: 2.5 TABLET, FILM COATED ORAL at 08:10

## 2024-10-06 RX ADMIN — PRAVASTATIN SODIUM 80 MG: 40 TABLET ORAL at 08:10

## 2024-10-06 RX ADMIN — Medication 1 CAPSULE: at 08:10

## 2024-10-06 RX ADMIN — CYANOCOBALAMIN TAB 500 MCG 500 MCG: 500 TAB at 08:10

## 2024-10-06 RX ADMIN — FERROUS SULFATE TAB EC 324 MG (65 MG FE EQUIVALENT) 1 EACH: 324 (65 FE) TABLET DELAYED RESPONSE at 08:10

## 2024-10-06 RX ADMIN — OXYBUTYNIN CHLORIDE 5 MG: 5 TABLET, EXTENDED RELEASE ORAL at 08:10

## 2024-10-06 RX ADMIN — Medication 1 CAPSULE: at 04:10

## 2024-10-06 RX ADMIN — FAMOTIDINE 20 MG: 20 TABLET, FILM COATED ORAL at 08:10

## 2024-10-06 RX ADMIN — METOPROLOL TARTRATE 25 MG: 25 TABLET, FILM COATED ORAL at 08:10

## 2024-10-06 RX ADMIN — MEROPENEM 1 G: 1 INJECTION INTRAVENOUS at 05:10

## 2024-10-06 RX ADMIN — SODIUM CHLORIDE: 9 INJECTION, SOLUTION INTRAVENOUS at 04:10

## 2024-10-06 RX ADMIN — MEROPENEM 1 G: 1 INJECTION INTRAVENOUS at 04:10

## 2024-10-06 NOTE — PLAN OF CARE
Problem: Adult Inpatient Plan of Care  Goal: Plan of Care Review  Outcome: Progressing  Goal: Patient-Specific Goal (Individualized)  Outcome: Progressing  Goal: Absence of Hospital-Acquired Illness or Injury  Outcome: Progressing  Goal: Optimal Comfort and Wellbeing  Outcome: Progressing  Goal: Readiness for Transition of Care  Outcome: Progressing     Problem: Sepsis/Septic Shock  Goal: Optimal Coping  Outcome: Progressing  Goal: Absence of Bleeding  Outcome: Progressing  Goal: Blood Glucose Level Within Targeted Range  Outcome: Progressing  Goal: Absence of Infection Signs and Symptoms  Outcome: Progressing  Goal: Optimal Nutrition Intake  Outcome: Progressing     Problem: Infection  Goal: Absence of Infection Signs and Symptoms  Outcome: Progressing     Problem: Wound  Goal: Optimal Coping  Outcome: Progressing  Goal: Optimal Functional Ability  Outcome: Progressing  Goal: Absence of Infection Signs and Symptoms  Outcome: Progressing  Goal: Improved Oral Intake  Outcome: Progressing  Goal: Optimal Pain Control and Function  Outcome: Progressing  Goal: Skin Health and Integrity  Outcome: Progressing  Goal: Optimal Wound Healing  Outcome: Progressing     Problem: Fall Injury Risk  Goal: Absence of Fall and Fall-Related Injury  Outcome: Progressing     Problem: Skin Injury Risk Increased  Goal: Skin Health and Integrity  Outcome: Progressing

## 2024-10-06 NOTE — NURSING
6541 Assisted pt from bed to commode and from commode back to bed using walker. Call bell within reach. Bed lowest position and locked. Put clean pull up on pt.    1230 Changed, cleaned, and applied new dressings to pt's right lower leg and upper right thigh at this time according to provider order. Cleaned both wounds with vashe. Packed upper right thigh with vashe gauze. Pt tolerated well. NADN.    0100 Gave pt water at this time. Reminded pt to use call bell for assistance. Bed lowest position and locked. Call bell within reach

## 2024-10-06 NOTE — NURSING
Flushed IV at this time to Rt AC. Flushed no resistance. Pt tolerated. IV infusing at this time per provider order. Pt stated she was okay. Bed lowest position and locked. Call bell within reach noted on check.

## 2024-10-07 ENCOUNTER — OFFICE VISIT (OUTPATIENT)
Dept: SURGERY | Facility: CLINIC | Age: 74
End: 2024-10-07
Attending: SURGERY
Payer: MEDICARE

## 2024-10-07 VITALS
DIASTOLIC BLOOD PRESSURE: 75 MMHG | HEIGHT: 71 IN | RESPIRATION RATE: 18 BRPM | HEART RATE: 71 BPM | OXYGEN SATURATION: 97 % | TEMPERATURE: 98 F | BODY MASS INDEX: 33.32 KG/M2 | SYSTOLIC BLOOD PRESSURE: 124 MMHG | WEIGHT: 238 LBS

## 2024-10-07 DIAGNOSIS — L02.415 ABSCESS OF RIGHT LOWER EXTREMITY: Primary | ICD-10-CM

## 2024-10-07 PROBLEM — Z29.9 ENCOUNTER FOR DEEP VEIN THROMBOSIS (DVT) PROPHYLAXIS: Status: RESOLVED | Noted: 2024-09-17 | Resolved: 2024-10-07

## 2024-10-07 PROCEDURE — 63600175 PHARM REV CODE 636 W HCPCS: Performed by: EMERGENCY MEDICINE

## 2024-10-07 PROCEDURE — 99214 OFFICE O/P EST MOD 30 MIN: CPT | Mod: PBBFAC | Performed by: SURGERY

## 2024-10-07 PROCEDURE — 99999 PR PBB SHADOW E&M-EST. PATIENT-LVL IV: CPT | Mod: PBBFAC,,, | Performed by: SURGERY

## 2024-10-07 PROCEDURE — 99024 POSTOP FOLLOW-UP VISIT: CPT | Mod: ,,, | Performed by: SURGERY

## 2024-10-07 PROCEDURE — 25000003 PHARM REV CODE 250: Performed by: EMERGENCY MEDICINE

## 2024-10-07 PROCEDURE — 1159F MED LIST DOCD IN RCRD: CPT | Mod: CPTII,,, | Performed by: SURGERY

## 2024-10-07 PROCEDURE — 25000003 PHARM REV CODE 250: Performed by: PHYSICIAN ASSISTANT

## 2024-10-07 RX ADMIN — CYANOCOBALAMIN TAB 500 MCG 500 MCG: 500 TAB at 09:10

## 2024-10-07 RX ADMIN — Medication 1 CAPSULE: at 11:10

## 2024-10-07 RX ADMIN — ESTRADIOL 2 MG: 0.5 TABLET ORAL at 09:10

## 2024-10-07 RX ADMIN — MEROPENEM 1 G: 1 INJECTION INTRAVENOUS at 04:10

## 2024-10-07 RX ADMIN — APIXABAN 2.5 MG: 2.5 TABLET, FILM COATED ORAL at 09:10

## 2024-10-07 RX ADMIN — DULOXETINE HYDROCHLORIDE 60 MG: 30 CAPSULE, DELAYED RELEASE ORAL at 09:10

## 2024-10-07 RX ADMIN — OXYBUTYNIN CHLORIDE 5 MG: 5 TABLET, EXTENDED RELEASE ORAL at 09:10

## 2024-10-07 RX ADMIN — OXYCODONE HYDROCHLORIDE AND ACETAMINOPHEN 500 MG: 500 TABLET ORAL at 09:10

## 2024-10-07 RX ADMIN — Medication 1 CAPSULE: at 09:10

## 2024-10-07 RX ADMIN — FAMOTIDINE 20 MG: 20 TABLET, FILM COATED ORAL at 09:10

## 2024-10-07 RX ADMIN — GABAPENTIN 600 MG: 600 TABLET, FILM COATED ORAL at 09:10

## 2024-10-07 RX ADMIN — FERROUS SULFATE TAB EC 324 MG (65 MG FE EQUIVALENT) 1 EACH: 324 (65 FE) TABLET DELAYED RESPONSE at 09:10

## 2024-10-07 RX ADMIN — METOPROLOL TARTRATE 25 MG: 25 TABLET, FILM COATED ORAL at 09:10

## 2024-10-07 RX ADMIN — Medication 1 TABLET: at 09:10

## 2024-10-07 NOTE — PLAN OF CARE
Problem: Adult Inpatient Plan of Care  Goal: Plan of Care Review  Outcome: Met  Goal: Patient-Specific Goal (Individualized)  Outcome: Met  Goal: Absence of Hospital-Acquired Illness or Injury  Outcome: Met  Goal: Optimal Comfort and Wellbeing  Outcome: Met  Goal: Readiness for Transition of Care  Outcome: Met     Problem: Sepsis/Septic Shock  Goal: Optimal Coping  Outcome: Met  Goal: Absence of Bleeding  Outcome: Met  Goal: Blood Glucose Level Within Targeted Range  Outcome: Met  Goal: Absence of Infection Signs and Symptoms  Outcome: Met  Goal: Optimal Nutrition Intake  Outcome: Met     Problem: Acute Kidney Injury/Impairment  Goal: Fluid and Electrolyte Balance  Outcome: Met  Goal: Improved Oral Intake  Outcome: Met  Goal: Effective Renal Function  Outcome: Met     Problem: Infection  Goal: Absence of Infection Signs and Symptoms  Outcome: Met     Problem: Wound  Goal: Optimal Coping  Outcome: Met  Goal: Optimal Functional Ability  Outcome: Met  Goal: Absence of Infection Signs and Symptoms  Outcome: Met  Goal: Improved Oral Intake  Outcome: Met  Goal: Optimal Pain Control and Function  Outcome: Met  Goal: Skin Health and Integrity  Outcome: Met  Goal: Optimal Wound Healing  Outcome: Met     Problem: Fall Injury Risk  Goal: Absence of Fall and Fall-Related Injury  Outcome: Met     Problem: Skin Injury Risk Increased  Goal: Skin Health and Integrity  Outcome: Met

## 2024-10-07 NOTE — ASSESSMENT & PLAN NOTE
Patient with Long standing persistent (>12 months) atrial fibrillation which is controlled currently with Beta Blocker. Patient is currently in atrial fibrillation.[id number] Score: 3. HASBLED Score: 1. Anticoagulation indicated. Anticoagulation done with ELIQUIS .  09/24/2024  CONTINUE POC WITH ELIQUIS    09/30/2024  CONTINUE POC WITH ELIQUIS    10/7/24  Continue Eliquis and Lopresssor on D/C for chronic afib   Controlled heart rate for discharge

## 2024-10-07 NOTE — PROGRESS NOTES
Post-operative Note    HPI:  The patient is status post incision and drainage of right medial and right lateral thigh abscess.  This was about a month ago.  She has been doing pretty well overall.  Had to go to a swing bed and was discharged recently.  Seen by wound care up there and has been healing pretty well based on the pictures.  Dressing was just applied today.    PHYSICAL EXAM:    Photos reviewed incision well healed clean dry intact good granulation tissue.    Recent Results (from the past 3 weeks)   Basic Metabolic Panel    Collection Time: 09/17/24  3:21 AM   Result Value Ref Range    Sodium 139 136 - 145 mmol/L    Potassium 3.9 3.5 - 5.1 mmol/L    Chloride 105 98 - 107 mmol/L    CO2 27 21 - 32 mmol/L    Anion Gap 11 7 - 16 mmol/L    Glucose 93 74 - 106 mg/dL    BUN 26 (H) 7 - 18 mg/dL    Creatinine 1.41 (H) 0.55 - 1.02 mg/dL    BUN/Creatinine Ratio 18 6 - 20    Calcium 8.4 (L) 8.5 - 10.1 mg/dL    eGFR 39 (L) >=60 mL/min/1.73m2   CBC with Differential    Collection Time: 09/17/24  3:21 AM   Result Value Ref Range    WBC 15.76 (H) 4.50 - 11.00 K/uL    RBC 2.81 (L) 4.20 - 5.40 M/uL    Hemoglobin 8.2 (L) 12.0 - 16.0 g/dL    Hematocrit 26.6 (L) 38.0 - 47.0 %    MCV 94.7 80.0 - 96.0 fL    MCH 29.2 27.0 - 31.0 pg    MCHC 30.8 (L) 32.0 - 36.0 g/dL    RDW 14.5 11.5 - 14.5 %    Platelet Count 383 150 - 400 K/uL    MPV 10.3 9.4 - 12.4 fL    Neutrophils % 64.6 53.0 - 65.0 %    Lymphocytes % 18.1 (L) 27.0 - 41.0 %    Monocytes % 9.6 (H) 2.0 - 6.0 %    Eosinophils % 2.9 1.0 - 4.0 %    Basophils % 0.5 0.0 - 1.0 %    Immature Granulocytes % 4.3 (H) 0.0 - 0.4 %    nRBC, Auto 0.1 (H) <=0.0 %    Neutrophils, Abs 10.17 (H) 1.80 - 7.70 K/uL    Lymphocytes, Absolute 2.85 1.00 - 4.80 K/uL    Monocytes, Absolute 1.52 (H) 0.00 - 0.80 K/uL    Eosinophils, Absolute 0.46 0.00 - 0.50 K/uL    Basophils, Absolute 0.08 0.00 - 0.20 K/uL    Immature  Granulocytes, Absolute 0.68 (H) 0.00 - 0.04 K/uL    nRBC, Absolute 0.02 (H) <=0.00 x10e3/uL    Diff Type Manual    Manual Differential    Collection Time: 09/17/24  3:21 AM   Result Value Ref Range    Segmented Neutrophils, Man % 72 (H) 50 - 62 %    Bands, Man % 1 1 - 5 %    Lymphocytes, Man % 15 (L) 27 - 41 %    Monocytes, Man % 8 (H) 2 - 6 %    Eosinophils, Man % 2 1 - 4 %    Metamyelocytes, Man % 2 %    Platelet Morphology Normal Normal    RBC Morphology Normal    Hepatic Function Panel    Collection Time: 09/17/24  7:00 PM   Result Value Ref Range    Total Protein 6.7 6.4 - 8.2 g/dL    Albumin 2.1 (L) 3.5 - 5.0 g/dL    Bilirubin, Total 0.3 >0.0 - 1.2 mg/dL    Bilirubin, Direct 0.2 0.0 - 0.2 mg/dL    AST 10 (L) 15 - 37 U/L    ALT 13 13 - 56 U/L    Alk Phos 92 55 - 142 U/L   COVID-19 Rapid Screening    Collection Time: 09/17/24  8:35 PM   Result Value Ref Range    SARS COV-2 Molecular Negative Negative, Invalid   Basic Metabolic Panel (BMP)    Collection Time: 09/17/24  9:00 PM   Result Value Ref Range    Sodium 139 136 - 145 mmol/L    Potassium 3.8 3.5 - 5.1 mmol/L    Chloride 101 98 - 107 mmol/L    CO2 29 21 - 32 mmol/L    Anion Gap 13 7 - 16 mmol/L    Glucose 113 (H) 74 - 106 mg/dL    BUN 25 (H) 7 - 18 mg/dL    Creatinine 1.49 (H) 0.55 - 1.02 mg/dL    BUN/Creatinine Ratio 17 6 - 20    Calcium 8.6 8.5 - 10.1 mg/dL    eGFR 36 (L) >=60 mL/min/1.73m2   Vitamin D    Collection Time: 09/17/24  9:00 PM   Result Value Ref Range    Vitamin D 25-Hydroxy, Blood 27.8 ng/mL   TSH    Collection Time: 09/17/24  9:00 PM   Result Value Ref Range    TSH 1.020 0.358 - 3.740 uIU/mL   CBC with Differential    Collection Time: 09/17/24  9:00 PM   Result Value Ref Range    WBC 18.69 (H) 4.50 - 11.00 K/uL    RBC 3.09 (L) 4.20 - 5.40 M/uL    Hemoglobin 9.1 (L) 12.0 - 16.0 g/dL    Hematocrit 29.9 (L) 38.0 - 47.0 %    MCV 96.8 (H) 80.0 - 96.0 fL    MCH 29.4 27.0 - 31.0 pg    MCHC 30.4 (L) 32.0 - 36.0 g/dL    RDW 14.7 (H) 11.5 - 14.5 %     Platelet Count 444 (H) 150 - 400 K/uL    MPV 9.5 9.4 - 12.4 fL    Neutrophils % 73.0 (H) 53.0 - 65.0 %    Lymphocytes % 15.0 (L) 27.0 - 41.0 %    Neutrophils, Abs 13.65 (H) 1.80 - 7.70 K/uL    Lymphocytes, Absolute 2.80 1.00 - 4.80 K/uL    Diff Type Manual     Monocytes % 9.4 (H) 2.0 - 6.0 %    Eosinophils % 2.3 1.0 - 4.0 %    Basophils % 0.3 0.0 - 1.0 %    Monocytes, Absolute 1.76 (H) 0.00 - 0.80 K/uL    Eosinophils, Absolute 0.43 0.00 - 0.50 K/uL    Basophils, Absolute 0.05 0.00 - 0.20 K/uL   Manual Differential    Collection Time: 09/17/24  9:00 PM   Result Value Ref Range    Segmented Neutrophils, Man % 84 (H) 50 - 62 %    Bands, Man % 2 1 - 5 %    Lymphocytes, Man % 6 (L) 27 - 41 %    Monocytes, Man % 8 (H) 2 - 6 %    nRBC, Manual 5 (H) <=0 /100 WBC    Platelet Morphology Increased (A) Normal    Anisocytosis 2+     Poikilocytosis 2+     Polychromasia 1+     Hypochromic 1+     Merino-Jolly Bodies Few    Urinalysis, Reflex to Urine Culture    Collection Time: 09/17/24 10:09 PM    Specimen: Urine   Result Value Ref Range    Color, UA Yellow Colorless, Straw, Yellow, Light Yellow, Dark Yellow    Clarity, UA Clear Clear    pH, UA 6.0 5.0 to 8.0 pH Units    Leukocytes, UA Trace (A) Negative    Nitrites, UA Negative Negative    Protein, UA Negative Negative    Glucose, UA Negative Normal mg/dL    Ketones, UA Negative Negative mg/dL    Urobilinogen, UA 0.2 0.2, 1.0, Normal mg/dL    Bilirubin, UA Negative Negative    Blood, UA Negative Negative    Specific Gravity, UA 1.025 <=1.005, 1.010, 1.015, 1.020, 1.025, 1.030   Urinalysis, Microscopic    Collection Time: 09/17/24 10:09 PM   Result Value Ref Range    WBC, UA 5-10 (A) None Seen, 0-5 /hpf    RBC, UA 0-3 None Seen, 0-3 /hpf    Bacteria, UA Occasional (A) None Seen /hpf    Yeast, UA Few (A) None Seen /hpf    Squamous Epithelial Cells, UA Few (A) None Seen, Rare, None Seen To Occasional /lpf    Transitional Epithelial Cells, UA Occasional (A) None Seen /lpf    Renal  Epithelial Cells, UA Occasional (A) None Seen /lpf    Mucus, UA Moderate (A) None Seen /hpf    Hyaline Casts, UA 0-2 (A) None Seen /lpf   Basic Metabolic Panel (BMP)    Collection Time: 09/24/24  5:04 AM   Result Value Ref Range    Sodium 141 136 - 145 mmol/L    Potassium 4.2 3.5 - 5.1 mmol/L    Chloride 104 98 - 107 mmol/L    CO2 28 21 - 32 mmol/L    Anion Gap 13 7 - 16 mmol/L    Glucose 99 74 - 106 mg/dL    BUN 30 (H) 7 - 18 mg/dL    Creatinine 1.45 (H) 0.55 - 1.02 mg/dL    BUN/Creatinine Ratio 21 (H) 6 - 20    Calcium 8.6 8.5 - 10.1 mg/dL    eGFR 38 (L) >=60 mL/min/1.73m2   CBC with Differential    Collection Time: 09/24/24  5:04 AM   Result Value Ref Range    WBC 14.07 (H) 4.50 - 11.00 K/uL    RBC 2.93 (L) 4.20 - 5.40 M/uL    Hemoglobin 8.9 (L) 12.0 - 16.0 g/dL    Hematocrit 28.8 (L) 38.0 - 47.0 %    MCV 98.3 (H) 80.0 - 96.0 fL    MCH 30.4 27.0 - 31.0 pg    MCHC 30.9 (L) 32.0 - 36.0 g/dL    RDW 15.9 (H) 11.5 - 14.5 %    Platelet Count 388 150 - 400 K/uL    MPV 9.8 9.4 - 12.4 fL    Neutrophils % 67.1 (H) 53.0 - 65.0 %    Lymphocytes % 19.4 (L) 27.0 - 41.0 %    Neutrophils, Abs 9.44 (H) 1.80 - 7.70 K/uL    Lymphocytes, Absolute 2.73 1.00 - 4.80 K/uL    Diff Type Manual     Monocytes % 10.5 (H) 2.0 - 6.0 %    Eosinophils % 2.4 1.0 - 4.0 %    Basophils % 0.6 0.0 - 1.0 %    Monocytes, Absolute 1.48 (H) 0.00 - 0.80 K/uL    Eosinophils, Absolute 0.34 0.00 - 0.50 K/uL    Basophils, Absolute 0.08 0.00 - 0.20 K/uL   Manual Differential    Collection Time: 09/24/24  5:04 AM   Result Value Ref Range    Segmented Neutrophils, Man % 68 (H) 50 - 62 %    Lymphocytes, Man % 19 (L) 27 - 41 %    Monocytes, Man % 11 (H) 2 - 6 %    Eosinophils, Man % 2 1 - 4 %    nRBC, Manual      Platelet Morphology Normal Normal    RBC Morphology Normal    Culture, Wound    Collection Time: 09/27/24 11:45 AM    Specimen: Leg, Right; Wound   Result Value Ref Range    Culture, Wound/Abscess Light Growth Acinetobacter lwoffii group (A)         Susceptibility    Acinetobacter lwoffii group -  (no method available)     Ampicillin/Sulbactam <=8/4 Sensitive µg/ml     Cefepime 4 Sensitive µg/ml     Gentamicin <=2 Sensitive µg/ml     Meropenem <=1 Sensitive µg/ml     Tetracycline <=4 Sensitive µg/ml     Tobramycin <=2 Sensitive µg/ml     Trimeth/Sulfa <=2/38 Sensitive µg/ml     Ceftazidime 4 Sensitive µg/ml   Basic Metabolic Panel (BMP)    Collection Time: 10/01/24  8:24 PM   Result Value Ref Range    Sodium 138 136 - 145 mmol/L    Potassium 4.4 3.5 - 5.1 mmol/L    Chloride 102 98 - 107 mmol/L    CO2 28 21 - 32 mmol/L    Anion Gap 12 7 - 16 mmol/L    Glucose 105 74 - 106 mg/dL    BUN 38 (H) 7 - 18 mg/dL    Creatinine 1.50 (H) 0.55 - 1.02 mg/dL    BUN/Creatinine Ratio 25 (H) 6 - 20    Calcium 8.7 8.5 - 10.1 mg/dL    eGFR 36 (L) >=60 mL/min/1.73m2   CBC with Differential    Collection Time: 10/01/24  8:24 PM   Result Value Ref Range    WBC 10.94 4.50 - 11.00 K/uL    RBC 2.88 (L) 4.20 - 5.40 M/uL    Hemoglobin 8.5 (L) 12.0 - 16.0 g/dL    Hematocrit 28.0 (L) 38.0 - 47.0 %    MCV 97.2 (H) 80.0 - 96.0 fL    MCH 29.5 27.0 - 31.0 pg    MCHC 30.4 (L) 32.0 - 36.0 g/dL    RDW 16.6 (H) 11.5 - 14.5 %    Platelet Count 368 150 - 400 K/uL    MPV 10.4 9.4 - 12.4 fL    Neutrophils % 62.4 53.0 - 65.0 %    Lymphocytes % 20.1 (L) 27.0 - 41.0 %    Neutrophils, Abs 6.83 1.80 - 7.70 K/uL    Lymphocytes, Absolute 2.20 1.00 - 4.80 K/uL    Diff Type Auto     Monocytes % 15.2 (H) 2.0 - 6.0 %    Eosinophils % 1.3 1.0 - 4.0 %    Basophils % 1.0 0.0 - 1.0 %    Monocytes, Absolute 1.66 (H) 0.00 - 0.80 K/uL    Eosinophils, Absolute 0.14 0.00 - 0.50 K/uL    Basophils, Absolute 0.11 0.00 - 0.20 K/uL        ASSESSMENT:      The patient is doing well after surgery.       PLAN:      Follow up PRN.    Wound care referral for the patient.  Come back and see me as needed.  All questions answered.

## 2024-10-07 NOTE — PLAN OF CARE
[facility] - Medical Surgical Unit  Discharge Final Note    Primary Care Provider: [facility]    Expected Discharge Date: 10/7/2024    Final Discharge Note (most recent)       Final Note - 10/07/24 1037          Final Note    Anticipated Discharge Disposition Home-Health Care [facility]    What phone number can be called within the next 1-3 days to see how you are doing after discharge? [phone]     Hospital Resources/Appts/Education Provided Provided patient/caregiver with written discharge plan information;Appointments scheduled and added to AVS        Post-Acute Status    Post-Acute Authorization Home [facility] Health Status Referrals Sent   [facility]    Coverage [employer]     Patient choice form signed by patient/caregiver List with quality metrics by geographic area provided     Discharge Delays None known at this time                 Pt. Will d/c home today from appt. Scheduled with Dr. Alberto. Has appt. At 2:15 pm with Dr. Alberto for assessment and evaluation of wounds. Pt. Son will be coming to  pt. From hospital to transport her to appt. Pt. Will then return home and will be referred to [facility] per request. Pt. Has appt. With Dr. Carlos Zhou on 10/22/24 as scheduled at 2:15 pm at earliest available. Pt. Has all needed DME. No other d/c needs identified.    Important Message from Medicare  Important Message from Medicare regarding Discharge Appeal Rights: Given to patient/caregiver, Explained to patient/caregiver, Signed/date by patient/caregiver     Date IMM was signed: 10/07/24  Time IMM was signed: 0930    Contact Carlos Cui MD   Specialty: General Practice    [address] [facility] [address]   Phone: [phone]       Next Steps: Go on 10/22/2024    Instructions: on 10/22/24 at 2:15 pm for hospital follow up appointment

## 2024-10-08 NOTE — ASSESSMENT & PLAN NOTE
"INOCENTE is likely due to pre-renal azotemia due to dehydration. Baseline creatinine is unknown. Most recent creatinine and eGFR are listed below.  No results for input(s): "CREATININE", "EGFRNORACEVR" in the last 72 hours.     Plan  - INOCENTE is improving  - Avoid nephrotoxins and renally dose meds for GFR listed above  - Monitor urine output, serial BMP, and adjust therapy as needed  - BUN/CREAT 25/1.49    09/24/2024  CONTINUE POC  BUN/CREAT 30/1.45  09/30/2024   BUN/CREAT 38/1.50    10/7/24  BUN/creatinine last checked 38/1.50  Continue to avoid nephrotoxic agents on discharge  Follow up PCP for continue monitor of kidney function  "

## 2024-10-08 NOTE — ASSESSMENT & PLAN NOTE
Patient has persistent depression which is mild and is currently controlled. Will Continue anti-depressant medications. We will not consult psychiatry at this time. Patient does not display psychosis at this time. Continue to monitor closely and adjust plan of care as needed.  CONTINUE CYMBALTA  09/24/224  CONTINUE POC  PT'S MOOD IS ELEVATED, WITH HI SMILING FREQUENTLY  09/30/2024  CONTINUE POC  PT'S MOOD REMAINS ELEVATED, ALTHOUGH SHE IS DISAPPOINTED THAT IV ABX HAVE BEEN RESUMED    10/7/24  Continue Cymbalta on discharge

## 2024-10-08 NOTE — ASSESSMENT & PLAN NOTE
PT/OT WILL EVALUATE PT AND INITIATE A TREATMENT PLAN  09/24/2024  PT IS PARTICIPATING WITH PT/OT PERFORMING BED MOBILITY WITH INDEPENDENCE, TRANSFERS WITH CGA WITH RW, AND AMBULATING 140  FEET WITH CGA WITH RW THEN 80 FEET ALSO WITH CGA AND RW ITH VERBAL CUES FOR POSTURE, SAFETY AND EXCESSIVE BUE SUPPORT ON AD WITH FAIR STANDING BALANCE.    09/30/2024  PT IS PARTICIPATING WITH PT/OT PERFORMING BED MOBILITY WITH SBA, TRANSFERS WITH SBA WITH RW, AND AMBULATING 180  FEET WITH SBA WITH RW WITH GOOD STANDING BALANCE.    10/7/24  Home health to continue treatment
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
## 2024-10-08 NOTE — ASSESSMENT & PLAN NOTE
Chronic, controlled. Latest blood pressure and vitals reviewed-     Temp:  [97.8 °F (36.6 °C)]   Pulse:  [71]   Resp:  [18]   BP: (124)/(75)   SpO2:  [97 %] .   Home meds for hypertension were reviewed and noted below.   Hypertension Medications               metoprolol tartrate (LOPRESSOR) 25 MG tablet Take 1 tablet by mouth once daily.            While in the hospital, will manage blood pressure as follows; Continue home antihypertensive regimen    Will utilize p.r.n. blood pressure medication only if patient's blood pressure greater than 180/110 and she develops symptoms such as worsening chest pain or shortness of breath.    09/30/2024  /66  CONTINUE POC    10/7/24  /75  Continue Lopressor on discharge

## 2024-10-08 NOTE — ASSESSMENT & PLAN NOTE
PT HAS HX NEUROPATHY  CONTINUE CYMBALTA AND GABAPENTIN    09/24/2024  PT IS PARTICIPATING WITH PT/OT PERFORMING BED MOBILITY WITH INDEPENDENCE, TRANSFERS WITH CGA WITH RW, AND AMBULATING 140  FEET WITH CGA WITH RW THEN 80 FEET ALSO WITH CGA AND RW ITH VERBAL CUES FOR POSTURE, SAFETY AND EXCESSIVE BUE SUPPORT ON AD WITH FAIR STANDING BALANCE.  CONTINUE CYMBALTA AND GABAPENTIN    10/7/24  Patient to continue Cymbalta and gabapentin on d/c    09/30/2024  PT IS PARTICIPATING WITH PT/OT PERFORMING BED MOBILITY WITH SBA, TRANSFERS WITH SBA WITH RW, AND AMBULATING 180  FEET WITH SBA WITH RW WITH GOOD STANDING BALANCE.  CONTINUE CYMBALTA AND GABAPENTIN    10/7/24  Continue Cymbalta on d/c
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
## 2024-10-09 NOTE — PROGRESS NOTES
PATIENT NAME: Bobbi Haywood  : 1950  DATE: 24  MRN: [id number]      Billing Provider: No name on file  Level of Service:   Patient PCP Information       Provider PCP Type    PRIMARY [facility]            Reason for Visit / Chief Complaint: Muscle Weakness       History of Present Illness / Problem Focused Workflow     Bobbi Haywood is a multi wound pt being dc'd  Review of Systems     Review of Systems   Constitutional: Negative.  Negative for activity change, appetite change, chills and diaphoresis.   HENT: Negative.  Negative for congestion, ear pain, hearing loss and postnasal drip.    Eyes:  Negative for itching.   Respiratory:  Negative for chest tightness and shortness of breath.    Cardiovascular:  Negative for chest pain.   Gastrointestinal:  Negative for abdominal pain.   Endocrine: Negative for polydipsia.   Genitourinary:  Negative for frequency.   Musculoskeletal:  Negative for back pain.   Skin:  Positive for wound.   Neurological:  Negative for headaches.       Medical / Social / Family History     Past Medical History:   Diagnosis Date    A-fib     Abnormal gait     Abscess of right lower extremity 09/10/2024    Acquired deformity of both ankles     Acute blood loss anemia     INOCENTE (acute kidney injury) 09/10/2024    Chronic pain     Contusion of right lower extremity     Degenerative joint disease of cervical and lumbar spine     Depression     E-coli UTI 09/10/2024    Fall 09/10/2024    Hematoma     Hypertension     Hypomagnesemia 09/10/2024    Iron deficiency anemia, unspecified     Neuropathy     Neuropathy     Spinal stenosis of cervical region     Spinal stenosis of lumbar region at multiple levels     Spondylolysis of lumbar region        Past Surgical History:   Procedure Laterality Date    CATARACT EXTRACTION, BILATERAL      CHOLECYSTECTOMY      HYSTERECTOMY      INCISION AND DRAINAGE OF ABSCESS Right 2024    Procedure: INCISION AND  DRAINAGE, ABSCESS;  Surgeon: Joe Alberto MD;  Location: [facility] OR;  Service: General;  Laterality: Right;  right thigh and right calf    INJECTION OF ANESTHETIC AGENT AROUND MEDIAL BRANCH NERVES INNERVATING LUMBAR FACET JOINT Bilateral 8/26/2021    Procedure: Bilateral L4-5,5-S1 MBB;  Surgeon: Sulma Nelson MD;  Location: [facility];  Service: Pain Management;  Laterality: Bilateral;  Per Dr Bradford ok to hold Eliquis for 4 days not 5, Per Dr Nelson this is fine with her. Pt will bring her verification card for COVID vaccination.    INJECTION OF ANESTHETIC AGENT AROUND MEDIAL BRANCH NERVES INNERVATING LUMBAR FACET JOINT Bilateral 10/5/2021    Procedure: Bilateral L4-5,5-S1 MBB;  Surgeon: Sulma Nelson MD;  Location: [facility];  Service: Pain Management;  Laterality: Bilateral;  PT AWARE ON OV TO BE TESTED    KNEE CARTILAGE SURGERY Right     RADIOFREQUENCY ABLATION OF LUMBAR MEDIAL BRANCH NERVE AT SINGLE LEVEL Bilateral 11/9/2021    Procedure: Radiofrequency Ablation, Nerve, Spinal, Lumbar, Medial Branch, 1 Level L4-S1  BILATERAL;  Surgeon: Sulma Nelson MD;  Location: [facility];  Service: Pain Management;  Laterality: Bilateral;  per alicia pt is on eliquis but she will get permission for it to be held 5 days prior to procedure.   HAD VAC   CARD SCANNED IN    REDUCTION OF BOTH BREASTS      RETINAL DETACHMENT SURGERY  right    VAGINAL DELIVERY      X1       Social History  Ms. Bobbi Haywood  reports that she has never smoked. She has never used smokeless tobacco. She reports that she does not currently use alcohol.    Family History  Ms.'s Bobbi Haywood family history includes Atrial fibrillation in her mother; Diabetes in her paternal grandfather; Hypertension in her mother; Stroke in her father.    Medications and Allergies     Medications  Outpatient Medications Marked as Taking for the 9/17/24 encounter (Hospital Encounter)   Medication Sig Dispense  Refill    cyanocobalamin 500 MCG tablet Take 500 mcg by mouth once daily.      DULoxetine (CYMBALTA) 60 MG capsule Take 1 capsule by mouth once daily.      estradioL (ESTRACE) 2 MG tablet Take 1 tablet by mouth once daily.      ferrous sulfate 325 (65 FE) MG EC tablet Take 1 tablet (325 mg total) by mouth once daily.      fesoterodine (TOVIAZ) 4 mg Tb24 Take 4 mg by mouth once daily.      folic acid (FOLVITE) 800 MCG Tab Take 800 mcg by mouth once daily.      gabapentin (NEURONTIN) 600 MG tablet Take 1 tablet by mouth 2 (two) times a day.      L. rhamnosus/C/zinc/elderberry (CULTURELLE IMMUNE DEFENSE ORAL) Take 1 tablet by mouth once daily.      L.acidoph,saliva/B.bif/S.therm (ACIDOPHILUS PROBIOTIC BLEND ORAL) Take 1 tablet by mouth once daily.      metoprolol tartrate (LOPRESSOR) 25 MG tablet Take 1 tablet by mouth once daily.      rosuvastatin (CRESTOR) 20 MG tablet Take 20 mg by mouth every evening.      [DISCONTINUED] amoxicillin-clavulanate 875-125mg (AUGMENTIN) 875-125 mg per tablet Take 1 tablet by mouth every 12 (twelve) hours. for 4 days      [DISCONTINUED] ELIQUIS 5 mg Tab Take 5 mg by mouth 2 (two) times daily.         Allergies  Review of patient's allergies indicates:   Allergen Reactions    Sulfa (sulfonamide antibiotics) Hives and Other (See Comments)     Sore Throat, Sore Mouth       Physical Examination     Vitals:    10/07/24 0728   BP: 124/75   Pulse: 71   Resp: 18   Temp: 97.8 °F (36.6 °C)     Physical Exam  Constitutional:       Appearance: Normal appearance.   HENT:      Head: Normocephalic and atraumatic.      Nose: Nose normal.   Eyes:      Pupils: Pupils are equal, round, and reactive to light.   Cardiovascular:      Rate and Rhythm: Normal rate and regular rhythm.      Pulses: Normal pulses.      Heart sounds: Normal heart sounds.   Pulmonary:      Effort: Pulmonary effort is normal.      Breath sounds: Normal breath sounds.   Abdominal:      General: Abdomen is flat. Bowel sounds are  normal.      Palpations: Abdomen is soft.   Musculoskeletal:         General: Normal range of motion.      Cervical back: Normal range of motion.   Skin:     General: Skin is warm and dry.      Findings: Lesion present.   Neurological:      General: No focal deficit present.      Mental Status: She is alert and oriented to person, place, and time.   Psychiatric:         Behavior: Behavior normal.         Assessment and Plan             Wound 09/11/24 1126 Traumatic Right posterior Calf (Active)   09/11/24 1126   Present on Original Admission: Y   Primary Wound Type: Traumatic   Side: Right   Orientation: posterior   Location: Calf   Wound Approximate Age at First Assessment (Weeks):    Wound Number:    Is this injury device related?:    Incision Type:    Closure Method:    Wound Description (Comments):    Type:    Additional Comments:    Ankle-Brachial Index:    Pulses:    Removal Indication and Assessment:    Wound Outcome:    Dressing Appearance Dry;Clean;Intact 10/03/24 1914   Drainage Amount Small 10/03/24 1914   Drainage Characteristics/Odor Serous 10/03/24 1914   Appearance Red 10/03/24 1914   Periwound Area Moist 10/03/24 1914   Care Cleansed with:;Other (see comments) 10/06/24 0540   Dressing Changed;Removed;Gauze, wet to dry;Gauze;Rolled gauze 10/06/24 0540   Packing packed with 10/06/24 0540   Packing Inserted  1 09/22/24 0951   Packing Removed 1 09/22/24 0951   Periwound Care Absorptive dressing applied 09/21/24 2015   Dressing Change Due 10/07/24 10/06/24 0540            Wound 09/11/24 1149 Pressure Injury Sacral spine (Active)   09/11/24 1149   Present on Original Admission: Y   Primary Wound Type: Pressure inj   Side:    Orientation:    Location: Sacral spine   Wound Approximate Age at First Assessment (Weeks): 2 weeks   Wound Number:    Is this injury device related?:    Incision Type:    Closure Method:    Wound Description (Comments):    Type:    Additional Comments:    Ankle-Brachial Index:     Pulses:    Removal Indication and Assessment:    Wound Outcome:    Wound Image    09/17/24 1809   Dressing Appearance Dry;Clean;Intact 10/03/24 1914   Appearance Intact 10/03/24 1914   Care Cleansed with:;Sterile normal saline;Antimicrobial agent 09/29/24 1702   Dressing Applied;Gauze;Absorptive Pad;Rolled gauze 09/29/24 1702   Periwound Care Moisture barrier applied 10/06/24 2025            Wound 09/12/24 Incision Right posterior Calf (Active)   09/12/24    Present on Original Admission:    Primary Wound Type: Incision   Side: Right   Orientation: posterior   Location: Calf   Wound Approximate Age at First Assessment (Weeks):    Wound Number:    Is this injury device related?:    Incision Type:    Closure Method: No Closure (see comments)   Wound Description (Comments):    Type:    Additional Comments:    Ankle-Brachial Index:    Pulses:    Removal Indication and Assessment:    Wound Outcome:    Wound Image    10/07/24 1222   Dressing Appearance Intact;Saturated 10/07/24 1222   Drainage Amount Large 10/07/24 1222   Drainage Characteristics/Odor Serosanguineous 10/07/24 1222   Appearance Intact;Pink;Moist 10/07/24 1222   Periwound Area Intact;Dry 10/07/24 1222   Wound Edges Defined 10/07/24 1222   Care Antimicrobial agent;Wound cleanser 10/07/24 1222   Dressing Applied;Calcium alginate 10/07/24 1222   Dressing Change Due 10/08/24 10/07/24 1222            Wound 09/12/24 Incision Right Thigh vertical (Active)   09/12/24    Present on Original Admission:    Primary Wound Type: Incision   Side: Right   Orientation:    Location: Thigh   Wound Approximate Age at First Assessment (Weeks):    Wound Number:    Is this injury device related?:    Incision Type: vertical   Closure Method: No Closure (see comments)   Wound Description (Comments):    Type:    Additional Comments:    Ankle-Brachial Index:    Pulses:    Removal Indication and Assessment:    Wound Outcome:    Wound Image    10/07/24 1232   Dressing Appearance  Intact;Moist drainage 10/07/24 1232   Drainage Amount Moderate 10/07/24 1232   Drainage Characteristics/Odor Serosanguineous 10/07/24 1232   Appearance Pink 10/07/24 1232   Black (%), Wound Tissue Color 0 % 09/30/24 1115   Red (%), Wound Tissue Color 90 % 10/07/24 1232   Yellow (%), Wound Tissue Color 10 % 10/07/24 1232   Periwound Area Intact;Dry 10/07/24 1232   Wound Edges Defined 10/07/24 1232   Wound Length (cm) 2.5 cm 10/07/24 1232   Wound Width (cm) 2 cm 10/07/24 1232   Wound Depth (cm) 0.6 cm 10/07/24 1232   Wound Volume (cm^3) 3 cm^3 10/07/24 1232   Wound Surface Area (cm^2) 5 cm^2 10/07/24 1232   Care Antimicrobial agent 10/07/24 1232   Dressing Applied;Calcium alginate;Foam 10/07/24 1232   Packing other (see comment) 10/06/24 0540   Packing Inserted  1 10/02/24 0908   Packing Removed 1 10/02/24 0908   Periwound Care Moisture barrier applied 09/30/24 1115   Dressing Change Due 10/08/24 10/07/24 1232            Wound 09/17/24 1707 Abrasion(s) Right dorsal Greater trochanter (Active)   09/17/24 1707   Present on Original Admission: Y   Primary Wound Type: Abrasion(s)   Side: Right   Orientation: dorsal   Location: Greater trochanter   Wound Approximate Age at First Assessment (Weeks):    Wound Number:    Is this injury device related?:    Incision Type:    Closure Method:    Wound Description (Comments):    Type:    Additional Comments:    Ankle-Brachial Index:    Pulses:    Removal Indication and Assessment:    Wound Outcome:    Wound Image   09/17/24 1820   Dressing Appearance Open to air 10/06/24 2025            Wound 09/17/24 1707 Abrasion(s) Right anterior Thigh (Active)   09/17/24 1707   Present on Original Admission:    Primary Wound Type: Abrasion(s)   Side: Right   Orientation: anterior   Location: Thigh   Wound Approximate Age at First Assessment (Weeks):    Wound Number:    Is this injury device related?:    Incision Type:    Closure Method:    Wound Description (Comments):    Type:    Additional  Comments:    Ankle-Brachial Index:    Pulses:    Removal Indication and Assessment:    Wound Outcome:    Wound Image   09/17/24 1819   Dressing Appearance Dry;Intact;Clean 10/06/24 2025            Wound 09/17/24 1707 Abrasion(s) Right distal;anterior Leg (Active)   09/17/24 1707   Present on Original Admission:    Primary Wound Type: Abrasion(s)   Side: Right   Orientation: distal;anterior   Location: Leg   Wound Approximate Age at First Assessment (Weeks):    Wound Number:    Is this injury device related?:    Incision Type:    Closure Method:    Wound Description (Comments):    Type:    Additional Comments:    Ankle-Brachial Index:    Pulses:    Removal Indication and Assessment:    Wound Outcome:    Wound Image   09/17/24 1817   Dressing Appearance Open to air 10/06/24 2025   Care Cleansed with:;Sterile normal saline 10/01/24 2100   Dressing Foam 10/01/24 2100            Wound 09/17/24 1707 Abrasion(s) Right dorsal Hip (Active)   09/17/24 1707   Present on Original Admission:    Primary Wound Type: Abrasion(s)   Side: Right   Orientation: dorsal   Location: Hip   Wound Approximate Age at First Assessment (Weeks):    Wound Number:    Is this injury device related?:    Incision Type:    Closure Method:    Wound Description (Comments):    Type:    Additional Comments:    Ankle-Brachial Index:    Pulses:    Removal Indication and Assessment:    Wound Outcome:    Wound Image   09/17/24 1822   Dressing Appearance Open to air 10/06/24 2025            Wound 09/17/24 1707 Abrasion(s) Left lateral Ankle (Active)   09/17/24 1707   Present on Original Admission:    Primary Wound Type: Abrasion(s)   Side: Left   Orientation: lateral   Location: Ankle   Wound Approximate Age at First Assessment (Weeks):    Wound Number:    Is this injury device related?:    Incision Type:    Closure Method:    Wound Description (Comments):    Type:    Additional Comments:    Ankle-Brachial Index:    Pulses:    Removal Indication and  Assessment:    Wound Outcome:    Wound Image   09/17/24 1823   Dressing Appearance Open to air 10/06/24 2025            Wound 09/20/24 2000 Pressure Injury Right (Active)   09/20/24 2000   Present on Original Admission:    Primary Wound Type: Pressure inj   Side: Right   Orientation:    Location:    Wound Approximate Age at First Assessment (Weeks):    Wound Number:    Is this injury device related?:    Incision Type:    Closure Method:    Wound Description (Comments):    Type:    Additional Comments:    Ankle-Brachial Index:    Pulses:    Removal Indication and Assessment:    Wound Outcome:    Drainage Amount None 09/22/24 2222     Problem List Items Addressed This Visit          Orthopedic    * (Principal) Muscle weakness - Primary     Other Visit Diagnoses       Weakness            Cont. Current care.    Future Appointments   Date Time Provider Department Center   10/22/2024  1:00 PM Sheri Louis FNP [facility]            Signature:  Julio Cesar Farrar III, DO  [unfilled]    Date of encounter: 9/17/24

## 2024-10-22 ENCOUNTER — OFFICE VISIT (OUTPATIENT)
Dept: WOUND CARE | Facility: CLINIC | Age: 74
End: 2024-10-22
Attending: FAMILY MEDICINE
Payer: MEDICARE

## 2024-10-22 VITALS — RESPIRATION RATE: 19 BRPM | SYSTOLIC BLOOD PRESSURE: 114 MMHG | DIASTOLIC BLOOD PRESSURE: 63 MMHG | TEMPERATURE: 98 F

## 2024-10-22 DIAGNOSIS — L97.912 NON-PRESSURE CHRONIC ULCER OF RIGHT LOWER LEG WITH FAT LAYER EXPOSED: Primary | ICD-10-CM

## 2024-10-22 DIAGNOSIS — L02.415 ABSCESS OF RIGHT LOWER EXTREMITY: ICD-10-CM

## 2024-10-22 PROCEDURE — 1160F RVW MEDS BY RX/DR IN RCRD: CPT | Mod: CPTII,,, | Performed by: NURSE PRACTITIONER

## 2024-10-22 PROCEDURE — 99213 OFFICE O/P EST LOW 20 MIN: CPT | Mod: S$PBB,,, | Performed by: NURSE PRACTITIONER

## 2024-10-22 PROCEDURE — 1159F MED LIST DOCD IN RCRD: CPT | Mod: CPTII,,, | Performed by: NURSE PRACTITIONER

## 2024-10-22 PROCEDURE — 1126F AMNT PAIN NOTED NONE PRSNT: CPT | Mod: CPTII,,, | Performed by: NURSE PRACTITIONER

## 2024-10-22 PROCEDURE — 3074F SYST BP LT 130 MM HG: CPT | Mod: CPTII,,, | Performed by: NURSE PRACTITIONER

## 2024-10-22 PROCEDURE — 99215 OFFICE O/P EST HI 40 MIN: CPT | Mod: PBBFAC | Performed by: NURSE PRACTITIONER

## 2024-10-22 PROCEDURE — 1111F DSCHRG MED/CURRENT MED MERGE: CPT | Mod: CPTII,,, | Performed by: NURSE PRACTITIONER

## 2024-10-22 PROCEDURE — 3288F FALL RISK ASSESSMENT DOCD: CPT | Mod: CPTII,,, | Performed by: NURSE PRACTITIONER

## 2024-10-22 PROCEDURE — 3078F DIAST BP <80 MM HG: CPT | Mod: CPTII,,, | Performed by: NURSE PRACTITIONER

## 2024-10-22 PROCEDURE — 1101F PT FALLS ASSESS-DOCD LE1/YR: CPT | Mod: CPTII,,, | Performed by: NURSE PRACTITIONER

## 2024-10-22 PROCEDURE — 99999 PR PBB SHADOW E&M-EST. PATIENT-LVL V: CPT | Mod: PBBFAC,,, | Performed by: NURSE PRACTITIONER

## 2024-10-22 NOTE — ASSESSMENT & PLAN NOTE
Clean wounds with Vashe or baby shampoo and water. Pat dry with gauze pad.  Apply UrgoTul Ag/Silver then drawtex to wound. Cover with foam bordered dressing.  Change every other day and as needed for soilage.    Check blood pressure twice daily, goal <120/80  Diet:   Increase protein intake, avoid fried, fatty foods and foods high in simple carbs.   Vitamins:  Take vitamin C 1000 mg, zinc 50mg, vitamin d 5000 units, and a daily multivitamin. Fawad is a good source of protein and nutrients to aid in wound healing.   Monitor closely for s/s of infection including fever, chills, increase in pain, odor from wound, and increased redness from foot. Go to ER if any complications develop.

## 2024-10-22 NOTE — PATIENT INSTRUCTIONS
Clean wounds with Vashe or baby shampoo and water. Pat dry with gauze pad.  Apply UrgoTul Ag/Silver then drawtex to wound. Cover with foam bordered dressing.  Change every other day and as needed for soilage.    Check blood pressure twice daily, goal <120/80  Diet:   Increase protein intake, avoid fried, fatty foods and foods high in simple carbs.   Vitamins:  Take vitamin C 1000 mg, zinc 50mg, vitamin d 5000 units, and a daily multivitamin. Fawad is a good source of protein and nutrients to aid in wound healing.   Monitor closely for s/s of infection including fever, chills, increase in pain, odor from wound, and increased redness from foot. Go to ER if any complications develop.     Home Health to change dressings once a week

## 2024-10-22 NOTE — PROGRESS NOTES
HARMAN Aviles   [facility]  [address] [facility] [place] [address]  [phone]      PATIENT NAME: Bobbi Haywood  : 1950  DATE: 10/22/24  MRN: [id number]      Billing Provider: HARMAN Aviles  Level of Service:   Patient PCP Information       Provider PCP Type    PRIMARY CARE [facility]            Reason for Visit / Chief Complaint: Non-healing Wound and Wound Check (Right calf and right thigh)       History of Present Illness / Problem Focused Workflow     Bobbi Haywood is a 75 yo male presents with complaints of ulcer to right lower leg. Pt reports falling several weeks ago and bumping her right leg on a truck door. She developed a deep hematoma and abscess to the medial thigh. She is status post I&D on  per Dr. Alberto. Granulation tissue to wound bed. She has been using saline wet to dry dressing on wounds. Pertinent PMH includes a-fib, anemia, neuropathy, nad hypertension. Wound healing is complicated by multiple co-morbidities, poor vascular supply, immunosuppression, heavy drainage, excessive wound moisture and macerated tissue, large surface area, large volume, advanced age, and fragile skin.            Review of Systems     Review of Systems   Constitutional:  Positive for activity change. Negative for chills and fever.   Respiratory:  Negative for chest tightness and shortness of breath.    Cardiovascular:  Positive for leg swelling. Negative for chest pain and palpitations.   Musculoskeletal:  Positive for arthralgias and joint swelling.   Skin:  Positive for wound.        wound   Neurological:  Positive for weakness and numbness.   Psychiatric/Behavioral:  Negative for agitation, behavioral problems, confusion and self-injury.      Medical / Social / Family History     Past Medical History:   Diagnosis Date    A-fib     Abnormal gait     Abscess of right lower extremity 09/10/2024    Acquired  deformity of both ankles     Acute blood loss anemia     INOCENTE (acute kidney injury) 09/10/2024    Chronic pain     Contusion of right lower extremity     Degenerative joint disease of cervical and lumbar spine     Depression     E-coli UTI 09/10/2024    Fall 09/10/2024    Hematoma     Hypertension     Hypomagnesemia 09/10/2024    Iron deficiency anemia, unspecified     Neuropathy     Neuropathy     Spinal stenosis of cervical region     Spinal stenosis of lumbar region at multiple levels     Spondylolysis of lumbar region        Past Surgical History:   Procedure Laterality Date    CATARACT EXTRACTION, BILATERAL      CHOLECYSTECTOMY      HYSTERECTOMY      INCISION AND DRAINAGE OF ABSCESS Right 9/12/2024    Procedure: INCISION AND DRAINAGE, ABSCESS;  Surgeon: Joe Alberto MD;  [facility];  Service: General;  Laterality: Right;  right thigh and right calf    INJECTION OF ANESTHETIC AGENT AROUND MEDIAL BRANCH NERVES INNERVATING LUMBAR FACET JOINT Bilateral 8/26/2021    Procedure: Bilateral L4-5,5-S1 MBB;  Surgeon: Sulma Nelson MD;  Location: [facility];  Service: Pain Management;  Laterality: Bilateral;  Per Dr Bradford ok to hold Eliquis for 4 days not 5, Per Dr Nelson this is fine with her. Pt will bring her verification card for COVID vaccination.    INJECTION OF ANESTHETIC AGENT AROUND MEDIAL BRANCH NERVES INNERVATING LUMBAR FACET JOINT Bilateral 10/5/2021    Procedure: Bilateral L4-5,5-S1 MBB;  Surgeon: Sulma Nelson MD;  Location: [facility];  Service: Pain Management;  Laterality: Bilateral;  PT AWARE ON OV TO BE TESTED    KNEE CARTILAGE SURGERY Right     RADIOFREQUENCY ABLATION OF LUMBAR MEDIAL BRANCH NERVE AT SINGLE LEVEL Bilateral 11/9/2021    Procedure: Radiofrequency Ablation, Nerve, Spinal, Lumbar, Medial Branch, 1 Level L4-S1  BILATERAL;  Surgeon: Sulma Nelson MD;  Location: [facility];  Service: Pain Management;  Laterality:  Bilateral;  per alicia pt is on eliquis but she will get permission for it to be held 5 days prior to procedure.   HAD VAC   CARD SCANNED IN    REDUCTION OF BOTH BREASTS      RETINAL DETACHMENT SURGERY  right    VAGINAL DELIVERY      X1       Social History  Ms. Bobbi Haywood  reports that she has never smoked. She has never used smokeless tobacco. She reports that she does not currently use alcohol.    Family History  Ms.'s Bobbi Haywood family history includes Atrial fibrillation in her mother; Diabetes in her paternal grandfather; Hypertension in her mother; Stroke in her father.    Medications and Allergies     Medications  Outpatient Medications Marked as Taking for the 10/22/24 encounter (Office Visit) with Sheri Louis FNP   Medication Sig Dispense Refill    apixaban (ELIQUIS) 2.5 mg Tab Take 1 tablet (2.5 mg total) by mouth 2 (two) times daily. 60 tablet 0    cyanocobalamin 500 MCG tablet Take 500 mcg by mouth once daily.      DULoxetine (CYMBALTA) 60 MG capsule Take 1 capsule by mouth once daily.      estradioL (ESTRACE) 2 MG tablet Take 1 tablet by mouth once daily.      ferrous sulfate 325 (65 FE) MG EC tablet Take 1 tablet (325 mg total) by mouth once daily.      fesoterodine (TOVIAZ) 4 mg Tb24 Take 4 mg by mouth once daily.      folic acid (FOLVITE) 800 MCG Tab Take 800 mcg by mouth once daily.      gabapentin (NEURONTIN) 600 MG tablet Take 1 tablet by mouth 2 (two) times a day.      L. rhamnosus/C/zinc/elderberry (CULTURELLE IMMUNE DEFENSE ORAL) Take 1 tablet by mouth once daily.      L.acidoph,saliva/B.bif/S.therm (ACIDOPHILUS PROBIOTIC BLEND ORAL) Take 1 tablet by mouth once daily.      rosuvastatin (CRESTOR) 20 MG tablet Take 20 mg by mouth every evening.         Allergies  Review of patient's allergies indicates:   Allergen Reactions    Sulfa (sulfonamide antibiotics) Hives and Other (See Comments)     Sore Throat, Sore Mouth       Physical Examination      Vitals:    10/22/24 1307   BP: 114/63   Resp: 19   Temp: 97.7 °F (36.5 °C)     Physical Exam  Vitals and nursing note reviewed.   Constitutional:       Appearance: Normal appearance.   HENT:      Head: Normocephalic.   Cardiovascular:      Rate and Rhythm: Normal rate. Rhythm irregular.      Pulses: Normal pulses.      Heart sounds: Normal heart sounds.   Pulmonary:      Effort: Pulmonary effort is normal. No respiratory distress.   Chest:      Chest wall: No tenderness.   Musculoskeletal:         General: Swelling present.      Right lower leg: Edema present.      Left lower leg: Edema present.   Skin:     General: Skin is warm and dry.      Comments: See LDA for photos/measurements   Neurological:      General: No focal deficit present.      Mental Status: She is alert and oriented to person, place, and time. Mental status is at baseline.   Psychiatric:         Mood and Affect: Mood normal.         Behavior: Behavior normal.         Thought Content: Thought content normal.         Judgment: Judgment normal.     Assessment and Plan             Wound 09/12/24 Other (comment) Right posterior Calf (Active)   09/12/24    Present on Original Admission:    Primary Wound Type: Other   Side: Right   Orientation: posterior   Location: Calf   Wound Approximate Age at First Assessment (Weeks):    Wound Number:    Is this injury device related?:    Incision Type:    Closure Method: No Closure (see comments)   Wound Description (Comments):    Type:    Additional Comments:    Ankle-Brachial Index:    Pulses:    Removal Indication and Assessment:    Wound Outcome:    Wound Image    10/22/24 1318   Dressing Appearance Intact;Moist drainage 10/22/24 1318   Drainage Amount Moderate 10/22/24 1318   Drainage Characteristics/Odor Serosanguineous 10/22/24 1318   Appearance Red;Moist;Granulating 10/22/24 1318   Tissue loss description Full thickness 10/22/24 1318   Black (%), Wound Tissue Color 0 % 10/22/24 1318   Red (%), Wound  Tissue Color 100 % 10/22/24 1318   Yellow (%), Wound Tissue Color 0 % 10/22/24 1318   Periwound Area Intact;Ecchymotic 10/22/24 1318   Wound Edges Defined 10/22/24 1318   Wound Length (cm) 3.5 cm 10/22/24 1318   Wound Width (cm) 3.4 cm 10/22/24 1318   Wound Depth (cm) 0.1 cm 10/22/24 1318   Wound Volume (cm^3) 1.19 cm^3 10/22/24 1318   Wound Surface Area (cm^2) 11.9 cm^2 10/22/24 1318   Care Cleansed with:;Antimicrobial agent 10/22/24 1318   Dressing Applied;Hydrofiber;Silver;Foam;Island/border;Non-adherent 10/22/24 1318            Wound 09/12/24 Incision Right Thigh vertical (Active)   09/12/24    Present on Original Admission:    Primary Wound Type: Incision   Side: Right   Orientation:    Location: Thigh   Wound Approximate Age at First Assessment (Weeks):    Wound Number:    Is this injury device related?:    Incision Type: vertical   Closure Method: No Closure (see comments)   Wound Description (Comments):    Type:    Additional Comments:    Ankle-Brachial Index:    Pulses:    Removal Indication and Assessment:    Wound Outcome:    Wound Image    10/22/24 1315   Dressing Appearance Intact;Moist drainage 10/22/24 1315   Drainage Amount Moderate 10/22/24 1315   Drainage Characteristics/Odor Serosanguineous 10/22/24 1315   Appearance Red;Moist;Granulating 10/22/24 1315   Black (%), Wound Tissue Color 0 % 10/22/24 1315   Red (%), Wound Tissue Color 100 % 10/22/24 1315   Yellow (%), Wound Tissue Color 0 % 10/22/24 1315   Periwound Area Intact;Ecchymotic 10/22/24 1315   Wound Edges Defined 10/22/24 1315   Wound Length (cm) 1 cm 10/22/24 1315   Wound Width (cm) 1.2 cm 10/22/24 1315   Wound Depth (cm) 0.1 cm 10/22/24 1315   Wound Volume (cm^3) 0.12 cm^3 10/22/24 1315   Wound Surface Area (cm^2) 1.2 cm^2 10/22/24 1315   Care Cleansed with:;Antimicrobial agent 10/22/24 1315   Dressing Applied;Silver;Foam;Island/border;Hydrofiber;Non-adherent 10/22/24 1315     Problem List Items Addressed This Visit          ID    Abscess  of right lower extremity       Orthopedic    Non-pressure chronic ulcer of right lower leg with fat layer exposed - Primary    Overview                        Current Assessment & Plan     Clean wounds with Vashe or baby shampoo and water. Pat dry with gauze pad.  Apply UrgoTul Ag/Silver then drawtex to wound. Cover with foam bordered dressing.  Change every other day and as needed for soilage.    Check blood pressure twice daily, goal <120/80  Diet:   Increase protein intake, avoid fried, fatty foods and foods high in simple carbs.   Vitamins:  Take vitamin C 1000 mg, zinc 50mg, vitamin d 5000 units, and a daily multivitamin. Fawad is a good source of protein and nutrients to aid in wound healing.   Monitor closely for s/s of infection including fever, chills, increase in pain, odor from wound, and increased redness from foot. Go to ER if any complications develop.               Future Appointments   Date Time Provider Department Center   11/4/2024  1:00 PM Sheri Louis FNP [facility]            Signature:  HARMAN Aviles  [facility]  [address] [facility] [address]  [phone]    Date of encounter: 10/22/24

## 2024-11-04 NOTE — PROGRESS NOTES
HARMAN Aviles   [facility]  [address] [facility] [place] [address]  [phone]      PATIENT NAME: Bobbi Haywood  : 1950  DATE: 24  MRN: [id number]      Billing Provider: HARMAN Aviles  Level of Service:   Patient PCP Information       Provider PCP Type    PRIMARY CARE [facility]            Reason for Visit / Chief Complaint: Non-healing Wound (Hematoma of right lateral leg from an injury of the car door hitting her legs)       History of Present Illness / Problem Focused Workflow     Bobbi Haywood is a 73 yo female presents for follow up on chronic non healing wound to right lower extremity. Wound to medial aspect of leg has healed. Lateral wound is healing well. Hypergranulation tissue to wound bed. Silver nitrate applied today. Betadine and mepilex ag foam border.     10/22/2024  73 yo female presents with complaints of ulcer to right lower leg. Pt reports falling several weeks ago and bumping her right leg on a truck door. She developed a deep hematoma and abscess to the medial thigh. She is status post I&D on  per Dr. Alberto. Granulation tissue to wound bed. She has been using saline wet to dry dressing on wounds. Pertinent PMH includes a-fib, anemia, neuropathy, and hypertension. Wound healing is complicated by multiple co-morbidities, poor vascular supply, immunosuppression, heavy drainage, excessive wound moisture and macerated tissue, large surface area, large volume, advanced age, and fragile skin.            Review of Systems     Review of Systems   Constitutional:  Positive for activity change. Negative for chills and fever.   Respiratory:  Negative for chest tightness and shortness of breath.    Cardiovascular:  Positive for leg swelling. Negative for chest pain and palpitations.   Musculoskeletal:  Positive for arthralgias and joint swelling.   Skin:  Positive for wound.        wound    Neurological:  Positive for weakness and numbness.   Psychiatric/Behavioral:  Negative for agitation, behavioral problems, confusion and self-injury.        Medical / Social / Family History     Past Medical History:   Diagnosis Date    A-fib     Abnormal gait     Abscess of right lower extremity 09/10/2024    Acquired deformity of both ankles     Acute blood loss anemia     INOCENTE (acute kidney injury) 09/10/2024    Chronic pain     Contusion of right lower extremity     Degenerative joint disease of cervical and lumbar spine     Depression     E-coli UTI 09/10/2024    Fall 09/10/2024    Hematoma     Hypertension     Hypomagnesemia 09/10/2024    Iron deficiency anemia, unspecified     Neuropathy     Neuropathy     Spinal stenosis of cervical region     Spinal stenosis of lumbar region at multiple levels     Spondylolysis of lumbar region        Past Surgical History:   Procedure Laterality Date    CATARACT EXTRACTION, BILATERAL      CHOLECYSTECTOMY      HYSTERECTOMY      INCISION AND DRAINAGE OF ABSCESS Right 9/12/2024    Procedure: INCISION AND DRAINAGE, ABSCESS;  Surgeon: Joe Alberto MD;  Location: [facility] OR;  Service: General;  Laterality: Right;  right thigh and right calf    INJECTION OF ANESTHETIC AGENT AROUND MEDIAL BRANCH NERVES INNERVATING LUMBAR FACET JOINT Bilateral 8/26/2021    Procedure: Bilateral L4-5,5-S1 MBB;  Surgeon: Sulma Nelson MD;  Location: [facility];  Service: Pain Management;  Laterality: Bilateral;  Per Dr Bradford ok to hold Eliquis for 4 days not 5, Per Dr Nelson this is fine with her. Pt will bring her verification card for COVID vaccination.    INJECTION OF ANESTHETIC AGENT AROUND MEDIAL BRANCH NERVES INNERVATING LUMBAR FACET JOINT Bilateral 10/5/2021    Procedure: Bilateral L4-5,5-S1 MBB;  Surgeon: Sulma Nelson MD;  Location: [facility];  Service: Pain Management;  Laterality: Bilateral;  PT AWARE ON OV TO BE TESTED    KNEE  CARTILAGE SURGERY Right     RADIOFREQUENCY ABLATION OF LUMBAR MEDIAL BRANCH NERVE AT SINGLE LEVEL Bilateral 11/9/2021    Procedure: Radiofrequency Ablation, Nerve, Spinal, Lumbar, Medial Branch, 1 Level L4-S1  BILATERAL;  Surgeon: Sulma Nelson MD;  Location: [facility];  Service: Pain Management;  Laterality: Bilateral;  per lessia pt is on eliquis but she will get permission for it to be held 5 days prior to procedure.   HAD VAC   CARD SCANNED IN    REDUCTION OF BOTH BREASTS      RETINAL DETACHMENT SURGERY  right    VAGINAL DELIVERY      X1       Social History  Ms. Bobbi Haywood  reports that she has never smoked. She has never used smokeless tobacco. She reports that she does not currently use alcohol.    Family History  Ms.'s Bobbi Haywood family history includes Atrial fibrillation in her mother; Diabetes in her paternal grandfather; Hypertension in her mother; Stroke in her father.    Medications and Allergies     Medications  Outpatient Medications Marked as Taking for the 11/13/24 encounter (Office Visit) with Sheri Louis FNP   Medication Sig Dispense Refill    cyanocobalamin 500 MCG tablet Take 500 mcg by mouth once daily.      DULoxetine (CYMBALTA) 60 MG capsule Take 1 capsule by mouth once daily.      estradioL (ESTRACE) 2 MG tablet Take 1 tablet by mouth once daily.      ferrous sulfate 325 (65 FE) MG EC tablet Take 1 tablet (325 mg total) by mouth once daily.      fesoterodine (TOVIAZ) 4 mg Tb24 Take 4 mg by mouth once daily.      folic acid (FOLVITE) 800 MCG Tab Take 800 mcg by mouth once daily.      gabapentin (NEURONTIN) 600 MG tablet Take 1 tablet by mouth 2 (two) times a day.      L. rhamnosus/C/zinc/elderberry (CULTURELLE IMMUNE DEFENSE ORAL) Take 1 tablet by mouth once daily.      L.acidoph,saliva/B.bif/S.therm (ACIDOPHILUS PROBIOTIC BLEND ORAL) Take 1 tablet by mouth once daily.      rosuvastatin (CRESTOR) 20 MG tablet Take 20 mg by mouth  every evening.         Allergies  Review of patient's allergies indicates:   Allergen Reactions    Sulfa (sulfonamide antibiotics) Hives and Other (See Comments)     Sore Throat, Sore Mouth       Physical Examination     Vitals:    11/13/24 1340   BP: 131/84   Pulse: 82   Resp: 20   Temp: 98.1 °F (36.7 °C)       Physical Exam  Vitals and nursing note reviewed.   Constitutional:       Appearance: Normal appearance.   HENT:      Head: Normocephalic.   Cardiovascular:      Rate and Rhythm: Normal rate. Rhythm irregular.      Pulses: Normal pulses.      Heart sounds: Normal heart sounds.   Pulmonary:      Effort: Pulmonary effort is normal. No respiratory distress.   Chest:      Chest wall: No tenderness.   Musculoskeletal:         General: Swelling present.      Right lower leg: Edema present.      Left lower leg: Edema present.   Skin:     General: Skin is warm and dry.      Comments: See LDA for photos/measurements   Neurological:      General: No focal deficit present.      Mental Status: She is alert and oriented to person, place, and time. Mental status is at baseline.   Psychiatric:         Mood and Affect: Mood normal.         Behavior: Behavior normal.         Thought Content: Thought content normal.         Judgment: Judgment normal.     Assessment and Plan             Wound 09/12/24 Incision Right Thigh vertical (Active)   09/12/24  Thigh   Present on Original Admission:    Primary Wound Type: Incision   Side: Right   Orientation:    Wound Approximate Age at First Assessment (Weeks):    Wound Number:    Is this injury device related?:    Incision Type: vertical   Closure Method: No Closure (see comments)   Wound Description (Comments):    Type:    Additional Comments:    Ankle-Brachial Index:    Pulses:    Removal Indication and Assessment:    Wound Outcome:    Wound Image    11/13/24 1342   Dressing Appearance Moist drainage 11/13/24 1342   Drainage Amount Moderate 11/13/24 1342   Drainage  Characteristics/Odor Serosanguineous 11/13/24 1342   Appearance Pink;Moist;Granulating 11/13/24 1342   Black (%), Wound Tissue Color 0 % 11/13/24 1342   Red (%), Wound Tissue Color 0 % 11/13/24 1342   Yellow (%), Wound Tissue Color 0 % 11/13/24 1342   Periwound Area Intact;Dry 11/13/24 1342   Wound Edges Rolled/closed 11/13/24 1342   Wound Length (cm) 0 cm 11/13/24 1342   Wound Width (cm) 0 cm 11/13/24 1342   Wound Depth (cm) 0 cm 11/13/24 1342   Wound Volume (cm^3) 0 cm^3 11/13/24 1342   Wound Surface Area (cm^2) 0 cm^2 11/13/24 1342            Wound 09/17/24 1707 Abrasion(s) Right distal;anterior Leg (Active)   09/17/24 1707 Leg   Present on Original Admission:    Primary Wound Type: Abrasion(s)   Side: Right   Orientation: distal;anterior   Wound Approximate Age at First Assessment (Weeks):    Wound Number:    Is this injury device related?:    Incision Type:    Closure Method:    Wound Description (Comments):    Type:    Additional Comments:    Ankle-Brachial Index:    Pulses:    Removal Indication and Assessment:    Wound Outcome:    Wound Image     11/13/24 1343   Dressing Appearance Moist drainage 11/13/24 1343   Drainage Amount Moderate 11/13/24 1343   Drainage Characteristics/Odor Serosanguineous 11/13/24 1343   Appearance Pink;Moist;Granulating 11/13/24 1343   Tissue loss description Full thickness 11/13/24 1343   Black (%), Wound Tissue Color 0 % 11/13/24 1343   Red (%), Wound Tissue Color 100 % 11/13/24 1343   Yellow (%), Wound Tissue Color 0 % 11/13/24 1343   Periwound Area Intact;Dry 11/13/24 1343   Wound Edges Irregular 11/13/24 1343   Wound Length (cm) 1 cm 11/13/24 1343   Wound Width (cm) 0.7 cm 11/13/24 1343   Wound Depth (cm) 0 cm 11/13/24 1343   Wound Volume (cm^3) 0 cm^3 11/13/24 1343   Wound Surface Area (cm^2) 0.7 cm^2 11/13/24 1343   Care Cleansed with:;Antimicrobial agent 11/13/24 1343   Dressing Applied;Gauze;Foam 11/13/24 1343   Periwound Care Moisture barrier applied 11/13/24 1343    Dressing Change Due 11/14/24 11/13/24 3883       Problem List Items Addressed This Visit          Orthopedic    Non-pressure chronic ulcer of right lower leg with fat layer exposed - Primary    Overview                      Current Assessment & Plan     Clean wounds with Vashe or baby shampoo and water. Pat dry with gauze pad.  Apply betadine to wound then cover.   Cover with  aquacel ag foam bordered dressing.  Change MWF and as needed for soilage.    Check blood pressure twice daily, goal <120/80  Diet:   Increase protein intake, avoid fried, fatty foods and foods high in simple carbs.   Vitamins:  Take vitamin C 1000 mg, zinc 50mg, vitamin d 5000 units, and a daily multivitamin. Fawad is a good source of protein and nutrients to aid in wound healing.   Monitor closely for s/s of infection including fever, chills, increase in pain, odor from wound, and increased redness from foot. Go to ER if any complications develop.     Home Health to change dressings once a week             Future Appointments   Date Time Provider Department Center   11/27/2024  1:00 PM Sheri Louis FNP [facility]            Signature:  HARMAN Aviles  [facility]  [address] [facility] [place] [address]  [phone]    Date of encounter: 11/13/24

## 2024-11-04 NOTE — PATIENT INSTRUCTIONS
Clean wounds with Vashe or baby shampoo and water. Pat dry with gauze pad.  Apply betadine to wound then cover.   Cover with  aquacel ag foam bordered dressing.  Change MWF and as needed for soilage.    Check blood pressure twice daily, goal <120/80  Diet:   Increase protein intake, avoid fried, fatty foods and foods high in simple carbs.   Vitamins:  Take vitamin C 1000 mg, zinc 50mg, vitamin d 5000 units, and a daily multivitamin. Fawad is a good source of protein and nutrients to aid in wound healing.   Monitor closely for s/s of infection including fever, chills, increase in pain, odor from wound, and increased redness from foot. Go to ER if any complications develop.     Home Health to change dressings once a week

## 2024-11-13 ENCOUNTER — OFFICE VISIT (OUTPATIENT)
Dept: WOUND CARE | Facility: CLINIC | Age: 74
End: 2024-11-13
Attending: SURGERY
Payer: MEDICARE

## 2024-11-13 VITALS
RESPIRATION RATE: 20 BRPM | HEART RATE: 82 BPM | TEMPERATURE: 98 F | DIASTOLIC BLOOD PRESSURE: 84 MMHG | SYSTOLIC BLOOD PRESSURE: 131 MMHG

## 2024-11-13 DIAGNOSIS — L97.912 NON-PRESSURE CHRONIC ULCER OF RIGHT LOWER LEG WITH FAT LAYER EXPOSED: Primary | ICD-10-CM

## 2024-11-13 PROCEDURE — 99499 UNLISTED E&M SERVICE: CPT | Mod: S$PBB,,, | Performed by: NURSE PRACTITIONER

## 2024-11-13 PROCEDURE — 17250 CHEM CAUT OF GRANLTJ TISSUE: CPT | Mod: PBBFAC | Performed by: NURSE PRACTITIONER

## 2024-11-13 PROCEDURE — 99214 OFFICE O/P EST MOD 30 MIN: CPT | Mod: PBBFAC | Performed by: NURSE PRACTITIONER

## 2024-11-13 PROCEDURE — 99999 PR PBB SHADOW E&M-EST. PATIENT-LVL IV: CPT | Mod: PBBFAC,,, | Performed by: NURSE PRACTITIONER

## 2024-11-13 PROCEDURE — 17250 CHEM CAUT OF GRANLTJ TISSUE: CPT | Mod: S$PBB,,, | Performed by: NURSE PRACTITIONER

## 2024-11-13 NOTE — PROGRESS NOTES
Debridement Performed for Assessment: Wound # Right lateral lower leg  Performed By: Provider;Sheri Louis NP  Assistant:  Iveth Arboleda LPN    Debridement: Chemical/enzymatic  Debridement Description: Non-Selective      Wound/Ulcer size:    Length: 1.0  Width:  0.7  Depth:            0.3  Cm2:      1.7    Photo taken post procedure:  Procedural Pain: Insensate  Post Procedural Pain: Insensate  Response to Treatment: Procedure was tolerated well    Specimen  Was a specimen collected?  No Specimen collected      Graft or Implant Aplpied  Was a graft of implant applied?  No      Post deridement diagnosis  chronic non pressure wound right lower leg  Did the post debridment diagnosis chagne?  The post debridment diagnosis is the same as the pre-op diagnosis.      Assistant of procedure  Who assisted with the procedure?  The assistant was the same as the nurse listed above.      Complication  Complications related to debridement?  No complications noted      Estimated Blood Loss  How much blood loss occured?  No blood loss      Anesthesia  Anesthesia used?  None    Estimated Blood Loss  How much blood loss occured?  No blood loss      Anesthesia  Anesthesia used?  None

## 2024-12-05 ENCOUNTER — DOCUMENT SCAN (OUTPATIENT)
Dept: HOME HEALTH SERVICES | Facility: HOSPITAL | Age: 74
End: 2024-12-05
Payer: MEDICARE

## 2025-04-30 ENCOUNTER — HOSPITAL ENCOUNTER (OUTPATIENT)
Facility: HOSPITAL | Age: 75
Discharge: HOME | DRG: 683 | End: 2025-05-02
Attending: EMERGENCY MEDICINE | Admitting: FAMILY MEDICINE
Payer: MEDICARE

## 2025-04-30 DIAGNOSIS — R07.9 CHEST PAIN: ICD-10-CM

## 2025-04-30 DIAGNOSIS — N17.9 AKI (ACUTE KIDNEY INJURY): Primary | ICD-10-CM

## 2025-04-30 DIAGNOSIS — D72.829 LEUKOCYTOSIS: ICD-10-CM
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
## 2025-04-30 LAB
ALBUMIN SERPL BCP-MCNC: 2.7 G/DL (ref 3.4–4.8)
ALBUMIN/GLOB SERPL: 0.7 {RATIO}
ALP SERPL-CCNC: 67 U/L (ref 40–150)
ALT SERPL W P-5'-P-CCNC: <7 U/L
ANION GAP SERPL CALCULATED.3IONS-SCNC: 19 MMOL/L (ref 7–16)
AST SERPL W P-5'-P-CCNC: 12 U/L (ref 11–45)
BACTERIA #/AREA URNS HPF: ABNORMAL /HPF
BASOPHILS # BLD AUTO: 0.05 K/UL (ref 0–0.2)
BASOPHILS NFR BLD AUTO: 0.3 % (ref 0–1)
BILIRUB SERPL-MCNC: 0.2 MG/DL
BILIRUB UR QL STRIP: NEGATIVE
BUN SERPL-MCNC: 26 MG/DL (ref 10–20)
BUN/CREAT SERPL: 15 (ref 6–20)
CALCIUM SERPL-MCNC: 8.6 MG/DL (ref 8.4–10.2)
CHLORIDE SERPL-SCNC: 109 MMOL/L (ref 98–107)
CLARITY UR: ABNORMAL
CO2 SERPL-SCNC: 17 MMOL/L (ref 23–31)
COLOR UR: YELLOW
CREAT SERPL-MCNC: 1.73 MG/DL (ref 0.55–1.02)
DIFFERENTIAL METHOD BLD: ABNORMAL
EGFR (NO RACE VARIABLE) (RUSH/TITUS): 30 ML/MIN/1.73M2
EOSINOPHIL # BLD AUTO: 0.08 K/UL (ref 0–0.5)
EOSINOPHIL NFR BLD AUTO: 0.5 % (ref 1–4)
ERYTHROCYTE [DISTWIDTH] IN BLOOD BY AUTOMATED COUNT: 14.6 % (ref 11.5–14.5)
GLOBULIN SER-MCNC: 3.8 G/DL (ref 2–4)
GLUCOSE SERPL-MCNC: 100 MG/DL (ref 82–115)
GLUCOSE UR STRIP-MCNC: NORMAL MG/DL
HCT VFR BLD AUTO: 38.8 % (ref 38–47)
HGB BLD-MCNC: 11.6 G/DL (ref 12–16)
HYALINE CASTS #/AREA URNS LPF: ABNORMAL /LPF
IMM GRANULOCYTES # BLD AUTO: 0.2 K/UL (ref 0–0.04)
IMM GRANULOCYTES NFR BLD: 1.2 % (ref 0–0.4)
INR BLD: 1.39
KETONES UR STRIP-SCNC: NEGATIVE MG/DL
LEUKOCYTE ESTERASE UR QL STRIP: ABNORMAL
LYMPHOCYTES # BLD AUTO: 1.05 K/UL (ref 1–4.8)
LYMPHOCYTES NFR BLD AUTO: 6.6 % (ref 27–41)
MCH RBC QN AUTO: 29.2 PG (ref 27–31)
MCHC RBC AUTO-ENTMCNC: 29.9 G/DL (ref 32–36)
MCV RBC AUTO: 97.7 FL (ref 80–96)
MONOCYTES # BLD AUTO: 1.14 K/UL (ref 0–0.8)
MONOCYTES NFR BLD AUTO: 7.1 % (ref 2–6)
MPC BLD CALC-MCNC: 10.7 FL (ref 9.4–12.4)
MUCOUS, UA: ABNORMAL /LPF
NEUTROPHILS # BLD AUTO: 13.49 K/UL (ref 1.8–7.7)
NEUTROPHILS NFR BLD AUTO: 84.3 % (ref 53–65)
NITRITE UR QL STRIP: NEGATIVE
NRBC # BLD AUTO: 0 X10E3/UL
NRBC, AUTO (.00): 0 %
PH UR STRIP: 5.5 PH UNITS
PLATELET # BLD AUTO: 331 K/UL (ref 150–400)
POTASSIUM SERPL-SCNC: 3.8 MMOL/L (ref 3.5–5.1)
PROT SERPL-MCNC: 6.5 G/DL (ref 5.8–7.6)
PROT UR QL STRIP: 50
PROTHROMBIN TIME: 16.9 SECONDS (ref 11.7–14.7)
RBC # BLD AUTO: 3.97 M/UL (ref 4.2–5.4)
RBC # UR STRIP: ABNORMAL /UL
RBC #/AREA URNS HPF: >182 /HPF
SARS-COV-2 RDRP RESP QL NAA+PROBE: NEGATIVE
SODIUM SERPL-SCNC: 141 MMOL/L (ref 136–145)
SP GR UR STRIP: 1.02
SQUAMOUS #/AREA URNS LPF: ABNORMAL /HPF
UROBILINOGEN UR STRIP-ACNC: NORMAL MG/DL
WBC # BLD AUTO: 16.01 K/UL (ref 4.5–11)
WBC #/AREA URNS HPF: >182 /HPF
WBC CLUMPS, UA: ABNORMAL /HPF

## 2025-04-30 PROCEDURE — 99285 EMERGENCY DEPT VISIT HI MDM: CPT | Mod: 25

## 2025-04-30 PROCEDURE — 80053 COMPREHEN METABOLIC PANEL: CPT | Performed by: EMERGENCY MEDICINE

## 2025-04-30 PROCEDURE — 25000003 PHARM REV CODE 250: Performed by: FAMILY MEDICINE

## 2025-04-30 PROCEDURE — 81001 URINALYSIS AUTO W/SCOPE: CPT | Performed by: EMERGENCY MEDICINE

## 2025-04-30 PROCEDURE — 85025 COMPLETE CBC W/AUTO DIFF WBC: CPT | Performed by: EMERGENCY MEDICINE

## 2025-04-30 PROCEDURE — 87086 URINE CULTURE/COLONY COUNT: CPT | Performed by: EMERGENCY MEDICINE

## 2025-04-30 PROCEDURE — G0378 HOSPITAL OBSERVATION PER HR: HCPCS

## 2025-04-30 PROCEDURE — 97165 OT EVAL LOW COMPLEX 30 MIN: CPT

## 2025-04-30 PROCEDURE — 94799 UNLISTED PULMONARY SVC/PX: CPT

## 2025-04-30 PROCEDURE — 25000003 PHARM REV CODE 250: Performed by: EMERGENCY MEDICINE

## 2025-04-30 PROCEDURE — 99223 1ST HOSP IP/OBS HIGH 75: CPT | Mod: AI,GC,, | Performed by: FAMILY MEDICINE

## 2025-04-30 PROCEDURE — 85610 PROTHROMBIN TIME: CPT

## 2025-04-30 PROCEDURE — 63600175 PHARM REV CODE 636 W HCPCS

## 2025-04-30 PROCEDURE — 99900035 HC TECH TIME PER 15 MIN (STAT)

## 2025-04-30 PROCEDURE — 87635 SARS-COV-2 COVID-19 AMP PRB: CPT | Performed by: EMERGENCY MEDICINE

## 2025-04-30 PROCEDURE — 96374 THER/PROPH/DIAG INJ IV PUSH: CPT

## 2025-04-30 PROCEDURE — 96372 THER/PROPH/DIAG INJ SC/IM: CPT

## 2025-04-30 PROCEDURE — 63600175 PHARM REV CODE 636 W HCPCS: Performed by: FAMILY MEDICINE

## 2025-04-30 PROCEDURE — 99406 BEHAV CHNG SMOKING 3-10 MIN: CPT

## 2025-04-30 PROCEDURE — 25000003 PHARM REV CODE 250

## 2025-04-30 PROCEDURE — 11000001 HC ACUTE MED/SURG PRIVATE ROOM

## 2025-04-30 PROCEDURE — 36415 COLL VENOUS BLD VENIPUNCTURE: CPT | Performed by: EMERGENCY MEDICINE

## 2025-04-30 PROCEDURE — 97162 PT EVAL MOD COMPLEX 30 MIN: CPT

## 2025-04-30 PROCEDURE — 63600175 PHARM REV CODE 636 W HCPCS: Performed by: EMERGENCY MEDICINE

## 2025-04-30 PROCEDURE — 96361 HYDRATE IV INFUSION ADD-ON: CPT

## 2025-04-30 PROCEDURE — 36415 COLL VENOUS BLD VENIPUNCTURE: CPT

## 2025-04-30 RX ORDER — ATORVASTATIN CALCIUM 40 MG/1
40 TABLET, FILM COATED ORAL DAILY
Status: DISCONTINUED | OUTPATIENT
Start: 2025-04-30 | End: 2025-05-02 | Stop reason: HOSPADM

## 2025-04-30 RX ORDER — SODIUM CHLORIDE 9 MG/ML
INJECTION, SOLUTION INTRAVENOUS CONTINUOUS
Status: DISCONTINUED | OUTPATIENT
Start: 2025-04-30 | End: 2025-04-30

## 2025-04-30 RX ORDER — ONDANSETRON HYDROCHLORIDE 2 MG/ML
4 INJECTION, SOLUTION INTRAVENOUS EVERY 8 HOURS PRN
Status: DISCONTINUED | OUTPATIENT
Start: 2025-04-30 | End: 2025-05-02 | Stop reason: HOSPADM

## 2025-04-30 RX ORDER — IBUPROFEN 200 MG
24 TABLET ORAL
Status: DISCONTINUED | OUTPATIENT
Start: 2025-04-30 | End: 2025-05-02 | Stop reason: HOSPADM

## 2025-04-30 RX ORDER — APIXABAN 5 MG/1
5 TABLET, FILM COATED ORAL 2 TIMES DAILY
COMMUNITY
Start: 2025-04-25

## 2025-04-30 RX ORDER — ALUMINUM HYDROXIDE, MAGNESIUM HYDROXIDE, AND SIMETHICONE 1200; 120; 1200 MG/30ML; MG/30ML; MG/30ML
30 SUSPENSION ORAL 4 TIMES DAILY PRN
Status: DISCONTINUED | OUTPATIENT
Start: 2025-04-30 | End: 2025-05-02 | Stop reason: HOSPADM

## 2025-04-30 RX ORDER — HYDROCODONE BITARTRATE AND ACETAMINOPHEN 5; 325 MG/1; MG/1
1 TABLET ORAL EVERY 6 HOURS PRN
Refills: 0 | Status: DISCONTINUED | OUTPATIENT
Start: 2025-04-30 | End: 2025-05-02 | Stop reason: HOSPADM

## 2025-04-30 RX ORDER — LANOLIN ALCOHOL/MO/W.PET/CERES
1 CREAM (GRAM) TOPICAL DAILY
Status: DISCONTINUED | OUTPATIENT
Start: 2025-04-30 | End: 2025-05-02 | Stop reason: HOSPADM

## 2025-04-30 RX ORDER — NALOXONE HCL 0.4 MG/ML
0.02 VIAL (ML) INJECTION
Status: DISCONTINUED | OUTPATIENT
Start: 2025-04-30 | End: 2025-05-02 | Stop reason: HOSPADM

## 2025-04-30 RX ORDER — FOLIC ACID 0.4 MG
800 TABLET ORAL DAILY
Status: DISCONTINUED | OUTPATIENT
Start: 2025-04-30 | End: 2025-05-02 | Stop reason: HOSPADM

## 2025-04-30 RX ORDER — ENOXAPARIN SODIUM 100 MG/ML
40 INJECTION SUBCUTANEOUS EVERY 24 HOURS
Status: DISCONTINUED | OUTPATIENT
Start: 2025-04-30 | End: 2025-04-30

## 2025-04-30 RX ORDER — ACETAMINOPHEN 325 MG/1
650 TABLET ORAL EVERY 4 HOURS PRN
Status: DISCONTINUED | OUTPATIENT
Start: 2025-04-30 | End: 2025-05-02 | Stop reason: HOSPADM

## 2025-04-30 RX ORDER — CEFTRIAXONE 1 G/1
1 INJECTION, POWDER, FOR SOLUTION INTRAMUSCULAR; INTRAVENOUS
Status: DISCONTINUED | OUTPATIENT
Start: 2025-05-01 | End: 2025-05-02 | Stop reason: HOSPADM

## 2025-04-30 RX ORDER — ACETAMINOPHEN 325 MG/1
650 TABLET ORAL EVERY 6 HOURS PRN
Status: DISCONTINUED | OUTPATIENT
Start: 2025-04-30 | End: 2025-05-02 | Stop reason: HOSPADM

## 2025-04-30 RX ORDER — METOPROLOL TARTRATE 25 MG/1
25 TABLET, FILM COATED ORAL DAILY
Status: DISCONTINUED | OUTPATIENT
Start: 2025-04-30 | End: 2025-05-02 | Stop reason: HOSPADM

## 2025-04-30 RX ORDER — SODIUM CHLORIDE, SODIUM LACTATE, POTASSIUM CHLORIDE, CALCIUM CHLORIDE 600; 310; 30; 20 MG/100ML; MG/100ML; MG/100ML; MG/100ML
INJECTION, SOLUTION INTRAVENOUS CONTINUOUS
Status: DISCONTINUED | OUTPATIENT
Start: 2025-04-30 | End: 2025-05-02

## 2025-04-30 RX ORDER — FOLIC ACID 1 MG/1
1 TABLET ORAL DAILY
COMMUNITY

## 2025-04-30 RX ORDER — CEFTRIAXONE 2 G/1
2 INJECTION, POWDER, FOR SOLUTION INTRAMUSCULAR; INTRAVENOUS
Status: COMPLETED | OUTPATIENT
Start: 2025-04-30 | End: 2025-04-30

## 2025-04-30 RX ORDER — DULOXETIN HYDROCHLORIDE 30 MG/1
60 CAPSULE, DELAYED RELEASE ORAL DAILY
Status: DISCONTINUED | OUTPATIENT
Start: 2025-04-30 | End: 2025-05-02 | Stop reason: HOSPADM

## 2025-04-30 RX ORDER — IBUPROFEN 200 MG
16 TABLET ORAL
Status: DISCONTINUED | OUTPATIENT
Start: 2025-04-30 | End: 2025-05-02 | Stop reason: HOSPADM

## 2025-04-30 RX ORDER — GLUCAGON 1 MG
1 KIT INJECTION
Status: DISCONTINUED | OUTPATIENT
Start: 2025-04-30 | End: 2025-05-02 | Stop reason: HOSPADM

## 2025-04-30 RX ORDER — POLYETHYLENE GLYCOL 3350 17 G/17G
17 POWDER, FOR SOLUTION ORAL DAILY PRN
Status: DISCONTINUED | OUTPATIENT
Start: 2025-04-30 | End: 2025-05-02 | Stop reason: HOSPADM

## 2025-04-30 RX ORDER — SODIUM CHLORIDE 0.9 % (FLUSH) 0.9 %
10 SYRINGE (ML) INJECTION EVERY 12 HOURS PRN
Status: DISCONTINUED | OUTPATIENT
Start: 2025-04-30 | End: 2025-05-02 | Stop reason: HOSPADM

## 2025-04-30 RX ORDER — CEFTRIAXONE 1 G/1
1 INJECTION, POWDER, FOR SOLUTION INTRAMUSCULAR; INTRAVENOUS
Status: DISCONTINUED | OUTPATIENT
Start: 2025-04-30 | End: 2025-04-30

## 2025-04-30 RX ORDER — ACETAMINOPHEN 325 MG/1
650 TABLET ORAL EVERY 8 HOURS PRN
Status: DISCONTINUED | OUTPATIENT
Start: 2025-04-30 | End: 2025-05-02 | Stop reason: HOSPADM

## 2025-04-30 RX ADMIN — ENOXAPARIN SODIUM 40 MG: 40 INJECTION SUBCUTANEOUS at 04:04

## 2025-04-30 RX ADMIN — ACETAMINOPHEN 650 MG: 325 TABLET ORAL at 08:04

## 2025-04-30 RX ADMIN — DULOXETINE HYDROCHLORIDE 60 MG: 30 CAPSULE, DELAYED RELEASE ORAL at 11:04

## 2025-04-30 RX ADMIN — FOLIC ACID TAB 400 MCG 800 MCG: 400 TAB at 11:04

## 2025-04-30 RX ADMIN — SODIUM CHLORIDE 1000 ML: 9 INJECTION, SOLUTION INTRAVENOUS at 02:04

## 2025-04-30 RX ADMIN — SODIUM CHLORIDE: 9 INJECTION, SOLUTION INTRAVENOUS at 09:04

## 2025-04-30 RX ADMIN — FERROUS SULFATE TAB 325 MG (65 MG ELEMENTAL FE) 1 EACH: 325 (65 FE) TAB at 11:04

## 2025-04-30 RX ADMIN — METOPROLOL TARTRATE 25 MG: 25 TABLET, FILM COATED ORAL at 11:04

## 2025-04-30 RX ADMIN — BACITRACIN ZINC, NEOMYCIN SULFATE, POLYMYXIN B SULFATE: 3.5; 5000; 4 OINTMENT TOPICAL at 03:04

## 2025-04-30 RX ADMIN — ATORVASTATIN CALCIUM 40 MG: 40 TABLET, FILM COATED ORAL at 11:04

## 2025-04-30 RX ADMIN — CEFTRIAXONE 2 G: 2 INJECTION, POWDER, FOR SOLUTION INTRAMUSCULAR; INTRAVENOUS at 02:04

## 2025-04-30 RX ADMIN — HYDROCODONE BITARTRATE AND ACETAMINOPHEN 1 TABLET: 5; 325 TABLET ORAL at 11:04

## 2025-04-30 RX ADMIN — SODIUM CHLORIDE, POTASSIUM CHLORIDE, SODIUM LACTATE AND CALCIUM CHLORIDE: 600; 310; 30; 20 INJECTION, SOLUTION INTRAVENOUS at 08:04

## 2025-04-30 NOTE — HPI
73 yo female presented to the ED with complaints of fall. As per patient, she was trying to stand and stabilize herself when she fell on the ground. States she hit her head but reports no loss of consciousness. Also had impact to the shoulder and right forearm during the fall. Denies N/V, SOB, chest pain, palpitation, or vision problems. History is obtained from the patient. She is alert, oriented and cooperative.  History of dysuria since past 2 days, well controlled symptoms after taking over the counter azos(phenazopyridine)  Past medical history is significant for long standing Afib under meds, hypertension, anemia, neuropathy, spinal stenosis at lumbosacral and cervical region and CKD  Presenting ED vitals         Initial Vitals [04/30/25 0031]   BP Pulse Resp Temp SpO2   (!) 104/55 104 20 97.8 °F (36.6 °C) (!) 94 %     Presenting ED labs: Wbc 16K, Hb 11.6, Plat 331K, Na 141, K 3.8, Creat 1.73  UA-Wbc >180 and Rbc >180, nitrites neg. Urine culture ordered and results awaited  CT head: No acute intracranial abnormality  X-ray forearm and shoulder , right -no evidence of fracture or dislocation    Patient is admitted to the observation unit under hospital service for further care and management

## 2025-04-30 NOTE — ED PROVIDER NOTES
Encounter Date: 4/30/2025    SCRIBE #1 NOTE: I, Nelly Omalley, am scribing for, and in the presence of,  Kendell Kovacs MD. I have scribed the entire note.       History     Chief Complaint   Patient presents with    Fall     Patient tripped over her own foot and fell. Did not hit head and no LOC    Arm Pain     Right arm pain after fall     This is a 75 y/o female,who presents to the ED via EMS S/P fall. She states was at home when she tripped over nothing and fell. She states she did not hit head and there was on LOC. She reports she is on Eliquis. She reports right arm pain. She is alert at this and answering questions correctly. There are no other complaints/pain in the ED at this time. She has a known hx of HTN, and INOCENTE. There is no cardiac surgeries on file. There is no smoking hx on file.     The history is provided by the patient and the EMS personnel.     Review of patient's allergies indicates:   Allergen Reactions    Sulfa (sulfonamide antibiotics) Hives and Other (See Comments)     Sore Throat, Sore Mouth     Past Medical History:   Diagnosis Date    A-fib     Abnormal gait     Abscess of right lower extremity 09/10/2024    Acquired deformity of both ankles     Acute blood loss anemia     INOCENTE (acute kidney injury) 09/10/2024    Chronic pain     Contusion of right lower extremity     Degenerative joint disease of cervical and lumbar spine     Depression     E-coli UTI 09/10/2024    Fall 09/10/2024    Hematoma     Hypertension     Hypomagnesemia 09/10/2024    Iron deficiency anemia, unspecified     Neuropathy     Neuropathy     Spinal stenosis of cervical region     Spinal stenosis of lumbar region at multiple levels     Spondylolysis of lumbar region      Past Surgical History:   Procedure Laterality Date    CATARACT EXTRACTION, BILATERAL      CHOLECYSTECTOMY      HYSTERECTOMY      INCISION AND DRAINAGE OF ABSCESS Right 9/12/2024    Procedure: INCISION AND DRAINAGE, ABSCESS;  Surgeon: Joe Alberto  MD ALESSIA;  Location: [facility] OR;  Service: General;  Laterality: Right;  right thigh and right calf    INJECTION OF ANESTHETIC AGENT AROUND MEDIAL BRANCH NERVES INNERVATING LUMBAR FACET JOINT Bilateral 8/26/2021    Procedure: Bilateral L4-5,5-S1 MBB;  Surgeon: Sulma Nelson MD;  Location: [facility];  Service: Pain Management;  Laterality: Bilateral;  Per Dr Bradford ok to hold Eliquis for 4 days not 5, Per Dr Nelson this is fine with her. Pt will bring her verification card for COVID vaccination.    INJECTION OF ANESTHETIC AGENT AROUND MEDIAL BRANCH NERVES INNERVATING LUMBAR FACET JOINT Bilateral 10/5/2021    Procedure: Bilateral L4-5,5-S1 MBB;  Surgeon: Sulma Nelson MD;  Location: [facility];  Service: Pain Management;  Laterality: Bilateral;  PT AWARE ON OV TO BE TESTED    KNEE CARTILAGE SURGERY Right     RADIOFREQUENCY ABLATION OF LUMBAR MEDIAL BRANCH NERVE AT SINGLE LEVEL Bilateral 11/9/2021    Procedure: Radiofrequency Ablation, Nerve, Spinal, Lumbar, Medial Branch, 1 Level L4-S1  BILATERAL;  Surgeon: Sulma Nelson MD;  Location: [facility];  Service: Pain Management;  Laterality: Bilateral;  per alicia pt is on eliquis but she will get permission for it to be held 5 days prior to procedure.   HAD VAC   CARD SCANNED IN    REDUCTION OF BOTH BREASTS      RETINAL DETACHMENT SURGERY  right    VAGINAL DELIVERY      X1     Family History   Problem Relation Name Age of Onset    Hypertension Mother      Atrial fibrillation Mother      Stroke Father      Diabetes Paternal Grandfather       Social History[1]  Review of Systems    Physical Exam     Initial Vitals [04/30/25 0031]   BP Pulse Resp Temp SpO2   (!) 104/55 104 20 97.8 °F (36.6 °C) (!) 94 %      MAP       --         Physical Exam    Nursing note and vitals reviewed.  Constitutional: She appears well-developed and well-nourished.   HENT:   Head: Normocephalic and atraumatic.   Eyes: Conjunctivae and EOM are normal. Pupils are  "equal, round, and reactive to light.   Neck: Neck supple.   Normal range of motion.  Cardiovascular:  Normal rate, regular rhythm, normal heart sounds and intact distal pulses.           Pulmonary/Chest: Breath sounds normal.   Abdominal: Abdomen is soft. Bowel sounds are normal.   Musculoskeletal:         General: Normal range of motion.      Cervical back: Normal range of motion and neck supple.      Comments: Abrasions to the right finger.        Neurological: She is alert and oriented to person, place, and time. She has normal strength.   Skin: Skin is warm and dry. Capillary refill takes less than 2 seconds.   Skin tear noted to the right forearm.      Psychiatric: She has a normal mood and affect. Thought content normal.         ED Course   Procedures  Labs Reviewed   COMPREHENSIVE METABOLIC PANEL   CBC W/ AUTO DIFFERENTIAL    Narrative:     The following orders were created for panel order CBC auto differential.  Procedure                               Abnormality         Status                     ---------                               -----------         ------                     CBC with Differential[[id number]]                                                        Please view results for these tests on the individual orders.   URINALYSIS, REFLEX TO URINE CULTURE   CBC WITH DIFFERENTIAL          Imaging Results    None          Medications - No data to display  Medical Decision Making  Amount and/or Complexity of Data Reviewed  Labs: ordered.  Radiology: ordered.              Attending Attestation:           Physician Attestation for Scribe:  Physician Attestation Statement for Scribe #1: I, Kendell Kovacs MD, reviewed documentation, as scribed by Nelly Omalley in my presence, and it is both accurate and complete.                                    Clinical Impression:   ***Please document a Clinical Impression and click the "Refresh" button to refresh your note and automatically pull in before " signing.***                [1]   Social History  Tobacco Use    Smoking status: Never    Smokeless tobacco: Never   Substance Use Topics    Alcohol use: Not Currently

## 2025-04-30 NOTE — ASSESSMENT & PLAN NOTE
-history of dysuria that started few days ago, well controlled symptoms currently after taking over the counter azos(phenazopyridine)  -UA-Wbc >180 and Rbc >180, nitrites neg. Urine culture ordered and results awaited  -leucocytosis(16K) likely due to UTI  -will start ceftriaxone 1 gm 24 hourly   -USG retroperitoneal ordered  -will adjust antibiotics as per c/s results

## 2025-04-30 NOTE — PHARMACY MED REC
"Admission Medication History     The home medication history was taken by Mercedes Fuentes.    You may go to "Admission" then "Reconcile Home Medications" tabs to review and/or act upon these items.     The home medication list has been updated by the Pharmacy department.   Please read ALL comments highlighted in yellow.   Please address this information as you see fit.    Feel free to contact us if you have any questions or require assistance.  Medications Added:   Eliquis 5 mg  Medications Updated:  Folic Acid 800 mg updated to Folic Acid 1 mg  Patient stated Dr. Bradford had taken her off her blood pressure medications because her BP was dipping too low, but did not discontinue her Metoprolol Tartrate 25 mg.      Patient reports no longer taking the following medication(s). The medication(s) listed below were removed from the home medication list. Please reorder if appropriate:  L. Acidophilus     Medications listed below were obtained from: Patient/family and Analytic software- [employer]  (Not in a hospital admission)        Current Outpatient Medications on File Prior to Encounter   Medication Sig Dispense Refill Last Dose/Taking    cyanocobalamin 500 MCG tablet Take 500 mcg by mouth once daily.   4/29/2025 Morning    DULoxetine (CYMBALTA) 60 MG capsule Take 1 capsule by mouth once daily.   4/29/2025 Morning    ELIQUIS 5 mg Tab Take 5 mg by mouth 2 (two) times daily.   Taking    estradioL (ESTRACE) 2 MG tablet Take 1 tablet by mouth once daily.   4/29/2025 Morning    ferrous sulfate 325 (65 FE) MG EC tablet Take 1 tablet (325 mg total) by mouth once daily.   4/29/2025 Morning    fesoterodine (TOVIAZ) 4 mg Tb24 Take 4 mg by mouth once daily.   4/29/2025 Morning    folic acid (FOLVITE) 1 MG tablet Take 1 mg by mouth once daily.   4/29/2025 Morning    gabapentin (NEURONTIN) 600 MG tablet Take 1 tablet by mouth 2 (two) times a day.   4/29/2025 Evening    L. rhamnosus/C/zinc/elderberry (CULTURELLE IMMUNE DEFENSE ORAL) Take " 1 tablet by mouth once daily.   4/29/2025 Morning    metoprolol tartrate (LOPRESSOR) 25 MG tablet Take 1 tablet by mouth once daily.   4/29/2025 Morning    rosuvastatin (CRESTOR) 20 MG tablet Take 20 mg by mouth once daily.   4/29/2025 Morning    [DISCONTINUED] folic acid (FOLVITE) 800 MCG Tab Take 800 mcg by mouth once daily.       [DISCONTINUED] L.acidoph,saliva/B.bif/S.therm (ACIDOPHILUS PROBIOTIC BLEND ORAL) Take 1 tablet by mouth once daily.            Potential issues to be addressed PRIOR TO DISCHARGE  No issues identified.    Mercedes Fuentes  Medication Access Specialist  EXT. [id number]    .

## 2025-04-30 NOTE — H&P
[facility] - Emergency Department  Hospital Medicine  History & Physical    Patient Name: Bobbi Haywood  MRN: [id number]  Patient Class: OP- Observation  Admission Date: 4/30/2025  Attending Physician: Alexis Henning Jr., MD   Primary Care Provider: [facility]         Patient information was obtained from patient and ER records.     Subjective:     Principal Problem:Fall     Chief Complaint:   Chief Complaint   Patient presents with    Fall     Patient tripped over her own foot and fell. Did not hit head and no LOC    Arm Pain     Right arm pain after fall        HPI: 75 yo female presented to the ED with complaints of fall. As per patient, she was trying to stand and stabilize herself when she fell on the ground. States she hit her head but reports no loss of consciousness. Also had impact to the shoulder and right forearm during the fall. Denies N/V, SOB, chest pain, palpitation, or vision problems. History is obtained from the patient. She is alert, oriented and cooperative.  History of dysuria since past few days, well controlled symptoms after taking over the counter azos(phenazopyridine)  Past medical history is significant for long standing Afib under meds, hypertension, anemia, neuropathy, spinal stenosis at lumbosacral and cervical region and CKD  Presenting ED vitals         Initial Vitals [04/30/25 0031]   BP Pulse Resp Temp SpO2   (!) 104/55 104 20 97.8 °F (36.6 °C) (!) 94 %     Presenting ED labs: Wbc 16K, Hb 11.6, Plat 331K, Na 141, K 3.8, Creat 1.73  UA-Wbc >180 and Rbc >180, nitrites neg. Urine culture ordered and results awaited  CT head: No acute intracranial abnormality  X-ray forearm and shoulder , right -no evidence of fracture or dislocation    Patient is admitted to the observation unit under hospitalist service for further care and management                      Past Medical History:   Diagnosis Date    A-fib     Abnormal gait     Abscess of right  lower extremity 09/10/2024    Acquired deformity of both ankles     Acute blood loss anemia     INOCENTE (acute kidney injury) 09/10/2024    Chronic pain     Contusion of right lower extremity     Degenerative joint disease of cervical and lumbar spine     Depression     E-coli UTI 09/10/2024    Fall 09/10/2024    Hematoma     Hypertension     Hypomagnesemia 09/10/2024    Iron deficiency anemia, unspecified     Neuropathy     Neuropathy     Spinal stenosis of cervical region     Spinal stenosis of lumbar region at multiple levels     Spondylolysis of lumbar region        Past Surgical History:   Procedure Laterality Date    CATARACT EXTRACTION, BILATERAL      CHOLECYSTECTOMY      HYSTERECTOMY      INCISION AND DRAINAGE OF ABSCESS Right 9/12/2024    Procedure: INCISION AND DRAINAGE, ABSCESS;  Surgeon: Joe Alberto MD;  Location: [facility] OR;  Service: General;  Laterality: Right;  right thigh and right calf    INJECTION OF ANESTHETIC AGENT AROUND MEDIAL BRANCH NERVES INNERVATING LUMBAR FACET JOINT Bilateral 8/26/2021    Procedure: Bilateral L4-5,5-S1 MBB;  Surgeon: Sulma Nelson MD;  Location: [facility];  Service: Pain Management;  Laterality: Bilateral;  Per Dr Bradford ok to hold Eliquis for 4 days not 5, Per Dr Nelson this is fine with her. Pt will bring her verification card for COVID vaccination.    INJECTION OF ANESTHETIC AGENT AROUND MEDIAL BRANCH NERVES INNERVATING LUMBAR FACET JOINT Bilateral 10/5/2021    Procedure: Bilateral L4-5,5-S1 MBB;  Surgeon: Sulma Nelson MD;  Location: [facility];  Service: Pain Management;  Laterality: Bilateral;  PT AWARE ON OV TO BE TESTED    KNEE CARTILAGE SURGERY Right     RADIOFREQUENCY ABLATION OF LUMBAR MEDIAL BRANCH NERVE AT SINGLE LEVEL Bilateral 11/9/2021    Procedure: Radiofrequency Ablation, Nerve, Spinal, Lumbar, Medial Branch, 1 Level L4-S1  BILATERAL;  Surgeon: Sulma Nelson MD;  Location: [facility];  Service: Pain Management;   Laterality: Bilateral;  per lessia pt is on eliquis but she will get permission for it to be held 5 days prior to procedure.   HAD VAC   CARD SCANNED IN    REDUCTION OF BOTH BREASTS      RETINAL DETACHMENT SURGERY  right    VAGINAL DELIVERY      X1       Review of patient's allergies indicates:   Allergen Reactions    Sulfa (sulfonamide antibiotics) Hives and Other (See Comments)     Sore Throat, Sore Mouth       No current facility-administered medications on file prior to encounter.     Current Outpatient Medications on File Prior to Encounter   Medication Sig    cyanocobalamin 500 MCG tablet Take 500 mcg by mouth once daily.    DULoxetine (CYMBALTA) 60 MG capsule Take 1 capsule by mouth once daily.    estradioL (ESTRACE) 2 MG tablet Take 1 tablet by mouth once daily.    ferrous sulfate 325 (65 FE) MG EC tablet Take 1 tablet (325 mg total) by mouth once daily.    fesoterodine (TOVIAZ) 4 mg Tb24 Take 4 mg by mouth once daily.    folic acid (FOLVITE) 800 MCG Tab Take 800 mcg by mouth once daily.    gabapentin (NEURONTIN) 600 MG tablet Take 1 tablet by mouth 2 (two) times a day.    L. rhamnosus/C/zinc/elderberry (CULTURELLE IMMUNE DEFENSE ORAL) Take 1 tablet by mouth once daily.    L.acidoph,saliva/B.bif/S.therm (ACIDOPHILUS PROBIOTIC BLEND ORAL) Take 1 tablet by mouth once daily.    metoprolol tartrate (LOPRESSOR) 25 MG tablet Take 1 tablet by mouth once daily. (Patient not taking: Reported on 10/22/2024)    rosuvastatin (CRESTOR) 20 MG tablet Take 20 mg by mouth every evening.     Family History       Problem Relation (Age of Onset)    Atrial fibrillation Mother    Diabetes Paternal Grandfather    Hypertension Mother    Stroke Father          Tobacco Use    Smoking status: Never    Smokeless tobacco: Never   Substance and Sexual Activity    Alcohol use: Not Currently    Drug use: Not on file    Sexual activity: Not on file     Review of Systems   Constitutional:  Positive for activity change. Negative for chills,  fatigue and fever.   Eyes:  Negative for redness.   Respiratory:  Negative for cough and shortness of breath.    Cardiovascular:  Negative for chest pain, palpitations and leg swelling.   Gastrointestinal:  Negative for abdominal pain and nausea.   Genitourinary:  Positive for dysuria. Negative for hematuria.   Musculoskeletal:  Positive for myalgias. Negative for arthralgias.   Skin:  Positive for wound.   Neurological:  Negative for syncope and weakness.   Psychiatric/Behavioral:  Negative for agitation, behavioral problems and decreased concentration.      Objective:     Vital Signs (Most Recent):  Temp: 98.1 °F (36.7 °C) (04/30/25 0701)  Pulse: (!) 113 (04/30/25 1102)  Resp: 13 (04/30/25 1102)  BP: 130/77 (04/30/25 1102)  SpO2: (!) 94 % (04/30/25 1102) Vital Signs (24h Range):  Temp:  [97.8 °F (36.6 °C)-98.1 °F (36.7 °C)] 98.1 °F (36.7 °C)  Pulse:  [] 113  Resp:  [12-20] 13  SpO2:  [94 %-100 %] 94 %  BP: (104-140)/(55-86) 130/77     Weight: 91.4 kg (201 lb 9.6 oz)  Body mass index is 28.12 kg/m².     Physical Exam  Vitals and nursing note reviewed.   Constitutional:       Appearance: Normal appearance.   HENT:      Head: Normocephalic and atraumatic.   Cardiovascular:      Rate and Rhythm: Rhythm irregular.      Heart sounds: Normal heart sounds. No murmur heard.  Pulmonary:      Effort: Pulmonary effort is normal. No respiratory distress.      Breath sounds: Normal breath sounds.   Abdominal:      Palpations: Abdomen is soft.   Musculoskeletal:         General: Normal range of motion.      Cervical back: Normal range of motion and neck supple.   Skin:     General: Skin is warm.      Capillary Refill: Capillary refill takes less than 2 seconds.             Comments: Right forearm skin tear  Neurological:      General: No focal deficit present.      Mental Status: She is alert and oriented to person, place, and time.   Psychiatric:         Mood and Affect: Mood normal.         Behavior: Behavior normal.                 Significant Labs: All pertinent labs within the past 24 hours have been reviewed.    Significant Imaging: I have reviewed all pertinent imaging results/findings within the past 24 hours.  Assessment/Plan:     Assessment & Plan  Fall  -fell yesterday at home, ground level fall  -states she hit her head but reports no loss of consciousness.  -impact to the shoulder and right forearm during the fall  -denies N/V, SOB, chest pain, palpitation, or vision problem  -CT head: No acute intracranial abnormality  -X-ray forearm and shoulder,right -no evidence of fracture or dislocation  -skin tear to right forearm-wound care consulted  -will continue to monitor        Longstanding persistent atrial fibrillation  Patient has long standing persistent (>12 months) atrial fibrillation. Patient is currently in atrial fibrillation. [id number] Score: 3. The patients heart rate in the last 24 hours is as follows:  Pulse  Min: 90  Max: 120     Antiarrhythmics  metoprolol tartrate (LOPRESSOR) tablet 25 mg, Daily, Oral    Anticoagulants  enoxaparin injection 40 mg, Every 24 hours, Subcutaneous    Plan  - Replete lytes with a goal of K>4, Mg >2  - Patient is anticoagulated, see medications listed above.  - Patient's afib is currently controlled  -Will hold Eliquis for now( recent fall and hematuria on UA)          INOCENTE (acute kidney injury)  INOCENTE is likely due to pre-renal azotemia due to dehydration.   History of CKD  Baseline creatinine is  1.1 which was 4 months ago .   Most recent creatinine and eGFR are listed below.  Recent Labs     04/30/25  0048   CREATININE 1.73*   EGFRNORACEVR 30*      Plan  - INOCENTE is worsening. Will adjust treatment as follows: iv Nacl 0.9% @ 75ml/hour  - Avoid nephrotoxins and renally dose meds for GFR listed above  - Monitor urine output, serial BMP, and adjust therapy as needed    Debility  Patient with Acute on chronic debility due to age-related physical debility along with right foot  deformity.The patient's latest AMPAC (Activity Measure for Post Acute Care) Score is listed below.    AM-PAC Score - How much help does the patient need for each activity listed       Plan  - PT/OT consulted    Depression  -Continue with home duloxetine        VTE Risk Mitigation (From admission, onward)           Ordered     enoxaparin injection 40 mg  Daily         04/30/25 1122     IP VTE HIGH RISK PATIENT  Once         04/30/25 1122     Place sequential compression device  Until discontinued         04/30/25 1122                           On 04/30/2025, patient should be placed in hospital observation services under my care in collaboration with Dr Juvencio Ramey MD  Department of Hospital Medicine  [facility] - Emergency Department

## 2025-04-30 NOTE — PT/OT/SLP EVAL
"Occupational Therapy   Evaluation    Name: Bobbi Haywood  MRN: [id number]  Admitting Diagnosis: Fall  Recent Surgery: * No surgery found *      Recommendations:     Discharge Recommendations: Moderate Intensity Therapy  Discharge Equipment Recommendations:   (to be determined)  Barriers to discharge:       Assessment:     Bobbi Haywood is a 74 y.o. female with a medical diagnosis of Fall.  She presents with alert and agreeable to evaluation. Performance deficits affecting function: weakness, impaired endurance, impaired self care skills, impaired functional mobility, gait instability, decreased upper extremity function, decreased lower extremity function, pain, impaired skin, edema.      Pt admitted after having a fall at home and suffering a skin tear on her (R) UE. Pt lives at home with her spouse. She ambulates short distances with a RW or a cane with furniture cruising. Pt has unstable ankles and has braces to wear when she is ambulating. Pt is just starting to use her braces. Pt does not want to go to swingbed, but would likely benefit from swingbed. OT will treat pt while she is hospitalized.     Rehab Prognosis: Good; patient would benefit from acute skilled OT services to address these deficits and reach maximum level of function.       Plan:     Patient to be seen 5 x/week to address the above listed problems via self-care/home management, therapeutic exercises, therapeutic activities  Plan of Care Expires: 05/28/25  Plan of Care Reviewed with: patient    Subjective     Chief Complaint: Fall    Patient/Family Comments/goals: "I would rather return home than to go to swingbed."    Occupational Profile:  Living Environment: Pt lives in a home with 3 steps inside from den to kitchen and bathroom  Previous level of function: Pt was mostly mod(I) with her self-care, requiring assistance for transferring into and out of the shower and for pulling her pants up over her hips when standing  Roles " and Routines: Pt is , mostly takes care of herself  Equipment Used at Home: cane, straight, walker, rolling (step in shower with built in seat)  Assistance upon Discharge: Pt will have facility staff verses spouse and home health care, depending on her rate of recovery    Pain/Comfort:  Location - Side 1: Right  Location 1: arm  Pain Addressed 1: Cessation of Activity  Pain Rating Post-Intervention 1: 2/10    Patients cultural, spiritual, Scientologist conflicts given the current situation: no    Objective:     Communicated with: SUKHDEV Greenberg prior to session.  Patient found HOB elevated with peripheral IV, telemetry upon OT entry to room.    General Precautions: Standard, fall  Orthopedic Precautions: N/A  Braces:  (Pt has foot and ankle braces, but they are at home)  Respiratory Status: Room air    Occupational Performance:    Bed Mobility:    Patient completed Rolling/Turning to Left with  maximal assistance  Patient completed Rolling/Turning to Right with moderate assistance and maximal assistance  Patient completed Supine to Sit with maximal assistance  Patient completed Sit to Supine with maximal assistance    Functional Mobility/Transfers:  NT as pt does not have her braces here to protect her ankles    Activities of Daily Living:  Feeding:  independence    Grooming: setup  to wash face    Cognitive/Visual Perceptual:  Cognitive/Psychosocial Skills:     -       Oriented to: Person, Place, and Situation   -       Follows Commands/attention:Follows one-step commands  -       Communication: clear/fluent  -       Safety awareness/insight to disability: Pt seems to have decreased awareness of her deficits and hoe unsafe it is to furniture cruise with a cane instead of using her walker as well as how unsafe it is to ambulate without her braces to protect her ankles   -       Mood/Affect/Coping skills/emotional control: Cooperative    Physical Exam:    Skin integrity: Bruising of (B) UE, multiple sites, Tear  of (R) UE near her elbow, Thin, and Dry  Edema:  Mild to Moderate  at her (R) elbow   Dominant hand:    -       right  Upper Extremity Range of Motion:     -       Right Upper Extremity: WFL except shoulder and elbow are limited by pain at the location of her skin tear  -       Left Upper Extremity: WFL except Pt had a couple of her fingers fractured some years ago and they don't move quite like they did, but once she gets them moving, she has good ROM  Upper Extremity Strength:    -       Right Upper Extremity:  impaired due to pain  -       Left Upper Extremity: WFL   Strength:    -       Right Upper Extremity: WFL  -       Left Upper Extremity:  impaired  Gross motor coordination:   WFL    AMPAC 6 Click ADL:  AMPAC Total Score: 15    Treatment & Education:  Pt educated on OT role/POC.   Importance of OOB activity with staff assistance.  Importance of sitting up in the chair throughout the day as tolerated, especially for meals   Importance of assisting with self-care activities   All questions/concerns answered within OT scope of practice      Patient left HOB elevated with all lines intact, call button in reach, and RN notified    GOALS:   Multidisciplinary Problems       Occupational Therapy Goals          Problem: Occupational Therapy    Goal Priority Disciplines Outcome Interventions   Occupational Therapy Goal     OT, PT/OT Progressing    Description: STG: (in 1 week)  Pt will perform grooming with setup  Pt will bathe with mod(A)  Pt will perform UE dressing with min(A)  Pt will perform LE dressing with mod(A)  Pt will sit EOB x 10 min with CGA assistance  Pt will transfer bed/chair/bsc with moderate assistance with braces and RW  Pt will perform standing task x 1 min with min(A) with RW and braces   Pt will tolerate 15 minutes of tx without fatigue      LTG: (in 5 weeks)  1.Restore to max I with self care and mobility.                          History:     Past Medical History:   Diagnosis Date     A-fib     Abnormal gait     Abscess of right lower extremity 09/10/2024    Acquired deformity of both ankles     Acute blood loss anemia     INOCENTE (acute kidney injury) 09/10/2024    Chronic pain     Contusion of right lower extremity     Degenerative joint disease of cervical and lumbar spine     Depression     E-coli UTI 09/10/2024    Fall 09/10/2024    Hematoma     Hypertension     Hypomagnesemia 09/10/2024    Iron deficiency anemia, unspecified     Neuropathy     Neuropathy     Spinal stenosis of cervical region     Spinal stenosis of lumbar region at multiple levels     Spondylolysis of lumbar region          Past Surgical History:   Procedure Laterality Date    CATARACT EXTRACTION, BILATERAL      CHOLECYSTECTOMY      HYSTERECTOMY      INCISION AND DRAINAGE OF ABSCESS Right 9/12/2024    Procedure: INCISION AND DRAINAGE, ABSCESS;  Surgeon: Joe Alberto MD;  [facility]: [facility] OR;  Service: General;  Laterality: Right;  right thigh and right calf    INJECTION OF ANESTHETIC AGENT AROUND MEDIAL BRANCH NERVES INNERVATING LUMBAR FACET JOINT Bilateral 8/26/2021    Procedure: Bilateral L4-5,5-S1 MBB;  Surgeon: Sulma Nelson MD;  Location: [facility];  Service: Pain Management;  Laterality: Bilateral;  Per Dr Bradford ok to hold Eliquis for 4 days not 5, Per Dr Nelson this is fine with her. Pt will bring her verification card for COVID vaccination.    INJECTION OF ANESTHETIC AGENT AROUND MEDIAL BRANCH NERVES INNERVATING LUMBAR FACET JOINT Bilateral 10/5/2021    Procedure: Bilateral L4-5,5-S1 MBB;  Surgeon: Sulma Nelson MD;  Location: [facility];  Service: Pain Management;  Laterality: Bilateral;  PT AWARE ON OV TO BE TESTED    KNEE CARTILAGE SURGERY Right     RADIOFREQUENCY ABLATION OF LUMBAR MEDIAL BRANCH NERVE AT SINGLE LEVEL Bilateral 11/9/2021    Procedure: Radiofrequency Ablation, Nerve, Spinal, Lumbar, Medial Branch, 1 Level L4-S1  BILATERAL;  Surgeon: Sulma Nelson MD;  Location: [place]  [facility];  Service: Pain Management;  Laterality: Bilateral;  per lessia pt is on eliquis but she will get permission for it to be held 5 days prior to procedure.   HAD VAC   CARD SCANNED IN    REDUCTION OF BOTH BREASTS      RETINAL DETACHMENT SURGERY  right    VAGINAL DELIVERY      X1       Time Tracking:     OT Date of Treatment: 04/30/25  OT Start Time: 1623  OT Stop Time: 1646  OT Total Time (min): 23 min    Billable Minutes:Evaluation low complexity    Clinical Information for Insurance Authorization     Dates of Services: 04/30/2025 to 05/28/2025  Discharge Plan: Patient will be discharged from skilled occupational therapy treatment once all goals have been met, patient has plateaued, or physician/insurance requests discontinuation of care. Patient will be discharged with a home exercise program.   Type of therapy: Rehabilitative  ICD-10 Diagnosis Code(s): W01.198A  Which side is symptomatic? Not applicable and/or symptoms are not localized  Surgical: No  Surgical procedure: N/A  Surgery date: N/A.  Presenting symptoms/diagnoses are unspecified in nature.  Presenting symptoms are non-radiating in nature.   The rehabilitation is not related to a diagnosis of cancer.  The rehabilitation is not related to a diagnosis of lymphedema.  Patient's clinical presentation is:  Moderate objective and functional deficits: consistent symptoms and/or symptoms that are intensified with activity with moderate loss of range of motion, strength, or ability to perform daily tasks  CPT Codes Requested:  88287 [therapeutic exercise], 05586 [neuromuscular re-education], 88302 [therapeutic activities], and 50832 [self care/home management training]        4/30/2025

## 2025-04-30 NOTE — ASSESSMENT & PLAN NOTE
INOCENTE is likely due to pre-renal azotemia due to dehydration.   History of CKD  Baseline creatinine is 1.1 which was 4 months ago.   Most recent creatinine and eGFR are listed below.  Recent Labs     04/30/25  0048   CREATININE 1.73*   EGFRNORACEVR 30*      Plan  - INOCENTE is worsening. Will adjust treatment as follows: iv Nacl 0.9% @ 75ml/hour  - Avoid nephrotoxins and renally dose meds for GFR listed above  - Monitor urine output, serial BMP, and adjust therapy as needed

## 2025-04-30 NOTE — ASSESSMENT & PLAN NOTE
Patient has long standing persistent (>12 months) atrial fibrillation. Patient is currently in atrial fibrillation. [id number] Score: 3. The patients heart rate in the last 24 hours is as follows:  Pulse  Min: 90  Max: 120     Antiarrhythmics  metoprolol tartrate (LOPRESSOR) tablet 25 mg, Daily, Oral    Anticoagulants  enoxaparin injection 40 mg, Every 24 hours, Subcutaneous    Plan  - Replete lytes with a goal of K>4, Mg >2  - Patient is anticoagulated, see medications listed above.  - Patient's afib is currently controlled  -Will hold Eliquis for now( recent fall and hematuria on UA)

## 2025-04-30 NOTE — ASSESSMENT & PLAN NOTE
-fell yesterday at home, ground level fall  -states she hit her head but reports no loss of consciousness.  -impact to the shoulder and right forearm during the fall  -denies N/V, SOB, chest pain, palpitation, or vision problem  -CT head: No acute intracranial abnormality  -X-ray forearm and shoulder,right -no evidence of fracture or dislocation  -skin tear to right forearm-wound care consulted  -will continue to monitor

## 2025-04-30 NOTE — SUBJECTIVE & OBJECTIVE
Past Medical History:   Diagnosis Date    A-fib     Abnormal gait     Abscess of right lower extremity 09/10/2024    Acquired deformity of both ankles     Acute blood loss anemia     INOCENTE (acute kidney injury) 09/10/2024    Chronic pain     Contusion of right lower extremity     Degenerative joint disease of cervical and lumbar spine     Depression     E-coli UTI 09/10/2024    Fall 09/10/2024    Hematoma     Hypertension     Hypomagnesemia 09/10/2024    Iron deficiency anemia, unspecified     Neuropathy     Neuropathy     Spinal stenosis of cervical region     Spinal stenosis of lumbar region at multiple levels     Spondylolysis of lumbar region        Past Surgical History:   Procedure Laterality Date    CATARACT EXTRACTION, BILATERAL      CHOLECYSTECTOMY      HYSTERECTOMY      INCISION AND DRAINAGE OF ABSCESS Right 9/12/2024    Procedure: INCISION AND DRAINAGE, ABSCESS;  Surgeon: Joe Alberto MD;  Location: [facility] [place];  Service: General;  Laterality: Right;  right thigh and right calf    INJECTION OF ANESTHETIC AGENT AROUND MEDIAL BRANCH NERVES INNERVATING LUMBAR FACET JOINT Bilateral 8/26/2021    Procedure: Bilateral L4-5,5-S1 MBB;  Surgeon: Sulma Nelson MD;  Location: [facility];  Service: Pain Management;  Laterality: Bilateral;  Per Dr Bradford ok to hold Eliquis for 4 days not 5, Per Dr Nelson this is fine with her. Pt will bring her verification card for COVID vaccination.    INJECTION OF ANESTHETIC AGENT AROUND MEDIAL BRANCH NERVES INNERVATING LUMBAR FACET JOINT Bilateral 10/5/2021    Procedure: Bilateral L4-5,5-S1 MBB;  Surgeon: Sulma Nelson MD;  Location: [facility];  Service: Pain Management;  Laterality: Bilateral;  PT AWARE ON OV TO BE TESTED    KNEE CARTILAGE SURGERY Right     RADIOFREQUENCY ABLATION OF LUMBAR MEDIAL BRANCH NERVE AT SINGLE LEVEL Bilateral 11/9/2021    Procedure: Radiofrequency Ablation, Nerve, Spinal, Lumbar, Medial Branch, 1 Level L4-S1  BILATERAL;   Surgeon: Sulma Nelson MD;  Location: [facility];  Service: Pain Management;  Laterality: Bilateral;  per lessia pt is on eliquis but she will get permission for it to be held 5 days prior to procedure.   HAD VAC   CARD SCANNED IN    REDUCTION OF BOTH BREASTS      RETINAL DETACHMENT SURGERY  right    VAGINAL DELIVERY      X1       Review of patient's allergies indicates:   Allergen Reactions    Sulfa (sulfonamide antibiotics) Hives and Other (See Comments)     Sore Throat, Sore Mouth       No current facility-administered medications on file prior to encounter.     Current Outpatient Medications on File Prior to Encounter   Medication Sig    cyanocobalamin 500 MCG tablet Take 500 mcg by mouth once daily.    DULoxetine (CYMBALTA) 60 MG capsule Take 1 capsule by mouth once daily.    estradioL (ESTRACE) 2 MG tablet Take 1 tablet by mouth once daily.    ferrous sulfate 325 (65 FE) MG EC tablet Take 1 tablet (325 mg total) by mouth once daily.    fesoterodine (TOVIAZ) 4 mg Tb24 Take 4 mg by mouth once daily.    folic acid (FOLVITE) 800 MCG Tab Take 800 mcg by mouth once daily.    gabapentin (NEURONTIN) 600 MG tablet Take 1 tablet by mouth 2 (two) times a day.    L. rhamnosus/C/zinc/elderberry (CULTURELLE IMMUNE DEFENSE ORAL) Take 1 tablet by mouth once daily.    L.acidoph,saliva/B.bif/S.therm (ACIDOPHILUS PROBIOTIC BLEND ORAL) Take 1 tablet by mouth once daily.    metoprolol tartrate (LOPRESSOR) 25 MG tablet Take 1 tablet by mouth once daily. (Patient not taking: Reported on 10/22/2024)    rosuvastatin (CRESTOR) 20 MG tablet Take 20 mg by mouth every evening.     Family History       Problem Relation (Age of Onset)    Atrial fibrillation Mother    Diabetes Paternal Grandfather    Hypertension Mother    Stroke Father          Tobacco Use    Smoking status: Never    Smokeless tobacco: Never   Substance and Sexual Activity    Alcohol use: Not Currently    Drug use: Not on file    Sexual activity: Not on file      Review of Systems   Constitutional:  Positive for activity change. Negative for chills, fatigue and fever.   Eyes:  Negative for redness.   Respiratory:  Negative for cough and shortness of breath.    Cardiovascular:  Negative for chest pain, palpitations and leg swelling.   Gastrointestinal:  Negative for abdominal pain and nausea.   Genitourinary:  Positive for dysuria. Negative for hematuria.   Musculoskeletal:  Positive for myalgias. Negative for arthralgias.   Skin:  Positive for wound.   Neurological:  Negative for syncope and weakness.   Psychiatric/Behavioral:  Negative for agitation, behavioral problems and decreased concentration.      Objective:     Vital Signs (Most Recent):  Temp: 98.1 °F (36.7 °C) (04/30/25 0701)  Pulse: (!) 113 (04/30/25 1102)  Resp: 13 (04/30/25 1102)  BP: 130/77 (04/30/25 1102)  SpO2: (!) 94 % (04/30/25 1102) Vital Signs (24h Range):  Temp:  [97.8 °F (36.6 °C)-98.1 °F (36.7 °C)] 98.1 °F (36.7 °C)  Pulse:  [] 113  Resp:  [12-20] 13  SpO2:  [94 %-100 %] 94 %  BP: (104-140)/(55-86) 130/77     Weight: 91.4 kg (201 lb 9.6 oz)  Body mass index is 28.12 kg/m².     Physical Exam  Vitals and nursing note reviewed.   Constitutional:       Appearance: Normal appearance.   HENT:      Head: Normocephalic and atraumatic.   Cardiovascular:      Rate and Rhythm: Rhythm irregular.      Heart sounds: Normal heart sounds. No murmur heard.  Pulmonary:      Effort: Pulmonary effort is normal. No respiratory distress.      Breath sounds: Normal breath sounds.   Abdominal:      Palpations: Abdomen is soft.   Musculoskeletal:         General: Normal range of motion.      Cervical back: Normal range of motion and neck supple.   Skin:     General: Skin is warm.      Capillary Refill: Capillary refill takes less than 2 seconds.             Comments: Right forearm laceration   Neurological:      General: No focal deficit present.      Mental Status: She is alert and oriented to person, place, and  time.   Psychiatric:         Mood and Affect: Mood normal.         Behavior: Behavior normal.                Significant Labs: All pertinent labs within the past 24 hours have been reviewed.    Significant Imaging: I have reviewed all pertinent imaging results/findings within the past 24 hours.

## 2025-04-30 NOTE — ASSESSMENT & PLAN NOTE
Patient with Acute on chronic debility due to age-related physical debility along with right foot deformity.The patient's latest AMPAC (Activity Measure for Post Acute Care) Score is listed below.    AM-PAC Score - How much help does the patient need for each activity listed       Plan  - PT/OT consulted

## 2025-04-30 NOTE — PLAN OF CARE
Problem: Adult Inpatient Plan of Care  Goal: Plan of Care Review  Outcome: Progressing  Goal: Patient-Specific Goal (Individualized)  Outcome: Progressing  Goal: Absence of Hospital-Acquired Illness or Injury  Outcome: Progressing  Goal: Optimal Comfort and Wellbeing  Outcome: Progressing  Goal: Readiness for Transition of Care  Outcome: Progressing     Problem: Sepsis/Septic Shock  Goal: Optimal Coping  Outcome: Progressing  Goal: Absence of Bleeding  Outcome: Progressing  Goal: Blood Glucose Level Within Targeted Range  Outcome: Progressing  Goal: Absence of Infection Signs and Symptoms  Outcome: Progressing  Goal: Optimal Nutrition Intake  Outcome: Progressing     Problem: Wound  Goal: Optimal Coping  Outcome: Progressing  Goal: Optimal Functional Ability  Outcome: Progressing  Goal: Absence of Infection Signs and Symptoms  Outcome: Progressing  Goal: Improved Oral Intake  Outcome: Progressing  Goal: Optimal Pain Control and Function  Outcome: Progressing  Goal: Skin Health and Integrity  Outcome: Progressing  Goal: Optimal Wound Healing  Outcome: Progressing     Problem: Fall Injury Risk  Goal: Absence of Fall and Fall-Related Injury  Outcome: Progressing     Problem: Skin Injury Risk Increased  Goal: Skin Health and Integrity  Outcome: Progressing

## 2025-04-30 NOTE — PLAN OF CARE
Problem: Occupational Therapy  Goal: Occupational Therapy Goal  Description: STG: (in 1 week)  Pt will perform grooming with setup  Pt will bathe with mod(A)  Pt will perform UE dressing with min(A)  Pt will perform LE dressing with mod(A)  Pt will sit EOB x 10 min with CGA assistance  Pt will transfer bed/chair/bsc with moderate assistance with braces and RW  Pt will perform standing task x 1 min with min(A) with RW and braces   Pt will tolerate 15 minutes of tx without fatigue      LTG: (in 5 weeks)  1.Restore to max I with self care and mobility.   Outcome: Progressing       Pt admitted after having a fall at home and suffering a skin tear on her (R) UE.  Pt lives at home with her spouse. She ambulates short distances with a RW or a cane with furniture cruising. Pt has unstable ankles and has braces to wear when she is ambulating. Pt is just starting to use her braces. Pt does not want to go to swingbed, but would likely benefit from swingbed. OT will treat pt while she is hospitalized.

## 2025-05-01 LAB
ANION GAP SERPL CALCULATED.3IONS-SCNC: 16 MMOL/L (ref 7–16)
BASOPHILS # BLD AUTO: 0.09 K/UL (ref 0–0.2)
BASOPHILS NFR BLD AUTO: 0.6 % (ref 0–1)
BUN SERPL-MCNC: 20 MG/DL (ref 10–20)
BUN/CREAT SERPL: 17 (ref 6–20)
CALCIUM SERPL-MCNC: 8.1 MG/DL (ref 8.4–10.2)
CHLORIDE SERPL-SCNC: 108 MMOL/L (ref 98–107)
CO2 SERPL-SCNC: 16 MMOL/L (ref 23–31)
CREAT SERPL-MCNC: 1.19 MG/DL (ref 0.55–1.02)
DIFFERENTIAL METHOD BLD: ABNORMAL
EGFR (NO RACE VARIABLE) (RUSH/TITUS): 47 ML/MIN/1.73M2
EOSINOPHIL # BLD AUTO: 0.21 K/UL (ref 0–0.5)
EOSINOPHIL NFR BLD AUTO: 1.4 % (ref 1–4)
ERYTHROCYTE [DISTWIDTH] IN BLOOD BY AUTOMATED COUNT: 14.6 % (ref 11.5–14.5)
GLUCOSE SERPL-MCNC: 116 MG/DL (ref 70–105)
GLUCOSE SERPL-MCNC: 91 MG/DL (ref 82–115)
HCT VFR BLD AUTO: 40.4 % (ref 38–47)
HGB BLD-MCNC: 11.6 G/DL (ref 12–16)
IMM GRANULOCYTES # BLD AUTO: 0.08 K/UL (ref 0–0.04)
IMM GRANULOCYTES NFR BLD: 0.5 % (ref 0–0.4)
LYMPHOCYTES # BLD AUTO: 2.49 K/UL (ref 1–4.8)
LYMPHOCYTES NFR BLD AUTO: 16.6 % (ref 27–41)
MCH RBC QN AUTO: 29.1 PG (ref 27–31)
MCHC RBC AUTO-ENTMCNC: 28.7 G/DL (ref 32–36)
MCV RBC AUTO: 101.5 FL (ref 80–96)
MONOCYTES # BLD AUTO: 1.8 K/UL (ref 0–0.8)
MONOCYTES NFR BLD AUTO: 12 % (ref 2–6)
MPC BLD CALC-MCNC: 10.5 FL (ref 9.4–12.4)
NEUTROPHILS # BLD AUTO: 10.31 K/UL (ref 1.8–7.7)
NEUTROPHILS NFR BLD AUTO: 68.9 % (ref 53–65)
NRBC # BLD AUTO: 0 X10E3/UL
NRBC, AUTO (.00): 0 %
PLATELET # BLD AUTO: 316 K/UL (ref 150–400)
POTASSIUM SERPL-SCNC: 3.9 MMOL/L (ref 3.5–5.1)
RBC # BLD AUTO: 3.98 M/UL (ref 4.2–5.4)
SODIUM SERPL-SCNC: 136 MMOL/L (ref 136–145)
WBC # BLD AUTO: 14.98 K/UL (ref 4.5–11)

## 2025-05-01 PROCEDURE — 36415 COLL VENOUS BLD VENIPUNCTURE: CPT

## 2025-05-01 PROCEDURE — 25000003 PHARM REV CODE 250: Performed by: FAMILY MEDICINE

## 2025-05-01 PROCEDURE — 97535 SELF CARE MNGMENT TRAINING: CPT

## 2025-05-01 PROCEDURE — 96376 TX/PRO/DX INJ SAME DRUG ADON: CPT

## 2025-05-01 PROCEDURE — 85025 COMPLETE CBC W/AUTO DIFF WBC: CPT

## 2025-05-01 PROCEDURE — 63600175 PHARM REV CODE 636 W HCPCS: Performed by: FAMILY MEDICINE

## 2025-05-01 PROCEDURE — 63600175 PHARM REV CODE 636 W HCPCS

## 2025-05-01 PROCEDURE — 99232 SBSQ HOSP IP/OBS MODERATE 35: CPT | Mod: GC,,, | Performed by: FAMILY MEDICINE

## 2025-05-01 PROCEDURE — 80048 BASIC METABOLIC PNL TOTAL CA: CPT

## 2025-05-01 PROCEDURE — 82962 GLUCOSE BLOOD TEST: CPT

## 2025-05-01 PROCEDURE — 25000003 PHARM REV CODE 250

## 2025-05-01 PROCEDURE — 11000001 HC ACUTE MED/SURG PRIVATE ROOM

## 2025-05-01 PROCEDURE — 97530 THERAPEUTIC ACTIVITIES: CPT

## 2025-05-01 PROCEDURE — 96361 HYDRATE IV INFUSION ADD-ON: CPT

## 2025-05-01 PROCEDURE — G0378 HOSPITAL OBSERVATION PER HR: HCPCS

## 2025-05-01 RX ADMIN — FERROUS SULFATE TAB 325 MG (65 MG ELEMENTAL FE) 1 EACH: 325 (65 FE) TAB at 08:05

## 2025-05-01 RX ADMIN — FOLIC ACID TAB 400 MCG 800 MCG: 400 TAB at 08:05

## 2025-05-01 RX ADMIN — APIXABAN 5 MG: 5 TABLET, FILM COATED ORAL at 08:05

## 2025-05-01 RX ADMIN — METOPROLOL TARTRATE 25 MG: 25 TABLET, FILM COATED ORAL at 08:05

## 2025-05-01 RX ADMIN — SODIUM CHLORIDE, POTASSIUM CHLORIDE, SODIUM LACTATE AND CALCIUM CHLORIDE: 600; 310; 30; 20 INJECTION, SOLUTION INTRAVENOUS at 08:05

## 2025-05-01 RX ADMIN — HYDROCODONE BITARTRATE AND ACETAMINOPHEN 1 TABLET: 5; 325 TABLET ORAL at 03:05

## 2025-05-01 RX ADMIN — SODIUM CHLORIDE, POTASSIUM CHLORIDE, SODIUM LACTATE AND CALCIUM CHLORIDE: 600; 310; 30; 20 INJECTION, SOLUTION INTRAVENOUS at 11:05

## 2025-05-01 RX ADMIN — CEFTRIAXONE SODIUM 1 G: 1 INJECTION, POWDER, FOR SOLUTION INTRAMUSCULAR; INTRAVENOUS at 08:05

## 2025-05-01 RX ADMIN — DULOXETINE HYDROCHLORIDE 60 MG: 30 CAPSULE, DELAYED RELEASE ORAL at 08:05

## 2025-05-01 RX ADMIN — ATORVASTATIN CALCIUM 40 MG: 40 TABLET, FILM COATED ORAL at 08:05

## 2025-05-01 RX ADMIN — HYDROCODONE BITARTRATE AND ACETAMINOPHEN 1 TABLET: 5; 325 TABLET ORAL at 08:05

## 2025-05-01 RX ADMIN — ACETAMINOPHEN 650 MG: 325 TABLET ORAL at 08:05

## 2025-05-01 NOTE — PLAN OF CARE
Problem: Physical Therapy  Goal: Physical Therapy Goal  Description: Short Term Goals to be met by: 5/15/25    Patient will increase functional independence with mobility by performin. Supine to sit with contact guard assist  2. Sit to stand transfer with contact guard assist using Rolling walker with brace on R foot  3. Bed to chair transfer with contact guard assist using Rolling walker with brace on R foot  4. Gait  x 100 feet with contact guard assist using Rolling walker with brace on R foot  5. Lower extremity exercise program x30 reps per handout, with assistance as needed    Long Term Goals to be met by: 25    Pt will regain full independent functional mobility with Rolling walker to return to home situation and prior activities of daily living.   Outcome: Progressing       PT eval completed. Please see eval note for details.

## 2025-05-01 NOTE — PLAN OF CARE
[facility] Telemetry  Initial Discharge Assessment       Primary Care Provider: [facility]    Admission Diagnosis: Leukocytosis [D72.829]  Fall [W19.XXXA]  Chest pain [R07.9]  INOCENTE (acute kidney injury) [N17.9]    Admission Date: 4/30/2025  Expected Discharge Date:     Transition of Care Barriers: None    Payor: HU[employer] MANAGED MEDICARE / Plan: HU[employer]NA MEDICARE PPO / Product Type: Medicare Advantage /     Extended Emergency Contact Information  Primary Emergency Contact: Ned Haywood  Address: [address] [place] [address] [place] [address] [facility]  Home Phone: [phone]  Relation: Spouse  Preferred language: English   needed? No  Secondary Emergency Contact: lisa may  Mobile Phone: [phone]  Relation: Son    Discharge Plan A: Home with family  Discharge Plan B: Home Health, Long-term acute care facility (LTAC), Rehab, Skilled Nursing Facility      [facility] - [facility] [address] [facility]  [address] [facility] [address]  Phone: [phone] Fax: [phone]    [id number] [place] - [address] AT [facility] [address]  [address]  [place] [address]  Phone: [phone] Fax: [phone]      Initial Assessment (most recent)       Adult Discharge Assessment - 05/01/25 1044          Discharge Assessment    Assessment Type Discharge Planning Assessment     Source of Information patient     Communicated SHASHI with patient/caregiver Yes     People in Home spouse     Do you expect to return to your current living situation? --   Unsure wether she wants to return home or go to [facility]    Do you have help at home or someone to help you manage your care at home? Yes     Who are your caregiver(s) and their phone number(s)? Ned,  [phone]     Prior to hospitilization cognitive status: Unable to Assess     Current cognitive status: Alert/Oriented     Walking or  Climbing Stairs Difficulty yes     Walking or Climbing Stairs ambulation difficulty, requires equipment     Mobility Management Rollator     Dressing/Bathing Difficulty yes     Dressing/Bathing bathing difficulty, assistance 1 person;bathing difficulty, requires equipment     Home Accessibility wheelchair accessible     Equipment Currently Used at Home cane, straight;rollator;walker, rolling     Readmission within 30 days? No     Patient currently being followed by outpatient case management? No     Do you currently have service(s) that help you manage your care at home? No     Do you take prescription medications? Yes     Do you have prescription coverage? Yes     Coverage [employer] Medicare     Do you have any problems affording any of your prescribed medications? No     Who is going to help you get home at discharge? Ned,  [phone]     How do you get to doctors appointments? family or friend will provide;car, drives self     Are you on dialysis? No     Do you take coumadin? No     Discharge Plan A Home with family     Discharge Plan B Home Health;Long-term acute care facility (LTAC);Rehab;Skilled Nursing Facility     DME Needed Upon Discharge  none     Transition of Care Barriers None        Physical Activity    On average, how many days per week do you engage in moderate to strenuous exercise (like a brisk walk)? 0 days     On average, how many minutes do you engage in exercise at this level? 0 min        Financial Resource Strain    How hard is it for you to pay for the very basics like food, housing, medical care, and heating? Not very hard        Housing Stability    In the last 12 months, was there a time when you were not able to pay the mortgage or rent on time? No     At any time in the past 12 months, were you homeless or living in a shelter (including now)? No        Transportation Needs    In the past 12 months, has lack of transportation kept you from medical appointments or from getting  medications? No     In the past 12 months, has lack of transportation kept you from meetings, work, or from getting things needed for daily living? No        Food Insecurity    Within the past 12 months, you worried that your food would run out before you got the money to buy more. Never true     Within the past 12 months, the food you bought just didn't last and you didn't have money to get more. Never true        Stress    Do you feel stress - tense, restless, nervous, or anxious, or unable to sleep at night because your mind is troubled all the time - these days? Not at all        Social Isolation    How often do you feel lonely or isolated from those around you?  Never        Alcohol Use    Q1: How often do you have a drink containing alcohol? Never     Q2: How many drinks containing alcohol do you have on a typical day when you are drinking? Patient does not drink     Q3: How often do you have six or more drinks on one occasion? Never        Utilities    In the past 12 months has the electric, gas, oil, or water company threatened to shut off services in your home? No        Health Literacy    How often do you need to have someone help you when you read instructions, pamphlets, or other written material from your doctor or pharmacy? Never        OTHER    Name(s) of People in Home Ned,  [phone]                   CM spoke with pt at the bedside. Pt lives at home with her , Ned. At this time pt is unsure wether she wants to return home or go to [facility]. Pt has gone to [facility]  in the past and choice obtained to return there if she decides to d/c to [facility]. Pt uses a rollator at home. Not current with HH but has used [facility] in the past and choice obtained for [facility] if HH is needed at d/c. SDOH completed. IM obtained. CM following for anticipated d/c needs.     1330- CM called [employer] on behalf of the pt per her request to inquire about a home helper. Pt does not have coverage for a home helper  or meals from [employer]. Pt informed. Still unsure if wanting to go to [facility]. Waiting to talk with her . CM following        Met with pt to review discharge recommendation of home vs [facility], HH and is agreeable to plan    Patient/family provided list of facilities in-network with patient's payor plan. Providers that are owned, operated, or affiliated with [facility] are included on the list.     Notified that referral sent to below listed facilities from in-network list based on proximity to home/family support:   [facility]  2. [facility]  3.  4.  5. (can send more than 5)    Patient/family instructed to identify preference.    Preferred Facility: (if more than 1, listed in order of descending preference)  1.  OR  Patient has declined to select a preferred provider and elects placement with the first accepting in network provider that is available to provide services as ordered by the physician       If an additional preferred facility not listed above is identified, additional referral to be sent. If above facilities unable to accept, will send additional referrals to in-network providers.

## 2025-05-01 NOTE — PROGRESS NOTES
[facility]  Wound Care    Patient Name:  Bobbi Haywood   MRN:  [id number]  Date: 5/1/2025  Diagnosis: Fall    History:     Past Medical History:   Diagnosis Date    A-fib     Abnormal gait     Abscess of right lower extremity 09/10/2024    Acquired deformity of both ankles     Acute blood loss anemia     INOCENTE (acute kidney injury) 09/10/2024    Chronic pain     Contusion of right lower extremity     Degenerative joint disease of cervical and lumbar spine     Depression     E-coli UTI 09/10/2024    Fall 09/10/2024    Hematoma     Hypertension     Hypomagnesemia 09/10/2024    Iron deficiency anemia, unspecified     Neuropathy     Neuropathy     Spinal stenosis of cervical region     Spinal stenosis of lumbar region at multiple levels     Spondylolysis of lumbar region        Social History[1]    Precautions:     Allergies as of 04/30/2025 - Reviewed 04/30/2025   Allergen Reaction Noted    Sulfa (sulfonamide antibiotics) Hives and Other (See Comments) 09/02/2016       [facility] Assessment Details/Treatment     Narrative: Seen patient for initial preventative skin care measures.  Skin tear to right arm.  Foam borders noted to sacral and bilateral heels.  No redness or open wounds noted.  Consult wound care of any findings.    05/01/2025        [1]   Social History  Socioeconomic History    Marital status:    Tobacco Use    Smoking status: Never    Smokeless tobacco: Never   Substance and Sexual Activity    Alcohol use: Not Currently     Social Drivers of Health     Financial Resource Strain: Low Risk  (5/1/2025)    Overall Financial Resource Strain (CARDIA)     Difficulty of Paying Living Expenses: Not very hard   Food Insecurity: No Food Insecurity (5/1/2025)    Hunger Vital Sign     Worried About Running Out of Food in the Last Year: Never true     Ran Out of Food in the Last Year: Never true   Transportation Needs: No Transportation Needs (5/1/2025)    PRAPARE -  Transportation     Lack of Transportation (Medical): No     Lack of Transportation (Non-Medical): No   Physical Activity: Inactive (5/1/2025)    Exercise Vital Sign     Days of Exercise per Week: 0 days     Minutes of Exercise per Session: 0 min   Stress: No Stress Concern Present (5/1/2025)    [employer] - Occupational Stress Questionnaire     Feeling of Stress : Not at all   Housing Stability: Low Risk  (5/1/2025)    Housing Stability Vital Sign     Unable to Pay for Housing in the Last Year: No     Homeless in the Last Year: No

## 2025-05-01 NOTE — PT/OT/SLP EVAL
Physical Therapy Evaluation     Patient Name: Bobbi Haywood   MRN: [id number]  Recent Surgery: * No surgery found *      Recommendations:     Discharge Recommendations: Moderate Intensity Therapy   Discharge Equipment Recommendations: none   Barriers to discharge: Increased level of assist, Ongoing medical treatment, and needs rehab placement    Assessment:     Bobbi Haywood is a 74 y.o. female admitted with a medical diagnosis of Fall. She presents with the following impairments/functional limitations: weakness, impaired endurance, impaired functional mobility, gait instability, impaired balance, decreased lower extremity function, pain, decreased safety awareness. Pt was at [facility] in Oct 2024 and had home health for approx 1 month after d/c. Pt reports she has had 2-3 falls since she has been home.  Per pt, she was ordered custom orthotics for her feet (mainly R foot) for safe WB during transfers and gait. Presently, orthotics and shoes are not here for evaluation.  Pt would benefit from more rehab prior to returning home with elderly .     Rehab Prognosis: Good and Fair; patient would benefit from acute PT services to address these deficits and reach maximum level of function.    Plan:     During this hospitalization, patient to be seen 5 x/week to address the above listed problems via gait training, therapeutic activities, therapeutic exercises, neuromuscular re-education    Plan of Care Expires: 05/30/25    Subjective     Chief Complaint: Fall   Patient Comments/Goals: agreeable  Pain/Comfort:  Pain Rating 1: 7/10  Location - Side 1: right  Location 1: leg  Pain Addressed 1: Cessation of Activity  Pain Rating Post-Intervention 1: 2/10    Social History:  Living Environment: Patient lives with their spouse in a single story home with number of outside stair(s): 0 at carport entrance  Prior Level of Function: Prior to admission, patient  ambulatory with orthotic , RW and  SPC  Equipment Used at Home: cane, straight, walker, rolling, rollator, bedside commode, raised toilet (built in shower seat)  DME owned (not currently used): none  Assistance Upon Discharge: significant other and facility staff    Objective:     Communicated with RN prior to session. Patient found HOB elevated with peripheral IV, telemetry, pulse ox (continuous), blood pressure cuff upon PT entry to room.    General Precautions: Standard, fall   Orthopedic Precautions: N/A   Braces:  (foot and ankle braces not present for eval)    Respiratory Status: Room air    Exams:  Cognition: Patient is oriented to Person, Place, Time, Situation  RLE ROM: Deficits: limited by pain from fall  RLE Strength: Deficits: 2-/5, ankle n/t  LLE ROM: WFL  LLE Strength: Deficits: 3-/5  Sensation:    -       Intact    Functional Mobility:  Gait belt applied - N/A  Bed Mobility  Scooting: maximal assistance  Supine to Sit: maximal assistance for LE management and trunk management  Sit to Supine: maximal assistance for LE management and trunk management  Transfers  Not assessed d/t orthotic not present  Gait  unable .  Balance  Sitting: minimum assistance  Standing:  N/T      Therapeutic Activities and Exercises:   Patient educated on role of acute care PT and PT POC, safety while in hospital including calling nurse for mobility, and call light usage  Patient educated about importance of OOB mobility and remaining up in chair most of the day.      AM-PAC 6 CLICK MOBILITY  Total Score:8    Patient left HOB elevated with all lines intact, call button in reach, and RN present.    GOALS:   Multidisciplinary Problems       Physical Therapy Goals          Problem: Physical Therapy    Goal Priority Disciplines Outcome Interventions   Physical Therapy Goal     PT, PT/OT Progressing    Description: Short Term Goals to be met by: 5/15/25    Patient will increase functional independence with mobility by performin. Supine to sit with contact  guard assist  2. Sit to stand transfer with contact guard assist using Rolling walker with brace on R foot  3. Bed to chair transfer with contact guard assist using Rolling walker with brace on R foot  4. Gait  x 100 feet with contact guard assist using Rolling walker with brace on R foot  5. Lower extremity exercise program x30 reps per handout, with assistance as needed    Long Term Goals to be met by: 5/30/25    Pt will regain full independent functional mobility with Rolling walker to return to home situation and prior activities of daily living.                        DME Justifications:  No DME recommended requiring DME justifications    History:     Past Medical History:   Diagnosis Date    A-fib     Abnormal gait     Abscess of right lower extremity 09/10/2024    Acquired deformity of both ankles     Acute blood loss anemia     INOCENTE (acute kidney injury) 09/10/2024    Chronic pain     Contusion of right lower extremity     Degenerative joint disease of cervical and lumbar spine     Depression     E-coli UTI 09/10/2024    Fall 09/10/2024    Hematoma     Hypertension     Hypomagnesemia 09/10/2024    Iron deficiency anemia, unspecified     Neuropathy     Neuropathy     Spinal stenosis of cervical region     Spinal stenosis of lumbar region at multiple levels     Spondylolysis of lumbar region        Past Surgical History:   Procedure Laterality Date    CATARACT EXTRACTION, BILATERAL      CHOLECYSTECTOMY      HYSTERECTOMY      INCISION AND DRAINAGE OF ABSCESS Right 9/12/2024    Procedure: INCISION AND DRAINAGE, ABSCESS;  Surgeon: Joe Alberto MD;  Location: [facility] OR;  Service: General;  Laterality: Right;  right thigh and right calf    INJECTION OF ANESTHETIC AGENT AROUND MEDIAL BRANCH NERVES INNERVATING LUMBAR FACET JOINT Bilateral 8/26/2021    Procedure: Bilateral L4-5,5-S1 MBB;  Surgeon: Sulma Nelson MD;  Location: [facility];  Service: Pain Management;  Laterality: Bilateral;  Per   Plavac ok to hold Eliquis for 4 days not 5, Per Dr Nelson this is fine with her. Pt will bring her verification card for COVID vaccination.    INJECTION OF ANESTHETIC AGENT AROUND MEDIAL BRANCH NERVES INNERVATING LUMBAR FACET JOINT Bilateral 10/5/2021    Procedure: Bilateral L4-5,5-S1 MBB;  Surgeon: Sulma Nelson MD;  Location: [facility];  Service: Pain Management;  Laterality: Bilateral;  PT AWARE ON OV TO BE TESTED    KNEE CARTILAGE SURGERY Right     RADIOFREQUENCY ABLATION OF LUMBAR MEDIAL BRANCH NERVE AT SINGLE LEVEL Bilateral 11/9/2021    Procedure: Radiofrequency Ablation, Nerve, Spinal, Lumbar, Medial Branch, 1 Level L4-S1  BILATERAL;  Surgeon: Sulma Nelson MD;  Location: [facility];  Service: Pain Management;  Laterality: Bilateral;  per alicia pt is on eliquis but she will get permission for it to be held 5 days prior to procedure.   HAD VAC   CARD SCANNED IN    REDUCTION OF BOTH BREASTS      RETINAL DETACHMENT SURGERY  right    VAGINAL DELIVERY      X1       Time Tracking:     PT Received On: 04/30/25  PT Start Time: 1501  PT Stop Time: 1515  PT Total Time (min): 14 min     Billable Minutes: Evaluation moderate complexity    4/30/2025

## 2025-05-01 NOTE — PT/OT/SLP PROGRESS
"Occupational Therapy   Treatment    Name: Bobbi aHywood  MRN: [id number]  Admitting Diagnosis:  Fall       Recommendations:     Discharge Recommendations: Moderate Intensity Therapy  Discharge Equipment Recommendations:   (to be determined)  Barriers to discharge:       Assessment:     Bobbi Haywood is a 74 y.o. female with a medical diagnosis of Fall.  She presents with alert and agreeable to performing grooming activities. Pt's IV had come out earlier and required a new IV placed and her (R) forearm was needing a new dressing as her old dressing had slipped down and was not able to pulled back over her skin tear.. Performance deficits affecting function are weakness, impaired endurance, impaired self care skills, impaired functional mobility, gait instability, decreased upper extremity function, decreased lower extremity function, pain, impaired skin, edema.     Rehab Prognosis:  Good and Fair; patient would benefit from acute skilled OT services to address these deficits and reach maximum level of function.       Plan:     Patient to be seen 5 x/week to address the above listed problems via self-care/home management, therapeutic exercises, therapeutic activities  Plan of Care Expires: 05/28/25  Plan of Care Reviewed with: patient    Subjective     Chief Complaint: Fall    Patient/Family Comments/goals: "I would love to brush my teeth, but I left all of my supplies at home."  Pain/Comfort:  Pain Rating 1: 3/10  Location - Side 1: Right  Location 1: leg    Objective:     Communicated with: SUKHDEV Fountain prior to session.  Patient found HOB elevated with peripheral IV, PureWick, telemetry upon OT entry to room.    General Precautions: Standard, fall    Orthopedic Precautions:N/A  Braces:  (Pt has foot and ankle braces, but they are at home)  Respiratory Status: Room air     Occupational Performance:     Bed Mobility:    NT    Functional Mobility/Transfers:  NT    Activities of Daily " Living:  Grooming: Pt performed brushing her teeth with supplies placed on her bedside table and setup partially started for her. Pt required increased time. Pt combed her hair with increased time and SBA / cues. Pt washed her hands and face with setup. Pt  applied lotion on (L) UE with setup.        [facility] 6 Click ADL: 15    Treatment & Education:  Pt required increased time as pt was rather distracted and required cues to continue with activity. Pt states that she was not able to reach her spouse today and he was supposed to bring her braces and some other things to the hospital, but never showed up.    Patient left HOB elevated with all lines intact, call button in reach, and RN notified    GOALS:   Multidisciplinary Problems       Occupational Therapy Goals          Problem: Occupational Therapy    Goal Priority Disciplines Outcome Interventions   Occupational Therapy Goal     OT, PT/OT Progressing    Description: STG: (in 1 week)  Pt will perform grooming with setup  Pt will bathe with mod(A)  Pt will perform UE dressing with min(A)  Pt will perform LE dressing with mod(A)  Pt will sit EOB x 10 min with CGA assistance  Pt will transfer bed/chair/bsc with moderate assistance with braces and RW  Pt will perform standing task x 1 min with min(A) with RW and braces   Pt will tolerate 15 minutes of tx without fatigue      LTG: (in 5 weeks)  1.Restore to max I with self care and mobility.                            Time Tracking:     OT Date of Treatment: 05/01/25  OT Start Time: 1530  OT Stop Time: 1607  OT Total Time (min): 37 min    Billable Minutes:Self Care/Home Management 25 min    OT/TRISHA: OT          5/1/2025

## 2025-05-01 NOTE — ASSESSMENT & PLAN NOTE
Patient with Acute on chronic debility due to age-related physical debility along with right foot deformity.The patient's latest AMPAC (Activity Measure for Post Acute Care) Score is listed below.    AM-PAC Score - How much help does the patient need for each activity listed  Basic Mobility Total Score: 8  Turning over in bed (including adjusting bedclothes, sheets and blankets)?: A lot  Sitting down on and standing up from a chair with arms (e.g., wheelchair, bedside commode, etc.): Unable  Moving from lying on back to sitting on the side of the bed?: A lot  Moving to and from a bed to a chair (including a wheelchair)?: Unable  Need to walk in hospital room?: Unable  Climbing 3-5 steps with a railing?: Unable    Plan  - PT/OT consulted     5/1/25  Per pt, she was ordered custom orthotics for her feet (mainly R foot) for safe WB during transfers and gait.  Presently, orthotics and shoes are not here for evaluation.    Pt would benefit from more rehab prior to returning home with elderly .

## 2025-05-01 NOTE — ASSESSMENT & PLAN NOTE
INOCENTE is likely due to pre-renal azotemia due to dehydration.   History of CKD  Baseline creatinine is 1.1 which was 4 months ago.   Most recent creatinine and eGFR are listed below.  Recent Labs     04/30/25  0048 05/01/25  0429   CREATININE 1.73* 1.19*   EGFRNORACEVR 30* 47*      Plan  - INOCENTE is worsening. Will adjust treatment as follows: iv Nacl 0.9% @ 75ml/hour  - Avoid nephrotoxins and renally dose meds for GFR listed above  - Monitor urine output, serial BMP, and adjust therapy as needed    5/1/25  INOCENTE improving. Continue lactated Ringers infusion

## 2025-05-01 NOTE — PROGRESS NOTES
[facility] - [address] [place] [address] Medicine  Progress Note    Patient Name: Bobbi Haywood  MRN: [id number]  Patient Class: IP- Inpatient   Admission Date: 4/30/2025  Length of Stay: 1 days  Attending Physician: Alexis Henning Jr., MD  Primary Care Provider: [facility]        Subjective     Principal Problem:Fall        HPI:  73 yo female presented to the ED with complaints of fall. As per patient, she was trying to stand and stabilize herself when she fell on the ground. States she hit her head but reports no loss of consciousness. Also had impact to the shoulder and right forearm during the fall. Denies N/V, SOB, chest pain, palpitation, or vision problems. History is obtained from the patient. She is alert, oriented and cooperative.  History of dysuria since past 2 days, well controlled symptoms after taking over the counter azos(phenazopyridine)  Past medical history is significant for long standing Afib under meds, hypertension, anemia, neuropathy, spinal stenosis at lumbosacral and cervical region and CKD  Presenting ED vitals         Initial Vitals [04/30/25 0031]   BP Pulse Resp Temp SpO2   (!) 104/55 104 20 97.8 °F (36.6 °C) (!) 94 %     Presenting ED labs: Wbc 16K, Hb 11.6, Plat 331K, Na 141, K 3.8, Creat 1.73  UA-Wbc >180 and Rbc >180, nitrites neg. Urine culture ordered and results awaited  CT head: No acute intracranial abnormality  X-ray forearm and shoulder , right -no evidence of fracture or dislocation    Patient is admitted to the observation unit under hospital service for further care and management                      Overview/Hospital Course:  No notes on file    Interval History: Patient found sitting in bed in NAD. Pt states improvements in dysuria. No acute events over night. Imaging negative for bleeding/ fractures post fall. Eliquis restarted. PT/OT recommends rehab prior to returning home with elderly . Pt to decide on  disposition  for tomorrow.    Review of Systems   Constitutional:  Positive for activity change. Negative for chills, fatigue and fever.   Eyes:  Negative for redness.   Respiratory:  Negative for cough and shortness of breath.    Cardiovascular:  Negative for chest pain, palpitations and leg swelling.   Gastrointestinal:  Negative for abdominal pain and nausea.   Genitourinary:  Positive for dysuria. Negative for hematuria.   Musculoskeletal:  Positive for myalgias. Negative for arthralgias.   Skin:  Positive for wound.   Neurological:  Negative for syncope and weakness.   Psychiatric/Behavioral:  Negative for agitation, behavioral problems and decreased concentration.      Objective:     Vital Signs (Most Recent):  Temp: 98 °F (36.7 °C) (05/01/25 1131)  Pulse: 82 (05/01/25 1131)  Resp: 18 (05/01/25 1131)  BP: 124/74 (05/01/25 1131)  SpO2: (!) 93 % (05/01/25 1131) Vital Signs (24h Range):  Temp:  [97.5 °F (36.4 °C)-98 °F (36.7 °C)] 98 °F (36.7 °C)  Pulse:  [] 82  Resp:  [15-19] 18  SpO2:  [92 %-99 %] 93 %  BP: (119-144)/(71-84) 124/74     Weight: 91.4 kg (201 lb 9.6 oz)  Body mass index is 28.12 kg/m².    Intake/Output Summary (Last 24 hours) at 5/1/2025 1224  Last data filed at 5/1/2025 1047  Gross per 24 hour   Intake --   Output 1100 ml   Net -1100 ml         Physical Exam  Vitals and nursing note reviewed.   Constitutional:       Appearance: Normal appearance.   HENT:      Head: Normocephalic and atraumatic.   Cardiovascular:      Rate and Rhythm: Rhythm irregular.      Heart sounds: Normal heart sounds. No murmur heard.  Pulmonary:      Effort: Pulmonary effort is normal. No respiratory distress.      Breath sounds: Normal breath sounds.   Abdominal:      Palpations: Abdomen is soft.   Musculoskeletal:         General: Swelling present. Normal range of motion.      Cervical back: Normal range of motion and neck supple.      Comments: Swollen inverted right foot, w/o erythema or loss of sensation.    Skin:     General: Skin is warm.      Capillary Refill: Capillary refill takes less than 2 seconds.             Comments: Right forearm laceration   Neurological:      General: No focal deficit present.      Mental Status: She is alert and oriented to person, place, and time.   Psychiatric:         Mood and Affect: Mood normal.         Behavior: Behavior normal.               Significant Labs: All pertinent labs within the past 24 hours have been reviewed.    Significant Imaging: I have reviewed all pertinent imaging results/findings within the past 24 hours.      Assessment & Plan  Fall  -fell yesterday at home, ground level fall  -states she hit her head but reports no loss of consciousness.  -impact to the shoulder and right forearm during the fall  -denies N/V, SOB, chest pain, palpitation, or vision problem  -CT head: No acute intracranial abnormality  -X-ray forearm and shoulder,right -no evidence of fracture or dislocation  -skin tear to right forearm-wound care consulted  -will continue to monitor        Longstanding persistent atrial fibrillation  Patient has long standing persistent (>12 months) atrial fibrillation. Patient is currently in atrial fibrillation. [id number] Score: 3. The patients heart rate in the last 24 hours is as follows:  Pulse  Min: 81  Max: 100     Antiarrhythmics  metoprolol tartrate (LOPRESSOR) tablet 25 mg, Daily, Oral    Anticoagulants  , 2 times daily, Oral  apixaban tablet 5 mg, 2 times daily, Oral    Plan  - Replete lytes with a goal of K>4, Mg >2  - Patient is anticoagulated, see medications listed above.  - Patient's afib is currently controlled  -Will hold Eliquis for now( recent fall and hematuria on UA)          INOCENTE (acute kidney injury)  INOCENTE is likely due to pre-renal azotemia due to dehydration.   History of CKD  Baseline creatinine is  1.1 which was 4 months ago .   Most recent creatinine and eGFR are listed below.  Recent Labs     04/30/25  0048 05/01/25  2747    CREATININE 1.73* 1.19*   EGFRNORACEVR 30* 47*      Plan  - INOCENTE is worsening. Will adjust treatment as follows: iv Nacl 0.9% @ 75ml/hour  - Avoid nephrotoxins and renally dose meds for GFR listed above  - Monitor urine output, serial BMP, and adjust therapy as needed    5/1/25  INOCENTE improving. Continue lactated Ringers infusion     Debility  Patient with Acute on chronic debility due to age-related physical debility along with right foot deformity.The patient's latest AMPAC (Activity Measure for Post Acute Care) Score is listed below.    AM-PAC Score - How much help does the patient need for each activity listed  Basic Mobility Total Score: 8  Turning over in bed (including adjusting bedclothes, sheets and blankets)?: A lot  Sitting down on and standing up from a chair with arms (e.g., wheelchair, bedside commode, etc.): Unable  Moving from lying on back to sitting on the side of the bed?: A lot  Moving to and from a bed to a chair (including a wheelchair)?: Unable  Need to walk in hospital room?: Unable  Climbing 3-5 steps with a railing?: Unable    Plan  - PT/OT consulted     5/1/25  Per pt, she was ordered custom orthotics for her feet (mainly R foot) for safe WB during transfers and gait.  Presently, orthotics and shoes are not here for evaluation.    Pt would benefit from more rehab prior to returning home with elderly .       Depression  -Continue with home duloxetine      VTE Risk Mitigation (From admission, onward)           Ordered     apixaban tablet 5 mg  2 times daily         05/01/25 0840     IP VTE HIGH RISK PATIENT  Once         04/30/25 1122     Place sequential compression device  Until discontinued         04/30/25 1122                    Discharge Planning   SHASHI: 5/8/2025     Code Status: Full Code   Medical Readiness for Discharge Date:   Discharge Plan A: Home with family                Please place Justification for DME        Otilia Ring MD  Department of Hospital Medicine   [place]  [facility] - [facility]

## 2025-05-01 NOTE — PROGRESS NOTES
[facility] Tele[facility]  Wound Care    Patient Name:  Bobbi Haywood   MRN:  [id number]  Date: 5/1/2025  Diagnosis: Fall    History:     Past Medical History:   Diagnosis Date    A-fib     Abnormal gait     Abscess of right lower extremity 09/10/2024    Acquired deformity of both ankles     Acute blood loss anemia     INOCENTE (acute kidney injury) 09/10/2024    Chronic pain     Contusion of right lower extremity     Degenerative joint disease of cervical and lumbar spine     Depression     E-coli UTI 09/10/2024    Fall 09/10/2024    Hematoma     Hypertension     Hypomagnesemia 09/10/2024    Iron deficiency anemia, unspecified     Neuropathy     Neuropathy     Spinal stenosis of cervical region     Spinal stenosis of lumbar region at multiple levels     Spondylolysis of lumbar region        Social History[1]    Precautions:     Allergies as of 04/30/2025 - Reviewed 04/30/2025   Allergen Reaction Noted    Sulfa (sulfonamide antibiotics) Hives and Other (See Comments) 09/02/2016       WOC Assessment Details/Treatment        05/01/25 1040   WOCN Assessment   WOCN Total Time (mins) 60   Visit Date 05/01/25   Visit Time 1040   Consult Type New   WOCN Speciality Wound   Wound other  (traumatic)   Number of Wounds 3   Continence Type Urinary;Fecal   Intervention assessed;applied;chart review;coordination of care   Teaching on-going   Skin Interventions   Device Skin Pressure Protection absorbent pad utilized/changed   Pressure Reduction Techniques frequent weight shift encouraged;heels elevated off bed;positioned off wounds;pressure points protected   Skin Protection incontinence pads utilized   Positioning   Body Position position changed independently   Head of Bed (HOB) Positioning HOB at 30 degrees   Positioning/Transfer Devices pillows;in use   Pressure Injury Prevention    Sacral Foam Dressing Peel back sacral foam dressing, assess skin and reapply   Heel protection technique Foam dressing    Heel preventative measures Peel back dressing/boot, assess skin and reapply   Check Medical Devices Done        Wound 04/30/25 0045 Skin Tear Right proximal;posterior   Date First Assessed/Time First Assessed: 04/30/25 0045   Present on Original Admission: Yes  Primary Wound Type: Skin Tear  Side: Right  Orientation: proximal;posterior  Is this injury device related?: No   Wound Image     Dressing Appearance Dry;Intact;Clean   Drainage Amount Moderate   Drainage Characteristics/Odor Yellow;Bleeding controlled   Appearance Pink;Red;Moist;Granulating;Tan;Slough;Steri-strips intact   Tissue loss description Full thickness   Black (%), Wound Tissue Color 0 %   Red (%), Wound Tissue Color 80 %   Yellow (%), Wound Tissue Color 20 %   Periwound Area Moist;Pink   Wound Edges Irregular;Undefined   Wound Length (cm) 6 cm   Wound Width (cm) 5 cm   Wound Depth (cm) 0.2 cm   Wound Volume (cm^3) 3.142 cm^3   Wound Surface Area (cm^2) 23.56 cm^2   Care Cleansed with:;Antimicrobial agent   Dressing Applied;Non-adherent;Silver;Hydrofiber;Gauze;Rolled gauze   Periwound Care Moisture barrier applied        Wound 04/30/25 1600 Moisture associated dermatitis Perineum   Date First Assessed/Time First Assessed: 04/30/25 1600   Present on Original Admission: Yes  Primary Wound Type: Moisture associated dermatitis  Location: Perineum   Wound Image    Dressing Appearance Dry;Intact;Clean   Drainage Amount None   Appearance Intact;Pink;Epithelialization   Tissue loss description Partial thickness   Black (%), Wound Tissue Color 0 %   Red (%), Wound Tissue Color 0 %   Yellow (%), Wound Tissue Color 0 %   Periwound Area Dry;Intact;Pink   Wound Edges Defined   Wound Length (cm) 2 cm   Wound Width (cm) 3 cm   Wound Depth (cm) 0 cm   Wound Volume (cm^3) 0 cm^3   Wound Surface Area (cm^2) 4.71 cm^2   Care Cleansed with:;Antimicrobial agent   Dressing Applied;Silicone;Foam;Island/border        Wound 09/12/24 Other (comment) Right posterior Calf    Date First Assessed: 09/12/24   Primary Wound Type: (c) Other (comment)  Side: Right  Orientation: posterior  Location: Calf  Closure Method: (c) No Closure (see comments)   Wound Image    Drainage Amount Small   Drainage Characteristics/Odor Serous   Appearance Pink;Red;Moist;Granulating   Tissue loss description Full thickness   Black (%), Wound Tissue Color 0 %   Red (%), Wound Tissue Color 100 %   Yellow (%), Wound Tissue Color 0 %   Periwound Area Moist;[place]   Wound Edges Irregular   Wound Length (cm) 2.1 cm   Wound Width (cm) 0.3 cm   Wound Depth (cm) 0.2 cm   Wound Volume (cm^3) 0.066 cm^3   Wound Surface Area (cm^2) 0.49 cm^2   Care Cleansed with:;Antimicrobial agent   Dressing Applied;Silver;Calcium alginate;Foam;Island/border       WOC Team consulted for right forearm skin tear    Narrative: pt lying in bed, resp even and nonlabored, NADN, pt tolerated wound care well.     Active Wounds and Recommendations: do not remove steri strips from right skin tear. Apply xeroform and aquacel AG to wounds on elbow and forearm. Coevr with 4x4 and wrap with kerlix. Change every 3 days AND as NEEDED for drainage/soilage. Clean right calf owund with vashe, apply aquacel Ag and cover with foam border. Change every 3 days and as needed for drainage/soilage.     Goals for Wound Healing: Manage drainage, Apply antimicrobial, Reduce pain, Reduce bioburden, and Educate on proper wound management post D/C     Barriers to Wound Healing: large surface area advanced age fragile skin no protective sensation infection    Orders placed.    Thank you for the consult.     We will continue to follow. See additional note under Notes Tab for tentative f/u plan/dates.        05/01/2025       [1]   Social History  Socioeconomic History    Marital status:    Tobacco Use    Smoking status: Never    Smokeless tobacco: Never   Substance and Sexual Activity    Alcohol use: Not Currently     Social Drivers of Health     Financial  Resource Strain: Low Risk  (5/1/2025)    Overall Financial Resource Strain (CARDIA)     Difficulty of Paying Living Expenses: Not very hard   Food Insecurity: No Food Insecurity (5/1/2025)    Hunger Vital Sign     Worried About Running Out of Food in the Last Year: Never true     Ran Out of Food in the Last Year: Never true   Transportation Needs: No Transportation Needs (5/1/2025)    PRAPARE - Transportation     Lack of Transportation (Medical): No     Lack of Transportation (Non-Medical): No   Physical Activity: Inactive (5/1/2025)    Exercise Vital Sign     Days of Exercise per Week: 0 days     Minutes of Exercise per Session: 0 min   Stress: No Stress Concern Present (5/1/2025)    [employer] - Occupational Stress Questionnaire     Feeling of Stress : Not at all   Housing Stability: Low Risk  (5/1/2025)    Housing Stability Vital Sign     Unable to Pay for Housing in the Last Year: No     Homeless in the Last Year: No

## 2025-05-01 NOTE — SUBJECTIVE & OBJECTIVE
Interval History: Patient found sitting in bed in NAD. Pt states improvements in dysuria. No acute events over night. Imaging negative for bleeding/ fractures post fall. Eliquis restarted. PT/OT recommends rehab prior to returning home with elderly . Pt to decide on disposition  for tomorrow.    Review of Systems   Constitutional:  Positive for activity change. Negative for chills, fatigue and fever.   Eyes:  Negative for redness.   Respiratory:  Negative for cough and shortness of breath.    Cardiovascular:  Negative for chest pain, palpitations and leg swelling.   Gastrointestinal:  Negative for abdominal pain and nausea.   Genitourinary:  Positive for dysuria. Negative for hematuria.   Musculoskeletal:  Positive for myalgias. Negative for arthralgias.   Skin:  Positive for wound.   Neurological:  Negative for syncope and weakness.   Psychiatric/Behavioral:  Negative for agitation, behavioral problems and decreased concentration.      Objective:     Vital Signs (Most Recent):  Temp: 98 °F (36.7 °C) (05/01/25 1131)  Pulse: 82 (05/01/25 1131)  Resp: 18 (05/01/25 1131)  BP: 124/74 (05/01/25 1131)  SpO2: (!) 93 % (05/01/25 1131) Vital Signs (24h Range):  Temp:  [97.5 °F (36.4 °C)-98 °F (36.7 °C)] 98 °F (36.7 °C)  Pulse:  [] 82  Resp:  [15-19] 18  SpO2:  [92 %-99 %] 93 %  BP: (119-144)/(71-84) 124/74     Weight: 91.4 kg (201 lb 9.6 oz)  Body mass index is 28.12 kg/m².    Intake/Output Summary (Last 24 hours) at 5/1/2025 1224  Last data filed at 5/1/2025 1047  Gross per 24 hour   Intake --   Output 1100 ml   Net -1100 ml         Physical Exam  Vitals and nursing note reviewed.   Constitutional:       Appearance: Normal appearance.   HENT:      Head: Normocephalic and atraumatic.   Cardiovascular:      Rate and Rhythm: Rhythm irregular.      Heart sounds: Normal heart sounds. No murmur heard.  Pulmonary:      Effort: Pulmonary effort is normal. No respiratory distress.      Breath sounds: Normal breath  sounds.   Abdominal:      Palpations: Abdomen is soft.   Musculoskeletal:         General: Swelling present. Normal range of motion.      Cervical back: Normal range of motion and neck supple.      Comments: Swollen inverted right foot, w/o erythema or loss of sensation.   Skin:     General: Skin is warm.      Capillary Refill: Capillary refill takes less than 2 seconds.             Comments: Right forearm laceration   Neurological:      General: No focal deficit present.      Mental Status: She is alert and oriented to person, place, and time.   Psychiatric:         Mood and Affect: Mood normal.         Behavior: Behavior normal.               Significant Labs: All pertinent labs within the past 24 hours have been reviewed.    Significant Imaging: I have reviewed all pertinent imaging results/findings within the past 24 hours.

## 2025-05-01 NOTE — PLAN OF CARE
Problem: Adult Inpatient Plan of Care  Goal: Plan of Care Review  Outcome: Progressing  Goal: Patient-Specific Goal (Individualized)  Outcome: Progressing  Goal: Absence of Hospital-Acquired Illness or Injury  Outcome: Progressing  Goal: Optimal Comfort and Wellbeing  Outcome: Progressing  Goal: Readiness for Transition of Care  Outcome: Progressing     Problem: Sepsis/Septic Shock  Goal: Optimal Coping  Outcome: Progressing  Goal: Absence of Bleeding  Outcome: Progressing  Goal: Blood Glucose Level Within Targeted Range  Outcome: Progressing  Goal: Absence of Infection Signs and Symptoms  Outcome: Progressing  Goal: Optimal Nutrition Intake  Outcome: Progressing     Problem: Acute Kidney Injury/Impairment  Goal: Fluid and Electrolyte Balance  Outcome: Progressing  Goal: Improved Oral Intake  Outcome: Progressing  Goal: Effective Renal Function  Outcome: Progressing     Problem: Wound  Goal: Optimal Coping  Outcome: Progressing  Goal: Optimal Functional Ability  Outcome: Progressing  Goal: Absence of Infection Signs and Symptoms  Outcome: Progressing  Goal: Improved Oral Intake  Outcome: Progressing  Goal: Optimal Pain Control and Function  Outcome: Progressing  Goal: Skin Health and Integrity  Outcome: Progressing  Goal: Optimal Wound Healing  Outcome: Progressing     Problem: Fall Injury Risk  Goal: Absence of Fall and Fall-Related Injury  Outcome: Progressing     Problem: Skin Injury Risk Increased  Goal: Skin Health and Integrity  Outcome: Progressing

## 2025-05-01 NOTE — PT/OT/SLP PROGRESS
Physical Therapy Treatment    Patient Name:  Bobbi Haywood   MRN:  [id number]    Recommendations:     Discharge Recommendations: Moderate Intensity Therapy  Discharge Equipment Recommendations: none  Barriers to discharge: Inaccessible home and Decreased caregiver support    Assessment:     Bobbi Haywood is a 74 y.o. female admitted with a medical diagnosis of Fall.  She presents with the following impairments/functional limitations: weakness, impaired endurance, impaired functional mobility, impaired balance, decreased lower extremity function     Patient overall weak and painful to move. Waiting on braces from home for bilateral feet to help with stability in stance. PT recommending swb at [place]    Rehab Prognosis: Good; patient would benefit from acute skilled PT services to address these deficits and reach maximum level of function.    Recent Surgery: * No surgery found *      Plan:     During this hospitalization, patient to be seen 5 x/week to address the identified rehab impairments via gait training, therapeutic activities, therapeutic exercises, neuromuscular re-education and progress toward the following goals:    Plan of Care Expires:  05/30/25    Subjective     Chief Complaint: foot pain  Patient/Family Comments/goals: agreeable  Pain/Comfort:  Pain Rating 1: 5/10  Location - Side 1: Right  Location 1: leg  Pain Addressed 1: Cessation of Activity      Objective:     Communicated with nurse prior to session.  Patient found HOB elevated with peripheral IV, PureWick, telemetry upon PT entry to room.     General Precautions: Standard, fall  Orthopedic Precautions: N/A  Braces:  (foot and ankle braces not present for eval)  Respiratory Status: Room air     Functional Mobility:  Bed Mobility:     Rolling Left:  minimum assistance  Rolling Right: minimum assistance  Supine to Sit: minimum assistance  Sit to Supine: maximal assistance and of 2 persons  Balance: CGA sitting balance      AM-PAC 6  CLICK MOBILITY  Turning over in bed (including adjusting bedclothes, sheets and blankets)?: 3  Sitting down on and standing up from a chair with arms (e.g., wheelchair, bedside commode, etc.): 1  Moving from lying on back to sitting on the side of the bed?: 3  Moving to and from a bed to a chair (including a wheelchair)?: 1  Need to walk in hospital room?: 1  Climbing 3-5 steps with a railing?: 1  Basic Mobility Total Score: 10       Treatment & Education:  Eob sitting x15 minutes; Heel/toe taps BLE 2x10  Began to try to attempt when IV in LUE began bleeding profusely. Patient returned to bed and had to have all bedding changed    Patient left HOB elevated with all lines intact, call button in reach, and nurse present..    GOALS:   Multidisciplinary Problems       Physical Therapy Goals          Problem: Physical Therapy    Goal Priority Disciplines Outcome Interventions   Physical Therapy Goal     PT, PT/OT Progressing    Description: Short Term Goals to be met by: 5/15/25    Patient will increase functional independence with mobility by performin. Supine to sit with contact guard assist  2. Sit to stand transfer with contact guard assist using Rolling walker with brace on R foot  3. Bed to chair transfer with contact guard assist using Rolling walker with brace on R foot  4. Gait  x 100 feet with contact guard assist using Rolling walker with brace on R foot  5. Lower extremity exercise program x30 reps per handout, with assistance as needed    Long Term Goals to be met by: 25    Pt will regain full independent functional mobility with Rolling walker to return to home situation and prior activities of daily living.                        DME Justifications:  none    Time Tracking:     PT Received On: 25  PT Start Time: 1425     PT Stop Time: 1457  PT Total Time (min): 32 min     Billable Minutes: Therapeutic Activity 32    Treatment Type: Treatment  PT/PTA: PT     Number of PTA visits since last  PT visit: 0     05/01/2025

## 2025-05-01 NOTE — ASSESSMENT & PLAN NOTE
Patient has long standing persistent (>12 months) atrial fibrillation. Patient is currently in atrial fibrillation. [id number] Score: 3. The patients heart rate in the last 24 hours is as follows:  Pulse  Min: 81  Max: 100     Antiarrhythmics  metoprolol tartrate (LOPRESSOR) tablet 25 mg, Daily, Oral    Anticoagulants  , 2 times daily, Oral  apixaban tablet 5 mg, 2 times daily, Oral    Plan  - Replete lytes with a goal of K>4, Mg >2  - Patient is anticoagulated, see medications listed above.  - Patient's afib is currently controlled  -Will hold Eliquis for now( recent fall and hematuria on UA)

## 2025-05-02 VITALS
SYSTOLIC BLOOD PRESSURE: 137 MMHG | HEIGHT: 71 IN | DIASTOLIC BLOOD PRESSURE: 69 MMHG | TEMPERATURE: 98 F | OXYGEN SATURATION: 91 % | HEART RATE: 105 BPM | BODY MASS INDEX: 28.23 KG/M2 | WEIGHT: 201.63 LBS | RESPIRATION RATE: 165 BRPM

## 2025-05-02 LAB
ANION GAP SERPL CALCULATED.3IONS-SCNC: 12 MMOL/L (ref 7–16)
BASOPHILS # BLD AUTO: 0.06 K/UL (ref 0–0.2)
BASOPHILS NFR BLD AUTO: 0.5 % (ref 0–1)
BUN SERPL-MCNC: 16 MG/DL (ref 10–20)
BUN/CREAT SERPL: 14 (ref 6–20)
CALCIUM SERPL-MCNC: 8.1 MG/DL (ref 8.4–10.2)
CHLORIDE SERPL-SCNC: 107 MMOL/L (ref 98–107)
CO2 SERPL-SCNC: 22 MMOL/L (ref 23–31)
CREAT SERPL-MCNC: 1.13 MG/DL (ref 0.55–1.02)
DIFFERENTIAL METHOD BLD: ABNORMAL
EGFR (NO RACE VARIABLE) (RUSH/TITUS): 51 ML/MIN/1.73M2
EOSINOPHIL # BLD AUTO: 0.29 K/UL (ref 0–0.5)
EOSINOPHIL NFR BLD AUTO: 2.5 % (ref 1–4)
ERYTHROCYTE [DISTWIDTH] IN BLOOD BY AUTOMATED COUNT: 14.6 % (ref 11.5–14.5)
GLUCOSE SERPL-MCNC: 110 MG/DL (ref 82–115)
HCT VFR BLD AUTO: 33.3 % (ref 38–47)
HGB BLD-MCNC: 10.1 G/DL (ref 12–16)
IMM GRANULOCYTES # BLD AUTO: 0.08 K/UL (ref 0–0.04)
IMM GRANULOCYTES NFR BLD: 0.7 % (ref 0–0.4)
LYMPHOCYTES # BLD AUTO: 2.31 K/UL (ref 1–4.8)
LYMPHOCYTES NFR BLD AUTO: 19.6 % (ref 27–41)
MCH RBC QN AUTO: 29.4 PG (ref 27–31)
MCHC RBC AUTO-ENTMCNC: 30.3 G/DL (ref 32–36)
MCV RBC AUTO: 97.1 FL (ref 80–96)
MONOCYTES # BLD AUTO: 1.48 K/UL (ref 0–0.8)
MONOCYTES NFR BLD AUTO: 12.6 % (ref 2–6)
MPC BLD CALC-MCNC: 10.7 FL (ref 9.4–12.4)
NEUTROPHILS # BLD AUTO: 7.56 K/UL (ref 1.8–7.7)
NEUTROPHILS NFR BLD AUTO: 64.1 % (ref 53–65)
NRBC # BLD AUTO: 0 X10E3/UL
NRBC, AUTO (.00): 0 %
PLATELET # BLD AUTO: 291 K/UL (ref 150–400)
POTASSIUM SERPL-SCNC: 4.1 MMOL/L (ref 3.5–5.1)
RBC # BLD AUTO: 3.43 M/UL (ref 4.2–5.4)
SODIUM SERPL-SCNC: 137 MMOL/L (ref 136–145)
UA COMPLETE W REFLEX CULTURE PNL UR: ABNORMAL
WBC # BLD AUTO: 11.78 K/UL (ref 4.5–11)

## 2025-05-02 PROCEDURE — 96374 THER/PROPH/DIAG INJ IV PUSH: CPT | Mod: 59

## 2025-05-02 PROCEDURE — 97110 THERAPEUTIC EXERCISES: CPT

## 2025-05-02 PROCEDURE — 63600175 PHARM REV CODE 636 W HCPCS: Performed by: FAMILY MEDICINE

## 2025-05-02 PROCEDURE — 96361 HYDRATE IV INFUSION ADD-ON: CPT

## 2025-05-02 PROCEDURE — 97530 THERAPEUTIC ACTIVITIES: CPT

## 2025-05-02 PROCEDURE — 85025 COMPLETE CBC W/AUTO DIFF WBC: CPT

## 2025-05-02 PROCEDURE — 25000003 PHARM REV CODE 250

## 2025-05-02 PROCEDURE — 80048 BASIC METABOLIC PNL TOTAL CA: CPT

## 2025-05-02 PROCEDURE — 1111F DSCHRG MED/CURRENT MED MERGE: CPT | Mod: CPTII,,, | Performed by: FAMILY MEDICINE

## 2025-05-02 PROCEDURE — 36415 COLL VENOUS BLD VENIPUNCTURE: CPT

## 2025-05-02 PROCEDURE — 63600175 PHARM REV CODE 636 W HCPCS

## 2025-05-02 PROCEDURE — 99239 HOSP IP/OBS DSCHRG MGMT >30: CPT | Mod: ,,, | Performed by: FAMILY MEDICINE

## 2025-05-02 PROCEDURE — G0378 HOSPITAL OBSERVATION PER HR: HCPCS

## 2025-05-02 RX ORDER — GENTAMICIN SULFATE 3 MG/ML
1 SOLUTION/ DROPS OPHTHALMIC 4 TIMES DAILY
Qty: 5 ML | Refills: 0 | Status: SHIPPED | OUTPATIENT
Start: 2025-05-02

## 2025-05-02 RX ORDER — AMOXICILLIN 500 MG/1
500 CAPSULE ORAL EVERY 8 HOURS
Qty: 15 CAPSULE | Refills: 0 | Status: SHIPPED | OUTPATIENT
Start: 2025-05-02 | End: 2025-05-07

## 2025-05-02 RX ADMIN — ATORVASTATIN CALCIUM 40 MG: 40 TABLET, FILM COATED ORAL at 08:05

## 2025-05-02 RX ADMIN — FERROUS SULFATE TAB 325 MG (65 MG ELEMENTAL FE) 1 EACH: 325 (65 FE) TAB at 08:05

## 2025-05-02 RX ADMIN — DULOXETINE HYDROCHLORIDE 60 MG: 30 CAPSULE, DELAYED RELEASE ORAL at 08:05

## 2025-05-02 RX ADMIN — SODIUM CHLORIDE, POTASSIUM CHLORIDE, SODIUM LACTATE AND CALCIUM CHLORIDE: 600; 310; 30; 20 INJECTION, SOLUTION INTRAVENOUS at 01:05

## 2025-05-02 RX ADMIN — METOPROLOL TARTRATE 25 MG: 25 TABLET, FILM COATED ORAL at 08:05

## 2025-05-02 RX ADMIN — FOLIC ACID TAB 400 MCG 800 MCG: 400 TAB at 08:05

## 2025-05-02 RX ADMIN — APIXABAN 5 MG: 5 TABLET, FILM COATED ORAL at 08:05

## 2025-05-02 RX ADMIN — CEFTRIAXONE SODIUM 1 G: 1 INJECTION, POWDER, FOR SOLUTION INTRAMUSCULAR; INTRAVENOUS at 08:05

## 2025-05-02 NOTE — PLAN OF CARE
CM spoke with pt at bedside. Pt informed that the MD and PT/OT are recommending SWB. Pt states that she forgot to ask her  about it and does not feel comfortable making the decision to go to SWB without him. CM attempted to contact pt's  and son multiple times, VM left. Pt asked to let CM her decision as soon as possible. CM following    1430- CM at bedside with pt and her . They have decided that she does not want to go to SWB and wants to return to her home at d/c. MD and Nelly at [facility] informed.

## 2025-05-02 NOTE — PROGRESS NOTES
[facility] - [address] [place] [address] Medicine  Progress Note    Patient Name: Bobbi Haywood  MRN: [id number]  Patient Class: IP- Inpatient   Admission Date: 4/30/2025  Length of Stay: 2 days  Attending Physician: Alexis Henning Jr., MD  Primary Care Provider: [facility]        Subjective     Principal Problem:Fall        HPI:  73 yo female presented to the ED with complaints of fall. As per patient, she was trying to stand and stabilize herself when she fell on the ground. States she hit her head but reports no loss of consciousness. Also had impact to the shoulder and right forearm during the fall. Denies N/V, SOB, chest pain, palpitation, or vision problems. History is obtained from the patient. She is alert, oriented and cooperative.  History of dysuria since past 2 days, well controlled symptoms after taking over the counter azos(phenazopyridine)  Past medical history is significant for long standing Afib under meds, hypertension, anemia, neuropathy, spinal stenosis at lumbosacral and cervical region and CKD  Presenting ED vitals         Initial Vitals [04/30/25 0031]   BP Pulse Resp Temp SpO2   (!) 104/55 104 20 97.8 °F (36.6 °C) (!) 94 %     Presenting ED labs: Wbc 16K, Hb 11.6, Plat 331K, Na 141, K 3.8, Creat 1.73  UA-Wbc >180 and Rbc >180, nitrites neg. Urine culture ordered and results awaited  CT head: No acute intracranial abnormality  X-ray forearm and shoulder , right -no evidence of fracture or dislocation    Patient is admitted to the observation unit under hospital service for further care and management                      Overview/Hospital Course:  No notes on file    Interval History: Overnight no acute events. Patient has no complaints at this time. PT/OT recommending moderate intensity. Patient is still considering SWB; orthotics at bedside.     Review of Systems   Constitutional:  Positive for activity change. Negative for chills, fatigue  and fever.   Eyes:  Negative for redness.   Respiratory:  Negative for cough and shortness of breath.    Cardiovascular:  Negative for chest pain, palpitations and leg swelling.   Gastrointestinal:  Negative for abdominal pain and nausea.   Genitourinary:  Positive for dysuria. Negative for hematuria.   Musculoskeletal:  Positive for myalgias. Negative for arthralgias.   Skin:  Positive for wound.   Neurological:  Negative for syncope and weakness.   Psychiatric/Behavioral:  Negative for agitation, behavioral problems and decreased concentration.      Objective:     Vital Signs (Most Recent):  Temp: 97.7 °F (36.5 °C) (05/02/25 0818)  Pulse: 109 (05/02/25 0818)  Resp: (!) 165 (05/02/25 0400)  BP: (!) 165/90 (05/02/25 0818)  SpO2: 95 % (05/02/25 0818) Vital Signs (24h Range):  Temp:  [97.6 °F (36.4 °C)-98.2 °F (36.8 °C)] 97.7 °F (36.5 °C)  Pulse:  [] 109  Resp:  [] 165  SpO2:  [93 %-98 %] 95 %  BP: (117-165)/(59-90) 165/90     Weight: 91.4 kg (201 lb 9.6 oz)  Body mass index is 28.12 kg/m².    Intake/Output Summary (Last 24 hours) at 5/2/2025 0938  Last data filed at 5/1/2025 1047  Gross per 24 hour   Intake --   Output 400 ml   Net -400 ml         Physical Exam  Vitals and nursing note reviewed.   Constitutional:       Appearance: Normal appearance.   HENT:      Head: Normocephalic and atraumatic.   Cardiovascular:      Rate and Rhythm: Rhythm irregular.      Heart sounds: Normal heart sounds. No murmur heard.  Pulmonary:      Effort: Pulmonary effort is normal. No respiratory distress.      Breath sounds: Normal breath sounds.   Abdominal:      Palpations: Abdomen is soft.   Musculoskeletal:         General: Swelling present. Normal range of motion.      Cervical back: Normal range of motion and neck supple.      Comments: Swollen inverted right foot, w/o erythema or loss of sensation.   Skin:     General: Skin is warm.      Capillary Refill: Capillary refill takes less than 2 seconds.              Comments: Right forearm laceration   Neurological:      General: No focal deficit present.      Mental Status: She is alert and oriented to person, place, and time.   Psychiatric:         Mood and Affect: Mood normal.         Behavior: Behavior normal.               Significant Labs: All pertinent labs within the past 24 hours have been reviewed.    Significant Imaging: I have reviewed all pertinent imaging results/findings within the past 24 hours.      Assessment & Plan  Fall  -fell yesterday at home, ground level fall  -states she hit her head but reports no loss of consciousness.  -impact to the shoulder and right forearm during the fall  -denies N/V, SOB, chest pain, palpitation, or vision problem  -CT head: No acute intracranial abnormality  -X-ray forearm and shoulder,right -no evidence of fracture or dislocation  -skin tear to right forearm-wound care consulted  -will continue to monitor        Longstanding persistent atrial fibrillation  Patient has long standing persistent (>12 months) atrial fibrillation. Patient is currently in atrial fibrillation. [id number] Score: 3. The patients heart rate in the last 24 hours is as follows:  Pulse  Min: 82  Max: 109     Antiarrhythmics  metoprolol tartrate (LOPRESSOR) tablet 25 mg, Daily, Oral    Anticoagulants  , 2 times daily, Oral  apixaban tablet 5 mg, 2 times daily, Oral    Plan  - Replete lytes with a goal of K>4, Mg >2  - Patient is anticoagulated, see medications listed above.  - Patient's afib is currently controlled  -Will hold Eliquis for now( recent fall and hematuria on UA)    5/2/25  tele: 88 and afib; no events overnight; con't eliquis 5 mg po bid with metoprolol and monitor        INOCENTE (acute kidney injury)  INOCENTE is likely due to pre-renal azotemia due to dehydration.   History of CKD  Baseline creatinine is 1.1 which was 4 months ago.   Most recent creatinine and eGFR are listed below.  Recent Labs     04/30/25  0048 05/01/25  0429 05/02/25  1209    CREATININE 1.73* 1.19* 1.13*   EGFRNORACEVR 30* 47* 51*      Plan  - INOCENTE is worsening. Will adjust treatment as follows: iv Nacl 0.9% @ 75ml/hour  - Avoid nephrotoxins and renally dose meds for GFR listed above  - Monitor urine output, serial BMP, and adjust therapy as needed    5/1/25  INOCENTE improving. Continue lactated Ringers infusion     5/2/25  INOCENTE continues to improve; baseline 1.1; con't LR 75 ml/hr with goal of discontinuing tomorrow morning.      Debility  Patient with Acute on chronic debility due to age-related physical debility along with right foot deformity.The patient's latest AMPAC (Activity Measure for Post Acute Care) Score is listed below.    AM-PAC Score - How much help does the patient need for each activity listed  Basic Mobility Total Score: 10  Turning over in bed (including adjusting bedclothes, sheets and blankets)?: A little  Sitting down on and standing up from a chair with arms (e.g., wheelchair, bedside commode, etc.): Unable  Moving from lying on back to sitting on the side of the bed?: A little  Moving to and from a bed to a chair (including a wheelchair)?: Unable  Need to walk in hospital room?: Unable  Climbing 3-5 steps with a railing?: Unable    Plan  - PT/OT consulted     5/1/25  Per pt, she was ordered custom orthotics for her feet (mainly R foot) for safe WB during transfers and gait.  Presently, orthotics and shoes are not here for evaluation.    Pt would benefit from more rehab prior to returning home with elderly .     5/2/25  Orthotics at bedside; will continue PT with orthotics to assess ability to ambulate; otherwise continue fall precautions      Depression  -Continue with home duloxetine      VTE Risk Mitigation (From admission, onward)           Ordered     apixaban tablet 5 mg  2 times daily         05/01/25 0840     IP VTE HIGH RISK PATIENT  Once         04/30/25 1122     Place sequential compression device  Until discontinued         04/30/25 1122                     Discharge Planning   SHASHI: 5/8/2025     Code Status: Full Code   Medical Readiness for Discharge Date:   Discharge Plan A: Home with family                Please place Justification for DME        Senait Simental MD  Department of Hospital Medicine   [facility] - [facility]

## 2025-05-02 NOTE — ASSESSMENT & PLAN NOTE
Patient has long standing persistent (>12 months) atrial fibrillation. Patient is currently in atrial fibrillation. [id number] Score: 3. The patients heart rate in the last 24 hours is as follows:  Pulse  Min: 82  Max: 109     Antiarrhythmics  metoprolol tartrate (LOPRESSOR) tablet 25 mg, Daily, Oral    Anticoagulants  , 2 times daily, Oral  apixaban tablet 5 mg, 2 times daily, Oral    Plan  - Replete lytes with a goal of K>4, Mg >2  - Patient is anticoagulated, see medications listed above.  - Patient's afib is currently controlled  -Will hold Eliquis for now( recent fall and hematuria on UA)    5/2/25  tele: 88 and afib; no events overnight; con't eliquis 5 mg po bid with metoprolol and monitor

## 2025-05-02 NOTE — ASSESSMENT & PLAN NOTE
Patient with Acute on chronic debility due to age-related physical debility along with right foot deformity.The patient's latest AMPAC (Activity Measure for Post Acute Care) Score is listed below.    AM-PAC Score - How much help does the patient need for each activity listed  Basic Mobility Total Score: 10  Turning over in bed (including adjusting bedclothes, sheets and blankets)?: A little  Sitting down on and standing up from a chair with arms (e.g., wheelchair, bedside commode, etc.): Unable  Moving from lying on back to sitting on the side of the bed?: A little  Moving to and from a bed to a chair (including a wheelchair)?: Unable  Need to walk in hospital room?: Unable  Climbing 3-5 steps with a railing?: Unable    Plan  - PT/OT consulted     5/1/25  Per pt, she was ordered custom orthotics for her feet (mainly R foot) for safe WB during transfers and gait.  Presently, orthotics and shoes are not here for evaluation.    Pt would benefit from more rehab prior to returning home with elderly .     5/2/25  Orthotics at bedside; will continue PT with orthotics to assess ability to ambulate; otherwise continue fall precautions

## 2025-05-02 NOTE — SUBJECTIVE & OBJECTIVE
Interval History: Overnight no acute events. Patient has no complaints at this time. PT/OT recommending moderate intensity. Patient is still considering SWB; orthotics at bedside.     Review of Systems   Constitutional:  Positive for activity change. Negative for chills, fatigue and fever.   Eyes:  Negative for redness.   Respiratory:  Negative for cough and shortness of breath.    Cardiovascular:  Negative for chest pain, palpitations and leg swelling.   Gastrointestinal:  Negative for abdominal pain and nausea.   Genitourinary:  Positive for dysuria. Negative for hematuria.   Musculoskeletal:  Positive for myalgias. Negative for arthralgias.   Skin:  Positive for wound.   Neurological:  Negative for syncope and weakness.   Psychiatric/Behavioral:  Negative for agitation, behavioral problems and decreased concentration.      Objective:     Vital Signs (Most Recent):  Temp: 97.7 °F (36.5 °C) (05/02/25 0818)  Pulse: 109 (05/02/25 0818)  Resp: (!) 165 (05/02/25 0400)  BP: (!) 165/90 (05/02/25 0818)  SpO2: 95 % (05/02/25 0818) Vital Signs (24h Range):  Temp:  [97.6 °F (36.4 °C)-98.2 °F (36.8 °C)] 97.7 °F (36.5 °C)  Pulse:  [] 109  Resp:  [] 165  SpO2:  [93 %-98 %] 95 %  BP: (117-165)/(59-90) 165/90     Weight: 91.4 kg (201 lb 9.6 oz)  Body mass index is 28.12 kg/m².    Intake/Output Summary (Last 24 hours) at 5/2/2025 0938  Last data filed at 5/1/2025 1047  Gross per 24 hour   Intake --   Output 400 ml   Net -400 ml         Physical Exam  Vitals and nursing note reviewed.   Constitutional:       Appearance: Normal appearance.   HENT:      Head: Normocephalic and atraumatic.   Cardiovascular:      Rate and Rhythm: Rhythm irregular.      Heart sounds: Normal heart sounds. No murmur heard.  Pulmonary:      Effort: Pulmonary effort is normal. No respiratory distress.      Breath sounds: Normal breath sounds.   Abdominal:      Palpations: Abdomen is soft.   Musculoskeletal:         General: Swelling present.  Normal range of motion.      Cervical back: Normal range of motion and neck supple.      Comments: Swollen inverted right foot, w/o erythema or loss of sensation.   Skin:     General: Skin is warm.      Capillary Refill: Capillary refill takes less than 2 seconds.             Comments: Right forearm laceration   Neurological:      General: No focal deficit present.      Mental Status: She is alert and oriented to person, place, and time.   Psychiatric:         Mood and Affect: Mood normal.         Behavior: Behavior normal.               Significant Labs: All pertinent labs within the past 24 hours have been reviewed.    Significant Imaging: I have reviewed all pertinent imaging results/findings within the past 24 hours.

## 2025-05-02 NOTE — ASSESSMENT & PLAN NOTE
INOCENTE is likely due to pre-renal azotemia due to dehydration.   History of CKD  Baseline creatinine is 1.1 which was 4 months ago.   Most recent creatinine and eGFR are listed below.  Recent Labs     04/30/25  0048 05/01/25  0429 05/02/25  0345   CREATININE 1.73* 1.19* 1.13*   EGFRNORACEVR 30* 47* 51*      Plan  - INOCENTE is worsening. Will adjust treatment as follows: iv Nacl 0.9% @ 75ml/hour  - Avoid nephrotoxins and renally dose meds for GFR listed above  - Monitor urine output, serial BMP, and adjust therapy as needed    5/1/25  INOCENTE improving. Continue lactated Ringers infusion     5/2/25  INOCENTE continues to improve; baseline 1.1; con't LR 75 ml/hr with goal of discontinuing tomorrow morning.

## 2025-05-02 NOTE — PT/OT/SLP PROGRESS
"Physical Therapy Treatment    Patient Name:  Bobbi Haywood   MRN:  [id number]    Recommendations:     Discharge Recommendations: Moderate Intensity Therapy  Discharge Equipment Recommendations: none  Barriers to discharge: Decreased caregiver support    Assessment:     Bobbi Haywood is a 74 y.o. female admitted with a medical diagnosis of Fall.  She presents with the following impairments/functional limitations: weakness, impaired endurance, impaired self care skills, impaired functional mobility, decreased lower extremity function     Patient with orthotics present today but having difficulty donning R one due to swelling and severe inversion/supination. Able to perform 2 sit to stand with therapy. Recommending swb at [place]    Rehab Prognosis: Good; patient would benefit from acute skilled PT services to address these deficits and reach maximum level of function.    Recent Surgery: * No surgery found *      Plan:     During this hospitalization, patient to be seen 5 x/week to address the identified rehab impairments via gait training, therapeutic activities, therapeutic exercises, neuromuscular re-education and progress toward the following goals:    Plan of Care Expires:  05/30/25    Subjective     Chief Complaint: Frustrated  Patient/Family Comments/goals: "I can't get in touch with my "  Pain/Comfort:  Pain Rating 1: 0/10      Objective:     Communicated with nurse prior to session.  Patient found sitting edge of bed with peripheral IV, PureWick, telemetry upon PT entry to room.     General Precautions: Standard, fall  Orthopedic Precautions: N/A  Braces:  (foot and ankle braces not present for eval)  Respiratory Status: Room air     Functional Mobility:  Transfers:     Sit to Stand:  moderate assistance and of 2 persons with rolling walker  Balance: cga sitting balance  Sit to supine: min assist x2      AM-PAC 6 CLICK MOBILITY  Turning over in bed (including adjusting bedclothes, sheets " and blankets)?: 3  Sitting down on and standing up from a chair with arms (e.g., wheelchair, bedside commode, etc.): 2  Moving from lying on back to sitting on the side of the bed?: 3  Moving to and from a bed to a chair (including a wheelchair)?: 2  Need to walk in hospital room?: 1  Climbing 3-5 steps with a railing?: 1  Basic Mobility Total Score: 12       Treatment & Education:  Mobility as noted (x2 sts trials)  Spent significant amount of attempting to don bilateral orthotics; L one fit well but could not get R on due to swelling and deformity    Patient left HOB elevated with all lines intact, call button in reach, and nurse notified..    GOALS:   Multidisciplinary Problems       Physical Therapy Goals          Problem: Physical Therapy    Goal Priority Disciplines Outcome Interventions   Physical Therapy Goal     PT, PT/OT Progressing    Description: Short Term Goals to be met by: 5/15/25    Patient will increase functional independence with mobility by performin. Supine to sit with contact guard assist  2. Sit to stand transfer with contact guard assist using Rolling walker with brace on R foot  3. Bed to chair transfer with contact guard assist using Rolling walker with brace on R foot  4. Gait  x 100 feet with contact guard assist using Rolling walker with brace on R foot  5. Lower extremity exercise program x30 reps per handout, with assistance as needed    Long Term Goals to be met by: 25    Pt will regain full independent functional mobility with Rolling walker to return to home situation and prior activities of daily living.                        DME Justifications:      Time Tracking:     PT Received On: 25  PT Start Time: 1115     PT Stop Time: 1148  PT Total Time (min): 33 min     Billable Minutes: Therapeutic Activity 33    Treatment Type: Treatment  PT/PTA: PT     Number of PTA visits since last PT visit: 0     2025

## 2025-05-02 NOTE — PT/OT/SLP PROGRESS
"Occupational Therapy   Treatment    Name: Bobbi Haywood  MRN: [id number]  Admitting Diagnosis:  Fall       Recommendations:     Discharge Recommendations: Moderate Intensity Therapy  Discharge Equipment Recommendations:   (to be determined)  Barriers to discharge:       Assessment:     Bobbi Haywood is a 74 y.o. female with a medical diagnosis of Fall.  She presents with alert and agreeable to treatment. Performance deficits affecting function are weakness, impaired endurance, impaired self care skills, impaired functional mobility, gait instability, decreased upper extremity function, decreased lower extremity function, pain, impaired skin, edema.     Rehab Prognosis:  Good; patient would benefit from acute skilled OT services to address these deficits and reach maximum level of function.       Plan:     Patient to be seen 5 x/week to address the above listed problems via self-care/home management, therapeutic exercises, therapeutic activities  Plan of Care Expires: 05/28/25  Plan of Care Reviewed with: patient    Subjective     Chief Complaint: Fall    Patient/Family Comments/goals: " I can usually put on my socks and shoes with braces myself."  Pain/Comfort:  Pain Rating 1: 0/10  Pain Rating Post-Intervention 1: 0/10    Objective:     Communicated with: SUKHDEV Maharaj prior to session.  Patient found HOB elevated with peripheral IV, PureWick, telemetry upon OT entry to room.    General Precautions: Standard, fall    Orthopedic Precautions:N/A  Braces: N/A  Respiratory Status: Room air     Occupational Performance:     Bed Mobility:    Patient completed Scooting/Bridging with minimum assistance and increased time and effort  Patient completed Supine to Sit with minimum assistance and increased time and effort  Patient completed Sit to Supine with minimum assistance and 2 persons     Functional Mobility/Transfers:  Patient completed Sit <> Stand Transfer with moderate assistance and of 2 " persons  with  rolling walker   Functional Mobility: NT    Activities of Daily Living:  Lower Body Dressing: Pt was able to don her (L) sock independently and donned her (L) orthotic with min(A) with increased time and effort. Pt required moderate assistance to don her (R) sock and max(A) x2 for (R) orthotic, but unable to get pt's foot fully into shoe due to edema and deformity.       [facility] 6 Click ADL: 16    Treatment & Education:  Pt performed (B) UE ex from siting EOB for 2 x 10 to 15 reps of each:   Elbow flexion with 2# db   Punches with 1# db  Shoulder horizontal abd/adduction with 1# db  Shoulder extension with red t-band  Elbow extension with red t-band  Chest pull with red t-band  Handhelper with 3 bands 2 x 20 reps    Pt donned socks and shoes as described above. Pt performed 2 standing trials with RW wing min(A) x2 with verbal cues using RW.      Patient left HOB elevated with all lines intact, call button in reach, and RN notified    GOALS:   Multidisciplinary Problems       Occupational Therapy Goals          Problem: Occupational Therapy    Goal Priority Disciplines Outcome Interventions   Occupational Therapy Goal     OT, PT/OT Progressing    Description: STG: (in 1 week)  Pt will perform grooming with setup  Pt will bathe with mod(A)  Pt will perform UE dressing with min(A)  Pt will perform LE dressing with mod(A)  Pt will sit EOB x 10 min with CGA assistance  Pt will transfer bed/chair/bsc with moderate assistance with braces and RW  Pt will perform standing task x 1 min with min(A) with RW and braces   Pt will tolerate 15 minutes of tx without fatigue      LTG: (in 5 weeks)  1.Restore to max I with self care and mobility.                            Time Tracking:     OT Date of Treatment: 05/02/25  OT Start Time: 1050  OT Stop Time: 1147  OT Total Time (min): 57 min    Billable Minutes:Self Care/Home Management 15 min  Therapeutic Exercise 35 min    OT/TRISHA: OT          5/2/2025

## 2025-05-05 NOTE — ASSESSMENT & PLAN NOTE
Patient with Acute on chronic debility due to age-related physical debility along with right foot deformity.The patient's latest AMPAC (Activity Measure for Post Acute Care) Score is listed below.    AM-PAC Score - How much help does the patient need for each activity listed  Basic Mobility Total Score: 12  Turning over in bed (including adjusting bedclothes, sheets and blankets)?: A little  Sitting down on and standing up from a chair with arms (e.g., wheelchair, bedside commode, etc.): A lot  Moving from lying on back to sitting on the side of the bed?: A little  Moving to and from a bed to a chair (including a wheelchair)?: A lot  Need to walk in hospital room?: Unable  Climbing 3-5 steps with a railing?: Unable    Plan  - PT/OT consulted     5/1/25  Per pt, she was ordered custom orthotics for her feet (mainly R foot) for safe WB during transfers and gait.  Presently, orthotics and shoes are not here for evaluation.    Pt would benefit from more rehab prior to returning home with elderly .     5/2/25  Orthotics at bedside; will continue PT with orthotics to assess ability to ambulate; otherwise continue fall precautions

## 2025-05-05 NOTE — PLAN OF CARE
[facility] - [facility]  Discharge Final Note    Primary Care Provider: [facility]    Expected Discharge Date: 5/2/2025    Final Discharge Note (most recent)       Final Note - 05/05/25 0830          Final Note    Assessment Type Final Discharge Note     Anticipated Discharge Disposition Home or Self Care     What phone number can be called within the next 1-3 days to see how you are doing after discharge? [phone]        Post-Acute Status    Coverage [employer]     Discharge Delays None known at this time                     Important Message from Medicare  Important Message from Medicare regarding Discharge Appeal Rights: Given to patient/caregiver, Explained to patient/caregiver, Signed/date by patient/caregiver     Date IMM was signed: 05/01/25  Time IMM was signed: 1020    Pt d/c home with family. IM updated. No further needs noted.

## 2025-05-05 NOTE — ASSESSMENT & PLAN NOTE
Patient has long standing persistent (>12 months) atrial fibrillation. Patient is currently in atrial fibrillation. [id number] Score: 3. The patients heart rate in the last 24 hours is as follows:  No data recorded     Antiarrhythmics       Anticoagulants  , 2 times daily, Oral    Plan  - Replete lytes with a goal of K>4, Mg >2  - Patient is anticoagulated, see medications listed above.  - Patient's afib is currently controlled  -Will hold Eliquis for now( recent fall and hematuria on UA)    5/2/25  tele: 88 and afib; no events overnight; con't eliquis 5 mg po bid with metoprolol and monitor

## 2025-05-05 NOTE — HOSPITAL COURSE
Patient is a 75 y/o WF who was admitted with a fall and also a Urinary Tract Infection.   Sustained R forearm skin tear that was dressed and bandaged. She was treated with Rocephin until cultures came back Proteus sensitive to ampicillin.  She was converted to Amoxil at discharge.  She was evaluated by PT and it was felt she would benefit from swing bed. She has a right foot deformity that causes it to roll over.  She has a brace.  Patient declined swing bed and was therefore discharged home on 5/2.   Lastly patient had a mild INOCENTE that improved with IVF.

## 2025-05-05 NOTE — DISCHARGE SUMMARY
[facility] - [address] [place] [address] Medicine  Discharge Summary      Patient Name: Bobbi Haywood  MRN: [id number]  NINFA: [id number]  Patient Class: IP- Inpatient  Admission Date: 4/30/2025  Hospital Length of Stay: 2 days  Discharge Date and Time: 5/2/2025  5:24 PM  Attending Physician: Kate att. providers found   Discharging Provider: Alexis Henning Jr, MD  Primary Care Provider: [facility]    Primary Care Team: Networked reference to record PCT     HPI:   73 yo female presented to the ED with complaints of fall. As per patient, she was trying to stand and stabilize herself when she fell on the ground. States she hit her head but reports no loss of consciousness. Also had impact to the shoulder and right forearm during the fall. Denies N/V, SOB, chest pain, palpitation, or vision problems. History is obtained from the patient. She is alert, oriented and cooperative.  History of dysuria since past 2 days, well controlled symptoms after taking over the counter azos(phenazopyridine)  Past medical history is significant for long standing Afib under meds, hypertension, anemia, neuropathy, spinal stenosis at lumbosacral and cervical region and CKD  Presenting ED vitals         Initial Vitals [04/30/25 0031]   BP Pulse Resp Temp SpO2   (!) 104/55 104 20 97.8 °F (36.6 °C) (!) 94 %     Presenting ED labs: Wbc 16K, Hb 11.6, Plat 331K, Na 141, K 3.8, Creat 1.73  UA-Wbc >180 and Rbc >180, nitrites neg. Urine culture ordered and results awaited  CT head: No acute intracranial abnormality  X-ray forearm and shoulder , right -no evidence of fracture or dislocation    Patient is admitted to the observation unit under hospital service for further care and management                      * No surgery found *      Hospital Course:   Patient is a 73 y/o WF who was admitted with a fall and also a Urinary Tract Infection.   Sustained R forearm skin tear that was dressed and bandaged.  She was treated with Rocephin until cultures came back Proteus sensitive to ampicillin.  She was converted to Amoxil at discharge.  She was evaluated by PT and it was felt she would benefit from swing bed. She has a right foot deformity that causes it to roll over.  She has a brace.  Patient declined swing bed and was therefore discharged home on 5/2.   Lastly patient had a mild INOCENTE that improved with IVF.       Goals of Care Treatment Preferences:  Code Status: Full Code         Consults:   Consults (From admission, onward)          Status Ordering Provider     Inpatient consult to Social Work  Once        Provider:  (Not yet assigned)    Completed KELLEN VILLEDA            Assessment & Plan  Fall  -fell yesterday at home, ground level fall  -states she hit her head but reports no loss of consciousness.  -impact to the shoulder and right forearm during the fall  -denies N/V, SOB, chest pain, palpitation, or vision problem  -CT head: No acute intracranial abnormality  -X-ray forearm and shoulder,right -no evidence of fracture or dislocation  -skin tear to right forearm-wound care consulted  -will continue to monitor        Longstanding persistent atrial fibrillation  Patient has long standing persistent (>12 months) atrial fibrillation. Patient is currently in atrial fibrillation. [id number] Score: 3. The patients heart rate in the last 24 hours is as follows:  No data recorded     Antiarrhythmics       Anticoagulants  , 2 times daily, Oral    Plan  - Replete lytes with a goal of K>4, Mg >2  - Patient is anticoagulated, see medications listed above.  - Patient's afib is currently controlled  -Will hold Eliquis for now( recent fall and hematuria on UA)    5/2/25  tele: 88 and afib; no events overnight; con't eliquis 5 mg po bid with metoprolol and monitor        INOCENTE (acute kidney injury)  INOCENTE is likely due to pre-renal azotemia due to dehydration.   History of CKD  Baseline creatinine is 1.1 which was 4 months  "ago.   Most recent creatinine and eGFR are listed below.  No results for input(s): "CREATININE", "EGFRNORACEVR" in the last 72 hours.     Plan  - INOCENTE is worsening. Will adjust treatment as follows: iv Nacl 0.9% @ 75ml/hour  - Avoid nephrotoxins and renally dose meds for GFR listed above  - Monitor urine output, serial BMP, and adjust therapy as needed    5/1/25  INOCENTE improving. Continue lactated Ringers infusion     5/2/25  INOCENTE continues to improve; baseline 1.1; con't LR 75 ml/hr with goal of discontinuing tomorrow morning.      Debility  Patient with Acute on chronic debility due to age-related physical debility along with right foot deformity.The patient's latest AMPAC (Activity Measure for Post Acute Care) Score is listed below.    AM-PAC Score - How much help does the patient need for each activity listed  Basic Mobility Total Score: 12  Turning over in bed (including adjusting bedclothes, sheets and blankets)?: A little  Sitting down on and standing up from a chair with arms (e.g., wheelchair, bedside commode, etc.): A lot  Moving from lying on back to sitting on the side of the bed?: A little  Moving to and from a bed to a chair (including a wheelchair)?: A lot  Need to walk in hospital room?: Unable  Climbing 3-5 steps with a railing?: Unable    Plan  - PT/OT consulted     5/1/25  Per pt, she was ordered custom orthotics for her feet (mainly R foot) for safe WB during transfers and gait.  Presently, orthotics and shoes are not here for evaluation.    Pt would benefit from more rehab prior to returning home with elderly .     5/2/25  Orthotics at bedside; will continue PT with orthotics to assess ability to ambulate; otherwise continue fall precautions      Depression  -Continue with home duloxetine      Final Active Diagnoses:    Diagnosis Date Noted POA    PRINCIPAL PROBLEM:  Fall [W19.XXXA] 04/30/2025 Yes    Depression [F32.A]  Yes    INOCENTE (acute kidney injury) [N17.9] 09/11/2024 Yes    Longstanding " persistent atrial fibrillation [I48.11] 09/11/2024 Yes    Debility [R53.81] 09/11/2024 Yes      Problems Resolved During this Admission:       Discharged Condition: good    Disposition: Home or Self Care    Follow Up:    Patient Instructions:   No discharge procedures on file.    Significant Diagnostic Studies: N/A    Pending Diagnostic Studies:       None           Medications:  Reconciled Home Medications:      Medication List        START taking these medications      amoxicillin 500 MG capsule  Commonly known as: AMOXIL  Take 1 capsule (500 mg total) by mouth every 8 hours for 5 days.     gentamicin 0.3 % ophthalmic solution  Commonly known as: GARAMYCIN  Place 1 drop into the right eye 4 (four) times daily.            CONTINUE taking these medications      CULTURELLE IMMUNE DEFENSE ORAL  Take 1 tablet by mouth once daily.     cyanocobalamin 500 MCG tablet  Take 500 mcg by mouth once daily.     DULoxetine 60 MG capsule  Commonly known as: CYMBALTA  Take 1 capsule by mouth once daily.     ELIQUIS 5 mg Tab  Generic drug: apixaban  Take 5 mg by mouth 2 (two) times daily.     estradioL 2 MG tablet  Commonly known as: ESTRACE  Take 1 tablet by mouth once daily.     ferrous sulfate 325 (65 FE) MG EC tablet  Take 1 tablet (325 mg total) by mouth once daily.     fesoterodine 4 mg Tb24  Commonly known as: TOVIAZ  Take 4 mg by mouth once daily.     folic acid 1 MG tablet  Commonly known as: FOLVITE  Take 1 mg by mouth once daily.     gabapentin 600 MG tablet  Commonly known as: NEURONTIN  Take 1 tablet by mouth 2 (two) times a day.     metoprolol tartrate 25 MG tablet  Commonly known as: LOPRESSOR  Take 1 tablet by mouth once daily.     rosuvastatin 20 MG tablet  Commonly known as: CRESTOR  Take 20 mg by mouth once daily.              Indwelling Lines/Drains at time of discharge:   Lines/Drains/Airways       None                   Time spent on the discharge of patient: 35 minutes         Alexis Henning Jr,  MD  Department of Hospital Medicine  [facility] - [address] [place] [address]

## 2025-08-19 ENCOUNTER — HOSPITAL ENCOUNTER (EMERGENCY)
Facility: HOSPITAL | Age: 75
Discharge: HOME | End: 2025-08-20
Payer: MEDICARE

## 2025-08-19 DIAGNOSIS — N39.0 URINARY TRACT INFECTION WITHOUT HEMATURIA, SITE UNSPECIFIED: ICD-10-CM

## 2025-08-19 DIAGNOSIS — K62.89 PROCTITIS: Primary | ICD-10-CM

## 2025-08-19 DIAGNOSIS — D72.829 LEUKOCYTOSIS, UNSPECIFIED TYPE: ICD-10-CM

## 2025-08-19 LAB
ALBUMIN SERPL BCP-MCNC: 2.5 G/DL (ref 3.4–4.8)
ALBUMIN/GLOB SERPL: 0.7 {RATIO}
ALP SERPL-CCNC: 72 U/L (ref 40–150)
ALT SERPL W P-5'-P-CCNC: 8 U/L
ANION GAP SERPL CALCULATED.3IONS-SCNC: 11 MMOL/L (ref 7–16)
AST SERPL W P-5'-P-CCNC: 32 U/L (ref 11–45)
BASOPHILS # BLD AUTO: 0.1 K/UL (ref 0–0.2)
BASOPHILS NFR BLD AUTO: 0.5 % (ref 0–1)
BILIRUB SERPL-MCNC: 0.4 MG/DL
BILIRUB UR QL STRIP: NEGATIVE
BUN SERPL-MCNC: 22 MG/DL (ref 10–20)
BUN/CREAT SERPL: 21 (ref 6–20)
CALCIUM SERPL-MCNC: 8.9 MG/DL (ref 8.4–10.2)
CHLORIDE SERPL-SCNC: 106 MMOL/L (ref 98–107)
CLARITY UR: ABNORMAL
CO2 SERPL-SCNC: 26 MMOL/L (ref 23–31)
COLOR UR: YELLOW
CREAT SERPL-MCNC: 1.06 MG/DL (ref 0.55–1.02)
DIFFERENTIAL METHOD BLD: ABNORMAL
EGFR (NO RACE VARIABLE) (RUSH/TITUS): 55 ML/MIN/1.73M2
EOSINOPHIL # BLD AUTO: 0.46 K/UL (ref 0–0.5)
EOSINOPHIL NFR BLD AUTO: 2.5 % (ref 1–4)
ERYTHROCYTE [DISTWIDTH] IN BLOOD BY AUTOMATED COUNT: 14.7 % (ref 11.5–14.5)
GLOBULIN SER-MCNC: 3.7 G/DL (ref 2–4)
GLUCOSE SERPL-MCNC: 102 MG/DL (ref 82–115)
GLUCOSE UR STRIP-MCNC: NORMAL MG/DL
HCT VFR BLD AUTO: 35.6 % (ref 38–47)
HGB BLD-MCNC: 11.4 G/DL (ref 12–16)
IMM GRANULOCYTES # BLD AUTO: 0.13 K/UL (ref 0–0.04)
IMM GRANULOCYTES NFR BLD: 0.7 % (ref 0–0.4)
KETONES UR STRIP-SCNC: NEGATIVE MG/DL
LEUKOCYTE ESTERASE UR QL STRIP: ABNORMAL
LIPASE SERPL-CCNC: 9 U/L
LYMPHOCYTES # BLD AUTO: 2.17 K/UL (ref 1–4.8)
LYMPHOCYTES NFR BLD AUTO: 11.7 % (ref 27–41)
MAGNESIUM SERPL-MCNC: 1.7 MG/DL (ref 1.6–2.6)
MCH RBC QN AUTO: 30.2 PG (ref 27–31)
MCHC RBC AUTO-ENTMCNC: 32 G/DL (ref 32–36)
MCV RBC AUTO: 94.2 FL (ref 80–96)
MONOCYTES # BLD AUTO: 1.74 K/UL (ref 0–0.8)
MONOCYTES NFR BLD AUTO: 9.4 % (ref 2–6)
MPC BLD CALC-MCNC: 11.2 FL (ref 9.4–12.4)
NEUTROPHILS # BLD AUTO: 13.96 K/UL (ref 1.8–7.7)
NEUTROPHILS NFR BLD AUTO: 75.2 % (ref 53–65)
NITRITE UR QL STRIP: NEGATIVE
NRBC # BLD AUTO: 0 X10E3/UL
NRBC, AUTO (.00): 0 %
PH UR STRIP: 5.5 PH UNITS
PLATELET # BLD AUTO: 339 K/UL (ref 150–400)
POTASSIUM SERPL-SCNC: 3.3 MMOL/L (ref 3.5–5.1)
PROT SERPL-MCNC: 6.2 G/DL (ref 5.8–7.6)
PROT UR QL STRIP: 20
RBC # BLD AUTO: 3.78 M/UL (ref 4.2–5.4)
RBC # UR STRIP: ABNORMAL /UL
SODIUM SERPL-SCNC: 140 MMOL/L (ref 136–145)
SP GR UR STRIP: 1.02
UROBILINOGEN UR STRIP-ACNC: 4 MG/DL
WBC # BLD AUTO: 18.56 K/UL (ref 4.5–11)

## 2025-08-19 PROCEDURE — 81003 URINALYSIS AUTO W/O SCOPE: CPT | Performed by: NURSE PRACTITIONER

## 2025-08-19 PROCEDURE — 83735 ASSAY OF MAGNESIUM: CPT | Performed by: NURSE PRACTITIONER

## 2025-08-19 PROCEDURE — 36415 COLL VENOUS BLD VENIPUNCTURE: CPT | Performed by: NURSE PRACTITIONER

## 2025-08-19 PROCEDURE — 87077 CULTURE AEROBIC IDENTIFY: CPT | Performed by: NURSE PRACTITIONER

## 2025-08-19 PROCEDURE — 85025 COMPLETE CBC W/AUTO DIFF WBC: CPT | Performed by: NURSE PRACTITIONER

## 2025-08-19 PROCEDURE — 99285 EMERGENCY DEPT VISIT HI MDM: CPT | Mod: 25

## 2025-08-19 PROCEDURE — 80053 COMPREHEN METABOLIC PANEL: CPT | Performed by: NURSE PRACTITIONER

## 2025-08-19 PROCEDURE — 83690 ASSAY OF LIPASE: CPT | Performed by: NURSE PRACTITIONER

## 2025-08-20 VITALS
TEMPERATURE: 98 F | HEART RATE: 95 BPM | DIASTOLIC BLOOD PRESSURE: 71 MMHG | RESPIRATION RATE: 17 BRPM | WEIGHT: 215 LBS | HEIGHT: 71 IN | SYSTOLIC BLOOD PRESSURE: 140 MMHG | BODY MASS INDEX: 30.1 KG/M2 | OXYGEN SATURATION: 98 %

## 2025-08-20 LAB
BACTERIA #/AREA URNS HPF: ABNORMAL /HPF
RBC #/AREA URNS HPF: 3 /HPF
SQUAMOUS #/AREA URNS LPF: ABNORMAL /HPF
WBC #/AREA URNS HPF: 19 /HPF

## 2025-08-20 PROCEDURE — 25500020 PHARM REV CODE 255

## 2025-08-20 PROCEDURE — 25000003 PHARM REV CODE 250

## 2025-08-20 RX ORDER — IOPAMIDOL 755 MG/ML
100 INJECTION, SOLUTION INTRAVASCULAR
Status: COMPLETED | OUTPATIENT
Start: 2025-08-20 | End: 2025-08-20

## 2025-08-20 RX ORDER — POTASSIUM CHLORIDE 20 MEQ/1
40 TABLET, EXTENDED RELEASE ORAL
Status: COMPLETED | OUTPATIENT
Start: 2025-08-20 | End: 2025-08-20

## 2025-08-20 RX ORDER — CIPROFLOXACIN 500 MG/1
500 TABLET, FILM COATED ORAL 2 TIMES DAILY
Qty: 10 TABLET | Refills: 0 | Status: SHIPPED | OUTPATIENT
Start: 2025-08-20 | End: 2025-08-25

## 2025-08-20 RX ORDER — CIPROFLOXACIN 500 MG/1
500 TABLET, FILM COATED ORAL
Status: COMPLETED | OUTPATIENT
Start: 2025-08-20 | End: 2025-08-20

## 2025-08-20 RX ADMIN — POTASSIUM CHLORIDE 40 MEQ: 1500 TABLET, EXTENDED RELEASE ORAL at 01:08

## 2025-08-20 RX ADMIN — CIPROFLOXACIN 500 MG: 500 TABLET ORAL at 03:08

## 2025-08-20 RX ADMIN — IOPAMIDOL 100 ML: 755 INJECTION, SOLUTION INTRAVENOUS at 01:08

## 2025-08-22 LAB — UA COMPLETE W REFLEX CULTURE PNL UR: ABNORMAL

## (undated) DEVICE — TRAY SKIN SCRUB WET PREMIUM

## (undated) DEVICE — GLOVE SENSICARE PI SURG 6.5

## (undated) DEVICE — ELECTRODE BLADE INSULATED 1 IN

## (undated) DEVICE — BANDAGE GAUZE COT STRL 4.5X4.1

## (undated) DEVICE — CATH IV JELCO 20GX1 1/4IN

## (undated) DEVICE — APPLICATOR CHLORAPREP HI-LITE TINTED ORANGE 26ML

## (undated) DEVICE — SET IV SOL CONTIN-FLOW 10 DROP/ML (PRIMARY)

## (undated) DEVICE — COLLECTOR SPECIMEN ANAEROBIC

## (undated) DEVICE — NEEDLE SPINAL 22GX3.5 QUINCHE (KC)

## (undated) DEVICE — CATH IV JELCO 22GX1 IN

## (undated) DEVICE — SUT 2/0 36IN COATED VICRYL

## (undated) DEVICE — Device

## (undated) DEVICE — KIT IV START 849

## (undated) DEVICE — GLOVE SURGICAL PROTEXIS PI SIZE 6.5

## (undated) DEVICE — GLOVE SENSICARE PI GRN 6.5

## (undated) DEVICE — GLOVE SENSICARE PI SURG 7

## (undated) DEVICE — KIT MULTI-2 COOLED RF 17GA X 100MM

## (undated) DEVICE — TRAY NERVE BLOCK (KC) PMA

## (undated) DEVICE — SWAB AEROBIC CULTURETTE

## (undated) DEVICE — SOL NACL IRR 1000ML BTL

## (undated) DEVICE — SPONGE GAUZE 2X2 STERILE 8 PLY 2/PK

## (undated) DEVICE — SOL WATER STRL IRRIGATION 250ML

## (undated) DEVICE — GLOVE SENSICARE PI GRN 7

## (undated) DEVICE — DRAPE SURGICAL 3/4 SHEET 52IN X 76IN STERILE